# Patient Record
Sex: FEMALE | Race: WHITE | NOT HISPANIC OR LATINO | Employment: OTHER | URBAN - NONMETROPOLITAN AREA
[De-identification: names, ages, dates, MRNs, and addresses within clinical notes are randomized per-mention and may not be internally consistent; named-entity substitution may affect disease eponyms.]

---

## 2017-10-04 RX ORDER — DICYCLOMINE HYDROCHLORIDE 10 MG/1
10 CAPSULE ORAL 3 TIMES DAILY
COMMUNITY

## 2017-10-04 RX ORDER — CELECOXIB 200 MG/1
200 CAPSULE ORAL DAILY
COMMUNITY

## 2017-10-04 RX ORDER — LISINOPRIL 20 MG/1
20 TABLET ORAL DAILY
COMMUNITY

## 2017-10-04 RX ORDER — LORATADINE 10 MG/1
10 TABLET ORAL DAILY
COMMUNITY
End: 2017-10-05

## 2017-10-04 RX ORDER — ERGOCALCIFEROL 1.25 MG/1
50000 CAPSULE ORAL
COMMUNITY
End: 2017-10-05 | Stop reason: RX

## 2017-10-05 ENCOUNTER — OFFICE VISIT (OUTPATIENT)
Dept: NEUROLOGY | Facility: CLINIC | Age: 72
End: 2017-10-05

## 2017-10-05 VITALS
DIASTOLIC BLOOD PRESSURE: 52 MMHG | RESPIRATION RATE: 18 BRPM | HEART RATE: 72 BPM | WEIGHT: 128 LBS | SYSTOLIC BLOOD PRESSURE: 80 MMHG | HEIGHT: 64 IN | BODY MASS INDEX: 21.85 KG/M2

## 2017-10-05 DIAGNOSIS — R42 DIZZINESS: Primary | ICD-10-CM

## 2017-10-05 DIAGNOSIS — I10 ESSENTIAL (PRIMARY) HYPERTENSION: ICD-10-CM

## 2017-10-05 DIAGNOSIS — I95.1 ORTHOSTASIS: ICD-10-CM

## 2017-10-05 DIAGNOSIS — R26.89 IMBALANCE: ICD-10-CM

## 2017-10-05 DIAGNOSIS — H53.2 DIPLOPIA: ICD-10-CM

## 2017-10-05 PROCEDURE — 99204 OFFICE O/P NEW MOD 45 MIN: CPT | Performed by: PSYCHIATRY & NEUROLOGY

## 2017-10-05 RX ORDER — FUROSEMIDE 20 MG/1
20 TABLET ORAL AS NEEDED
COMMUNITY

## 2017-10-05 NOTE — PROGRESS NOTES
Subjective   Merline Burr, 1945, is a female who is being seen today for   Chief Complaint   Patient presents with   • Dizziness       HISTORY OF PRESENT ILLNESS: Patient seen for dizziness.  Patient says that when she gets up quickly she gets lightheaded or dizzy.  This does not a spinning sensation and she notes no acute hearing loss.  Patient has having some eye problems followed by Dr. Cyr see optometrist I do not have any of those reports for review.  Patient has never had her blood pressure checks standing.  Patient's blood pressure today is 120/80 sitting left arm left arm standing 80/52 with pulse 72.  Patient is on lisinopril and is to get back with PCP about hypotension and orthostasis.  Patient does drive and so far has not had any significant problems driving but is warned about potential risks.  Patient had a head cold early September and got a steroid shot and some antibiotics as well as starting steroids by mouth.  Patient had significant increase diplopia with this which was vertical diplopia.  Patient has had some blood work, do not have any of that for review.  Patient is had an MRI of the brain the CD of which is not available to review.  Patient's MRI shows scattered foci of signal abnormality within the subcortical and deep white matter most likely representing chronic small vessel ischemic changes.  Patient is been under a lot of stress recently and his son apparently recently passed away and she has lost weight.  Patient has inflammatory arthritis and takes tramadol.  Patient has a sister with dementia.    REVIEW OF SYSTEMS:   GENERAL: As above  PULMONARY: As above  CVS:  No acute chest pain or palpitation  GASTROINTESTINAL: Patient is had some recent nausea  GENITOURINARY: No acute  distress  GYN: No acute GYN distress  MUSCULOSKELETAL: As above  HEENT:  As above  ENDOCRINE:  No acute endocrine symptoms  PSYCHIATRIC: As above  HEMATOLOGY: No blood work for review  SKIN: No acute  skin changes  Family history reviewed and otherwise noncontributory  Social history: Patient does smoke.  Patient denies alcohol or drug use.    PHYSICAL EXAMINATION:    GENERAL: Patient very anxious during the exam  CRANIUM: No specific tenderness over the cranium with good strength/  Flexion and extension of neck  HEENT: No acute fundic abnormalities.  Pupils equal round reactive to light.       EYES: Fields full to confrontation and EOMs intact without nystagmus.  There is a slight possible right ptosis       EARS:  Tympanic membranes normal hears tuning fork bilaterally       THROAT: No oropharynx abnormalities and patient having no swallowing difficulties or shortness of breath       NECK:  No bruits/no lymphadenopathy  CHEST: No acute cardiopulmonary abnormalities by auscultation  ABDOMEN: Nondistended  EXTREMITIES: Pulses symmetrical  NEURO: Patient alert and follows commands without difficulty  SPEECH:  Normal    CRANIAL NERVES:  Motor sensory about the face otherwise normal and symmetric    MOTOR STRENGTH:  Motor strength upper and lower extremities normal  STATION AND GAIT:  Gait is normal/Romberg negative  CEREBELLAR:  Finger-nose and heel shin normal  SENSORY:  Pin and vibration shows decreased pin and vibration distal to proximal in lower extremities to the ankles bilaterally otherwise normal pin and vibration throughout  REFLEXES:  Decreased absent reflexes throughout upper and lower extremities without Babinski's      ASSESSMENT AND PLAN:  Patient with orthostasis and dizziness and imbalance.  Patient is to get MRA brain and EEG and noninvasive carotids and further blood work.  With the diplopia were checking myasthenia panel.  Patient is to have follow-up with PCP about her blood pressure dropping and watch for driving problems and safety precautions.      Merline was seen today for dizziness.    Diagnoses and all orders for this visit:    Dizziness  -     EEG; Future  -     MRI Angiogram Head Without  Contrast; Future  -     CBC & Differential; Future  -     Comprehensive Metabolic Panel; Future  -     Lipid Panel; Future  -     Magnesium; Future  -     Sedimentation Rate; Future  -     T4, Free; Future  -     Vitamin B12; Future  -     Folate; Future    Orthostasis    Imbalance  -     US Carotid Bilateral; Future    Diplopia  -     Myasthenia Gravis Full Panel; Future    Essential (primary) hypertension   -     Lipid Panel; Future  -     T4, Free; Future

## 2017-10-05 NOTE — PATIENT INSTRUCTIONS
Patient to get back with PCP about orthostasis.  Patient to take her blood pressures regularly sitting and standing or lying sitting and standing twice daily and keep diary for her PCP.  Patient to have driving and safety precautions.  Patient not to be climbing or working over hot fires or water or working with sharp cutting tools

## 2017-10-31 ENCOUNTER — HOSPITAL ENCOUNTER (OUTPATIENT)
Dept: NEUROLOGY | Facility: HOSPITAL | Age: 72
Discharge: HOME | End: 2017-10-31
Attending: PSYCHIATRY & NEUROLOGY | Admitting: PSYCHIATRY & NEUROLOGY

## 2017-10-31 ENCOUNTER — HOSPITAL ENCOUNTER (OUTPATIENT)
Dept: MRI IMAGING | Facility: HOSPITAL | Age: 72
Discharge: HOME | End: 2017-10-31
Attending: PSYCHIATRY & NEUROLOGY

## 2017-10-31 ENCOUNTER — HOSPITAL ENCOUNTER (OUTPATIENT)
Dept: ULTRASOUND IMAGING | Facility: HOSPITAL | Age: 72
Discharge: HOME | End: 2017-10-31
Attending: PSYCHIATRY & NEUROLOGY

## 2017-10-31 ENCOUNTER — LAB (OUTPATIENT)
Dept: LAB | Facility: HOSPITAL | Age: 72
End: 2017-10-31
Attending: PSYCHIATRY & NEUROLOGY

## 2017-10-31 DIAGNOSIS — I10 ESSENTIAL (PRIMARY) HYPERTENSION: ICD-10-CM

## 2017-10-31 DIAGNOSIS — R42 DIZZINESS: ICD-10-CM

## 2017-10-31 DIAGNOSIS — H53.2 DIPLOPIA: ICD-10-CM

## 2017-10-31 DIAGNOSIS — R26.89 IMBALANCE: ICD-10-CM

## 2017-10-31 LAB
ALBUMIN SERPL-MCNC: 4.2 G/DL (ref 3.5–5)
ALBUMIN/GLOB SERPL: 1.4 G/DL (ref 1.1–2.5)
ALP SERPL-CCNC: 94 U/L (ref 24–120)
ALT SERPL W P-5'-P-CCNC: 40 U/L (ref 0–54)
ANION GAP SERPL CALCULATED.3IONS-SCNC: 13 MMOL/L (ref 4–13)
ARTICHOKE IGE QN: 104 MG/DL (ref 0–99)
AST SERPL-CCNC: 28 U/L (ref 7–45)
BASOPHILS # BLD AUTO: 0.02 10*3/MM3 (ref 0–0.2)
BASOPHILS NFR BLD AUTO: 0.2 % (ref 0–2)
BILIRUB SERPL-MCNC: 0.3 MG/DL (ref 0.1–1)
BUN BLD-MCNC: 16 MG/DL (ref 5–21)
BUN/CREAT SERPL: 16.2 (ref 7–25)
CALCIUM SPEC-SCNC: 9.3 MG/DL (ref 8.4–10.4)
CHLORIDE SERPL-SCNC: 103 MMOL/L (ref 98–110)
CHOLEST SERPL-MCNC: 193 MG/DL (ref 130–200)
CO2 SERPL-SCNC: 27 MMOL/L (ref 24–31)
CREAT BLD-MCNC: 0.99 MG/DL (ref 0.5–1.4)
DEPRECATED RDW RBC AUTO: 46.9 FL (ref 40–54)
EOSINOPHIL # BLD AUTO: 0.03 10*3/MM3 (ref 0–0.7)
EOSINOPHIL NFR BLD AUTO: 0.3 % (ref 0–4)
ERYTHROCYTE [DISTWIDTH] IN BLOOD BY AUTOMATED COUNT: 13.7 % (ref 12–15)
ERYTHROCYTE [SEDIMENTATION RATE] IN BLOOD: 7 MM/HR (ref 0–20)
FOLATE SERPL-MCNC: 12.1 NG/ML
GFR SERPL CREATININE-BSD FRML MDRD: 55 ML/MIN/1.73
GLOBULIN UR ELPH-MCNC: 3 GM/DL
GLUCOSE BLD-MCNC: 83 MG/DL (ref 70–100)
HCT VFR BLD AUTO: 43.3 % (ref 37–47)
HDLC SERPL-MCNC: 63 MG/DL
HGB BLD-MCNC: 14.3 G/DL (ref 12–16)
IMM GRANULOCYTES # BLD: 0.01 10*3/MM3 (ref 0–0.03)
IMM GRANULOCYTES NFR BLD: 0.1 % (ref 0–5)
LDLC/HDLC SERPL: 1.87 {RATIO}
LYMPHOCYTES # BLD AUTO: 1.02 10*3/MM3 (ref 0.72–4.86)
LYMPHOCYTES NFR BLD AUTO: 11.2 % (ref 15–45)
MAGNESIUM SERPL-MCNC: 2.1 MG/DL (ref 1.4–2.2)
MCH RBC QN AUTO: 31.2 PG (ref 28–32)
MCHC RBC AUTO-ENTMCNC: 33 G/DL (ref 33–36)
MCV RBC AUTO: 94.3 FL (ref 82–98)
MONOCYTES # BLD AUTO: 0.57 10*3/MM3 (ref 0.19–1.3)
MONOCYTES NFR BLD AUTO: 6.3 % (ref 4–12)
NEUTROPHILS # BLD AUTO: 7.42 10*3/MM3 (ref 1.87–8.4)
NEUTROPHILS NFR BLD AUTO: 81.9 % (ref 39–78)
PLATELET # BLD AUTO: 326 10*3/MM3 (ref 130–400)
PMV BLD AUTO: 10.7 FL (ref 6–12)
POTASSIUM BLD-SCNC: 4.2 MMOL/L (ref 3.5–5.3)
PROT SERPL-MCNC: 7.2 G/DL (ref 6.3–8.7)
RBC # BLD AUTO: 4.59 10*6/MM3 (ref 4.2–5.4)
SODIUM BLD-SCNC: 143 MMOL/L (ref 135–145)
T4 FREE SERPL-MCNC: 1.22 NG/DL (ref 0.78–2.19)
TRIGL SERPL-MCNC: 61 MG/DL (ref 0–149)
VIT B12 BLD-MCNC: 389 PG/ML (ref 239–931)
WBC NRBC COR # BLD: 9.07 10*3/MM3 (ref 4.8–10.8)

## 2017-10-31 PROCEDURE — 93880 EXTRACRANIAL BILAT STUDY: CPT | Performed by: SURGERY

## 2017-10-31 PROCEDURE — 83735 ASSAY OF MAGNESIUM: CPT | Performed by: PSYCHIATRY & NEUROLOGY

## 2017-10-31 PROCEDURE — 95816 EEG AWAKE AND DROWSY: CPT

## 2017-10-31 PROCEDURE — 83519 RIA NONANTIBODY: CPT | Performed by: PSYCHIATRY & NEUROLOGY

## 2017-10-31 PROCEDURE — 82746 ASSAY OF FOLIC ACID SERUM: CPT | Performed by: PSYCHIATRY & NEUROLOGY

## 2017-10-31 PROCEDURE — 93880 EXTRACRANIAL BILAT STUDY: CPT

## 2017-10-31 PROCEDURE — 80053 COMPREHEN METABOLIC PANEL: CPT | Performed by: PSYCHIATRY & NEUROLOGY

## 2017-10-31 PROCEDURE — 80061 LIPID PANEL: CPT | Performed by: PSYCHIATRY & NEUROLOGY

## 2017-10-31 PROCEDURE — 82607 VITAMIN B-12: CPT | Performed by: PSYCHIATRY & NEUROLOGY

## 2017-10-31 PROCEDURE — 95816 EEG AWAKE AND DROWSY: CPT | Performed by: PSYCHIATRY & NEUROLOGY

## 2017-10-31 PROCEDURE — 85025 COMPLETE CBC W/AUTO DIFF WBC: CPT | Performed by: PSYCHIATRY & NEUROLOGY

## 2017-10-31 PROCEDURE — 86255 FLUORESCENT ANTIBODY SCREEN: CPT | Performed by: PSYCHIATRY & NEUROLOGY

## 2017-10-31 PROCEDURE — 76376 3D RENDER W/INTRP POSTPROCES: CPT

## 2017-10-31 PROCEDURE — 36415 COLL VENOUS BLD VENIPUNCTURE: CPT

## 2017-10-31 PROCEDURE — 85651 RBC SED RATE NONAUTOMATED: CPT | Performed by: PSYCHIATRY & NEUROLOGY

## 2017-10-31 PROCEDURE — 70544 MR ANGIOGRAPHY HEAD W/O DYE: CPT

## 2017-10-31 PROCEDURE — 84439 ASSAY OF FREE THYROXINE: CPT | Performed by: PSYCHIATRY & NEUROLOGY

## 2017-11-03 LAB
ACHR AB SER-SCNC: <0.03 NMOL/L (ref 0–0.24)
ACHR BLOCK AB/ACHR TOTAL SFR SER: 17 % (ref 0–25)
ACHR MOD AB/ACHR TOTAL SFR SER: <12 % (ref 0–20)
STRIA MUS AB TITR SER IF: NEGATIVE {TITER}

## 2017-11-10 ENCOUNTER — OFFICE VISIT (OUTPATIENT)
Dept: NEUROLOGY | Facility: CLINIC | Age: 72
End: 2017-11-10

## 2017-11-10 VITALS
OXYGEN SATURATION: 98 % | HEART RATE: 100 BPM | HEIGHT: 65 IN | BODY MASS INDEX: 21.33 KG/M2 | DIASTOLIC BLOOD PRESSURE: 100 MMHG | WEIGHT: 128 LBS | SYSTOLIC BLOOD PRESSURE: 160 MMHG

## 2017-11-10 DIAGNOSIS — R26.89 IMBALANCE: ICD-10-CM

## 2017-11-10 DIAGNOSIS — I10 HYPERTENSION, UNSPECIFIED TYPE: ICD-10-CM

## 2017-11-10 DIAGNOSIS — R42 DIZZINESS: Primary | ICD-10-CM

## 2017-11-10 PROCEDURE — 99406 BEHAV CHNG SMOKING 3-10 MIN: CPT | Performed by: CLINICAL NURSE SPECIALIST

## 2017-11-10 PROCEDURE — 99213 OFFICE O/P EST LOW 20 MIN: CPT | Performed by: CLINICAL NURSE SPECIALIST

## 2017-11-10 NOTE — PATIENT INSTRUCTIONS
"You Can Quit Smoking  If you are ready to quit smoking or are thinking about it, congratulations! You have chosen to help yourself be healthier and live longer! There are lots of different ways to quit smoking. Nicotine gum, nicotine patches, a nicotine inhaler, or nicotine nasal spray can help with physical craving. Hypnosis, support groups, and medicines help break the habit of smoking.  TIPS TO GET OFF AND STAY OFF CIGARETTES  · Learn to predict your moods. Do not let a bad situation be your excuse to have a cigarette. Some situations in your life might tempt you to have a cigarette.  · Ask friends and co-workers not to smoke around you.  · Make your home smoke-free.  · Never have \"just one\" cigarette. It leads to wanting another and another. Remind yourself of your decision to quit.  · On a card, make a list of your reasons for not smoking. Read it at least the same number of times a day as you have a cigarette. Tell yourself everyday, \"I do not want to smoke. I choose not to smoke.\"  · Ask someone at home or work to help you with your plan to quit smoking.  · Have something planned after you eat or have a cup of coffee. Take a walk or get other exercise to perk you up. This will help to keep you from overeating.  · Try a relaxation exercise to calm you down and decrease your stress. Remember, you may be tense and nervous the first two weeks after you quit. This will pass.  · Find new activities to keep your hands busy. Play with a pen, coin, or rubber band. Doodle or draw things on paper.  · Brush your teeth right after eating. This will help cut down the craving for the taste of tobacco after meals. You can try mouthwash too.  · Try gum, breath mints, or diet candy to keep something in your mouth.  IF YOU SMOKE AND WANT TO QUIT:  · Do not stock up on cigarettes. Never buy a carton. Wait until one pack is finished before you buy another.  · Never carry cigarettes with you at work or at home.  · Keep cigarettes " "as far away from you as possible. Leave them with someone else.  · Never carry matches or a lighter with you.  · Ask yourself, \"Do I need this cigarette or is this just a reflex?\"  · Bet with someone that you can quit. Put cigarette money in a [employer] bank every morning. If you smoke, you give up the money. If you do not smoke, by the end of the week, you keep the money.  · Keep trying. It takes 21 days to change a habit!  · Talk to your doctor about using medicines to help you quit. These include nicotine replacement gum, lozenges, or skin patches.     This information is not intended to replace advice given to you by your health care provider. Make sure you discuss any questions you have with your health care provider.     Document Released: 10/14/2010 Document Revised: 03/11/2013 Document Reviewed: 05/03/2016  Elsevier Interactive Patient Education ©2017 [employer]    "

## 2017-11-10 NOTE — PROGRESS NOTES
Subjective     Chief Complaint   Patient presents with   • Follow-up     Patient here to follow up from her Carotid Artery testing done on 10/31/2017.  She states the only time she has felt dizzy was while she had sinus pressure and since it has cleared up she hasn't had any dizzy spells.       Merline Burr is a 72 y.o. female right handed retiree. She is here today for follow up for dizziness and imabalance. She was last seen 10/2017 by Dr. Chaparro. Since then she tells me she has had resolution of the dizziness. At that visit she did have orthostatic BP and was to f/u with PCP. She did and per patient no adjustments to medications were done. She does have elevated BP today and no ortho stasis with sitting /standing BP. She would also have vertical diplopia with the dizziness and this has resovled as well.   She did have prior MRI brain that showed scattered foci of signal abnormality within the subcortical and deep white matter most likely representing chronic small vessel ischemic changes.  She also had 2decho, EEG, and carotid duplex and I have reviewed results with patient.     Dizziness   This is a new problem. The current episode started more than 1 month ago (9/2017). The problem occurs daily. The problem has been resolved. Pertinent negatives include no arthralgias, chest pain, fatigue, headaches, nausea, vomiting or weakness. Associated symptoms comments: Dizziness when standing, would have vertical  diplopia . Exacerbated by: sinus infection 9/2017, HTN, orthostatic hypotenson. She has tried nothing for the symptoms. The treatment provided significant relief.        Current Outpatient Prescriptions   Medication Sig Dispense Refill   • b complex-C-folic acid 1 MG capsule Take 1 capsule by mouth. Takes 2 caps twice a day     • celecoxib (CeleBREX) 200 MG capsule Take 200 mg by mouth Daily.     • dicyclomine (BENTYL) 10 MG capsule Take 10 mg by mouth 3 (Three) Times a Day.     • furosemide (LASIX) 20 MG  "tablet Take 20 mg by mouth As Needed.     • lisinopril (PRINIVIL,ZESTRIL) 20 MG tablet Take 20 mg by mouth Daily.       No current facility-administered medications for this visit.        Past Medical History:   Diagnosis Date   • Dizziness        Past Surgical History:   Procedure Laterality Date   • EYE SURGERY     • HAND SURGERY Left    • HIP SURGERY     • NECK SURGERY         family history is not on file.    Social History   Substance Use Topics   • Smoking status: Current Every Day Smoker     Types: Cigarettes   • Smokeless tobacco: Never Used   • Alcohol use No       Review of Systems   Constitutional: Negative.  Negative for fatigue.   HENT: Negative.  Negative for nosebleeds, rhinorrhea and sinus pressure.    Eyes: Negative.    Respiratory: Negative.  Negative for apnea and shortness of breath.    Cardiovascular: Negative.  Negative for chest pain.   Gastrointestinal: Negative.  Negative for constipation, diarrhea, nausea and vomiting.   Endocrine: Negative.    Genitourinary: Negative.  Negative for dysuria and frequency.   Musculoskeletal: Negative for arthralgias and gait problem.   Skin: Negative.    Allergic/Immunologic: Negative.    Neurological: Positive for dizziness. Negative for weakness and headaches.   Hematological: Negative.  Negative for adenopathy.   Psychiatric/Behavioral: Negative.  Negative for agitation, confusion and hallucinations.   All other systems reviewed and are negative.      Objective     /100 (BP Location: Left arm, Patient Position: Standing)  Pulse 100  Ht 65\" (165.1 cm)  Wt 128 lb (58.1 kg)  SpO2 98%  BMI 21.3 kg/m2, Body mass index is 21.3 kg/(m^2).    Physical Exam   Constitutional: She is oriented to person, place, and time. She appears well-developed and well-nourished.   HENT:   Head: Normocephalic and atraumatic.   Right Ear: External ear normal.   Left Ear: External ear normal.   Nose: Nose normal.   Mouth/Throat: Oropharynx is clear and moist.   Eyes: " Conjunctivae, EOM and lids are normal. Pupils are equal, round, and reactive to light. Right eye exhibits normal extraocular motion and no nystagmus. Left eye exhibits normal extraocular motion.   Neck: Normal range of motion. Neck supple. Carotid bruit is not present.   Cardiovascular: Normal rate, regular rhythm and normal heart sounds.    No murmur heard.  Pulmonary/Chest: Effort normal and breath sounds normal.   Abdominal: Soft. Bowel sounds are normal.   Musculoskeletal: Normal range of motion.   Neurological: She is alert and oriented to person, place, and time. She has normal strength and normal reflexes. She displays no tremor. No cranial nerve deficit or sensory deficit. She exhibits normal muscle tone. She displays a negative Romberg sign. Coordination and gait normal. GCS eye subscore is 4. GCS verbal subscore is 5. GCS motor subscore is 6.   Reflex Scores:       Tricep reflexes are 2+ on the right side and 2+ on the left side.       Bicep reflexes are 2+ on the right side and 2+ on the left side.       Brachioradialis reflexes are 2+ on the right side and 2+ on the left side.       Patellar reflexes are 2+ on the right side and 2+ on the left side.       Achilles reflexes are 2+ on the right side and 2+ on the left side.  CN II:  Visual fields full.  Pupils equally reactive to light  CN III, IV, VI:  Extraocular Muscles full with no signs of nystagmus  CN V:  Facial sensory is symmetric with no asymetries.  CN VII:  Facial motor symmetric  CN VIII:  Gross hearing intact bilaterally  CN IX:  Palate elevates symmetrically  CN X:  Palate elevates symmetrically  CN XI:  Shoulder shrug symmetric  CN XII:  Tongue is midline on protrusion   Skin: Skin is warm and dry.   Psychiatric: She has a normal mood and affect. Her speech is normal and behavior is normal. Cognition and memory are normal.   Nursing note and vitals reviewed.      Results for orders placed or performed in visit on 10/31/17   Comprehensive  Metabolic Panel   Result Value Ref Range    Glucose 83 70 - 100 mg/dL    BUN 16 5 - 21 mg/dL    Creatinine 0.99 0.50 - 1.40 mg/dL    Sodium 143 135 - 145 mmol/L    Potassium 4.2 3.5 - 5.3 mmol/L    Chloride 103 98 - 110 mmol/L    CO2 27.0 24.0 - 31.0 mmol/L    Calcium 9.3 8.4 - 10.4 mg/dL    Total Protein 7.2 6.3 - 8.7 g/dL    Albumin 4.20 3.50 - 5.00 g/dL    ALT (SGPT) 40 0 - 54 U/L    AST (SGOT) 28 7 - 45 U/L    Alkaline Phosphatase 94 24 - 120 U/L    Total Bilirubin 0.3 0.1 - 1.0 mg/dL    eGFR Non African Amer 55 (L) >60 mL/min/1.73    Globulin 3.0 gm/dL    A/G Ratio 1.4 1.1 - 2.5 g/dL    BUN/Creatinine Ratio 16.2 7.0 - 25.0    Anion Gap 13.0 4.0 - 13.0 mmol/L   Lipid Panel   Result Value Ref Range    Total Cholesterol 193 130 - 200 mg/dL    Triglycerides 61 0 - 149 mg/dL    HDL Cholesterol 63 >=50 mg/dL    LDL Cholesterol  104 (H) 0 - 99 mg/dL    LDL/HDL Ratio 1.87    Magnesium   Result Value Ref Range    Magnesium 2.1 1.4 - 2.2 mg/dL   Sedimentation Rate   Result Value Ref Range    Sed Rate 7 0 - 20 mm/hr   T4, Free   Result Value Ref Range    Free T4 1.22 0.78 - 2.19 ng/dL   Vitamin B12   Result Value Ref Range    Vitamin B-12 389 239 - 931 pg/mL   Folate   Result Value Ref Range    Folate 12.10 >2.75 ng/mL   Myasthenia Gravis Full Panel   Result Value Ref Range    AChR Binding Ab <0.03 0.00 - 0.24 nmol/L    AChR Blocking Abs 17 0 - 25 %    Acetylcholine Modulating Ab <12 0 - 20 %    Striated Muscle Antibody Negative Neg:<1:40   CBC Auto Differential   Result Value Ref Range    WBC 9.07 4.80 - 10.80 10*3/mm3    RBC 4.59 4.20 - 5.40 10*6/mm3    Hemoglobin 14.3 12.0 - 16.0 g/dL    Hematocrit 43.3 37.0 - 47.0 %    MCV 94.3 82.0 - 98.0 fL    MCH 31.2 28.0 - 32.0 pg    MCHC 33.0 33.0 - 36.0 g/dL    RDW 13.7 12.0 - 15.0 %    RDW-SD 46.9 40.0 - 54.0 fl    MPV 10.7 6.0 - 12.0 fL    Platelets 326 130 - 400 10*3/mm3    Neutrophil % 81.9 (H) 39.0 - 78.0 %    Lymphocyte % 11.2 (L) 15.0 - 45.0 %    Monocyte % 6.3 4.0 -  12.0 %    Eosinophil % 0.3 0.0 - 4.0 %    Basophil % 0.2 0.0 - 2.0 %    Immature Grans % 0.1 0.0 - 5.0 %    Neutrophils, Absolute 7.42 1.87 - 8.40 10*3/mm3    Lymphocytes, Absolute 1.02 0.72 - 4.86 10*3/mm3    Monocytes, Absolute 0.57 0.19 - 1.30 10*3/mm3    Eosinophils, Absolute 0.03 0.00 - 0.70 10*3/mm3    Basophils, Absolute 0.02 0.00 - 0.20 10*3/mm3    Immature Grans, Absolute 0.01 0.00 - 0.03 10*3/mm3      MRA HEAD: IMPRESSION:  No evidence of high-grade stenosis or occlusion seen in the  arteries of the [place].    CAROTID DUPLEX: IMPRESSION:  Impression:  1. There is less than 50% stenosis of the right internal carotid artery.  2. There is less than 50% stenosis of the left internal carotid artery.  3. Antegrade flow is demonstrated in bilateral vertebral arteries.    EEG: RESULTS:  EEG background activity the record is low amplitude and somewhat obscured by movement artifact at times while awake.  Background is probably  8-9 Hz in the posterior head region bilaterally there is possible beta activity over both hemispheres/possibly secondary to medication effect.  Hyperventilation produces no significant changes.  Photic stimulation no change.  Patient gets drowsy with further generalized slowing the background activity noted     IMPRESSION:  Normal EEG awake/drowsy.  There is some artifact as noted above.         ASSESSMENT/PLAN    Diagnoses and all orders for this visit:    Dizziness    Imbalance    Hypertension, unspecified type      MEDICAL DECISION MAKIN. Resolution of dizziness and diplopia  2. BP elevated today. Patient is to monitor and if not improved she is to contact PCP  3. Will see one more time in f/u and if no further episodes will likely seen PRN.  4. I advised the patient of the risks in continuing to use tobacco, and I provided this patient with smoking cessation educational materials.  I also discussed how to quit smoking and the patient has expressed the willingness to quit.       During this visit, I spent 3-10 mintues counseling the patient regarding smoking cessation.   5. Healthy BMI     allergies and all known medications/prescriptions have been reviewed using resources available on this encounter.    Return in about 3 months (around 2/10/2018).        DUSTIN Cordova

## 2018-05-15 ENCOUNTER — OFFICE VISIT (OUTPATIENT)
Dept: FAMILY MEDICINE CLINIC | Age: 73
End: 2018-05-15
Payer: MEDICARE

## 2018-05-15 VITALS
DIASTOLIC BLOOD PRESSURE: 60 MMHG | OXYGEN SATURATION: 98 % | SYSTOLIC BLOOD PRESSURE: 120 MMHG | BODY MASS INDEX: 21.78 KG/M2 | WEIGHT: 127.6 LBS | HEART RATE: 82 BPM | TEMPERATURE: 98.8 F | RESPIRATION RATE: 16 BRPM | HEIGHT: 64 IN

## 2018-05-15 DIAGNOSIS — G89.29 CHRONIC PAIN OF MULTIPLE JOINTS: ICD-10-CM

## 2018-05-15 DIAGNOSIS — R60.9 SWELLING: ICD-10-CM

## 2018-05-15 DIAGNOSIS — I10 ESSENTIAL HYPERTENSION: ICD-10-CM

## 2018-05-15 DIAGNOSIS — M19.90 ARTHRITIS: Primary | ICD-10-CM

## 2018-05-15 DIAGNOSIS — K58.9 IRRITABLE BOWEL SYNDROME, UNSPECIFIED TYPE: ICD-10-CM

## 2018-05-15 DIAGNOSIS — M25.50 CHRONIC PAIN OF MULTIPLE JOINTS: ICD-10-CM

## 2018-05-15 PROCEDURE — 99204 OFFICE O/P NEW MOD 45 MIN: CPT | Performed by: FAMILY MEDICINE

## 2018-05-15 RX ORDER — CELECOXIB 200 MG/1
200 CAPSULE ORAL DAILY
Qty: 30 CAPSULE | Refills: 0 | Status: SHIPPED | OUTPATIENT
Start: 2018-05-15 | End: 2018-06-19 | Stop reason: SDUPTHER

## 2018-05-15 RX ORDER — CELECOXIB 200 MG/1
200 CAPSULE ORAL
COMMUNITY
End: 2018-05-15 | Stop reason: SDUPTHER

## 2018-05-15 RX ORDER — TRAMADOL HYDROCHLORIDE 50 MG/1
50 TABLET ORAL 2 TIMES DAILY
COMMUNITY
End: 2018-05-15 | Stop reason: SDUPTHER

## 2018-05-15 RX ORDER — FUROSEMIDE 20 MG/1
20 TABLET ORAL
COMMUNITY
End: 2018-05-15 | Stop reason: SDUPTHER

## 2018-05-15 RX ORDER — LISINOPRIL 20 MG/1
TABLET ORAL
COMMUNITY
Start: 2018-05-04 | End: 2019-05-16 | Stop reason: SDUPTHER

## 2018-05-15 RX ORDER — TRAMADOL HYDROCHLORIDE 50 MG/1
50 TABLET ORAL 2 TIMES DAILY PRN
Qty: 60 TABLET | Refills: 0 | Status: SHIPPED | OUTPATIENT
Start: 2018-05-15 | End: 2018-06-19 | Stop reason: SDUPTHER

## 2018-05-15 RX ORDER — DICYCLOMINE HYDROCHLORIDE 10 MG/1
10 CAPSULE ORAL
COMMUNITY
End: 2019-02-19 | Stop reason: SDUPTHER

## 2018-05-15 RX ORDER — FUROSEMIDE 20 MG/1
20 TABLET ORAL DAILY PRN
Qty: 30 TABLET | Refills: 0 | Status: SHIPPED | OUTPATIENT
Start: 2018-05-15 | End: 2019-09-17 | Stop reason: SDUPTHER

## 2018-05-15 ASSESSMENT — PATIENT HEALTH QUESTIONNAIRE - PHQ9
2. FEELING DOWN, DEPRESSED OR HOPELESS: 1
1. LITTLE INTEREST OR PLEASURE IN DOING THINGS: 1
SUM OF ALL RESPONSES TO PHQ9 QUESTIONS 1 & 2: 2
SUM OF ALL RESPONSES TO PHQ QUESTIONS 1-9: 2

## 2018-05-15 ASSESSMENT — ENCOUNTER SYMPTOMS
EYE PAIN: 0
ABDOMINAL PAIN: 0
WHEEZING: 0
BACK PAIN: 0
COUGH: 0
TROUBLE SWALLOWING: 0
CONSTIPATION: 0
EYE DISCHARGE: 0
SHORTNESS OF BREATH: 0
DIARRHEA: 0
NAUSEA: 0
VOMITING: 0

## 2018-05-18 ENCOUNTER — TELEPHONE (OUTPATIENT)
Dept: FAMILY MEDICINE CLINIC | Age: 73
End: 2018-05-18

## 2018-06-19 ENCOUNTER — OFFICE VISIT (OUTPATIENT)
Dept: FAMILY MEDICINE CLINIC | Age: 73
End: 2018-06-19
Payer: MEDICARE

## 2018-06-19 VITALS
RESPIRATION RATE: 16 BRPM | HEIGHT: 64 IN | HEART RATE: 70 BPM | OXYGEN SATURATION: 97 % | WEIGHT: 128.2 LBS | BODY MASS INDEX: 21.89 KG/M2 | SYSTOLIC BLOOD PRESSURE: 138 MMHG | TEMPERATURE: 98.8 F | DIASTOLIC BLOOD PRESSURE: 84 MMHG

## 2018-06-19 DIAGNOSIS — M19.90 ARTHRITIS: ICD-10-CM

## 2018-06-19 DIAGNOSIS — M25.50 CHRONIC PAIN OF MULTIPLE JOINTS: ICD-10-CM

## 2018-06-19 DIAGNOSIS — G89.29 CHRONIC PAIN OF MULTIPLE JOINTS: ICD-10-CM

## 2018-06-19 DIAGNOSIS — J34.89 POSTERIOR RHINORRHEA: ICD-10-CM

## 2018-06-19 DIAGNOSIS — J01.00 ACUTE MAXILLARY SINUSITIS, RECURRENCE NOT SPECIFIED: Primary | ICD-10-CM

## 2018-06-19 DIAGNOSIS — Z23 NEED FOR PROPHYLACTIC VACCINATION AGAINST STREPTOCOCCUS PNEUMONIAE (PNEUMOCOCCUS): ICD-10-CM

## 2018-06-19 PROCEDURE — 90471 IMMUNIZATION ADMIN: CPT | Performed by: FAMILY MEDICINE

## 2018-06-19 PROCEDURE — 99214 OFFICE O/P EST MOD 30 MIN: CPT | Performed by: FAMILY MEDICINE

## 2018-06-19 PROCEDURE — 90670 PCV13 VACCINE IM: CPT | Performed by: FAMILY MEDICINE

## 2018-06-19 RX ORDER — TRIAMCINOLONE ACETONIDE 40 MG/ML
40 INJECTION, SUSPENSION INTRA-ARTICULAR; INTRAMUSCULAR ONCE
Status: COMPLETED | OUTPATIENT
Start: 2018-06-19 | End: 2018-06-19

## 2018-06-19 RX ORDER — FLUTICASONE PROPIONATE 50 MCG
1 SPRAY, SUSPENSION (ML) NASAL DAILY
Qty: 1 BOTTLE | Refills: 3 | Status: SHIPPED | OUTPATIENT
Start: 2018-06-19 | End: 2018-11-20 | Stop reason: SDUPTHER

## 2018-06-19 RX ORDER — TRAMADOL HYDROCHLORIDE 50 MG/1
50 TABLET ORAL 2 TIMES DAILY PRN
Qty: 60 TABLET | Refills: 2 | Status: SHIPPED | OUTPATIENT
Start: 2018-06-19 | End: 2018-08-16 | Stop reason: SDUPTHER

## 2018-06-19 RX ORDER — CELECOXIB 200 MG/1
200 CAPSULE ORAL DAILY
Qty: 30 CAPSULE | Refills: 2 | Status: SHIPPED | OUTPATIENT
Start: 2018-06-19 | End: 2022-08-03

## 2018-06-19 RX ORDER — AMOXICILLIN 500 MG/1
500 CAPSULE ORAL 2 TIMES DAILY
Qty: 20 CAPSULE | Refills: 0 | Status: SHIPPED | OUTPATIENT
Start: 2018-06-19 | End: 2018-06-29

## 2018-06-19 RX ADMIN — TRIAMCINOLONE ACETONIDE 40 MG: 40 INJECTION, SUSPENSION INTRA-ARTICULAR; INTRAMUSCULAR at 11:59

## 2018-06-19 NOTE — PROGRESS NOTES
Social History   Substance Use Topics    Smoking status: Current Every Day Smoker     Packs/day: 0.50     Years: 10.00     Types: Cigarettes    Smokeless tobacco: Never Used    Alcohol use No       Family History   Problem Relation Age of Onset    Other Other         Son DVT       /84 (Site: Right Arm, Position: Sitting, Cuff Size: Large Adult)   Pulse 70   Temp 98.8 °F (37.1 °C) (Temporal)   Resp 16   Ht 5' 3.5\" (1.613 m)   Wt 128 lb 3.2 oz (58.2 kg)   SpO2 97%   BMI 22.35 kg/m²     Physical Exam   Constitutional: She is oriented to person, place, and time. She appears well-developed and well-nourished. No distress. HENT:   Head: Normocephalic and atraumatic. Right Ear: External ear normal.   Left Ear: External ear normal.   Nose: Nose normal.   Mouth/Throat: No oropharyngeal exudate. Posterior rhinorrhea with maxillary sinus tenderness   Eyes: Conjunctivae and EOM are normal. Right eye exhibits no discharge. Left eye exhibits no discharge. No scleral icterus. Neck: Neck supple. No tracheal deviation present. No thyromegaly present. Cardiovascular: Normal rate, regular rhythm, normal heart sounds and intact distal pulses. Exam reveals no gallop and no friction rub. No murmur heard. Pulmonary/Chest: Effort normal and breath sounds normal. No respiratory distress. She has no wheezes. She has no rales. Abdominal: Soft. Bowel sounds are normal. She exhibits no distension. There is no tenderness. Musculoskeletal: She exhibits edema (mild edema bilateral feet) and deformity (Deformitites noted of fingers and toes. Ulnar deviation of fingers. ). Lymphadenopathy:     She has no cervical adenopathy. Neurological: She is alert and oriented to person, place, and time. No cranial nerve deficit. Skin: Skin is warm and dry. No rash noted. She is not diaphoretic. No erythema. Psychiatric: She has a normal mood and affect.  Her behavior is normal. Thought content normal.   Nursing note and vitals reviewed. Assessment:    ICD-10-CM ICD-9-CM    1. Acute maxillary sinusitis, recurrence not specified J01.00 461.0 amoxicillin (AMOXIL) 500 MG capsule   2. Posterior rhinorrhea J34.89 478.19 triamcinolone acetonide (KENALOG-40) injection 40 mg      fluticasone (FLONASE) 50 MCG/ACT nasal spray   3. Arthritis M19.90 716.90 traMADol (ULTRAM) 50 MG tablet      celecoxib (CELEBREX) 200 MG capsule    Jean Casas reviewed and scanned in medical record. Stressed proper use of her medication. We'll continue to monitor and adjust treatment as course dictates. 4. Chronic pain of multiple joints M25.50 719.49 traMADol (ULTRAM) 50 MG tablet    G89.29 338.29    5. Need for prophylactic vaccination against Streptococcus pneumoniae (pneumococcus) Z23 V03.82 Pneumococcal conjugate vaccine 13-valent PCV13       Plan:  Discussed diagnosis, expected course, and proper use of medication, including OTC medications if prescription is too expensive or insurance does not cover. Discussed signs and symptoms requiring medical attention. All questions were answered and patient voiced understanding and agreement with plan as discussed. Orders Placed This Encounter   Procedures    Pneumococcal conjugate vaccine 13-valent PCV13     Orders Placed This Encounter   Medications    traMADol (ULTRAM) 50 MG tablet     Sig: Take 1 tablet by mouth 2 times daily as needed for Pain for up to 90 days. .     Dispense:  60 tablet     Refill:  2    celecoxib (CELEBREX) 200 MG capsule     Sig: Take 1 capsule by mouth daily     Dispense:  30 capsule     Refill:  2    amoxicillin (AMOXIL) 500 MG capsule     Sig: Take 1 capsule by mouth 2 times daily for 10 days     Dispense:  20 capsule     Refill:  0    triamcinolone acetonide (KENALOG-40) injection 40 mg    fluticasone (FLONASE) 50 MCG/ACT nasal spray     Si spray by Nasal route daily     Dispense:  1 Bottle     Refill:  3     Medications Discontinued During This Encounter

## 2018-07-05 ASSESSMENT — ENCOUNTER SYMPTOMS
EYE PAIN: 0
CONSTIPATION: 0
SHORTNESS OF BREATH: 0
EYE DISCHARGE: 0
ABDOMINAL PAIN: 0
NAUSEA: 0
SINUS PRESSURE: 1
COUGH: 0
DIARRHEA: 0
VOMITING: 0
RHINORRHEA: 1
WHEEZING: 0

## 2018-07-06 NOTE — PATIENT INSTRUCTIONS
Patient Education        Sinusitis: Care Instructions  Your Care Instructions    Sinusitis is an infection of the lining of the sinus cavities in your head. Sinusitis often follows a cold. It causes pain and pressure in your head and face. In most cases, sinusitis gets better on its own in 1 to 2 weeks. But some mild symptoms may last for several weeks. Sometimes antibiotics are needed. Follow-up care is a key part of your treatment and safety. Be sure to make and go to all appointments, and call your doctor if you are having problems. It's also a good idea to know your test results and keep a list of the medicines you take. How can you care for yourself at home? · Take an over-the-counter pain medicine, such as acetaminophen (Tylenol), ibuprofen (Advil, Motrin), or naproxen (Aleve). Read and follow all instructions on the label. · If the doctor prescribed antibiotics, take them as directed. Do not stop taking them just because you feel better. You need to take the full course of antibiotics. · Be careful when taking over-the-counter cold or flu medicines and Tylenol at the same time. Many of these medicines have acetaminophen, which is Tylenol. Read the labels to make sure that you are not taking more than the recommended dose. Too much acetaminophen (Tylenol) can be harmful. · Breathe warm, moist air from a steamy shower, a hot bath, or a sink filled with hot water. Avoid cold, dry air. Using a humidifier in your home may help. Follow the directions for cleaning the machine. · Use saline (saltwater) nasal washes to help keep your nasal passages open and wash out mucus and bacteria. You can buy saline nose drops at a grocery store or drugstore. Or you can make your own at home by adding 1 teaspoon of salt and 1 teaspoon of baking soda to 2 cups of distilled water. If you make your own, fill a bulb syringe with the solution, insert the tip into your nostril, and squeeze gently. Geraldine Estefania your nose.   · Put a hot, wet towel or a warm gel pack on your face 3 or 4 times a day for 5 to 10 minutes each time. · Try a decongestant nasal spray like oxymetazoline (Afrin). Do not use it for more than 3 days in a row. Using it for more than 3 days can make your congestion worse. When should you call for help? Call your doctor now or seek immediate medical care if:    · You have new or worse swelling or redness in your face or around your eyes.     · You have a new or higher fever.    Watch closely for changes in your health, and be sure to contact your doctor if:    · You have new or worse facial pain.     · The mucus from your nose becomes thicker (like pus) or has new blood in it.     · You are not getting better as expected. Where can you learn more? Go to [email or web address] and sign in to your [employer] account. Enter V287 in the Alter-G box to learn more about \"Sinusitis: Care Instructions. \"     If you do not have an account, please click on the \"Sign Up Now\" link. Current as of: May 12, 2017  Content Version: 11.6  © 3500-6080 [employer]. Care instructions adapted under license by [facility] ([facility]). If you have questions about a medical condition or this instruction, always ask your healthcare professional. [address] any warranty or liability for your use of this information.

## 2018-08-15 ENCOUNTER — TELEPHONE (OUTPATIENT)
Dept: FAMILY MEDICINE CLINIC | Age: 73
End: 2018-08-15

## 2018-08-15 DIAGNOSIS — K04.7 TOOTH ABSCESS: Primary | ICD-10-CM

## 2018-08-15 RX ORDER — AMOXICILLIN 500 MG/1
500 CAPSULE ORAL 3 TIMES DAILY
Qty: 30 CAPSULE | Refills: 0 | Status: SHIPPED | OUTPATIENT
Start: 2018-08-15 | End: 2018-08-25

## 2018-08-16 ENCOUNTER — OFFICE VISIT (OUTPATIENT)
Dept: FAMILY MEDICINE CLINIC | Age: 73
End: 2018-08-16
Payer: MEDICARE

## 2018-08-16 VITALS
HEART RATE: 74 BPM | WEIGHT: 132.8 LBS | SYSTOLIC BLOOD PRESSURE: 106 MMHG | RESPIRATION RATE: 16 BRPM | BODY MASS INDEX: 23.53 KG/M2 | DIASTOLIC BLOOD PRESSURE: 60 MMHG | OXYGEN SATURATION: 98 % | HEIGHT: 63 IN | TEMPERATURE: 98.8 F

## 2018-08-16 DIAGNOSIS — I10 ESSENTIAL HYPERTENSION: ICD-10-CM

## 2018-08-16 DIAGNOSIS — M25.50 CHRONIC PAIN OF MULTIPLE JOINTS: ICD-10-CM

## 2018-08-16 DIAGNOSIS — G89.29 CHRONIC PAIN OF MULTIPLE JOINTS: ICD-10-CM

## 2018-08-16 DIAGNOSIS — K04.7 TOOTH ABSCESS: ICD-10-CM

## 2018-08-16 DIAGNOSIS — M19.90 ARTHRITIS: Primary | ICD-10-CM

## 2018-08-16 PROCEDURE — 99214 OFFICE O/P EST MOD 30 MIN: CPT | Performed by: FAMILY MEDICINE

## 2018-08-16 RX ORDER — TRAMADOL HYDROCHLORIDE 50 MG/1
50 TABLET ORAL 2 TIMES DAILY PRN
Qty: 60 TABLET | Refills: 1 | Status: SHIPPED | OUTPATIENT
Start: 2018-08-16 | End: 2018-11-20 | Stop reason: SDUPTHER

## 2018-08-16 ASSESSMENT — ENCOUNTER SYMPTOMS
SHORTNESS OF BREATH: 0
DIARRHEA: 0
BACK PAIN: 0
NAUSEA: 0
TROUBLE SWALLOWING: 0
VOMITING: 0
CONSTIPATION: 0
ABDOMINAL PAIN: 0
COUGH: 0
EYE PAIN: 0
WHEEZING: 0
EYE DISCHARGE: 0

## 2018-08-16 NOTE — PROGRESS NOTES
of fingers. ). She exhibits no edema (no appreciable lower extermity edema). Lymphadenopathy:     She has no cervical adenopathy. Neurological: She is alert and oriented to person, place, and time. No cranial nerve deficit. Skin: Skin is warm and dry. No rash noted. She is not diaphoretic. No erythema. Psychiatric: She has a normal mood and affect. Her behavior is normal. Thought content normal.   Nursing note and vitals reviewed. Assessment:    ICD-10-CM ICD-9-CM    1. Arthritis M19.90 716.90 traMADol (ULTRAM) 50 MG tablet    [place] reviewed and scanned into medical record. Stressed proper use of medication and using in as needed fashion only. Patient coming in early and having a refill left of the pharmacy discussed the option of providing a prescription with one refill into her pharmacy with plans for follow-up in 3 months. She was agreeable with this plan. 2. Chronic pain of multiple joints M25.50 719.49 traMADol (ULTRAM) 50 MG tablet    G89.29 338.29    3. Tooth abscess K04.7 522.5 Discussed proper care of involved tooth. Antibiotics of previously been sent to the pharmacy for her to get started on. Stress importance of keeping follow-up with her dentist.    4. Essential hypertension I10 401.9 Controlled with current medication we'll continue to monitor and adjust as course dictates. Plan:  Discussed proper use of medication. Discussed signs and symptoms requiring medical attention. All questions were answered and patient voiced understanding and agreement with plan as discussed. No orders of the defined types were placed in this encounter. Orders Placed This Encounter   Medications    traMADol (ULTRAM) 50 MG tablet     Sig: Take 1 tablet by mouth 2 times daily as needed for Pain for up to 60 days. .     Dispense:  60 tablet     Refill:  1     Medications Discontinued During This Encounter   Medication Reason    traMADol (ULTRAM) 50 MG tablet REORDER     Patient Instructions Patient given educational handouts and has had all questions answered. Patient voices understanding and agrees to plans along with risks and benefits of plan. Patient is instructed to continue prior meds, diet, and exercise plans as instructed. Patient agrees to follow up as instructed and sooner if needed. Patient agrees to go to ER if condition becomes emergent. Return in about 3 months (around 11/16/2018), or if symptoms worsen or fail to improve.

## 2018-11-20 ENCOUNTER — OFFICE VISIT (OUTPATIENT)
Dept: FAMILY MEDICINE CLINIC | Age: 73
End: 2018-11-20
Payer: MEDICARE

## 2018-11-20 VITALS
BODY MASS INDEX: 23.22 KG/M2 | HEART RATE: 86 BPM | DIASTOLIC BLOOD PRESSURE: 77 MMHG | OXYGEN SATURATION: 98 % | TEMPERATURE: 97.6 F | SYSTOLIC BLOOD PRESSURE: 138 MMHG | WEIGHT: 136 LBS | HEIGHT: 64 IN

## 2018-11-20 DIAGNOSIS — M25.50 CHRONIC PAIN OF MULTIPLE JOINTS: ICD-10-CM

## 2018-11-20 DIAGNOSIS — K06.2 DENTURE IRRITATION: ICD-10-CM

## 2018-11-20 DIAGNOSIS — J06.9 VIRAL URI: Primary | ICD-10-CM

## 2018-11-20 DIAGNOSIS — M19.90 ARTHRITIS: ICD-10-CM

## 2018-11-20 DIAGNOSIS — J34.89 POSTERIOR RHINORRHEA: ICD-10-CM

## 2018-11-20 DIAGNOSIS — G89.29 CHRONIC PAIN OF MULTIPLE JOINTS: ICD-10-CM

## 2018-11-20 PROCEDURE — 99214 OFFICE O/P EST MOD 30 MIN: CPT | Performed by: FAMILY MEDICINE

## 2018-11-20 RX ORDER — TRAMADOL HYDROCHLORIDE 50 MG/1
50 TABLET ORAL 2 TIMES DAILY PRN
Qty: 60 TABLET | Refills: 2 | Status: SHIPPED | OUTPATIENT
Start: 2018-11-20 | End: 2019-02-19 | Stop reason: SDUPTHER

## 2018-11-20 RX ORDER — FLUTICASONE PROPIONATE 50 MCG
1 SPRAY, SUSPENSION (ML) NASAL DAILY
Qty: 1 BOTTLE | Refills: 3 | Status: SHIPPED | OUTPATIENT
Start: 2018-11-20 | End: 2019-06-03 | Stop reason: SDUPTHER

## 2018-11-20 ASSESSMENT — ENCOUNTER SYMPTOMS
SINUS PRESSURE: 1
EYE PAIN: 0
COUGH: 0
TROUBLE SWALLOWING: 0
BACK PAIN: 0
NAUSEA: 0
VOMITING: 0
RHINORRHEA: 1
DIARRHEA: 0
CONSTIPATION: 0
ABDOMINAL PAIN: 0
EYE DISCHARGE: 0
SHORTNESS OF BREATH: 0
SINUS PAIN: 1

## 2019-02-19 ENCOUNTER — OFFICE VISIT (OUTPATIENT)
Dept: FAMILY MEDICINE CLINIC | Age: 74
End: 2019-02-19
Payer: MEDICARE

## 2019-02-19 VITALS
BODY MASS INDEX: 24.1 KG/M2 | SYSTOLIC BLOOD PRESSURE: 128 MMHG | TEMPERATURE: 98.8 F | HEART RATE: 82 BPM | WEIGHT: 136 LBS | DIASTOLIC BLOOD PRESSURE: 82 MMHG | HEIGHT: 63 IN | OXYGEN SATURATION: 97 % | RESPIRATION RATE: 16 BRPM

## 2019-02-19 DIAGNOSIS — M19.90 ARTHRITIS: Primary | ICD-10-CM

## 2019-02-19 DIAGNOSIS — Z79.899 MEDICATION MANAGEMENT: ICD-10-CM

## 2019-02-19 DIAGNOSIS — M25.50 CHRONIC PAIN OF MULTIPLE JOINTS: ICD-10-CM

## 2019-02-19 DIAGNOSIS — G89.29 CHRONIC PAIN OF MULTIPLE JOINTS: ICD-10-CM

## 2019-02-19 PROCEDURE — 99213 OFFICE O/P EST LOW 20 MIN: CPT | Performed by: FAMILY MEDICINE

## 2019-02-19 RX ORDER — DICYCLOMINE HYDROCHLORIDE 10 MG/1
10 CAPSULE ORAL
Qty: 120 CAPSULE | Refills: 2 | Status: SHIPPED | OUTPATIENT
Start: 2019-02-19 | End: 2020-04-23 | Stop reason: SDUPTHER

## 2019-02-19 RX ORDER — TRAMADOL HYDROCHLORIDE 50 MG/1
TABLET ORAL
COMMUNITY
End: 2019-03-23 | Stop reason: SDUPTHER

## 2019-02-19 RX ORDER — TRAMADOL HYDROCHLORIDE 50 MG/1
50 TABLET ORAL 2 TIMES DAILY PRN
Qty: 60 TABLET | Refills: 2 | Status: SHIPPED | OUTPATIENT
Start: 2019-02-19 | End: 2019-05-16 | Stop reason: SDUPTHER

## 2019-02-19 ASSESSMENT — PATIENT HEALTH QUESTIONNAIRE - PHQ9
SUM OF ALL RESPONSES TO PHQ9 QUESTIONS 1 & 2: 0
SUM OF ALL RESPONSES TO PHQ QUESTIONS 1-9: 0
1. LITTLE INTEREST OR PLEASURE IN DOING THINGS: 0
SUM OF ALL RESPONSES TO PHQ QUESTIONS 1-9: 0
2. FEELING DOWN, DEPRESSED OR HOPELESS: 0

## 2019-02-20 ASSESSMENT — ENCOUNTER SYMPTOMS
CONSTIPATION: 0
TROUBLE SWALLOWING: 0
RHINORRHEA: 0
VOMITING: 0
COUGH: 0
NAUSEA: 0
DIARRHEA: 0
EYE PAIN: 0
EYE DISCHARGE: 0
SHORTNESS OF BREATH: 0

## 2019-03-19 ENCOUNTER — TELEPHONE (OUTPATIENT)
Dept: FAMILY MEDICINE CLINIC | Age: 74
End: 2019-03-19

## 2019-03-23 ENCOUNTER — OFFICE VISIT (OUTPATIENT)
Dept: FAMILY MEDICINE CLINIC | Age: 74
End: 2019-03-23
Payer: MEDICARE

## 2019-03-23 VITALS
DIASTOLIC BLOOD PRESSURE: 74 MMHG | SYSTOLIC BLOOD PRESSURE: 122 MMHG | BODY MASS INDEX: 24.09 KG/M2 | TEMPERATURE: 97.4 F | HEART RATE: 94 BPM | OXYGEN SATURATION: 98 % | WEIGHT: 136 LBS

## 2019-03-23 DIAGNOSIS — B96.89 ACUTE BACTERIAL SINUSITIS: Primary | ICD-10-CM

## 2019-03-23 DIAGNOSIS — J01.90 ACUTE BACTERIAL SINUSITIS: Primary | ICD-10-CM

## 2019-03-23 PROCEDURE — 96372 THER/PROPH/DIAG INJ SC/IM: CPT | Performed by: FAMILY MEDICINE

## 2019-03-23 PROCEDURE — 99213 OFFICE O/P EST LOW 20 MIN: CPT | Performed by: FAMILY MEDICINE

## 2019-03-23 RX ORDER — TRIAMCINOLONE ACETONIDE 40 MG/ML
40 INJECTION, SUSPENSION INTRA-ARTICULAR; INTRAMUSCULAR ONCE
Status: COMPLETED | OUTPATIENT
Start: 2019-03-23 | End: 2019-03-23

## 2019-03-23 RX ORDER — IPRATROPIUM BROMIDE 42 UG/1
2 SPRAY, METERED NASAL 3 TIMES DAILY
Qty: 1 BOTTLE | Refills: 3 | Status: SHIPPED | OUTPATIENT
Start: 2019-03-23 | End: 2020-04-23 | Stop reason: ALTCHOICE

## 2019-03-23 RX ORDER — DEXAMETHASONE SODIUM PHOSPHATE 4 MG/ML
4 INJECTION, SOLUTION INTRA-ARTICULAR; INTRALESIONAL; INTRAMUSCULAR; INTRAVENOUS; SOFT TISSUE ONCE
Status: COMPLETED | OUTPATIENT
Start: 2019-03-23 | End: 2019-03-23

## 2019-03-23 RX ORDER — CEFDINIR 300 MG/1
300 CAPSULE ORAL 2 TIMES DAILY
Qty: 20 CAPSULE | Refills: 0 | Status: SHIPPED | OUTPATIENT
Start: 2019-03-23 | End: 2019-04-02

## 2019-03-23 RX ADMIN — DEXAMETHASONE SODIUM PHOSPHATE 4 MG: 4 INJECTION, SOLUTION INTRA-ARTICULAR; INTRALESIONAL; INTRAMUSCULAR; INTRAVENOUS; SOFT TISSUE at 09:49

## 2019-03-23 RX ADMIN — TRIAMCINOLONE ACETONIDE 40 MG: 40 INJECTION, SUSPENSION INTRA-ARTICULAR; INTRAMUSCULAR at 09:49

## 2019-03-23 ASSESSMENT — ENCOUNTER SYMPTOMS
GASTROINTESTINAL NEGATIVE: 1
COUGH: 1
ALLERGIC/IMMUNOLOGIC NEGATIVE: 1
EYES NEGATIVE: 1

## 2019-03-26 ENCOUNTER — TELEPHONE (OUTPATIENT)
Dept: FAMILY MEDICINE CLINIC | Age: 74
End: 2019-03-26

## 2019-03-26 RX ORDER — BENZONATATE 200 MG/1
200 CAPSULE ORAL 3 TIMES DAILY PRN
Qty: 30 CAPSULE | Refills: 0 | Status: SHIPPED | OUTPATIENT
Start: 2019-03-26 | End: 2019-04-05

## 2019-04-03 ENCOUNTER — HOSPITAL ENCOUNTER (OUTPATIENT)
Dept: GENERAL RADIOLOGY | Age: 74
Discharge: HOME | End: 2019-04-03
Payer: MEDICARE

## 2019-04-03 ENCOUNTER — OFFICE VISIT (OUTPATIENT)
Dept: FAMILY MEDICINE CLINIC | Age: 74
End: 2019-04-03
Payer: MEDICARE

## 2019-04-03 VITALS
SYSTOLIC BLOOD PRESSURE: 104 MMHG | WEIGHT: 131 LBS | HEART RATE: 91 BPM | BODY MASS INDEX: 23.21 KG/M2 | TEMPERATURE: 98.1 F | OXYGEN SATURATION: 97 % | RESPIRATION RATE: 16 BRPM | DIASTOLIC BLOOD PRESSURE: 70 MMHG

## 2019-04-03 DIAGNOSIS — J01.90 ACUTE NON-RECURRENT SINUSITIS, UNSPECIFIED LOCATION: ICD-10-CM

## 2019-04-03 DIAGNOSIS — R05.9 COUGH: ICD-10-CM

## 2019-04-03 DIAGNOSIS — M19.90 ARTHRITIS: ICD-10-CM

## 2019-04-03 DIAGNOSIS — I10 ESSENTIAL HYPERTENSION: ICD-10-CM

## 2019-04-03 PROCEDURE — 96372 THER/PROPH/DIAG INJ SC/IM: CPT | Performed by: FAMILY MEDICINE

## 2019-04-03 PROCEDURE — 71046 X-RAY EXAM CHEST 2 VIEWS: CPT

## 2019-04-03 PROCEDURE — 99214 OFFICE O/P EST MOD 30 MIN: CPT | Performed by: FAMILY MEDICINE

## 2019-04-03 RX ORDER — ALBUTEROL SULFATE 90 UG/1
2 AEROSOL, METERED RESPIRATORY (INHALATION) EVERY 6 HOURS PRN
Qty: 1 INHALER | Refills: 3 | Status: SHIPPED | OUTPATIENT
Start: 2019-04-03 | End: 2022-03-23

## 2019-04-03 RX ORDER — AMOXICILLIN AND CLAVULANATE POTASSIUM 875; 125 MG/1; MG/1
1 TABLET, FILM COATED ORAL 2 TIMES DAILY
Qty: 20 TABLET | Refills: 0 | Status: SHIPPED | OUTPATIENT
Start: 2019-04-03 | End: 2019-04-13

## 2019-04-03 RX ORDER — TRIAMCINOLONE ACETONIDE 40 MG/ML
40 INJECTION, SUSPENSION INTRA-ARTICULAR; INTRAMUSCULAR ONCE
Status: COMPLETED | OUTPATIENT
Start: 2019-04-03 | End: 2019-04-03

## 2019-04-03 RX ADMIN — TRIAMCINOLONE ACETONIDE 40 MG: 40 INJECTION, SUSPENSION INTRA-ARTICULAR; INTRAMUSCULAR at 12:43

## 2019-04-03 ASSESSMENT — ENCOUNTER SYMPTOMS
SHORTNESS OF BREATH: 0
BLOOD IN STOOL: 0
CONSTIPATION: 0
EYES NEGATIVE: 1
COUGH: 0
NAUSEA: 0
VOMITING: 0
DIARRHEA: 0
CHEST TIGHTNESS: 0

## 2019-04-03 NOTE — PROGRESS NOTES
Subjective:      Patient ID: Eder Grewal is a 75 y.o. female. HPI  This patient is a patient of this clinic typically seen by Dr. Sonia smith. She was however seen regarding a sick visit on 3/to 3/19 which was on a Saturday by Dr. Arsen Joshi. She was evaluated then for upper respiratory infection. She had had a one-week history of pressure and congestion. Apparently there was a lot of postnasal drainage wiith significant cough at night. She also did report some chills and facial pain there was report of history of allergies in this patient. Dr. Arsen Joshi felt as though she had acute bacterial sinusitis. He did administer an injection of Kenalog 40 mg along with Decadron 4 mg. She will also was placed on Atrovent nasal spray and Omnicef 300 mg capsule. The patient has finished the [address] now and states that she was feeling better. She actually had the enough improvement that she was able to get outside and get out and about walking her [place] that she enjoys doing in the past. She states that the night after which she had gone out during the day she noted she had increased temperature night and the coughing restarted. She has had white sputum. This patient is currently a smoker and presently usually smokes about 1 pack every 2 or 3 days. She was prescribed Tessalon Perles and she still has about a half a bottle of that left. I did tell her that she should continue to use that as needed. Atrovent or Flonase could certainly be utilized as well. The patient was felt to have evidence of sinusitis and allergies. She did undergo chest x-ray to be sure that she did not have pneumonia. There was no evidence of any acute cardiopulmonary process. The patient was prescribed a Proventil inhaler to use it 2 puffs every 6 hours when necessary. She also was given an injection of Rocephin at 1 g IM and Kenalog 80 mg IM. She then was placed on Augmentin at 875 mg by mouth twice a day for a total of 10 days.  She was admonished to increase her water intake as well. She does have a history of essential hypertension. She is on Cipro 20 mg by mouth daily and Lasix 20 mg by mouth daily on an as needed basis for swelling. She does reportedly tolerate these medicines well and her blood pressure today was good at 104/70. For history of arthritis, she does take Celebrex 200 mg by mouth daily and she uses a tramadol for pain as well at 50 mg up to twice daily on an as-needed basis. Review of Systems   Constitutional: Negative. HENT: Negative. Eyes: Negative. Respiratory: Negative for cough, chest tightness and shortness of breath. Cardiovascular: Negative for chest pain, palpitations and leg swelling. Gastrointestinal: Negative for blood in stool, constipation, diarrhea, nausea and vomiting. Genitourinary: Negative for hematuria. Musculoskeletal: Negative. Skin: Negative. Neurological: Negative. Psychiatric/Behavioral: Negative. Objective:   Physical Exam   Constitutional: She is oriented to person, place, and time. Vital signs are normal. She appears well-developed and well-nourished. She is active. HENT:   Head: Normocephalic and atraumatic. Right Ear: External ear normal.   Left Ear: External ear normal.   Nose: Nose normal.   Mouth/Throat: Oropharynx is clear and moist.   Eyes: Pupils are equal, round, and reactive to light. Conjunctivae and EOM are normal.   Neck: Normal range of motion and full passive range of motion without pain. Neck supple. No thyromegaly present. Cardiovascular: Normal rate, regular rhythm, S1 normal, S2 normal, normal heart sounds and normal pulses. Exam reveals no gallop and no friction rub. No murmur heard. Pulmonary/Chest: Effort normal and breath sounds normal. No stridor. No respiratory distress. She has no wheezes. She has no rales. She exhibits no tenderness. Abdominal: Soft. Normal appearance and bowel sounds are normal. She exhibits no distension and no mass. There is no tenderness. There is no rebound and no guarding. Musculoskeletal: Normal range of motion. She exhibits no edema, tenderness or deformity. Neurological: She is alert and oriented to person, place, and time. She has normal strength and normal reflexes. Skin: Skin is warm, dry and intact. Psychiatric: She has a normal mood and affect. Her speech is normal and behavior is normal. Judgment and thought content normal. Cognition and memory are normal.   Nursing note and vitals reviewed. Assessment:             Diagnosis Orders   1. Cough  XR CHEST STANDARD (2 VW)   2. Acute non-recurrent sinusitis, unspecified location     3. Essential hypertension     4. Arthritis          Plan:       I am having Ms. Alex Zuluaga maintain her :   Outpatient Medications Marked as Taking for the 4/3/19 encounter (Office Visit) with Hari Currie MD   Medication Sig Dispense Refill    albuterol sulfate HFA (PROVENTIL HFA) 108 (90 Base) MCG/ACT inhaler Inhale 2 puffs into the lungs every 6 hours as needed for Wheezing 1 Inhaler 3    amoxicillin-clavulanate (AUGMENTIN) 875-125 MG per tablet Take 1 tablet by mouth 2 times daily for 10 days 20 tablet 0    benzonatate (TESSALON) 200 MG capsule Take 1 capsule by mouth 3 times daily as needed for Cough 30 capsule 0    ipratropium (ATROVENT) 0.06 % nasal spray 2 sprays by Nasal route 3 times daily 1 Bottle 3    traMADol (ULTRAM) 50 MG tablet Take 1 tablet by mouth 2 times daily as needed for Pain for up to 60 days. . 60 tablet 2    dicyclomine (BENTYL) 10 MG capsule Take 1 capsule by mouth 4 times daily (before meals and nightly) 120 capsule 2    fluticasone (FLONASE) 50 MCG/ACT nasal spray 1 spray by Nasal route daily 1 Bottle 3    celecoxib (CELEBREX) 200 MG capsule Take 1 capsule by mouth daily 30 capsule 2    lisinopril (PRINIVIL;ZESTRIL) 20 MG tablet       furosemide (LASIX) 20 MG tablet Take 1 tablet by mouth daily as needed (swelling) 30 tablet 0 ,Patient states they are no longer utilizing these medication: There are no discontinued medications. I have refilled the following medication today:   Requested Prescriptions     Signed Prescriptions Disp Refills    albuterol sulfate HFA (PROVENTIL HFA) 108 (90 Base) MCG/ACT inhaler 1 Inhaler 3     Sig: Inhale 2 puffs into the lungs every 6 hours as needed for Wheezing    amoxicillin-clavulanate (AUGMENTIN) 875-125 MG per tablet 20 tablet 0     Sig: Take 1 tablet by mouth 2 times daily for 10 days   .              Angela Zaragoza MD

## 2019-05-16 ENCOUNTER — OFFICE VISIT (OUTPATIENT)
Dept: FAMILY MEDICINE CLINIC | Age: 74
End: 2019-05-16
Payer: MEDICARE

## 2019-05-16 VITALS
BODY MASS INDEX: 24.09 KG/M2 | HEART RATE: 72 BPM | OXYGEN SATURATION: 98 % | WEIGHT: 136 LBS | DIASTOLIC BLOOD PRESSURE: 60 MMHG | TEMPERATURE: 98.1 F | SYSTOLIC BLOOD PRESSURE: 116 MMHG

## 2019-05-16 DIAGNOSIS — F43.21 GRIEF REACTION: ICD-10-CM

## 2019-05-16 DIAGNOSIS — M25.50 CHRONIC PAIN OF MULTIPLE JOINTS: ICD-10-CM

## 2019-05-16 DIAGNOSIS — G89.29 CHRONIC PAIN OF MULTIPLE JOINTS: ICD-10-CM

## 2019-05-16 DIAGNOSIS — M19.90 ARTHRITIS: Primary | ICD-10-CM

## 2019-05-16 DIAGNOSIS — I10 ESSENTIAL HYPERTENSION: ICD-10-CM

## 2019-05-16 PROCEDURE — 99214 OFFICE O/P EST MOD 30 MIN: CPT | Performed by: FAMILY MEDICINE

## 2019-05-16 RX ORDER — TRAMADOL HYDROCHLORIDE 50 MG/1
50 TABLET ORAL 2 TIMES DAILY PRN
Qty: 60 TABLET | Refills: 2 | Status: SHIPPED | OUTPATIENT
Start: 2019-05-16 | End: 2019-08-30 | Stop reason: SDUPTHER

## 2019-05-16 RX ORDER — LISINOPRIL 20 MG/1
20 TABLET ORAL DAILY
Qty: 30 TABLET | Refills: 5 | Status: SHIPPED | OUTPATIENT
Start: 2019-05-16 | End: 2019-08-30 | Stop reason: SDUPTHER

## 2019-05-16 RX ORDER — MUPIROCIN CALCIUM 20 MG/G
CREAM TOPICAL
Qty: 1 TUBE | Refills: 0 | Status: SHIPPED | OUTPATIENT
Start: 2019-05-16 | End: 2019-06-15

## 2019-05-16 ASSESSMENT — ENCOUNTER SYMPTOMS
RHINORRHEA: 0
EYE DISCHARGE: 0
CONSTIPATION: 0
EYE PAIN: 0
NAUSEA: 0
COUGH: 0
DIARRHEA: 0
VOMITING: 0
SHORTNESS OF BREATH: 0

## 2019-05-16 NOTE — PROGRESS NOTES
Eder Grewal is a 75 y.o. female who presents today for   Chief Complaint   Patient presents with    Finger Injury     thumbnail got smashed    Arthritis     med refill    Hypertension     med refill       Chief Complaint: Arthritis    HPI  Patient has a history of arthritis that interferes with her daily life and has been taking tramadol with improvement of her symptoms. She denies any issues with the use of medicine and reports that it does give her benefit to allow her to do the things that she needs to do. She is also taking Celebrex which is also beneficial for her symptoms. She denies any issues with the use of Celebrex. She does have history of arterial hypertension that is doing well with her current medications. She denies any issues with use of her lisinopril. She denies any symptoms from abnormal blood pressures. She does attempt avoid excess salt intake. Patient did recently lose her sister. She reports that she is taking the loss hard but is managing to cope well. She states that she is staying active and has been spitting a lot of time outside which is really been helpful. She does report that she's had more difficulty sleeping but again is managing. She is not interested in medicine at this time but did bring up that she was easily emotional in office. Review of Systems   Constitutional: Negative for activity change, appetite change, fatigue and fever. HENT: Negative for congestion and rhinorrhea. Eyes: Negative for pain and discharge. Respiratory: Negative for cough and shortness of breath. Cardiovascular: Negative for chest pain and palpitations. Gastrointestinal: Negative for constipation, diarrhea, nausea and vomiting. Endocrine: Negative for cold intolerance and heat intolerance. Genitourinary: Negative for dysuria and hematuria. Musculoskeletal: Positive for arthralgias and gait problem. Skin: Negative for rash and wound.    Neurological: Negative for syncope and weakness. Hematological: Negative for adenopathy. Does not bruise/bleed easily. Psychiatric/Behavioral: Positive for sleep disturbance. The patient is not nervous/anxious. Past Medical History:   Diagnosis Date    Chronic back pain     Hypertension     Inflammatory arthritis        Current Outpatient Medications   Medication Sig Dispense Refill    traMADol (ULTRAM) 50 MG tablet Take 1 tablet by mouth 2 times daily as needed for Pain for up to 60 days. 60 tablet 2    lisinopril (PRINIVIL;ZESTRIL) 20 MG tablet Take 1 tablet by mouth daily 30 tablet 5    mupirocin (BACTROBAN) 2 % cream Apply 3 times daily. 1 Tube 0    albuterol sulfate HFA (PROVENTIL HFA) 108 (90 Base) MCG/ACT inhaler Inhale 2 puffs into the lungs every 6 hours as needed for Wheezing 1 Inhaler 3    ipratropium (ATROVENT) 0.06 % nasal spray 2 sprays by Nasal route 3 times daily 1 Bottle 3    dicyclomine (BENTYL) 10 MG capsule Take 1 capsule by mouth 4 times daily (before meals and nightly) 120 capsule 2    fluticasone (FLONASE) 50 MCG/ACT nasal spray 1 spray by Nasal route daily 1 Bottle 3    celecoxib (CELEBREX) 200 MG capsule Take 1 capsule by mouth daily 30 capsule 2    furosemide (LASIX) 20 MG tablet Take 1 tablet by mouth daily as needed (swelling) 30 tablet 0     No current facility-administered medications for this visit. Allergies   Allergen Reactions    Codeine     Hydrocodone-Acetaminophen     Hydromorphone Hcl     Levofloxacin In D5w     Sulfamethoxazole-Trimethoprim        Past Surgical History:   Procedure Laterality Date    ABDOMEN SURGERY      APPENDECTOMY      BACK SURGERY      HAND SURGERY Left     MOUTH SURGERY      TUBAL LIGATION         Social History     Tobacco Use    Smoking status: Current Every Day Smoker     Packs/day: 0.50     Years: 10.00     Pack years: 5.00     Types: Cigarettes    Smokeless tobacco: Never Used   Substance Use Topics    Alcohol use: No    Drug use:  No Family History   Problem Relation Age of Onset    Other Other         Son DVT    Cancer Mother         colon     Heart Attack Mother     Heart Attack Father        /60 (Site: Right Upper Arm, Position: Sitting, Cuff Size: Medium Adult)   Pulse 72   Temp 98.1 °F (36.7 °C) (Oral)   Wt 136 lb (61.7 kg)   SpO2 98%   BMI 24.09 kg/m²     Physical Exam   Constitutional: She is oriented to person, place, and time. She appears well-developed and well-nourished. No distress. HENT:   Head: Normocephalic and atraumatic. Right Ear: External ear normal.   Left Ear: External ear normal.   Nose: Nose normal.   Eyes: Conjunctivae and EOM are normal. Right eye exhibits no discharge. Left eye exhibits no discharge. No scleral icterus. Neck: Neck supple. No tracheal deviation present. No thyromegaly present. Cardiovascular: Normal rate, regular rhythm, normal heart sounds and intact distal pulses. Exam reveals no gallop and no friction rub. No murmur heard. Pulmonary/Chest: Effort normal and breath sounds normal. No respiratory distress. She has no wheezes. She has no rales. Abdominal: Soft. Bowel sounds are normal. She exhibits no distension. There is no tenderness. Musculoskeletal: She exhibits deformity (Ulnar deviation of fingers. ). She exhibits no edema (no appreciable lower extermity edema). Lymphadenopathy:     She has no cervical adenopathy. Neurological: She is alert and oriented to person, place, and time. No cranial nerve deficit. Skin: Skin is warm and dry. No rash noted. She is not diaphoretic. No erythema. Psychiatric: Her behavior is normal. Thought content normal. She exhibits a depressed mood. Emotional and easily tearful during office visit. Nursing note and vitals reviewed. Assessment:       ICD-10-CM    1. Arthritis M19.90 traMADol (ULTRAM) 50 MG tablet   2. Chronic pain of multiple joints M25.50 traMADol (ULTRAM) 50 MG tablet    G89.29    3.  Essential hypertension I10 lisinopril (PRINIVIL;ZESTRIL) 20 MG tablet   4. Grief reaction F43.21        Plan:  Discussed proper use of medication. Doing well with current usage of tramadol. We'll continue this time and continue to monitor. Stress importance of as needed use only. Blood pressure is controlled with current medication. We'll continue and continue to monitor. Discussed patient's mood and management options with recommendation for therapy with a licensed psychologist and offers a referral to individual for choosing including but not limited to our in-house psychologist. Patient declines any treatment/management at this time but does state that if her symptoms don't start to show some improvement over the next few days she may call for referral/appointment. Discussed signs and symptoms requiring medical attention. All questions were answered and patient voiced understanding and agreement with plan as discussed. No orders of the defined types were placed in this encounter. Orders Placed This Encounter   Medications    traMADol (ULTRAM) 50 MG tablet     Sig: Take 1 tablet by mouth 2 times daily as needed for Pain for up to 60 days. Dispense:  60 tablet     Refill:  2     Reduce doses taken as pain becomes manageable    lisinopril (PRINIVIL;ZESTRIL) 20 MG tablet     Sig: Take 1 tablet by mouth daily     Dispense:  30 tablet     Refill:  5    mupirocin (BACTROBAN) 2 % cream     Sig: Apply 3 times daily. Dispense:  1 Tube     Refill:  0     May substitute for ointment if needed for insurance purposes. Medications Discontinued During This Encounter   Medication Reason    traMADol (ULTRAM) 50 MG tablet REORDER    lisinopril (PRINIVIL;ZESTRIL) 20 MG tablet REORDER     Patient Instructions   Patient given educational handouts and has had all questions answered. Patient voices understanding and agrees to plans along with risks and benefits of plan.  Patient is instructed to continue prior meds,diet, and exercise plans as instructed. Patient agrees to follow up as instructed and sooner if needed. Patient agrees to go to ER if condition becomes emergent. Return in about 3 months (around 8/16/2019), or if symptoms worsen or fail to improve.

## 2019-05-17 NOTE — PATIENT INSTRUCTIONS
Patient Education        Adjustment Disorder: Care Instructions  Your Care Instructions    Adjustment disorder means that you have emotional or behavioral problems because of stress. But your response to the stress is far more severe than a normal response. It is severe enough to affect your work or social life and may cause depression and physical pains and problems. Events that may cause this response can include a divorce, money problems, or starting school or a new job. It might be anything that causes some stress. This disorder is most often a short-term problem. It happens within 3 months of the stressful event or change. If the response lasts longer than 6 months after the event ends, you may have a more serious disorder. Follow-up care is a key part of your treatment and safety. Be sure to make and go to all appointments, and call your doctor if you are having problems. It's also a good idea to know your test results and keep a list of the medicines you take. How can you care for yourself at home? · Go to all counseling sessions. Do not skip any because you are feeling better. · If your doctor prescribed medicines, take them exactly as prescribed. Call your doctor if you think you are having a problem with your medicine. You will get more details on the specific medicines your doctor prescribes. · Discuss the causes of your stress with a good friend or family member. Or you can join a support group for people with similar problems. Talking to others sometimes relieves stress. · Get at least 30 minutes of exercise on most days of the week. Walking is a good choice. You also may want to do other activities, such as running, swimming, cycling, or playing tennis or team sports. Relaxation techniques  Do relaxation exercises 10 to 20 minutes a day. You can play soothing, relaxing music while you do them, if you wish. · Tell others in your house that you are going to do your relaxation exercises.  Ask them not to disturb you. · Find a comfortable, quiet place. · Lie down on your back, or sit with your back straight. · Focus on your breathing. Make it slow and steady. · Breathe in through your nose. Breathe out through either your nose or mouth. · Breathe deeply, filling up the area between your navel and your rib cage. Breathe so that your belly goes up and down. · Do not hold your breath. · Breathe like this for 5 to 10 minutes. Notice the feeling of calmness throughout your whole body. As you continue to breathe slowly and deeply, relax by doing these next steps for another 5 to 10 minutes:  · Tighten and relax each muscle group in your body. Start at your toes, and work your way up to your head. · Imagine your muscle groups relaxing and getting heavy. · Empty your mind of all thoughts. · Let yourself relax more and more deeply. · Be aware of the state of calmness that surrounds you. · When your relaxation time is over, you can bring yourself back to alertness by moving your fingers and toes. Then move your hands and feet. And then move your entire body. Sometimes people fall asleep during relaxation. But they most often wake up soon. · Always give yourself time to return to full alertness before you drive a car. Wait to do anything that might cause an accident if you are not fully alert. Never play a relaxation tape while you drive a car. When should you call for help? Call 911 anytime you think you may need emergency care. For example, call if:    · You feel you cannot stop from hurting yourself or someone else. Keep the numbers for these national suicide hotlines: 5-313-021-TALK (7-439-266-475.309.9460) and 4-993-XAIEKHV (8-645.137.7201).  If you or someone you know talks about suicide or feeling hopeless, get help right away.    Watch closely for changes in your health, and be sure to contact your doctor if:    · You have new anxiety, or your anxiety gets worse.     · You have been feeling sad,

## 2019-06-03 DIAGNOSIS — J34.89 POSTERIOR RHINORRHEA: ICD-10-CM

## 2019-06-03 RX ORDER — FLUTICASONE PROPIONATE 50 MCG
SPRAY, SUSPENSION (ML) NASAL
Qty: 16 G | Refills: 0 | Status: SHIPPED | OUTPATIENT
Start: 2019-06-03 | End: 2019-09-17 | Stop reason: SDUPTHER

## 2019-08-29 DIAGNOSIS — I10 ESSENTIAL HYPERTENSION: ICD-10-CM

## 2019-08-29 DIAGNOSIS — M19.90 ARTHRITIS: ICD-10-CM

## 2019-08-29 DIAGNOSIS — M25.50 CHRONIC PAIN OF MULTIPLE JOINTS: ICD-10-CM

## 2019-08-29 DIAGNOSIS — G89.29 CHRONIC PAIN OF MULTIPLE JOINTS: ICD-10-CM

## 2019-08-30 DIAGNOSIS — M19.90 ARTHRITIS: ICD-10-CM

## 2019-08-30 DIAGNOSIS — M25.50 CHRONIC PAIN OF MULTIPLE JOINTS: ICD-10-CM

## 2019-08-30 DIAGNOSIS — G89.29 CHRONIC PAIN OF MULTIPLE JOINTS: ICD-10-CM

## 2019-08-30 RX ORDER — TRAMADOL HYDROCHLORIDE 50 MG/1
50 TABLET ORAL 2 TIMES DAILY PRN
Qty: 60 TABLET | Refills: 2 | Status: SHIPPED | OUTPATIENT
Start: 2019-08-30 | End: 2019-10-29

## 2019-08-30 RX ORDER — LISINOPRIL 20 MG/1
20 TABLET ORAL DAILY
Qty: 30 TABLET | Refills: 5 | Status: SHIPPED | OUTPATIENT
Start: 2019-08-30 | End: 2020-03-02

## 2019-09-03 RX ORDER — TRAMADOL HYDROCHLORIDE 50 MG/1
TABLET ORAL
Qty: 60 TABLET | Refills: 0 | Status: SHIPPED | OUTPATIENT
Start: 2019-09-03 | End: 2019-10-03

## 2019-09-17 ENCOUNTER — OFFICE VISIT (OUTPATIENT)
Dept: FAMILY MEDICINE CLINIC | Age: 74
End: 2019-09-17
Payer: MEDICARE

## 2019-09-17 VITALS
DIASTOLIC BLOOD PRESSURE: 84 MMHG | HEIGHT: 63 IN | OXYGEN SATURATION: 96 % | HEART RATE: 87 BPM | WEIGHT: 138 LBS | RESPIRATION RATE: 16 BRPM | SYSTOLIC BLOOD PRESSURE: 126 MMHG | TEMPERATURE: 98.9 F | BODY MASS INDEX: 24.45 KG/M2

## 2019-09-17 DIAGNOSIS — J32.0 MAXILLARY SINUSITIS, UNSPECIFIED CHRONICITY: Primary | ICD-10-CM

## 2019-09-17 DIAGNOSIS — Z13.220 SCREENING CHOLESTEROL LEVEL: ICD-10-CM

## 2019-09-17 DIAGNOSIS — N18.30 CKD (CHRONIC KIDNEY DISEASE) STAGE 3, GFR 30-59 ML/MIN (HCC): ICD-10-CM

## 2019-09-17 DIAGNOSIS — J34.89 POSTERIOR RHINORRHEA: ICD-10-CM

## 2019-09-17 DIAGNOSIS — I10 ESSENTIAL HYPERTENSION: ICD-10-CM

## 2019-09-17 DIAGNOSIS — R60.9 SWELLING: ICD-10-CM

## 2019-09-17 PROCEDURE — 99214 OFFICE O/P EST MOD 30 MIN: CPT | Performed by: FAMILY MEDICINE

## 2019-09-17 RX ORDER — FUROSEMIDE 20 MG/1
20 TABLET ORAL DAILY PRN
Qty: 30 TABLET | Refills: 2 | Status: SHIPPED | OUTPATIENT
Start: 2019-09-17 | End: 2020-05-06 | Stop reason: SDUPTHER

## 2019-09-17 RX ORDER — CEFDINIR 300 MG/1
300 CAPSULE ORAL 2 TIMES DAILY
Qty: 20 CAPSULE | Refills: 0 | Status: SHIPPED | OUTPATIENT
Start: 2019-09-17 | End: 2019-09-27

## 2019-09-17 RX ORDER — METHYLPREDNISOLONE 4 MG/1
TABLET ORAL
Qty: 1 KIT | Refills: 0 | Status: SHIPPED | OUTPATIENT
Start: 2019-09-17 | End: 2019-09-23

## 2019-09-17 RX ORDER — FLUTICASONE PROPIONATE 50 MCG
2 SPRAY, SUSPENSION (ML) NASAL DAILY
Qty: 16 G | Refills: 2 | Status: SHIPPED | OUTPATIENT
Start: 2019-09-17 | End: 2020-04-23 | Stop reason: SDUPTHER

## 2019-09-17 NOTE — PROGRESS NOTES
Negative for syncope, weakness and numbness. Hematological: Negative for adenopathy. Does not bruise/bleed easily. Psychiatric/Behavioral: Positive for sleep disturbance. Negative for dysphoric mood. The patient is not nervous/anxious. Past Medical History:   Diagnosis Date    Chronic back pain     Hypertension     Inflammatory arthritis        Current Outpatient Medications   Medication Sig Dispense Refill    furosemide (LASIX) 20 MG tablet Take 1 tablet by mouth daily as needed (swelling) 30 tablet 2    fluticasone (FLONASE) 50 MCG/ACT nasal spray 2 sprays by Nasal route daily 16 g 2    traMADol (ULTRAM) 50 MG tablet Take 1 tablet by mouth 2 times daily as needed for Pain. Reduce doses taken as pain becomes manageable 60 tablet 0    lisinopril (PRINIVIL;ZESTRIL) 20 MG tablet Take 1 tablet by mouth daily 30 tablet 5    traMADol (ULTRAM) 50 MG tablet Take 1 tablet by mouth 2 times daily as needed for Pain for up to 60 days. 60 tablet 2    albuterol sulfate HFA (PROVENTIL HFA) 108 (90 Base) MCG/ACT inhaler Inhale 2 puffs into the lungs every 6 hours as needed for Wheezing 1 Inhaler 3    ipratropium (ATROVENT) 0.06 % nasal spray 2 sprays by Nasal route 3 times daily 1 Bottle 3    dicyclomine (BENTYL) 10 MG capsule Take 1 capsule by mouth 4 times daily (before meals and nightly) 120 capsule 2    celecoxib (CELEBREX) 200 MG capsule Take 1 capsule by mouth daily 30 capsule 2     No current facility-administered medications for this visit.            Allergies   Allergen Reactions    Codeine     Hydrocodone-Acetaminophen     Hydromorphone Hcl     Levofloxacin In D5w     Sulfamethoxazole-Trimethoprim        Past Surgical History:   Procedure Laterality Date    ABDOMEN SURGERY      APPENDECTOMY      BACK SURGERY      HAND SURGERY Left     MOUTH SURGERY      TUBAL LIGATION         Social History     Tobacco Use    Smoking status: Current Every Day Smoker     Packs/day: 0.50     Years: 10.00 Pack years: 5.00     Types: Cigarettes    Smokeless tobacco: Never Used   Substance Use Topics    Alcohol use: No    Drug use: No       Family History   Problem Relation Age of Onset    Other Other         Son DVT    Cancer Mother         colon     Heart Attack Mother     Heart Attack Father        /84 (Site: Right Upper Arm, Position: Sitting, Cuff Size: Medium Adult)   Pulse 87   Temp 98.9 °F (37.2 °C) (Oral)   Resp 16   Ht 5' 3\" (1.6 m)   Wt 138 lb (62.6 kg)   SpO2 96%   BMI 24.45 kg/m²     Physical Exam   Constitutional: She is oriented to person, place, and time. She appears well-developed and well-nourished. HENT:   Head: Normocephalic and atraumatic. Right Ear: Hearing, tympanic membrane, external ear and ear canal normal.   Left Ear: Hearing, tympanic membrane, external ear and ear canal normal.   Mouth/Throat: No oropharyngeal exudate. Maxillary sinus tenderness appreciated. She does have posterior drainage appreciated on exam as well. Eyes: Pupils are equal, round, and reactive to light. Conjunctivae are normal. Right eye exhibits no discharge. Left eye exhibits no discharge. No scleral icterus. Neck: Neck supple. No tracheal deviation present. No thyromegaly present. Cardiovascular: Normal rate, regular rhythm, normal heart sounds and intact distal pulses. Exam reveals no gallop and no friction rub. No murmur heard. Pulmonary/Chest: Effort normal and breath sounds normal. No respiratory distress. She has no wheezes. She has no rales. Abdominal: Soft. Bowel sounds are normal. She exhibits no distension. There is no tenderness. Musculoskeletal: She exhibits no edema (bilateral lower extremities) or deformity ( no gross deformities of upper or lower extremities bilaterally). Lymphadenopathy:     She has no cervical adenopathy. Neurological: She is alert and oriented to person, place, and time. No cranial nerve deficit. She exhibits normal muscle tone.    Skin: Standing Expiration Date:   2020     Orders Placed This Encounter   Medications    furosemide (LASIX) 20 MG tablet     Sig: Take 1 tablet by mouth daily as needed (swelling)     Dispense:  30 tablet     Refill:  2    fluticasone (FLONASE) 50 MCG/ACT nasal spray     Si sprays by Nasal route daily     Dispense:  16 g     Refill:  2    methylPREDNISolone (MEDROL DOSEPACK) 4 MG tablet     Sig: Take by mouth. Dispense:  1 kit     Refill:  0    cefdinir (OMNICEF) 300 MG capsule     Sig: Take 1 capsule by mouth 2 times daily for 10 days     Dispense:  20 capsule     Refill:  0     Medications Discontinued During This Encounter   Medication Reason    furosemide (LASIX) 20 MG tablet REORDER    fluticasone (FLONASE) 50 MCG/ACT nasal spray REORDER     Patient Instructions   Patient given educational handouts and has had all questions answered. Patient voices understanding and agrees to plans along with risks and benefits of plan. Patient is instructed to continue prior meds,diet, and exercise plans as instructed. Patient agrees to follow up as instructed and sooner if needed. Patient agrees to go to ER if condition becomes emergent. Return in about 2 weeks (around 10/1/2019), or if symptoms worsen or fail to improve.

## 2019-09-28 ASSESSMENT — ENCOUNTER SYMPTOMS
WHEEZING: 0
RHINORRHEA: 1
CONSTIPATION: 0
DIARRHEA: 0
EYE DISCHARGE: 0
SINUS PRESSURE: 1
VOMITING: 0
SORE THROAT: 0
EYE PAIN: 0
SHORTNESS OF BREATH: 0
SINUS PAIN: 1
NAUSEA: 0
ABDOMINAL PAIN: 0

## 2019-10-01 ENCOUNTER — OFFICE VISIT (OUTPATIENT)
Dept: FAMILY MEDICINE CLINIC | Age: 74
End: 2019-10-01
Payer: MEDICARE

## 2019-10-01 VITALS
BODY MASS INDEX: 24.45 KG/M2 | RESPIRATION RATE: 16 BRPM | TEMPERATURE: 98.9 F | SYSTOLIC BLOOD PRESSURE: 140 MMHG | HEIGHT: 63 IN | DIASTOLIC BLOOD PRESSURE: 88 MMHG | OXYGEN SATURATION: 96 % | WEIGHT: 138 LBS | HEART RATE: 60 BPM

## 2019-10-01 DIAGNOSIS — H66.002 ACUTE SUPPURATIVE OTITIS MEDIA OF LEFT EAR WITHOUT SPONTANEOUS RUPTURE OF TYMPANIC MEMBRANE, RECURRENCE NOT SPECIFIED: Primary | ICD-10-CM

## 2019-10-01 DIAGNOSIS — Z23 NEED FOR PROPHYLACTIC VACCINATION AGAINST STREPTOCOCCUS PNEUMONIAE (PNEUMOCOCCUS): ICD-10-CM

## 2019-10-01 DIAGNOSIS — R09.81 SINUS CONGESTION: ICD-10-CM

## 2019-10-01 PROCEDURE — 99213 OFFICE O/P EST LOW 20 MIN: CPT | Performed by: FAMILY MEDICINE

## 2019-10-01 PROCEDURE — G0009 ADMIN PNEUMOCOCCAL VACCINE: HCPCS | Performed by: FAMILY MEDICINE

## 2019-10-01 PROCEDURE — 90732 PPSV23 VACC 2 YRS+ SUBQ/IM: CPT | Performed by: FAMILY MEDICINE

## 2019-10-01 RX ORDER — MONTELUKAST SODIUM 10 MG/1
10 TABLET ORAL DAILY
Qty: 30 TABLET | Refills: 3 | Status: SHIPPED | OUTPATIENT
Start: 2019-10-01 | End: 2020-07-30

## 2019-10-01 RX ORDER — AMOXICILLIN 875 MG/1
875 TABLET, COATED ORAL 2 TIMES DAILY
Qty: 20 TABLET | Refills: 0 | Status: SHIPPED | OUTPATIENT
Start: 2019-10-01 | End: 2019-10-11

## 2019-10-18 ASSESSMENT — ENCOUNTER SYMPTOMS
SINUS PRESSURE: 1
ABDOMINAL PAIN: 0
RHINORRHEA: 0
VOMITING: 0
EYE DISCHARGE: 0
DIARRHEA: 0
SHORTNESS OF BREATH: 0
EYE PAIN: 0
COUGH: 0
SORE THROAT: 0
CONSTIPATION: 0
NAUSEA: 0

## 2019-11-25 ENCOUNTER — OFFICE VISIT (OUTPATIENT)
Dept: FAMILY MEDICINE CLINIC | Age: 74
End: 2019-11-25
Payer: MEDICARE

## 2019-11-25 VITALS
OXYGEN SATURATION: 95 % | WEIGHT: 138 LBS | HEIGHT: 63 IN | TEMPERATURE: 98.5 F | RESPIRATION RATE: 16 BRPM | SYSTOLIC BLOOD PRESSURE: 140 MMHG | BODY MASS INDEX: 24.45 KG/M2 | HEART RATE: 80 BPM | DIASTOLIC BLOOD PRESSURE: 82 MMHG

## 2019-11-25 DIAGNOSIS — I10 ESSENTIAL HYPERTENSION: ICD-10-CM

## 2019-11-25 DIAGNOSIS — K58.0 IRRITABLE BOWEL SYNDROME WITH DIARRHEA: ICD-10-CM

## 2019-11-25 DIAGNOSIS — Z12.31 ENCOUNTER FOR SCREENING MAMMOGRAM FOR BREAST CANCER: ICD-10-CM

## 2019-11-25 DIAGNOSIS — M25.50 CHRONIC PAIN OF MULTIPLE JOINTS: ICD-10-CM

## 2019-11-25 DIAGNOSIS — M19.90 ARTHRITIS: Primary | ICD-10-CM

## 2019-11-25 DIAGNOSIS — G89.29 CHRONIC PAIN OF MULTIPLE JOINTS: ICD-10-CM

## 2019-11-25 PROCEDURE — 99214 OFFICE O/P EST MOD 30 MIN: CPT | Performed by: FAMILY MEDICINE

## 2019-11-25 RX ORDER — TRAMADOL HYDROCHLORIDE 50 MG/1
50 TABLET ORAL 2 TIMES DAILY PRN
Qty: 60 TABLET | Refills: 2 | Status: SHIPPED | OUTPATIENT
Start: 2019-11-25 | End: 2020-01-29 | Stop reason: SDUPTHER

## 2019-11-25 ASSESSMENT — ENCOUNTER SYMPTOMS
CONSTIPATION: 0
SHORTNESS OF BREATH: 0
NAUSEA: 0
COUGH: 0
VOMITING: 0
EYE PAIN: 0
EYE DISCHARGE: 0
DIARRHEA: 0
RHINORRHEA: 0

## 2020-01-29 ENCOUNTER — OFFICE VISIT (OUTPATIENT)
Dept: FAMILY MEDICINE CLINIC | Age: 75
End: 2020-01-29
Payer: MEDICARE

## 2020-01-29 VITALS
HEART RATE: 80 BPM | TEMPERATURE: 98.6 F | SYSTOLIC BLOOD PRESSURE: 152 MMHG | HEIGHT: 63 IN | WEIGHT: 140 LBS | BODY MASS INDEX: 24.8 KG/M2 | DIASTOLIC BLOOD PRESSURE: 96 MMHG | OXYGEN SATURATION: 96 % | RESPIRATION RATE: 16 BRPM

## 2020-01-29 LAB
AMPHETAMINE SCREEN, URINE: NORMAL
BARBITURATE SCREEN, URINE: NORMAL
BENZODIAZEPINE SCREEN, URINE: NORMAL
BUPRENORPHINE URINE: NORMAL
COCAINE METABOLITE SCREEN URINE: NORMAL
GABAPENTIN SCREEN, URINE: NORMAL
MDMA URINE: NORMAL
METHADONE SCREEN, URINE: NORMAL
METHAMPHETAMINE, URINE: NORMAL
OPIATE SCREEN URINE: NORMAL
OXYCODONE SCREEN URINE: NORMAL
PHENCYCLIDINE SCREEN URINE: NORMAL
PROPOXYPHENE SCREEN, URINE: NORMAL
THC SCREEN, URINE: NORMAL
TRICYCLIC ANTIDEPRESSANTS, UR: NORMAL

## 2020-01-29 PROCEDURE — 80305 DRUG TEST PRSMV DIR OPT OBS: CPT | Performed by: FAMILY MEDICINE

## 2020-01-29 PROCEDURE — 99214 OFFICE O/P EST MOD 30 MIN: CPT | Performed by: FAMILY MEDICINE

## 2020-01-29 RX ORDER — AMOXICILLIN AND CLAVULANATE POTASSIUM 875; 125 MG/1; MG/1
1 TABLET, FILM COATED ORAL 2 TIMES DAILY
Qty: 14 TABLET | Refills: 0 | Status: SHIPPED | OUTPATIENT
Start: 2020-01-29 | End: 2020-02-05

## 2020-01-29 RX ORDER — METHYLPREDNISOLONE 4 MG/1
TABLET ORAL
Qty: 1 KIT | Refills: 0 | Status: SHIPPED | OUTPATIENT
Start: 2020-01-29 | End: 2020-02-04

## 2020-01-29 RX ORDER — TRAMADOL HYDROCHLORIDE 50 MG/1
50 TABLET ORAL 2 TIMES DAILY PRN
Qty: 60 TABLET | Refills: 2 | Status: SHIPPED | OUTPATIENT
Start: 2020-01-29 | End: 2020-04-23 | Stop reason: SDUPTHER

## 2020-01-29 ASSESSMENT — PATIENT HEALTH QUESTIONNAIRE - PHQ9
2. FEELING DOWN, DEPRESSED OR HOPELESS: 1
1. LITTLE INTEREST OR PLEASURE IN DOING THINGS: 0
SUM OF ALL RESPONSES TO PHQ9 QUESTIONS 1 & 2: 1
SUM OF ALL RESPONSES TO PHQ QUESTIONS 1-9: 1
SUM OF ALL RESPONSES TO PHQ QUESTIONS 1-9: 1

## 2020-01-29 ASSESSMENT — ENCOUNTER SYMPTOMS
RHINORRHEA: 1
SHORTNESS OF BREATH: 0
VOMITING: 0
EYE PAIN: 0
SINUS PAIN: 1
SINUS PRESSURE: 1
EYE DISCHARGE: 0
COUGH: 0
NAUSEA: 0
DIARRHEA: 0
CONSTIPATION: 0

## 2020-01-29 NOTE — LETTER
MEDICATION AGREEMENT     Noah Vivar  [birth year]      For certain conditions, multiple classes of medications may be used to help better manage your symptoms, and to improve your ability to function at home, work and in social settings. However, these medications do have risks, which will be discussed with you, including addiction and dependency. The following prescribed medications need frequent monitoring and will require you to partner and assist in your healthcare. Medication  Dose, instructions and quantity as indicated on current prescription bottle Diagnosis/Reason(s) for Taking Category     Tramadol 50 mg 1 bid prn #60 2 refills  Chronic pain and arthritis                             Benefits and goals of Controlled Substance Medications: There are two potential goals for your treatment: (1) decreased pain and suffering (2) improved daily life functions. There are many possible treatments for your chronic condition(s), and, in addition to controlled substance medications, we will try alternatives such as physical therapy, yoga, massage, home daily exercise, meditation, relaxation techniques, injections, chiropractic manipulations, surgery, cognitive therapy, hypnosis and many medications that are not habit-forming. Use of controlled substance medications may be helpful, but they are unlikely to resolve all of your symptoms or restore all function. Risks of Controlled Substance Medications:    Opioid pain medications: These medications can lead to problems such as addiction/dependence, sedation, lightheadedness/dizziness, memory issues, falls, constipation, nausea, or vomiting. They may also impair the ability to drive or operate machinery. Additionally, these medications may lower testosterone levels, leading to loss of bone strength, stamina and sex drive.   They may cause problems with breathing, sleep apnea and reduced coughing, which are especially dangerous for patients with lung supplements and oral decongestants. Dependence withdrawal symptoms may include depressed mood, loss of interest, suicidal thoughts, anxiety, fatigue, appetite changes and agitation. Testosterone replacement therapy:  Potential side effects include increased risk of stroke and heart attack, blood clots, increased blood pressure, increased cholesterol, enlarged prostate, sleep apnea, irritability/aggression and other mood disorders, and decreased fertility. Other:     1. I understand that I have the following responsibilities:  · I will take medications at the dose and frequency prescribed. · I will not increase or change how I take my medications without the approval of the health care provider who signs this Medication Agreement. · I will arrange for refills at the prescribed interval ONLY during regular office hours. I will not ask for refills earlier than agreed, after-hours, on holidays or on weekends. · I will obtain all refills for these medications at  ·  ____________________________________  pharmacy (phone number  ·  ________________________), with full consent for my provider and pharmacist to exchange information in writing or verbally. · I will not request any pain medications or controlled substances from other providers and will inform this provider of all other medications I am taking. · I will inform my other health care providers that I am taking these medications and of the existence of this [facility]. In the event of an emergency, I will provide the same information to the emergency department providers. · I will protect my prescriptions and medications. I understand that lost or misplaced prescriptions will not be replaced. · I will keep medications only for my own use and will not share them with others. I will keep all medications away from children. · I agree to participate in any medical, psychological or psychiatric assessments recommended by my provider. which may also result in my being prevented from receiving further care from this office. · Other:____________________________________________________________________    AGREEMENT:    I have read the above and have had all of my questions answered. For chronic disease management, I know that my symptoms can be managed with many types of treatments. A chronic medication trial may be part of my treatment, but I must be an active participant in my care. Medication therapy is only one part of my symptom management plan. In some cases, there may be limited scientific evidence to support the chronic use of certain medications to improve symptoms and daily function. Furthermore, in certain circumstances, there may be scientific information that suggests that use of chronic controlled substances may actually worsen my symptoms and increase my risk of unintentional death directly related to this medication therapy. I know that if my provider feels my risk from controlled medications is greater than my benefit, I will have my controlled substance medication(s) compassionately lowered or removed altogether. I agree to a controlled substance medication trial.      I further agree to allow this office to contact my HIPAA contact on file if there are concerns about my safety and use of controlled medications. I have agreed to use the following medications above as instructed by my physician and as stated in this [facility] [address].      Patient Signature:  ______________________  Date:1/29/2020 or _____________    Provider Signature:______________________  Date:1/29/2020 or _____________

## 2020-01-29 NOTE — PROGRESS NOTES
problem. Skin: Negative for rash and wound. Neurological: Negative for syncope and weakness. Hematological: Negative for adenopathy. Does not bruise/bleed easily. Psychiatric/Behavioral: Positive for sleep disturbance. The patient is not nervous/anxious. Past Medical History:   Diagnosis Date    Chronic back pain     Hypertension     Inflammatory arthritis        Current Outpatient Medications   Medication Sig Dispense Refill    traMADol (ULTRAM) 50 MG tablet Take 1 tablet by mouth 2 times daily as needed for Pain for up to 60 days. 60 tablet 2    methylPREDNISolone (MEDROL DOSEPACK) 4 MG tablet Take by mouth. 1 kit 0    amoxicillin-clavulanate (AUGMENTIN) 875-125 MG per tablet Take 1 tablet by mouth 2 times daily for 7 days 14 tablet 0    montelukast (SINGULAIR) 10 MG tablet Take 1 tablet by mouth daily 30 tablet 3    furosemide (LASIX) 20 MG tablet Take 1 tablet by mouth daily as needed (swelling) 30 tablet 2    fluticasone (FLONASE) 50 MCG/ACT nasal spray 2 sprays by Nasal route daily 16 g 2    lisinopril (PRINIVIL;ZESTRIL) 20 MG tablet Take 1 tablet by mouth daily 30 tablet 5    albuterol sulfate HFA (PROVENTIL HFA) 108 (90 Base) MCG/ACT inhaler Inhale 2 puffs into the lungs every 6 hours as needed for Wheezing 1 Inhaler 3    ipratropium (ATROVENT) 0.06 % nasal spray 2 sprays by Nasal route 3 times daily 1 Bottle 3    dicyclomine (BENTYL) 10 MG capsule Take 1 capsule by mouth 4 times daily (before meals and nightly) 120 capsule 2    celecoxib (CELEBREX) 200 MG capsule Take 1 capsule by mouth daily 30 capsule 2     No current facility-administered medications for this visit.            Allergies   Allergen Reactions    Codeine     Hydrocodone-Acetaminophen     Hydromorphone Hcl     Levofloxacin In D5w     Sulfamethoxazole-Trimethoprim        Past Surgical History:   Procedure Laterality Date    ABDOMEN SURGERY      APPENDECTOMY      BACK SURGERY      HAND SURGERY Left     Musculoskeletal:         General: Deformity (Ulnar deviation of fingers.) present. Right lower leg: No edema. Left lower leg: No edema. Comments: Not present on the palm of patient's left hand that does correspond to the tendon/ligaments of her middle finger. Lymphadenopathy:      Cervical: No cervical adenopathy. Skin:     General: Skin is warm and dry. Findings: No erythema or rash. Neurological:      Mental Status: She is alert and oriented to person, place, and time. Cranial Nerves: No cranial nerve deficit. Psychiatric:         Behavior: Behavior normal.         Thought Content: Thought content normal.         Assessment:       ICD-10-CM    1. Arthritis M19.90 traMADol (ULTRAM) 50 MG tablet     POCT Rapid Drug Screen    Doing well with current dose of tramadol. Will continue at this time and continue to monitor. Stressed importance of as needed use only. 2. Chronic pain of multiple joints M25.50 traMADol (ULTRAM) 50 MG tablet    G89.29 POCT Rapid Drug Screen   3. Pain of left hand M79.642 External Referral To Orthopedic Surgery    Discussed proper care of involved hand and the possibility of seeing an specialist for further evaluation recommendations and patient was agreeable. 4. Trigger middle finger of left hand M65.332 External Referral To Orthopedic Surgery    Discussed physical exam findings and management options and through shared decision-making termination was to involve hand specialist in her care for further evaluation recommendations. 5. Acute non-recurrent sinusitis, unspecified location J01.90 methylPREDNISolone (MEDROL DOSEPACK) 4 MG tablet     amoxicillin-clavulanate (AUGMENTIN) 875-125 MG per tablet       Plan:  Discussed diagnosis, expected course, and proper use of medication, including OTC medications if prescription is too expensive or insurance does not cover. Discussed signs and symptoms requiring medical attention.   All questions were answered and patient voiced understanding and agreement with plan as discussed. Orders Placed This Encounter   Procedures    External Referral To Orthopedic Surgery     Referral Priority:   Routine     Referral Type:   Eval and Treat     Referral Reason:   Specialty Services Required     Referred to Provider:   Hayden Mckee MD     Requested Specialty:   Orthopedic Surgery     Number of Visits Requested:   1    POCT Rapid Drug Screen     Orders Placed This Encounter   Medications    traMADol (ULTRAM) 50 MG tablet     Sig: Take 1 tablet by mouth 2 times daily as needed for Pain for up to 60 days. Dispense:  60 tablet     Refill:  2     Reduce doses taken as pain becomes manageable    methylPREDNISolone (MEDROL DOSEPACK) 4 MG tablet     Sig: Take by mouth. Dispense:  1 kit     Refill:  0    amoxicillin-clavulanate (AUGMENTIN) 875-125 MG per tablet     Sig: Take 1 tablet by mouth 2 times daily for 7 days     Dispense:  14 tablet     Refill:  0     Medications Discontinued During This Encounter   Medication Reason    traMADol (ULTRAM) 50 MG tablet REORDER     Patient Instructions   Patient given educational handouts and has had all questions answered. Patient voices understanding and agrees to plans along with risks and benefits of plan. Patient is instructed to continue prior meds,diet, and exercise plans as instructed. Patient agrees to follow up as instructed and sooner if needed. Patient agrees to go to ER if condition becomes emergent. Return in about 3 months (around 4/29/2020), or if symptoms worsen or fail to improve.

## 2020-01-29 NOTE — PATIENT INSTRUCTIONS
life easier, such as a higher toilet seat and padded handles on kitchen utensils. · Do not sit in low chairs, which can make it hard to get up. · Put heat or cold on your sore joints as needed. Use whichever helps you most. You also can take turns with hot and cold packs. ? Apply heat 2 or 3 times a day for 20 to 30 minutes--using a heating pad, hot shower, or hot pack--to relieve pain and stiffness. ? Put ice or a cold pack on your sore joint for 10 to 20 minutes at a time. Put a thin cloth between the ice and your skin. When should you call for help? Call your doctor now or seek immediate medical care if:    · You have sudden swelling, warmth, or pain in any joint.     · You have joint pain and a fever or rash.     · You have such bad pain that you cannot use a joint.    Watch closely for changes in your health, and be sure to contact your doctor if:    · You have mild joint symptoms that continue even with more than 6 weeks of care at home.     · You have stomach pain or other problems with your medicine. Where can you learn more? Go to [email or web address] org and sign in to your [employer] account. Enter W972 in the [employer] box to learn more about \"Arthritis: Care Instructions. \"     If you do not have an account, please click on the \"Sign Up Now\" link. Current as of: April 1, 2019  Content Version: 12.3  © 0288-4231 [employer]. Care instructions adapted under license by [facility] [place] ([facility]). If you have questions about a medical condition or this instruction, always ask your healthcare professional. [place] [address] any warranty or liability for your use of this information.

## 2020-03-02 RX ORDER — LISINOPRIL 20 MG/1
TABLET ORAL
Qty: 30 TABLET | Refills: 0 | Status: SHIPPED | OUTPATIENT
Start: 2020-03-02 | End: 2020-05-20

## 2020-03-05 ENCOUNTER — OFFICE VISIT (OUTPATIENT)
Dept: FAMILY MEDICINE CLINIC | Age: 75
End: 2020-03-05
Payer: MEDICARE

## 2020-03-05 VITALS
WEIGHT: 141 LBS | RESPIRATION RATE: 16 BRPM | HEIGHT: 63 IN | SYSTOLIC BLOOD PRESSURE: 130 MMHG | OXYGEN SATURATION: 96 % | TEMPERATURE: 98.9 F | HEART RATE: 74 BPM | BODY MASS INDEX: 24.98 KG/M2 | DIASTOLIC BLOOD PRESSURE: 92 MMHG

## 2020-03-05 PROCEDURE — 99213 OFFICE O/P EST LOW 20 MIN: CPT | Performed by: FAMILY MEDICINE

## 2020-03-05 RX ORDER — METHYLPREDNISOLONE 4 MG/1
TABLET ORAL
Qty: 1 KIT | Refills: 0 | Status: SHIPPED | OUTPATIENT
Start: 2020-03-05 | End: 2020-03-11

## 2020-03-05 RX ORDER — AMOXICILLIN AND CLAVULANATE POTASSIUM 562.5; 437.5; 62.5 MG/1; MG/1; MG/1
1 TABLET, FILM COATED, EXTENDED RELEASE ORAL 2 TIMES DAILY
Qty: 20 TABLET | Refills: 0 | Status: SHIPPED | OUTPATIENT
Start: 2020-03-05 | End: 2020-03-15

## 2020-03-05 ASSESSMENT — ENCOUNTER SYMPTOMS
NAUSEA: 0
RHINORRHEA: 1
SINUS PRESSURE: 1
VOMITING: 0
ABDOMINAL PAIN: 0
COUGH: 0
SORE THROAT: 0
EYE PAIN: 0
DIARRHEA: 0
CONSTIPATION: 0
SHORTNESS OF BREATH: 0
EYE DISCHARGE: 0

## 2020-03-05 NOTE — PATIENT INSTRUCTIONS
Patient Education        Ear Infection (Otitis Media): Care Instructions  Your Care Instructions    An ear infection may start with a cold and affect the middle ear (otitis media). It can hurt a lot. Most ear infections clear up on their own in a couple of days. Most often you will not need antibiotics. This is because many ear infections are caused by a virus. Antibiotics don't work against a virus. Regular doses of pain medicines are the best way to reduce your fever and help you feel better. Follow-up care is a key part of your treatment and safety. Be sure to make and go to all appointments, and call your doctor if you are having problems. It's also a good idea to know your test results and keep a list of the medicines you take. How can you care for yourself at home? · Take pain medicines exactly as directed. ? If the doctor gave you a prescription medicine for pain, take it as prescribed. ? If you are not taking a prescription pain medicine, take an over-the-counter medicine, such as acetaminophen (Tylenol), ibuprofen (Advil, Motrin), or naproxen (Aleve). Read and follow all instructions on the label. ? Do not take two or more pain medicines at the same time unless the doctor told you to. Many pain medicines have acetaminophen, which is Tylenol. Too much acetaminophen (Tylenol) can be harmful. · Plan to take a full dose of pain reliever before bedtime. Getting enough sleep will help you get better. · Try a warm, moist washcloth on the ear. It may help relieve pain. · If your doctor prescribed antibiotics, take them as directed. Do not stop taking them just because you feel better. You need to take the full course of antibiotics. When should you call for help?   Call your doctor now or seek immediate medical care if:    · You have new or increasing ear pain.     · You have new or increasing pus or blood draining from your ear.     · You have a fever with a stiff neck or a severe headache.    Watch closely for changes in your health, and be sure to contact your doctor if:    · You have new or worse symptoms.     · You are not getting better after taking an antibiotic for 2 days. Where can you learn more? Go to [email or web address] and sign in to your [employer] account. Enter W477 in the [employer] box to learn more about \"Ear Infection (Otitis Media): Care Instructions. \"     If you do not have an account, please click on the \"Sign Up Now\" link. Current as of: July 28, 2019  Content Version: 12.3  © 9027-3612 Healthwise, Incorporated. Care instructions adapted under license by [facility] ([facility]). If you have questions about a medical condition or this instruction, always ask your healthcare professional. [address] any warranty or liability for your use of this information.

## 2020-03-05 NOTE — PROGRESS NOTES
Jesse Boyce is a 75 y.o. female who presents today for   Chief Complaint   Patient presents with    Sinus Problem    Head Congestion    Headache       Chief Complaint: Sinus/ear problems    HPI  Patient reports that she has been having bilateral ear pain but worse on the right and sinus congestion with pressure and drainage that is been ongoing for the past few days or so. She has had an elevated temperature but denies any fever. She has taken some Vicks softgels which might of provide her some temporary benefit but did not provide any lasting relief. She denies any other aggravating or relieving factors for symptoms. She denies any specific known sick contacts. Review of Systems   Constitutional: Negative for activity change, appetite change and fever. HENT: Positive for congestion, ear pain, rhinorrhea and sinus pressure. Negative for sore throat. Eyes: Negative for pain and discharge. Respiratory: Negative for cough and shortness of breath. Cardiovascular: Negative for chest pain and palpitations. Gastrointestinal: Negative for abdominal pain, constipation, diarrhea, nausea and vomiting. Endocrine: Negative for cold intolerance and heat intolerance. Genitourinary: Negative for dysuria and hematuria. Musculoskeletal: Negative for gait problem and neck pain. Skin: Negative for rash and wound. Past Medical History:   Diagnosis Date    Chronic back pain     Hypertension     Inflammatory arthritis        Current Outpatient Medications   Medication Sig Dispense Refill    amoxicillin-clavulanate (AUGMENTIN XR) 1000-62.5 MG per extended release tablet Take 1 tablet by mouth 2 times daily for 10 days 20 tablet 0    methylPREDNISolone (MEDROL DOSEPACK) 4 MG tablet Take by mouth.  1 kit 0    lisinopril (PRINIVIL;ZESTRIL) 20 MG tablet TAKE 1 TABLET BY MOUTH ONCE DAILY 30 tablet 0    traMADol (ULTRAM) 50 MG tablet Take 1 tablet by mouth 2 times daily as needed for Pain for up amoxicillin-clavulanate (AUGMENTIN XR) 1000-62.5 MG per extended release tablet   2. Posterior rhinorrhea J34.89 methylPREDNISolone (MEDROL DOSEPACK) 4 MG tablet       Plan:  Discussed diagnosis, expected course, and proper use of medication, including OTC medications if prescription is too expensive or insurance does not cover. Discussed signs and symptoms requiring medical attention. All questions were answered and patient voiced understanding and agreement with plan as discussed. No orders of the defined types were placed in this encounter. Orders Placed This Encounter   Medications    amoxicillin-clavulanate (AUGMENTIN XR) 1000-62.5 MG per extended release tablet     Sig: Take 1 tablet by mouth 2 times daily for 10 days     Dispense:  20 tablet     Refill:  0    methylPREDNISolone (MEDROL DOSEPACK) 4 MG tablet     Sig: Take by mouth. Dispense:  1 kit     Refill:  0     There are no discontinued medications. Patient Instructions   Patient given educational handouts and has had all questions answered. Patient voices understanding and agrees to plans along with risks and benefits of plan. Patient is instructed to continue prior meds,diet, and exercise plans as instructed. Patient agrees to follow up as instructed and sooner if needed. Patient agrees to go to ER if condition becomes emergent. Return if symptoms worsen or fail to improve, for next scheduled follow up with PCP.

## 2020-03-18 ENCOUNTER — TELEPHONE (OUTPATIENT)
Dept: FAMILY MEDICINE CLINIC | Age: 75
End: 2020-03-18

## 2020-03-18 RX ORDER — CEFDINIR 300 MG/1
300 CAPSULE ORAL 2 TIMES DAILY
Qty: 20 CAPSULE | Refills: 0 | Status: SHIPPED | OUTPATIENT
Start: 2020-03-18 | End: 2020-03-28

## 2020-04-23 ENCOUNTER — OFFICE VISIT (OUTPATIENT)
Dept: FAMILY MEDICINE CLINIC | Age: 75
End: 2020-04-23
Payer: MEDICARE

## 2020-04-23 VITALS
TEMPERATURE: 97.6 F | DIASTOLIC BLOOD PRESSURE: 78 MMHG | RESPIRATION RATE: 20 BRPM | WEIGHT: 141 LBS | SYSTOLIC BLOOD PRESSURE: 136 MMHG | BODY MASS INDEX: 24.98 KG/M2 | HEART RATE: 90 BPM | OXYGEN SATURATION: 98 %

## 2020-04-23 PROCEDURE — 99214 OFFICE O/P EST MOD 30 MIN: CPT | Performed by: FAMILY MEDICINE

## 2020-04-23 RX ORDER — TRAMADOL HYDROCHLORIDE 50 MG/1
50 TABLET ORAL 2 TIMES DAILY PRN
Qty: 60 TABLET | Refills: 2 | Status: SHIPPED | OUTPATIENT
Start: 2020-04-23 | End: 2020-06-22

## 2020-04-23 RX ORDER — DICYCLOMINE HYDROCHLORIDE 10 MG/1
10 CAPSULE ORAL
Qty: 120 CAPSULE | Refills: 2 | Status: SHIPPED | OUTPATIENT
Start: 2020-04-23 | End: 2021-08-30

## 2020-04-23 RX ORDER — FLUTICASONE PROPIONATE 50 MCG
2 SPRAY, SUSPENSION (ML) NASAL DAILY
Qty: 16 G | Refills: 2 | Status: SHIPPED | OUTPATIENT
Start: 2020-04-23 | End: 2020-11-30

## 2020-04-23 ASSESSMENT — ENCOUNTER SYMPTOMS
SINUS PRESSURE: 0
VOMITING: 0
COUGH: 0
SORE THROAT: 0
SHORTNESS OF BREATH: 0
EYE PAIN: 0
NAUSEA: 0
ABDOMINAL PAIN: 0
CONSTIPATION: 0
EYE DISCHARGE: 0
RHINORRHEA: 1
DIARRHEA: 0

## 2020-04-23 NOTE — PROGRESS NOTES
APPENDECTOMY      BACK SURGERY      HAND SURGERY Left     MOUTH SURGERY      TUBAL LIGATION         Social History     Tobacco Use    Smoking status: Current Every Day Smoker     Packs/day: 0.50     Years: 10.00     Pack years: 5.00     Types: Cigarettes    Smokeless tobacco: Never Used   Substance Use Topics    Alcohol use: No    Drug use: No       Family History   Problem Relation Age of Onset    Other Other         Son DVT    Cancer Mother         colon     Heart Attack Mother     Heart Attack Father        /78 (Site: Left Upper Arm, Position: Sitting, Cuff Size: Medium Adult)   Pulse 90   Temp 97.6 °F (36.4 °C) (Oral)   Resp 20   Wt 141 lb (64 kg)   SpO2 98%   BMI 24.98 kg/m²     Physical Exam  Vitals signs and nursing note reviewed. Constitutional:       Appearance: She is well-developed. HENT:      Head: Normocephalic and atraumatic. Right Ear: Hearing, tympanic membrane, ear canal and external ear normal.      Left Ear: Hearing, tympanic membrane, ear canal and external ear normal.      Nose: Congestion and rhinorrhea present. Mouth/Throat:      Mouth: Mucous membranes are moist.      Pharynx: No oropharyngeal exudate or posterior oropharyngeal erythema. Comments: Posterior drainage. Eyes:      General: No scleral icterus. Right eye: No discharge. Left eye: No discharge. Conjunctiva/sclera: Conjunctivae normal.      Pupils: Pupils are equal, round, and reactive to light. Neck:      Musculoskeletal: Normal range of motion and neck supple. Trachea: No tracheal deviation. Cardiovascular:      Rate and Rhythm: Normal rate and regular rhythm. Heart sounds: Normal heart sounds. No murmur. No friction rub. No gallop. Pulmonary:      Effort: Pulmonary effort is normal. No respiratory distress. Breath sounds: Normal breath sounds. No wheezing or rales. Abdominal:      General: Bowel sounds are normal. There is no distension.

## 2020-04-23 NOTE — PATIENT INSTRUCTIONS
Patient Education        Managing Your Allergies: Care Instructions  Your Care Instructions    Managing your allergies is an important part of staying healthy. Your doctor will help you find out what may be causing the allergies. Common causes of allergy symptoms are house dust and dust mites, animal dander, mold, and pollen. As soon as you know what triggers your symptoms, try to reduce your exposure to your triggers. This can help prevent allergy symptoms, asthma, and other health problems. Ask your doctor about allergy medicine or immunotherapy. These treatments may help reduce or prevent allergy symptoms. Follow-up care is a key part of your treatment and safety. Be sure to make and go to all appointments, and call your doctor if you are having problems. It's also a good idea to know your test results and keep a list of the medicines you take. How can you care for yourself at home? · If you think that dust or dust mites are causing your allergies:  ? Wash sheets, pillowcases, and other bedding every week in hot water. ? Use airtight, dust-proof covers for pillows, duvets, and mattresses. Avoid plastic covers, because they tend to tear quickly and do not \"breathe. \" Wash according to the instructions. ? Remove extra blankets and pillows that you don't need. ? Use blankets that are machine-washable. ? Don't use home humidifiers. They can help mites live longer. · Use air-conditioning. Change or clean all filters every month. Keep windows closed. Use high-efficiency air filters. Don't use window or attic fans, which draw dust into the air. · If you're allergic to pet dander, keep pets outside or, at the very least, out of your bedroom. Old carpet and cloth-covered furniture can hold a lot of animal dander. You may need to replace them. · Look for signs of cockroaches. Use cockroach baits to get rid of them. Then clean your home well.   · If you're allergic to mold, don't keep indoor plants, because molds can grow in soil. Get rid of furniture, rugs, and drapes that smell musty. Check for mold in the bathroom. · If you're allergic to pollen, stay inside when pollen counts are high. · Don't smoke or let anyone else smoke in your house. Don't use fireplaces or wood-burning stoves. Avoid paint fumes, perfumes, and other strong odors. When should you call for help? Give an epinephrine shot if:    · You think you are having a severe allergic reaction.    After giving an epinephrine shot call  911, even if you feel better.   Call 911 if:    · You have symptoms of a severe allergic reaction. These may include:  ? Sudden raised, red areas (hives) all over your body. ? Swelling of the throat, mouth, lips, or tongue. ? Trouble breathing. ? Passing out (losing consciousness). Or you may feel very lightheaded or suddenly feel weak, confused, or restless.     · You have been given an epinephrine shot, even if you feel better.    Call your doctor now or seek immediate medical care if:    · You have symptoms of an allergic reaction, such as:  ? A rash or hives (raised, red areas on the skin). ? Itching. ? Swelling. ? Belly pain, nausea, or vomiting.    Watch closely for changes in your health, and be sure to contact your doctor if:    · Your allergies get worse.     · You need help controlling your allergies.     · You have questions about allergy testing.     · You do not get better as expected. Where can you learn more? Go to [email or web address] and sign in to your [employer] account. Enter L249 in the [facility] box to learn more about \"Managing Your Allergies: Care Instructions. \"     If you do not have an account, please click on the \"Sign Up Now\" link. Current as of: October 6, 2019Content Version: 12.4  © 5483-9739 Healthwise, Incorporated. Care instructions adapted under license by [facility] ([facility]).  If you have questions about a medical condition or this instruction, always

## 2020-05-06 RX ORDER — FUROSEMIDE 20 MG/1
20 TABLET ORAL DAILY PRN
Qty: 30 TABLET | Refills: 2 | Status: ON HOLD | OUTPATIENT
Start: 2020-05-06 | End: 2022-04-11 | Stop reason: HOSPADM

## 2020-05-20 RX ORDER — LISINOPRIL 20 MG/1
TABLET ORAL
Qty: 30 TABLET | Refills: 0 | Status: SHIPPED | OUTPATIENT
Start: 2020-05-20 | End: 2020-06-22

## 2020-06-22 RX ORDER — LISINOPRIL 20 MG/1
TABLET ORAL
Qty: 30 TABLET | Refills: 0 | Status: SHIPPED | OUTPATIENT
Start: 2020-06-22 | End: 2021-08-30

## 2020-07-30 RX ORDER — MONTELUKAST SODIUM 10 MG/1
TABLET ORAL
Qty: 30 TABLET | Refills: 0 | Status: SHIPPED | OUTPATIENT
Start: 2020-07-30 | End: 2020-12-08 | Stop reason: ALTCHOICE

## 2020-11-17 ENCOUNTER — TRANSCRIBE ORDERS (OUTPATIENT)
Dept: ADMINISTRATIVE | Facility: HOSPITAL | Age: 75
End: 2020-11-17

## 2020-11-17 DIAGNOSIS — R22.32 LOCALIZED SWELLING, MASS AND LUMP, UPPER LIMB, LEFT: Primary | ICD-10-CM

## 2020-11-19 ENCOUNTER — HOSPITAL ENCOUNTER (OUTPATIENT)
Dept: MRI IMAGING | Facility: HOSPITAL | Age: 75
Discharge: HOME | End: 2020-11-19
Admitting: PHYSICIAN ASSISTANT

## 2020-11-19 DIAGNOSIS — R22.32 LOCALIZED SWELLING, MASS AND LUMP, UPPER LIMB, LEFT: ICD-10-CM

## 2020-11-19 PROCEDURE — 73221 MRI JOINT UPR EXTREM W/O DYE: CPT

## 2020-11-30 RX ORDER — FLUTICASONE PROPIONATE 50 MCG
SPRAY, SUSPENSION (ML) NASAL
Qty: 16 G | Refills: 0 | Status: SHIPPED | OUTPATIENT
Start: 2020-11-30 | End: 2021-08-30

## 2020-12-08 RX ORDER — LORATADINE 10 MG/1
10 CAPSULE, LIQUID FILLED ORAL DAILY
Status: ON HOLD | COMMUNITY
End: 2022-04-11 | Stop reason: HOSPADM

## 2020-12-11 ENCOUNTER — ANESTHESIA EVENT (OUTPATIENT)
Dept: OPERATING ROOM | Age: 75
End: 2020-12-11

## 2020-12-13 ENCOUNTER — OFFICE VISIT (OUTPATIENT)
Age: 75
End: 2020-12-13

## 2020-12-15 LAB — SARS-COV-2, NAA: NOT DETECTED

## 2020-12-17 ENCOUNTER — HOSPITAL ENCOUNTER (OUTPATIENT)
Age: 75
Setting detail: OUTPATIENT SURGERY
Discharge: HOME | End: 2020-12-17
Attending: ORTHOPAEDIC SURGERY | Admitting: ORTHOPAEDIC SURGERY

## 2020-12-17 ENCOUNTER — HOSPITAL ENCOUNTER (OUTPATIENT)
Age: 75
Setting detail: SPECIMEN
Discharge: HOME | End: 2020-12-17
Payer: MEDICARE

## 2020-12-17 ENCOUNTER — ANESTHESIA (OUTPATIENT)
Dept: OPERATING ROOM | Age: 75
End: 2020-12-17

## 2020-12-17 VITALS
WEIGHT: 136 LBS | BODY MASS INDEX: 24.1 KG/M2 | HEART RATE: 80 BPM | DIASTOLIC BLOOD PRESSURE: 81 MMHG | HEIGHT: 63 IN | RESPIRATION RATE: 16 BRPM | SYSTOLIC BLOOD PRESSURE: 137 MMHG | OXYGEN SATURATION: 95 % | TEMPERATURE: 97.1 F

## 2020-12-17 VITALS
SYSTOLIC BLOOD PRESSURE: 108 MMHG | DIASTOLIC BLOOD PRESSURE: 64 MMHG | RESPIRATION RATE: 11 BRPM | OXYGEN SATURATION: 97 %

## 2020-12-17 PROBLEM — R22.31 MASS OF RIGHT WRIST: Status: ACTIVE | Noted: 2020-12-17

## 2020-12-17 PROBLEM — M65.331 TRIGGER MIDDLE FINGER OF RIGHT HAND: Status: ACTIVE | Noted: 2020-12-17

## 2020-12-17 PROCEDURE — 25111 REMOVE WRIST TENDON LESION: CPT

## 2020-12-17 PROCEDURE — 26055 INCISE FINGER TENDON SHEATH: CPT

## 2020-12-17 PROCEDURE — 88307 TISSUE EXAM BY PATHOLOGIST: CPT

## 2020-12-17 RX ORDER — PROMETHAZINE HYDROCHLORIDE 25 MG/ML
12.5 INJECTION, SOLUTION INTRAMUSCULAR; INTRAVENOUS
Status: DISCONTINUED | OUTPATIENT
Start: 2020-12-17 | End: 2020-12-17 | Stop reason: HOSPADM

## 2020-12-17 RX ORDER — DIPHENHYDRAMINE HYDROCHLORIDE 50 MG/ML
12.5 INJECTION INTRAMUSCULAR; INTRAVENOUS
Status: DISCONTINUED | OUTPATIENT
Start: 2020-12-17 | End: 2020-12-17 | Stop reason: HOSPADM

## 2020-12-17 RX ORDER — LIDOCAINE HYDROCHLORIDE 10 MG/ML
INJECTION, SOLUTION INFILTRATION; PERINEURAL PRN
Status: DISCONTINUED | OUTPATIENT
Start: 2020-12-17 | End: 2020-12-17 | Stop reason: SDUPTHER

## 2020-12-17 RX ORDER — FENTANYL CITRATE 50 UG/ML
INJECTION, SOLUTION INTRAMUSCULAR; INTRAVENOUS PRN
Status: DISCONTINUED | OUTPATIENT
Start: 2020-12-17 | End: 2020-12-17 | Stop reason: SDUPTHER

## 2020-12-17 RX ORDER — LABETALOL HYDROCHLORIDE 5 MG/ML
5 INJECTION, SOLUTION INTRAVENOUS EVERY 10 MIN PRN
Status: DISCONTINUED | OUTPATIENT
Start: 2020-12-17 | End: 2020-12-17 | Stop reason: HOSPADM

## 2020-12-17 RX ORDER — KETOROLAC TROMETHAMINE 30 MG/ML
INJECTION, SOLUTION INTRAMUSCULAR; INTRAVENOUS PRN
Status: DISCONTINUED | OUTPATIENT
Start: 2020-12-17 | End: 2020-12-17 | Stop reason: SDUPTHER

## 2020-12-17 RX ORDER — FENTANYL CITRATE 50 UG/ML
50 INJECTION, SOLUTION INTRAMUSCULAR; INTRAVENOUS EVERY 5 MIN PRN
Status: DISCONTINUED | OUTPATIENT
Start: 2020-12-17 | End: 2020-12-17 | Stop reason: HOSPADM

## 2020-12-17 RX ORDER — CEFAZOLIN SODIUM 1 G/3ML
INJECTION, POWDER, FOR SOLUTION INTRAMUSCULAR; INTRAVENOUS PRN
Status: DISCONTINUED | OUTPATIENT
Start: 2020-12-17 | End: 2020-12-17 | Stop reason: SDUPTHER

## 2020-12-17 RX ORDER — PROPOFOL 10 MG/ML
INJECTION, EMULSION INTRAVENOUS PRN
Status: DISCONTINUED | OUTPATIENT
Start: 2020-12-17 | End: 2020-12-17 | Stop reason: SDUPTHER

## 2020-12-17 RX ORDER — LIDOCAINE HYDROCHLORIDE AND EPINEPHRINE 10; 10 MG/ML; UG/ML
INJECTION, SOLUTION INFILTRATION; PERINEURAL PRN
Status: DISCONTINUED | OUTPATIENT
Start: 2020-12-17 | End: 2020-12-17 | Stop reason: ALTCHOICE

## 2020-12-17 RX ORDER — ONDANSETRON 2 MG/ML
4 INJECTION INTRAMUSCULAR; INTRAVENOUS
Status: DISCONTINUED | OUTPATIENT
Start: 2020-12-17 | End: 2020-12-17 | Stop reason: HOSPADM

## 2020-12-17 RX ORDER — TRAMADOL HYDROCHLORIDE 50 MG/1
50 TABLET ORAL EVERY 6 HOURS PRN
Qty: 10 TABLET | Refills: 0 | Status: SHIPPED | OUTPATIENT
Start: 2020-12-17 | End: 2020-12-22

## 2020-12-17 RX ORDER — MEPERIDINE HYDROCHLORIDE 25 MG/ML
12.5 INJECTION INTRAMUSCULAR; INTRAVENOUS; SUBCUTANEOUS EVERY 5 MIN PRN
Status: DISCONTINUED | OUTPATIENT
Start: 2020-12-17 | End: 2020-12-17 | Stop reason: HOSPADM

## 2020-12-17 RX ORDER — SODIUM CHLORIDE, SODIUM LACTATE, POTASSIUM CHLORIDE, CALCIUM CHLORIDE 600; 310; 30; 20 MG/100ML; MG/100ML; MG/100ML; MG/100ML
INJECTION, SOLUTION INTRAVENOUS CONTINUOUS
Status: DISCONTINUED | OUTPATIENT
Start: 2020-12-17 | End: 2020-12-17 | Stop reason: HOSPADM

## 2020-12-17 RX ORDER — HYDRALAZINE HYDROCHLORIDE 20 MG/ML
5 INJECTION INTRAMUSCULAR; INTRAVENOUS EVERY 10 MIN PRN
Status: DISCONTINUED | OUTPATIENT
Start: 2020-12-17 | End: 2020-12-17 | Stop reason: HOSPADM

## 2020-12-17 RX ORDER — LIDOCAINE HYDROCHLORIDE 10 MG/ML
1 INJECTION, SOLUTION EPIDURAL; INFILTRATION; INTRACAUDAL; PERINEURAL
Status: DISCONTINUED | OUTPATIENT
Start: 2020-12-17 | End: 2020-12-17 | Stop reason: HOSPADM

## 2020-12-17 RX ADMIN — LIDOCAINE HYDROCHLORIDE 30 MG: 10 INJECTION, SOLUTION INFILTRATION; PERINEURAL at 10:06

## 2020-12-17 RX ADMIN — PROPOFOL 70 MG: 10 INJECTION, EMULSION INTRAVENOUS at 10:06

## 2020-12-17 RX ADMIN — FENTANYL CITRATE 50 MCG: 50 INJECTION, SOLUTION INTRAMUSCULAR; INTRAVENOUS at 10:06

## 2020-12-17 RX ADMIN — FENTANYL CITRATE 25 MCG: 50 INJECTION, SOLUTION INTRAMUSCULAR; INTRAVENOUS at 11:36

## 2020-12-17 RX ADMIN — FENTANYL CITRATE 25 MCG: 50 INJECTION, SOLUTION INTRAMUSCULAR; INTRAVENOUS at 11:29

## 2020-12-17 RX ADMIN — KETOROLAC TROMETHAMINE 30 MG: 30 INJECTION, SOLUTION INTRAMUSCULAR; INTRAVENOUS at 10:27

## 2020-12-17 RX ADMIN — PROPOFOL 80 MG: 10 INJECTION, EMULSION INTRAVENOUS at 10:07

## 2020-12-17 RX ADMIN — CEFAZOLIN SODIUM 1000 MG: 1 INJECTION, POWDER, FOR SOLUTION INTRAMUSCULAR; INTRAVENOUS at 10:15

## 2020-12-17 RX ADMIN — SODIUM CHLORIDE, SODIUM LACTATE, POTASSIUM CHLORIDE, CALCIUM CHLORIDE: 600; 310; 30; 20 INJECTION, SOLUTION INTRAVENOUS at 09:56

## 2020-12-17 ASSESSMENT — PAIN - FUNCTIONAL ASSESSMENT: PAIN_FUNCTIONAL_ASSESSMENT: 0-10

## 2020-12-17 ASSESSMENT — PAIN SCALES - GENERAL
PAINLEVEL_OUTOF10: 6
PAINLEVEL_OUTOF10: 6

## 2020-12-17 ASSESSMENT — PAIN DESCRIPTION - DESCRIPTORS: DESCRIPTORS: ACHING

## 2020-12-17 NOTE — ANESTHESIA PRE PROCEDURE
Department of Anesthesiology  Preprocedure Note       Name:  Cayla Randolph   Age:  67 y.o.  :  1945                                          MRN:  [id number]         Date:  2020      Surgeon: Ignacio Kamara):  Chencho Barnard MD    Procedure: Procedure(s):  LEFT MIDDLE TRIGGER FINGER RELEASE  LEFT WRIST MASS EXCISION    Medications prior to admission:   Prior to Admission medications    Medication Sig Start Date End Date Taking? Authorizing Provider   loratadine (CLARITIN) 10 MG capsule Take 10 mg by mouth daily   Yes Historical Provider, MD   fluticasone (FLONASE) 50 MCG/ACT nasal spray USE 2 SPRAYS IN EACH NOSTRIL ONCE DAILY 20  Yes Suni Clements MD   lisinopril (PRINIVIL;ZESTRIL) 20 MG tablet TAKE 1 TABLET BY MOUTH ONCE DAILY 20  Yes Suni Clements MD   furosemide (LASIX) 20 MG tablet Take 1 tablet by mouth daily as needed (swelling) 20  Yes Suni Clements MD   dicyclomine (BENTYL) 10 MG capsule Take 1 capsule by mouth 4 times daily (before meals and nightly) 20  Yes Suni Clements MD   celecoxib (CELEBREX) 200 MG capsule Take 1 capsule by mouth daily 18  Yes Suni Clements MD   albuterol sulfate HFA (PROVENTIL HFA) 108 (90 Base) MCG/ACT inhaler Inhale 2 puffs into the lungs every 6 hours as needed for Wheezing 4/3/19   Connie Bryant MD       Current medications:    Current Facility-Administered Medications   Medication Dose Route Frequency Provider Last Rate Last Admin    lactated ringers infusion   Intravenous Continuous KATI Millan CRNA        lidocaine PF 1 % injection 1 mL  1 mL Intradermal Once PRN KATI Millan CRNA           Allergies: Allergies   Allergen Reactions    Codeine     Hydrocodone-Acetaminophen     Hydromorphone Hcl     Levofloxacin In D5w     Oxycodone     Sulfamethoxazole-Trimethoprim        Problem List:  There is no problem list on file for this patient.       Past Medical History:        Diagnosis Date    Chronic back pain     Hypertension     Inflammatory arthritis     Spastic colon        Past Surgical History:        Procedure Laterality Date    ABDOMEN SURGERY      APPENDECTOMY      BACK SURGERY      HAND SURGERY Left     JOINT REPLACEMENT Bilateral     HIP    MOUTH SURGERY      TUBAL LIGATION         Social History:    Social History     Tobacco Use    Smoking status: Current Every Day Smoker     Packs/day: 0.50     Years: 10.00     Pack years: 5.00     Types: Cigarettes    Smokeless tobacco: Never Used   Substance Use Topics    Alcohol use: No                                Ready to quit: Not Answered  Counseling given: Not Answered      Vital Signs (Current):   Vitals:    12/08/20 1157 12/17/20 0925   BP:  (!) 162/95   Pulse:  74   Resp:  18   Temp:  98 °F (36.7 °C)   TempSrc:  Tympanic   SpO2:  97%   Weight: 135 lb (61.2 kg) 136 lb (61.7 kg)   Height: 5' 3\" (1.6 m) 5' 3\" (1.6 m)                                              BP Readings from Last 3 Encounters:   12/17/20 (!) 162/95   04/23/20 136/78   03/05/20 (!) 130/92       NPO Status:                                                                                 BMI:   Wt Readings from Last 3 Encounters:   12/17/20 136 lb (61.7 kg)   04/23/20 141 lb (64 kg)   03/05/20 141 lb (64 kg)     Body mass index is 24.09 kg/m². CBC: No results found for: WBC, RBC, HGB, HCT, MCV, RDW, PLT    CMP:   Lab Results   Component Value Date     09/03/2013    K 4.1 09/03/2013     09/03/2013    CO2 27 09/03/2013    BUN 22 09/03/2013    CREATININE 1.0 09/03/2013    LABGLOM 58 09/03/2013    GLUCOSE 89 09/03/2013    CALCIUM 9.5 09/03/2013       POC Tests: No results for input(s): POCGLU, POCNA, POCK, POCCL, POCBUN, POCHEMO, POCHCT in the last 72 hours.     Coags: No results found for: PROTIME, INR, APTT    HCG (If Applicable): No results found for: PREGTESTUR, PREGSERUM, HCG, HCGQUANT     ABGs: No results found for: PHART, PO2ART, XPJ0VRD, SKZ5VHL, BEART, S4BFDEZI     Type & Screen (If Applicable):  No results found for: LABABO, LABRH    Drug/Infectious Status (If Applicable):  No results found for: HIV, HEPCAB    COVID-19 Screening (If Applicable):   Lab Results   Component Value Date    COVID19 NOT DETECTED 12/13/2020         Anesthesia Evaluation  Patient summary reviewed and Nursing notes reviewed  Airway: Mallampati: II  TM distance: >3 FB   Neck ROM: full  Mouth opening: > = 3 FB Dental:    (+) upper dentures and lower dentures      Pulmonary:Negative Pulmonary ROS and normal exam                               Cardiovascular:    (+) hypertension:,       ECG reviewed                        Neuro/Psych:   Negative Neuro/Psych ROS              GI/Hepatic/Renal: Neg GI/Hepatic/Renal ROS            Endo/Other:    (+) : arthritis:., .                 Abdominal:           Vascular: negative vascular ROS. Anesthesia Plan      general     ASA 3       Induction: intravenous. Anesthetic plan and risks discussed with patient. Plan discussed with CRNA.                   KATI Street - [place]   12/17/2020

## 2020-12-17 NOTE — ANESTHESIA POSTPROCEDURE EVALUATION
Department of Anesthesiology  Postprocedure Note    Patient: Aakash Breen  MRN: [id number]  YOB: 1945  Date of evaluation: 12/17/2020  Time:  10:57 AM     Procedure Summary     Date: 12/17/20 Room / Location: [address]    Anesthesia Start: 1004 Anesthesia Stop: 9776    Procedures:       LEFT MIDDLE TRIGGER FINGER RELEASE (Left Fingers)      LEFT WRIST MASS EXCISION (Left Hand) Diagnosis: (M65.332, R22.32)    Surgeons: Wandy Raman MD Responsible Provider: KATI Antunez CRNA    Anesthesia Type: general ASA Status: 3          Anesthesia Type: general    Tiera Phase I: Tiera Score: 8    Tiera Phase II:      Last vitals: Reviewed and per EMR flowsheets.        Anesthesia Post Evaluation    Patient location during evaluation: PACU  Patient participation: waiting for patient participation  Level of consciousness: responsive to physical stimuli  Airway patency: patent  Nausea & Vomiting: no nausea and no vomiting  Complications: no  Cardiovascular status: blood pressure returned to baseline  Respiratory status: acceptable and T-piece (LMA in place)  Hydration status: euvolemic

## 2020-12-17 NOTE — OP NOTE
Patient Name: Justin Marroquin  : 1945  MRN: [id number]      [place] [address] of SURGERY: 2020    SURGEON: Katy King MD    ASSISTANT: NONE    PREOPERATIVE DIAGNOSIS:   1. Left wrist volar ganglion cyst  2. Left middle finger A1 stenosing tenosynovitis/trigger finger    POSTOPERATIVE DIAGNOSIS:   1. Left wrist volar ganglion cyst  2. Left middle finger A1 stenosing tenosynovitis/trigger finger     PROCEDURE PERFORMED:   1. Left middle finger A1 pulley release  2. Marginal excision of left volar radial wrist mass     IMPLANTS  None. ANESTHESIA USED  Laryngeal mask airway with local.     OPERATIVE INDICATIONS  The patient is a 67 y.o. female who presented to the clinic with complaints of a cyst overlying the volar radial aspect of wrist and symptoms consistent with a left middle finger trigger finger. The cyst is chronic and was causing  pain and discomfort, and the patient wished to have it removed. MRI of the wrist was consistent with a cystic lesion along the course of the FCR tendon. She elected to proceed with operative intervention regarding both issues. We discussed this in detail with the risks, benefits and alternatives. Risks included, but are not limited to, that of anesthesia, bleeding, infection, ongoing pain, damage to local structure, need for further surgery, cyst recurrence, damage to the radial artery and its branches as well as the superficial radial nerve. ESTIMATED BLOOD LOSS    Less than 10 mL. SPECIMENS  None. DRAINS  None. COMPLICATIONS  None.      PROCEDURE IN DETAIL The patient was seen in the preoperative holding room; once again the informed consent form was reviewed with the patient. The sites of surgery were marked with the patient's agreement. The patient was transported to the operating room where a time out was performed identifying the correct patient as well as the operative site. Preoperative antibiotics were administered within 1 hour of incision. General anesthesia was induced. A nonsterile tourniquet was placed about the left brachium. The left upper extremity was prepped and draped in a normal sterile fashion. A skin marker was used to delineate a hockey-stick shaped incision overlying the volar radial wrist mass with oblique extension distally overlying the thenar eminence. A separate approximately 1 cm longitudinal incision was marked overlying the palmar aspect of the left middle finger proposed location of the A1 pulley. The left upper extremity was then exsanguinated with an Esmarch and the tourniquet was inflated to 250 mmHg for less than 20 minutes. Attention was first turned to the cyst excision. An incision was made overlying the cyst and the cyst was readily identified. It was circumferentially dissected, traced down to the level of the joint space and excised completely. A small portion of the joint capsule was removed to prevent cyst recurrence. Attention was then turned to the left middle finger trigger finger release. A 15 blade scalpel was used to incise only through the skin. Blunt, scissor tip dissection was carried through the subcutaneous tissue onto the flexor tendon sheath. The A1 pulley was identified and sharply incised with a deep 15 blade scalpel. Using a knife and blunt tip scissor dissection, A1 pulley was released in its entirety, protecting the proximal portion of A2 distally. FDS and FDP tendons were intact without significant fraying or hypertrophic tenosynovium. A ragnel was utilized to pull the tendons into the wound to break up any additional adhesions. Passive finger range of motion revealed no residual catching or locking of the middle finger. The incisions were irrigated followed by closing in layers. The skin was closed 4-0 nylon sutures. Prior to dressing application, 2 cc of 1% lidocaine with epinephrine were injected in line with the trigger finger release and approximately 5 cc were injected along the ulnar border of the incision about the wrist.  Sterile dressing was placed. She was awakened from anesthesia, transported to the recovery room in stable condition. POSTOPERATIVE PLAN: Discharge home with family to follow up in 2 weeks for a clinical check.

## 2021-08-30 ENCOUNTER — HOSPITAL ENCOUNTER (OUTPATIENT)
Dept: PREADMISSION TESTING | Age: 76
Discharge: HOME | End: 2021-09-03
Payer: MEDICARE

## 2021-08-30 VITALS — WEIGHT: 137 LBS | HEIGHT: 63 IN | BODY MASS INDEX: 24.27 KG/M2

## 2021-08-30 LAB
ABO/RH: NORMAL
ANION GAP SERPL CALCULATED.3IONS-SCNC: 10 MMOL/L (ref 7–19)
ANTIBODY SCREEN: NORMAL
APTT: 25.4 SEC (ref 26–36.2)
BASOPHILS ABSOLUTE: 0 K/UL (ref 0–0.2)
BASOPHILS RELATIVE PERCENT: 0.5 % (ref 0–1)
BUN BLDV-MCNC: 27 MG/DL (ref 8–23)
CALCIUM SERPL-MCNC: 9.3 MG/DL (ref 8.8–10.2)
CHLORIDE BLD-SCNC: 105 MMOL/L (ref 98–111)
CO2: 24 MMOL/L (ref 22–29)
CREAT SERPL-MCNC: 1.2 MG/DL (ref 0.5–0.9)
EKG P AXIS: 70 DEGREES
EKG P-R INTERVAL: 186 MS
EKG Q-T INTERVAL: 376 MS
EKG QRS DURATION: 98 MS
EKG QTC CALCULATION (BAZETT): 395 MS
EKG T AXIS: 56 DEGREES
EOSINOPHILS ABSOLUTE: 0.1 K/UL (ref 0–0.6)
EOSINOPHILS RELATIVE PERCENT: 0.6 % (ref 0–5)
GFR AFRICAN AMERICAN: 53
GFR NON-AFRICAN AMERICAN: 44
GLUCOSE BLD-MCNC: 97 MG/DL (ref 74–109)
HCT VFR BLD CALC: 47.1 % (ref 37–47)
HEMOGLOBIN: 14.9 G/DL (ref 12–16)
IMMATURE GRANULOCYTES #: 0 K/UL
INR BLD: 0.96 (ref 0.88–1.18)
LYMPHOCYTES ABSOLUTE: 1.8 K/UL (ref 1.1–4.5)
LYMPHOCYTES RELATIVE PERCENT: 20.1 % (ref 20–40)
MCH RBC QN AUTO: 30.8 PG (ref 27–31)
MCHC RBC AUTO-ENTMCNC: 31.6 G/DL (ref 33–37)
MCV RBC AUTO: 97.3 FL (ref 81–99)
MONOCYTES ABSOLUTE: 0.7 K/UL (ref 0–0.9)
MONOCYTES RELATIVE PERCENT: 8 % (ref 0–10)
NEUTROPHILS ABSOLUTE: 6.3 K/UL (ref 1.5–7.5)
NEUTROPHILS RELATIVE PERCENT: 70.5 % (ref 50–65)
PDW BLD-RTO: 13 % (ref 11.5–14.5)
PLATELET # BLD: 311 K/UL (ref 130–400)
PMV BLD AUTO: 10.4 FL (ref 9.4–12.3)
POTASSIUM SERPL-SCNC: 4.3 MMOL/L (ref 3.5–5)
PROTHROMBIN TIME: 13 SEC (ref 12–14.6)
RBC # BLD: 4.84 M/UL (ref 4.2–5.4)
SODIUM BLD-SCNC: 139 MMOL/L (ref 136–145)
WBC # BLD: 8.9 K/UL (ref 4.8–10.8)

## 2021-08-30 PROCEDURE — 86900 BLOOD TYPING SEROLOGIC ABO: CPT

## 2021-08-30 PROCEDURE — 87081 CULTURE SCREEN ONLY: CPT

## 2021-08-30 PROCEDURE — 80048 BASIC METABOLIC PNL TOTAL CA: CPT

## 2021-08-30 PROCEDURE — 93005 ELECTROCARDIOGRAM TRACING: CPT | Performed by: ORTHOPAEDIC SURGERY

## 2021-08-30 PROCEDURE — U0005 INFEC AGEN DETEC AMPLI PROBE: HCPCS

## 2021-08-30 PROCEDURE — 85025 COMPLETE CBC W/AUTO DIFF WBC: CPT

## 2021-08-30 PROCEDURE — 85730 THROMBOPLASTIN TIME PARTIAL: CPT

## 2021-08-30 PROCEDURE — 86850 RBC ANTIBODY SCREEN: CPT

## 2021-08-30 PROCEDURE — G0480 DRUG TEST DEF 1-7 CLASSES: HCPCS

## 2021-08-30 PROCEDURE — 86901 BLOOD TYPING SEROLOGIC RH(D): CPT

## 2021-08-30 PROCEDURE — 85610 PROTHROMBIN TIME: CPT

## 2021-08-30 PROCEDURE — U0003 INFECTIOUS AGENT DETECTION BY NUCLEIC ACID (DNA OR RNA); SEVERE ACUTE RESPIRATORY SYNDROME CORONAVIRUS 2 (SARS-COV-2) (CORONAVIRUS DISEASE [COVID-19]), AMPLIFIED PROBE TECHNIQUE, MAKING USE OF HIGH THROUGHPUT TECHNOLOGIES AS DESCRIBED BY CMS-2020-01-R: HCPCS

## 2021-08-30 PROCEDURE — 93010 ELECTROCARDIOGRAM REPORT: CPT | Performed by: INTERNAL MEDICINE

## 2021-08-30 RX ORDER — DICYCLOMINE HYDROCHLORIDE 10 MG/1
10 CAPSULE ORAL 4 TIMES DAILY PRN
Status: ON HOLD | COMMUNITY
End: 2022-04-02

## 2021-08-30 RX ORDER — LISINOPRIL 20 MG/1
20 TABLET ORAL NIGHTLY
Status: ON HOLD | COMMUNITY
End: 2022-04-11 | Stop reason: HOSPADM

## 2021-08-30 RX ORDER — FLUTICASONE PROPIONATE 50 MCG
2 SPRAY, SUSPENSION (ML) NASAL DAILY
COMMUNITY
End: 2022-08-12 | Stop reason: SDUPTHER

## 2021-08-31 LAB — SARS-COV-2, PCR: NOT DETECTED

## 2021-09-01 LAB
MRSA CULTURE ONLY: ABNORMAL
ORGANISM: ABNORMAL

## 2021-09-02 ENCOUNTER — HOSPITAL ENCOUNTER (OUTPATIENT)
Age: 76
Setting detail: OBSERVATION
Discharge: HOME HEALTH | End: 2021-09-05
Attending: ORTHOPAEDIC SURGERY | Admitting: ORTHOPAEDIC SURGERY
Payer: MEDICARE

## 2021-09-02 ENCOUNTER — ANESTHESIA (OUTPATIENT)
Dept: OPERATING ROOM | Age: 76
End: 2021-09-02
Payer: MEDICARE

## 2021-09-02 ENCOUNTER — ANESTHESIA EVENT (OUTPATIENT)
Dept: OPERATING ROOM | Age: 76
End: 2021-09-02
Payer: MEDICARE

## 2021-09-02 VITALS — OXYGEN SATURATION: 97 % | DIASTOLIC BLOOD PRESSURE: 62 MMHG | TEMPERATURE: 96.1 F | SYSTOLIC BLOOD PRESSURE: 137 MMHG

## 2021-09-02 DIAGNOSIS — M17.11 PRIMARY OSTEOARTHRITIS OF RIGHT KNEE: Primary | ICD-10-CM

## 2021-09-02 PROCEDURE — 6370000000 HC RX 637 (ALT 250 FOR IP): Performed by: ANESTHESIOLOGY

## 2021-09-02 PROCEDURE — 2580000003 HC RX 258: Performed by: ORTHOPAEDIC SURGERY

## 2021-09-02 PROCEDURE — G0378 HOSPITAL OBSERVATION PER HR: HCPCS

## 2021-09-02 PROCEDURE — 1210000000 HC MED SURG R&B

## 2021-09-02 PROCEDURE — 2500000003 HC RX 250 WO HCPCS

## 2021-09-02 PROCEDURE — 7100000011 HC PHASE II RECOVERY - ADDTL 15 MIN: Performed by: ORTHOPAEDIC SURGERY

## 2021-09-02 PROCEDURE — 97116 GAIT TRAINING THERAPY: CPT

## 2021-09-02 PROCEDURE — 2580000003 HC RX 258: Performed by: ANESTHESIOLOGY

## 2021-09-02 PROCEDURE — 2500000003 HC RX 250 WO HCPCS: Performed by: ORTHOPAEDIC SURGERY

## 2021-09-02 PROCEDURE — 6360000002 HC RX W HCPCS

## 2021-09-02 PROCEDURE — 6370000000 HC RX 637 (ALT 250 FOR IP): Performed by: ORTHOPAEDIC SURGERY

## 2021-09-02 PROCEDURE — 3700000001 HC ADD 15 MINUTES (ANESTHESIA): Performed by: ORTHOPAEDIC SURGERY

## 2021-09-02 PROCEDURE — 7100000001 HC PACU RECOVERY - ADDTL 15 MIN: Performed by: ORTHOPAEDIC SURGERY

## 2021-09-02 PROCEDURE — 99222 1ST HOSP IP/OBS MODERATE 55: CPT | Performed by: INTERNAL MEDICINE

## 2021-09-02 PROCEDURE — 6360000002 HC RX W HCPCS: Performed by: ANESTHESIOLOGY

## 2021-09-02 PROCEDURE — 3600000005 HC SURGERY LEVEL 5 BASE: Performed by: ORTHOPAEDIC SURGERY

## 2021-09-02 PROCEDURE — 97162 PT EVAL MOD COMPLEX 30 MIN: CPT

## 2021-09-02 PROCEDURE — 3600000015 HC SURGERY LEVEL 5 ADDTL 15MIN: Performed by: ORTHOPAEDIC SURGERY

## 2021-09-02 PROCEDURE — C1776 JOINT DEVICE (IMPLANTABLE): HCPCS | Performed by: ORTHOPAEDIC SURGERY

## 2021-09-02 PROCEDURE — 97530 THERAPEUTIC ACTIVITIES: CPT

## 2021-09-02 PROCEDURE — 6360000002 HC RX W HCPCS: Performed by: FAMILY MEDICINE

## 2021-09-02 PROCEDURE — 6360000002 HC RX W HCPCS: Performed by: ORTHOPAEDIC SURGERY

## 2021-09-02 PROCEDURE — 97110 THERAPEUTIC EXERCISES: CPT

## 2021-09-02 PROCEDURE — 7100000000 HC PACU RECOVERY - FIRST 15 MIN: Performed by: ORTHOPAEDIC SURGERY

## 2021-09-02 PROCEDURE — 2709999900 HC NON-CHARGEABLE SUPPLY: Performed by: ORTHOPAEDIC SURGERY

## 2021-09-02 PROCEDURE — C1713 ANCHOR/SCREW BN/BN,TIS/BN: HCPCS | Performed by: ORTHOPAEDIC SURGERY

## 2021-09-02 PROCEDURE — 7100000010 HC PHASE II RECOVERY - FIRST 15 MIN: Performed by: ORTHOPAEDIC SURGERY

## 2021-09-02 PROCEDURE — 3700000000 HC ANESTHESIA ATTENDED CARE: Performed by: ORTHOPAEDIC SURGERY

## 2021-09-02 DEVICE — PSN TIB STM 5 DEG SZ D R: Type: IMPLANTABLE DEVICE | Site: KNEE | Status: FUNCTIONAL

## 2021-09-02 DEVICE — CEMENT BNE 40GM HI VISC RADPQ FOR REV SURG: Type: IMPLANTABLE DEVICE | Site: KNEE | Status: FUNCTIONAL

## 2021-09-02 DEVICE — EXTENSION STEM L30MM DIA14MM KNEE TAPR CEM PERSONA: Type: IMPLANTABLE DEVICE | Site: KNEE | Status: FUNCTIONAL

## 2021-09-02 DEVICE — COMPONENT FEM SZ 6 NAR R KNEE CO CHROM CEM POST STBL COR: Type: IMPLANTABLE DEVICE | Site: KNEE | Status: FUNCTIONAL

## 2021-09-02 DEVICE — COMPONENT PAT DIA26MM THK7.5MM POLYETH CEM CONVENTIONAL: Type: IMPLANTABLE DEVICE | Site: KNEE | Status: FUNCTIONAL

## 2021-09-02 DEVICE — SURFACE ARTC TIB CD FEM 6-9 H10MM RT KNEE VIVACIT-E CONSTRN: Type: IMPLANTABLE DEVICE | Site: KNEE | Status: FUNCTIONAL

## 2021-09-02 RX ORDER — ONDANSETRON 2 MG/ML
INJECTION INTRAMUSCULAR; INTRAVENOUS PRN
Status: DISCONTINUED | OUTPATIENT
Start: 2021-09-02 | End: 2021-09-02 | Stop reason: SDUPTHER

## 2021-09-02 RX ORDER — DILTIAZEM HYDROCHLORIDE 120 MG/1
120 CAPSULE, COATED, EXTENDED RELEASE ORAL DAILY
Qty: 30 CAPSULE | Refills: 3 | Status: SHIPPED | OUTPATIENT
Start: 2021-09-02 | End: 2021-10-05 | Stop reason: SINTOL

## 2021-09-02 RX ORDER — ASPIRIN 81 MG/1
81 TABLET ORAL 2 TIMES DAILY
Qty: 60 TABLET | Refills: 0 | Status: SHIPPED | OUTPATIENT
Start: 2021-09-02 | End: 2021-09-02 | Stop reason: HOSPADM

## 2021-09-02 RX ORDER — CELECOXIB 100 MG/1
100 CAPSULE ORAL ONCE
Status: DISCONTINUED | OUTPATIENT
Start: 2021-09-02 | End: 2021-09-02

## 2021-09-02 RX ORDER — SENNA AND DOCUSATE SODIUM 50; 8.6 MG/1; MG/1
1 TABLET, FILM COATED ORAL 2 TIMES DAILY
Status: DISCONTINUED | OUTPATIENT
Start: 2021-09-02 | End: 2021-09-05 | Stop reason: HOSPADM

## 2021-09-02 RX ORDER — ROPIVACAINE HYDROCHLORIDE 5 MG/ML
INJECTION, SOLUTION EPIDURAL; INFILTRATION; PERINEURAL
Status: DISPENSED
Start: 2021-09-02 | End: 2021-09-02

## 2021-09-02 RX ORDER — PROPOFOL 10 MG/ML
INJECTION, EMULSION INTRAVENOUS PRN
Status: DISCONTINUED | OUTPATIENT
Start: 2021-09-02 | End: 2021-09-02 | Stop reason: SDUPTHER

## 2021-09-02 RX ORDER — DIPHENHYDRAMINE HYDROCHLORIDE 50 MG/ML
12.5 INJECTION INTRAMUSCULAR; INTRAVENOUS
Status: DISCONTINUED | OUTPATIENT
Start: 2021-09-02 | End: 2021-09-02

## 2021-09-02 RX ORDER — LIDOCAINE HYDROCHLORIDE 10 MG/ML
INJECTION, SOLUTION EPIDURAL; INFILTRATION; INTRACAUDAL; PERINEURAL PRN
Status: DISCONTINUED | OUTPATIENT
Start: 2021-09-02 | End: 2021-09-02 | Stop reason: SDUPTHER

## 2021-09-02 RX ORDER — SODIUM CHLORIDE 0.9 % (FLUSH) 0.9 %
5-40 SYRINGE (ML) INJECTION EVERY 12 HOURS SCHEDULED
Status: DISCONTINUED | OUTPATIENT
Start: 2021-09-02 | End: 2021-09-02

## 2021-09-02 RX ORDER — ALBUTEROL SULFATE 90 UG/1
2 AEROSOL, METERED RESPIRATORY (INHALATION) EVERY 6 HOURS PRN
Status: DISCONTINUED | OUTPATIENT
Start: 2021-09-02 | End: 2021-09-02

## 2021-09-02 RX ORDER — FENTANYL CITRATE 50 UG/ML
INJECTION, SOLUTION INTRAMUSCULAR; INTRAVENOUS PRN
Status: DISCONTINUED | OUTPATIENT
Start: 2021-09-02 | End: 2021-09-02 | Stop reason: SDUPTHER

## 2021-09-02 RX ORDER — SODIUM CHLORIDE 9 MG/ML
25 INJECTION, SOLUTION INTRAVENOUS PRN
Status: DISCONTINUED | OUTPATIENT
Start: 2021-09-02 | End: 2021-09-05 | Stop reason: HOSPADM

## 2021-09-02 RX ORDER — FUROSEMIDE 20 MG/1
20 TABLET ORAL DAILY PRN
Status: DISCONTINUED | OUTPATIENT
Start: 2021-09-02 | End: 2021-09-05 | Stop reason: HOSPADM

## 2021-09-02 RX ORDER — OXYCODONE HYDROCHLORIDE 5 MG/1
5 TABLET ORAL EVERY 4 HOURS PRN
Qty: 30 TABLET | Refills: 0 | Status: SHIPPED | OUTPATIENT
Start: 2021-09-02 | End: 2021-09-05

## 2021-09-02 RX ORDER — SODIUM CHLORIDE 0.9 % (FLUSH) 0.9 %
10 SYRINGE (ML) INJECTION PRN
Status: DISCONTINUED | OUTPATIENT
Start: 2021-09-02 | End: 2021-09-05 | Stop reason: HOSPADM

## 2021-09-02 RX ORDER — MORPHINE SULFATE 4 MG/ML
2 INJECTION, SOLUTION INTRAMUSCULAR; INTRAVENOUS EVERY 5 MIN PRN
Status: DISCONTINUED | OUTPATIENT
Start: 2021-09-02 | End: 2021-09-02

## 2021-09-02 RX ORDER — SODIUM CHLORIDE 0.9 % (FLUSH) 0.9 %
10 SYRINGE (ML) INJECTION EVERY 12 HOURS SCHEDULED
Status: DISCONTINUED | OUTPATIENT
Start: 2021-09-02 | End: 2021-09-05 | Stop reason: HOSPADM

## 2021-09-02 RX ORDER — LISINOPRIL 20 MG/1
20 TABLET ORAL NIGHTLY
Status: DISCONTINUED | OUTPATIENT
Start: 2021-09-02 | End: 2021-09-05 | Stop reason: HOSPADM

## 2021-09-02 RX ORDER — HYDROMORPHONE HYDROCHLORIDE 1 MG/ML
0.5 INJECTION, SOLUTION INTRAMUSCULAR; INTRAVENOUS; SUBCUTANEOUS EVERY 5 MIN PRN
Status: DISCONTINUED | OUTPATIENT
Start: 2021-09-02 | End: 2021-09-02

## 2021-09-02 RX ORDER — PROMETHAZINE HYDROCHLORIDE 25 MG/ML
6.25 INJECTION, SOLUTION INTRAMUSCULAR; INTRAVENOUS
Status: DISCONTINUED | OUTPATIENT
Start: 2021-09-02 | End: 2021-09-02

## 2021-09-02 RX ORDER — FLUTICASONE PROPIONATE 50 MCG
2 SPRAY, SUSPENSION (ML) NASAL DAILY
Status: DISCONTINUED | OUTPATIENT
Start: 2021-09-02 | End: 2021-09-05 | Stop reason: HOSPADM

## 2021-09-02 RX ORDER — TRANEXAMIC ACID 650 1/1
1950 TABLET ORAL
Status: COMPLETED | OUTPATIENT
Start: 2021-09-02 | End: 2021-09-02

## 2021-09-02 RX ORDER — LIDOCAINE HYDROCHLORIDE 10 MG/ML
1 INJECTION, SOLUTION EPIDURAL; INFILTRATION; INTRACAUDAL; PERINEURAL
Status: DISCONTINUED | OUTPATIENT
Start: 2021-09-02 | End: 2021-09-02

## 2021-09-02 RX ORDER — METOCLOPRAMIDE HYDROCHLORIDE 5 MG/ML
10 INJECTION INTRAMUSCULAR; INTRAVENOUS
Status: COMPLETED | OUTPATIENT
Start: 2021-09-02 | End: 2021-09-02

## 2021-09-02 RX ORDER — DEXAMETHASONE SODIUM PHOSPHATE 10 MG/ML
INJECTION, SOLUTION INTRAMUSCULAR; INTRAVENOUS PRN
Status: DISCONTINUED | OUTPATIENT
Start: 2021-09-02 | End: 2021-09-02 | Stop reason: SDUPTHER

## 2021-09-02 RX ORDER — LABETALOL HYDROCHLORIDE 5 MG/ML
5 INJECTION, SOLUTION INTRAVENOUS EVERY 10 MIN PRN
Status: DISCONTINUED | OUTPATIENT
Start: 2021-09-02 | End: 2021-09-02

## 2021-09-02 RX ORDER — IBUPROFEN 400 MG/1
400 TABLET ORAL EVERY 8 HOURS PRN
Qty: 30 TABLET | Refills: 0 | Status: SHIPPED | OUTPATIENT
Start: 2021-09-02 | End: 2021-10-05

## 2021-09-02 RX ORDER — MORPHINE SULFATE 4 MG/ML
4 INJECTION, SOLUTION INTRAMUSCULAR; INTRAVENOUS EVERY 5 MIN PRN
Status: DISCONTINUED | OUTPATIENT
Start: 2021-09-02 | End: 2021-09-02

## 2021-09-02 RX ORDER — FENTANYL CITRATE 50 UG/ML
50 INJECTION, SOLUTION INTRAMUSCULAR; INTRAVENOUS
Status: DISCONTINUED | OUTPATIENT
Start: 2021-09-02 | End: 2021-09-02

## 2021-09-02 RX ORDER — CETIRIZINE HYDROCHLORIDE 5 MG/1
5 TABLET ORAL DAILY
Status: DISCONTINUED | OUTPATIENT
Start: 2021-09-02 | End: 2021-09-05 | Stop reason: HOSPADM

## 2021-09-02 RX ORDER — OXYCODONE HYDROCHLORIDE 5 MG/1
5 TABLET ORAL ONCE
Status: COMPLETED | OUTPATIENT
Start: 2021-09-02 | End: 2021-09-02

## 2021-09-02 RX ORDER — ACETAMINOPHEN 325 MG/1
650 TABLET ORAL EVERY 6 HOURS
Status: DISCONTINUED | OUTPATIENT
Start: 2021-09-02 | End: 2021-09-05 | Stop reason: HOSPADM

## 2021-09-02 RX ORDER — MORPHINE SULFATE 4 MG/ML
4 INJECTION, SOLUTION INTRAMUSCULAR; INTRAVENOUS
Status: DISCONTINUED | OUTPATIENT
Start: 2021-09-02 | End: 2021-09-05 | Stop reason: HOSPADM

## 2021-09-02 RX ORDER — SODIUM CHLORIDE 9 MG/ML
25 INJECTION, SOLUTION INTRAVENOUS PRN
Status: DISCONTINUED | OUTPATIENT
Start: 2021-09-02 | End: 2021-09-02

## 2021-09-02 RX ORDER — EPHEDRINE SULFATE 50 MG/ML
INJECTION, SOLUTION INTRAVENOUS PRN
Status: DISCONTINUED | OUTPATIENT
Start: 2021-09-02 | End: 2021-09-02 | Stop reason: SDUPTHER

## 2021-09-02 RX ORDER — ONDANSETRON 2 MG/ML
4 INJECTION INTRAMUSCULAR; INTRAVENOUS EVERY 6 HOURS PRN
Status: DISCONTINUED | OUTPATIENT
Start: 2021-09-02 | End: 2021-09-05 | Stop reason: HOSPADM

## 2021-09-02 RX ORDER — DICYCLOMINE HYDROCHLORIDE 10 MG/1
10 CAPSULE ORAL
Status: DISCONTINUED | OUTPATIENT
Start: 2021-09-02 | End: 2021-09-05 | Stop reason: HOSPADM

## 2021-09-02 RX ORDER — OXYCODONE HCL 10 MG/1
10 TABLET, FILM COATED, EXTENDED RELEASE ORAL
Status: COMPLETED | OUTPATIENT
Start: 2021-09-02 | End: 2021-09-02

## 2021-09-02 RX ORDER — SODIUM CHLORIDE 0.9 % (FLUSH) 0.9 %
5-40 SYRINGE (ML) INJECTION PRN
Status: DISCONTINUED | OUTPATIENT
Start: 2021-09-02 | End: 2021-09-02

## 2021-09-02 RX ORDER — SODIUM CHLORIDE, SODIUM LACTATE, POTASSIUM CHLORIDE, CALCIUM CHLORIDE 600; 310; 30; 20 MG/100ML; MG/100ML; MG/100ML; MG/100ML
INJECTION, SOLUTION INTRAVENOUS CONTINUOUS
Status: DISCONTINUED | OUTPATIENT
Start: 2021-09-02 | End: 2021-09-02

## 2021-09-02 RX ORDER — DILTIAZEM HYDROCHLORIDE 5 MG/ML
INJECTION INTRAVENOUS PRN
Status: DISCONTINUED | OUTPATIENT
Start: 2021-09-02 | End: 2021-09-02 | Stop reason: SDUPTHER

## 2021-09-02 RX ORDER — MORPHINE SULFATE 4 MG/ML
2 INJECTION, SOLUTION INTRAMUSCULAR; INTRAVENOUS
Status: DISCONTINUED | OUTPATIENT
Start: 2021-09-02 | End: 2021-09-05 | Stop reason: HOSPADM

## 2021-09-02 RX ORDER — ROCURONIUM BROMIDE 10 MG/ML
INJECTION, SOLUTION INTRAVENOUS PRN
Status: DISCONTINUED | OUTPATIENT
Start: 2021-09-02 | End: 2021-09-02 | Stop reason: SDUPTHER

## 2021-09-02 RX ORDER — MEPERIDINE HYDROCHLORIDE 25 MG/ML
12.5 INJECTION INTRAMUSCULAR; INTRAVENOUS; SUBCUTANEOUS EVERY 5 MIN PRN
Status: DISCONTINUED | OUTPATIENT
Start: 2021-09-02 | End: 2021-09-02

## 2021-09-02 RX ORDER — ALBUTEROL SULFATE 2.5 MG/3ML
2.5 SOLUTION RESPIRATORY (INHALATION) EVERY 6 HOURS PRN
Status: DISCONTINUED | OUTPATIENT
Start: 2021-09-02 | End: 2021-09-05 | Stop reason: HOSPADM

## 2021-09-02 RX ORDER — OXYCODONE HYDROCHLORIDE 5 MG/1
10 TABLET ORAL EVERY 4 HOURS PRN
Status: DISCONTINUED | OUTPATIENT
Start: 2021-09-02 | End: 2021-09-05 | Stop reason: HOSPADM

## 2021-09-02 RX ORDER — OXYCODONE HYDROCHLORIDE 5 MG/1
5 TABLET ORAL EVERY 4 HOURS PRN
Status: DISCONTINUED | OUTPATIENT
Start: 2021-09-02 | End: 2021-09-05 | Stop reason: HOSPADM

## 2021-09-02 RX ORDER — ENALAPRILAT 2.5 MG/2ML
1.25 INJECTION INTRAVENOUS
Status: DISCONTINUED | OUTPATIENT
Start: 2021-09-02 | End: 2021-09-02

## 2021-09-02 RX ORDER — HYDROMORPHONE HYDROCHLORIDE 1 MG/ML
0.25 INJECTION, SOLUTION INTRAMUSCULAR; INTRAVENOUS; SUBCUTANEOUS EVERY 5 MIN PRN
Status: COMPLETED | OUTPATIENT
Start: 2021-09-02 | End: 2021-09-02

## 2021-09-02 RX ORDER — MIDAZOLAM HYDROCHLORIDE 1 MG/ML
2 INJECTION INTRAMUSCULAR; INTRAVENOUS
Status: COMPLETED | OUTPATIENT
Start: 2021-09-02 | End: 2021-09-02

## 2021-09-02 RX ORDER — DILTIAZEM HYDROCHLORIDE 120 MG/1
120 CAPSULE, COATED, EXTENDED RELEASE ORAL DAILY
Status: DISCONTINUED | OUTPATIENT
Start: 2021-09-02 | End: 2021-09-05 | Stop reason: HOSPADM

## 2021-09-02 RX ORDER — ONDANSETRON 4 MG/1
4 TABLET, FILM COATED ORAL DAILY PRN
Qty: 30 TABLET | Refills: 0 | Status: SHIPPED | OUTPATIENT
Start: 2021-09-02 | End: 2021-09-02 | Stop reason: SDUPTHER

## 2021-09-02 RX ORDER — ACETAMINOPHEN 500 MG
1000 TABLET ORAL ONCE
Status: COMPLETED | OUTPATIENT
Start: 2021-09-02 | End: 2021-09-02

## 2021-09-02 RX ORDER — ROPIVACAINE HYDROCHLORIDE 2 MG/ML
INJECTION, SOLUTION EPIDURAL; INFILTRATION; PERINEURAL PRN
Status: DISCONTINUED | OUTPATIENT
Start: 2021-09-02 | End: 2021-09-02 | Stop reason: ALTCHOICE

## 2021-09-02 RX ORDER — FENTANYL CITRATE 50 UG/ML
25 INJECTION, SOLUTION INTRAMUSCULAR; INTRAVENOUS
Status: DISCONTINUED | OUTPATIENT
Start: 2021-09-02 | End: 2021-09-02

## 2021-09-02 RX ORDER — OXYCODONE HYDROCHLORIDE 5 MG/1
5 TABLET ORAL
Status: DISPENSED | OUTPATIENT
Start: 2021-09-02 | End: 2021-09-02

## 2021-09-02 RX ORDER — ONDANSETRON 4 MG/1
4 TABLET, FILM COATED ORAL EVERY 8 HOURS PRN
Qty: 30 TABLET | Refills: 0
Start: 2021-09-02 | End: 2022-08-03

## 2021-09-02 RX ORDER — HYDRALAZINE HYDROCHLORIDE 20 MG/ML
5 INJECTION INTRAMUSCULAR; INTRAVENOUS EVERY 10 MIN PRN
Status: DISCONTINUED | OUTPATIENT
Start: 2021-09-02 | End: 2021-09-02

## 2021-09-02 RX ORDER — SODIUM CHLORIDE 9 MG/ML
INJECTION, SOLUTION INTRAVENOUS CONTINUOUS
Status: DISCONTINUED | OUTPATIENT
Start: 2021-09-02 | End: 2021-09-02

## 2021-09-02 RX ADMIN — FENTANYL CITRATE 50 MCG: 50 INJECTION, SOLUTION INTRAMUSCULAR; INTRAVENOUS at 09:39

## 2021-09-02 RX ADMIN — Medication 2 MG: at 23:37

## 2021-09-02 RX ADMIN — LIDOCAINE HYDROCHLORIDE 50 MG: 10 INJECTION, SOLUTION EPIDURAL; INFILTRATION; INTRACAUDAL; PERINEURAL at 09:39

## 2021-09-02 RX ADMIN — Medication 2000 MG: at 21:33

## 2021-09-02 RX ADMIN — HYDROMORPHONE HYDROCHLORIDE 0.25 MG: 1 INJECTION, SOLUTION INTRAMUSCULAR; INTRAVENOUS; SUBCUTANEOUS at 11:12

## 2021-09-02 RX ADMIN — TRANEXAMIC ACID 1950 MG: 650 TABLET ORAL at 08:33

## 2021-09-02 RX ADMIN — DILTIAZEM HYDROCHLORIDE 10 MG: 5 INJECTION INTRAVENOUS at 10:10

## 2021-09-02 RX ADMIN — OXYCODONE 5 MG: 5 TABLET ORAL at 12:12

## 2021-09-02 RX ADMIN — ACETAMINOPHEN 1000 MG: 500 TABLET ORAL at 08:33

## 2021-09-02 RX ADMIN — PHENYLEPHRINE HYDROCHLORIDE 80 MCG: 10 INJECTION INTRAVENOUS at 10:30

## 2021-09-02 RX ADMIN — METOCLOPRAMIDE 10 MG: 5 INJECTION, SOLUTION INTRAMUSCULAR; INTRAVENOUS at 13:17

## 2021-09-02 RX ADMIN — DEXAMETHASONE SODIUM PHOSPHATE 10 MG: 10 INJECTION, SOLUTION INTRAMUSCULAR; INTRAVENOUS at 09:45

## 2021-09-02 RX ADMIN — ASPIRIN 325 MG: 325 TABLET, COATED ORAL at 21:31

## 2021-09-02 RX ADMIN — DILTIAZEM HYDROCHLORIDE 5 MG: 5 INJECTION INTRAVENOUS at 09:52

## 2021-09-02 RX ADMIN — MIDAZOLAM 1 MG: 1 INJECTION INTRAMUSCULAR; INTRAVENOUS at 09:27

## 2021-09-02 RX ADMIN — OXYCODONE HYDROCHLORIDE 10 MG: 10 TABLET, FILM COATED, EXTENDED RELEASE ORAL at 08:33

## 2021-09-02 RX ADMIN — ONDANSETRON HYDROCHLORIDE 4 MG: 2 SOLUTION INTRAMUSCULAR; INTRAVENOUS at 21:33

## 2021-09-02 RX ADMIN — ACETAMINOPHEN 650 MG: 325 TABLET ORAL at 21:31

## 2021-09-02 RX ADMIN — DOCUSATE SODIUM 50 MG AND SENNOSIDES 8.6 MG 1 TABLET: 8.6; 5 TABLET, FILM COATED ORAL at 21:31

## 2021-09-02 RX ADMIN — Medication 2000 MG: at 09:43

## 2021-09-02 RX ADMIN — OXYCODONE 5 MG: 5 TABLET ORAL at 21:32

## 2021-09-02 RX ADMIN — DILTIAZEM HYDROCHLORIDE 120 MG: 120 CAPSULE, COATED, EXTENDED RELEASE ORAL at 19:32

## 2021-09-02 RX ADMIN — PHENYLEPHRINE HYDROCHLORIDE 160 MCG: 10 INJECTION INTRAVENOUS at 10:42

## 2021-09-02 RX ADMIN — HYDROMORPHONE HYDROCHLORIDE 0.25 MG: 1 INJECTION, SOLUTION INTRAMUSCULAR; INTRAVENOUS; SUBCUTANEOUS at 11:56

## 2021-09-02 RX ADMIN — DILTIAZEM HYDROCHLORIDE 5 MG: 5 INJECTION INTRAVENOUS at 10:43

## 2021-09-02 RX ADMIN — SUGAMMADEX 200 MG: 100 INJECTION, SOLUTION INTRAVENOUS at 11:00

## 2021-09-02 RX ADMIN — PROPOFOL 100 MG: 10 INJECTION, EMULSION INTRAVENOUS at 09:39

## 2021-09-02 RX ADMIN — EPHEDRINE SULFATE 10 MG: 50 INJECTION INTRAMUSCULAR; INTRAVENOUS; SUBCUTANEOUS at 09:49

## 2021-09-02 RX ADMIN — EPHEDRINE SULFATE 10 MG: 50 INJECTION INTRAMUSCULAR; INTRAVENOUS; SUBCUTANEOUS at 10:34

## 2021-09-02 RX ADMIN — DILTIAZEM HYDROCHLORIDE 5 MG: 5 INJECTION INTRAVENOUS at 09:53

## 2021-09-02 RX ADMIN — PHENYLEPHRINE HYDROCHLORIDE 160 MCG: 10 INJECTION INTRAVENOUS at 09:47

## 2021-09-02 RX ADMIN — SODIUM CHLORIDE, SODIUM LACTATE, POTASSIUM CHLORIDE, AND CALCIUM CHLORIDE: 600; 310; 30; 20 INJECTION, SOLUTION INTRAVENOUS at 08:15

## 2021-09-02 RX ADMIN — PHENYLEPHRINE HYDROCHLORIDE 160 MCG: 10 INJECTION INTRAVENOUS at 10:34

## 2021-09-02 RX ADMIN — ROCURONIUM BROMIDE 50 MG: 10 INJECTION, SOLUTION INTRAVENOUS at 09:39

## 2021-09-02 RX ADMIN — HYDROMORPHONE HYDROCHLORIDE 0.25 MG: 1 INJECTION, SOLUTION INTRAMUSCULAR; INTRAVENOUS; SUBCUTANEOUS at 11:18

## 2021-09-02 RX ADMIN — SODIUM CHLORIDE, SODIUM LACTATE, POTASSIUM CHLORIDE, AND CALCIUM CHLORIDE: 600; 310; 30; 20 INJECTION, SOLUTION INTRAVENOUS at 10:40

## 2021-09-02 RX ADMIN — HYDROMORPHONE HYDROCHLORIDE 0.25 MG: 1 INJECTION, SOLUTION INTRAMUSCULAR; INTRAVENOUS; SUBCUTANEOUS at 11:43

## 2021-09-02 RX ADMIN — SODIUM CHLORIDE, PRESERVATIVE FREE 10 ML: 5 INJECTION INTRAVENOUS at 21:31

## 2021-09-02 RX ADMIN — ONDANSETRON HYDROCHLORIDE 4 MG: 2 INJECTION, SOLUTION INTRAMUSCULAR; INTRAVENOUS at 10:38

## 2021-09-02 ASSESSMENT — PAIN DESCRIPTION - PAIN TYPE
TYPE: SURGICAL PAIN

## 2021-09-02 ASSESSMENT — PAIN SCALES - GENERAL
PAINLEVEL_OUTOF10: 8
PAINLEVEL_OUTOF10: 6
PAINLEVEL_OUTOF10: 5
PAINLEVEL_OUTOF10: 6
PAINLEVEL_OUTOF10: 9
PAINLEVEL_OUTOF10: 6

## 2021-09-02 ASSESSMENT — PAIN - FUNCTIONAL ASSESSMENT: PAIN_FUNCTIONAL_ASSESSMENT: PREVENTS OR INTERFERES SOME ACTIVE ACTIVITIES AND ADLS

## 2021-09-02 ASSESSMENT — ENCOUNTER SYMPTOMS
WHEEZING: 0
VOMITING: 0
BACK PAIN: 0
CONSTIPATION: 0
BLOOD IN STOOL: 0
SHORTNESS OF BREATH: 0
COUGH: 0
ABDOMINAL DISTENTION: 0
SHORTNESS OF BREATH: 0
EYE DISCHARGE: 0
DIARRHEA: 0

## 2021-09-02 ASSESSMENT — PAIN DESCRIPTION - ORIENTATION
ORIENTATION: RIGHT

## 2021-09-02 ASSESSMENT — PAIN DESCRIPTION - LOCATION
LOCATION: LEG;KNEE
LOCATION: KNEE
LOCATION: KNEE

## 2021-09-02 ASSESSMENT — PAIN DESCRIPTION - DESCRIPTORS
DESCRIPTORS: SORE;DISCOMFORT
DESCRIPTORS: ACHING
DESCRIPTORS: ACHING
DESCRIPTORS: THROBBING;PRESSURE

## 2021-09-02 ASSESSMENT — LIFESTYLE VARIABLES: SMOKING_STATUS: 1

## 2021-09-02 ASSESSMENT — PAIN DESCRIPTION - FREQUENCY: FREQUENCY: CONTINUOUS

## 2021-09-02 NOTE — CONSULTS
[facility]  Cardiology Consult      Requesting MD:  Benson Hayes [facility],*   Admit Status:         History obtained from:   ? Patient  ? Other (specify):     PROBLEM LIST:    Patient Active Problem List    Diagnosis Date Noted    Trigger middle finger of right hand 12/17/2020     Priority: Low    Mass of right wrist 12/17/2020     Priority: Low     1. Reported new onset atrial fibrillation preoperatively. 2.  Possible aortic stenosis by clinical exam.  3.  Active ongoing tobacco use. PRESENTATION: María Lozada is a 67y.o. year old female who presents for right TKR. Preoperatively she was noted apparently to be in atrial fibrillation. No EKG done with single strip with concern for A. fib. Repeat strips showing sinus rhythm. Patient underwent an uneventful right TKR. Currently in sinus rhythm. Active ongoing tobacco use. History of hypertension with no other cardiac history reported. No concern of chest pain or shortness of breath at current effort tolerance. REVIEW OF SYSTEMS:  Review of Systems   Constitutional: Negative for activity change, fatigue and fever. HENT: Negative for ear pain, hearing loss and tinnitus. Eyes: Negative for discharge and visual disturbance. Respiratory: Negative for cough, shortness of breath and wheezing. Cardiovascular: Negative for chest pain, palpitations and leg swelling. Gastrointestinal: Negative for abdominal distention, blood in stool, constipation, diarrhea and vomiting. Endocrine: Negative for cold intolerance, heat intolerance, polydipsia and polyuria. Genitourinary: Negative for dysuria and hematuria. Musculoskeletal: Positive for arthralgias. Negative for back pain and myalgias. Skin: Negative for pallor and rash. Neurological: Negative for seizures, syncope, weakness and headaches. Psychiatric/Behavioral: Negative for behavioral problems and dysphoric mood.        Past Medical History: Diagnosis Date    Chronic back pain     Hypertension     Inflammatory arthritis     Knee pain     Spastic colon        Past Surgical History:      Procedure Laterality Date    ABDOMEN SURGERY      APPENDECTOMY      BACK SURGERY      neck; bone out of hip to fuse neck    FINGER TRIGGER RELEASE Left 12/17/2020    LEFT MIDDLE TRIGGER FINGER RELEASE performed by Chas Schmidt MD at [address] [place] [address] HAND SURGERY Left 2012    fx repair (3 total surgeries)    HAND SURGERY Left 12/17/2020    LEFT WRIST MASS EXCISION performed by Chas Schmidt MD at [facility] [address] Bilateral     HIP    MOUTH SURGERY      TUBAL LIGATION         Allergies:  Levofloxacin in d5w, Pyridium [phenazopyridine], Sulfamethoxazole-trimethoprim, Codeine, Hydrocodone-acetaminophen, Hydromorphone hcl, and Oxycodone    Past Social History:  Social History     Socioeconomic History    Marital status:      Spouse name: Not on file    Number of children: Not on file    Years of education: Not on file    Highest education level: Not on file   Occupational History    Not on file   Tobacco Use    Smoking status: Current Every Day Smoker     Packs/day: 0.50     Types: Cigarettes    Smokeless tobacco: Never Used   Vaping Use    Vaping Use: Never used   Substance and Sexual Activity    Alcohol use: No    Drug use: No    Sexual activity: Never   Other Topics Concern    Not on file   Social History Narrative    Not on file     Social Determinants of Health     Financial Resource Strain:     Difficulty of Paying Living Expenses:    Food Insecurity:     Worried About Running Out of Food in the Last Year:     [address] in the Last Year:    Transportation Needs:     Lack of Transportation (Medical):      Lack of Transportation (Non-Medical):    Physical Activity:     Days of Exercise per Week:     Minutes of Exercise per Session:    Stress:     Feeling of Stress :    Social Connections:     Frequency of Communication with Friends and Family:     Frequency of Social Gatherings with Friends and Family:     Attends Yarsanism Services:     Active Member of Clubs or Organizations:     Attends Club or Organization Meetings:     Marital Status:    Intimate Partner Violence:     Fear of Current or Ex-Partner:     Emotionally Abused:     Physically Abused:     Sexually Abused:        Family History:       Problem Relation Age of Onset    Other Other         Son DVT    Cancer Mother         colon     Heart Attack Mother     Heart Attack Father        Home Meds:  Prior to Admission medications    Medication Sig Start Date End Date Taking? Authorizing Provider   oxyCODONE (ROXICODONE) 5 MG immediate release tablet Take 1 tablet by mouth every 4 hours as needed for Pain for up to 3 days.  9/2/21 9/5/21 Yes Saumya Alejo MD   ibuprofen (ADVIL;MOTRIN) 400 MG tablet Take 1 tablet by mouth every 8 hours as needed for Pain 9/2/21  Yes Saumya Alejo MD   ondansetron [facility]) 4 MG tablet Take 1 tablet by mouth every 8 hours as needed for Nausea or Vomiting 9/2/21  Yes Saumya Alejo MD   apixaban Priscille Cones) 5 MG TABS tablet Take 1 tablet by mouth 2 times daily 9/2/21 10/2/21 Yes Saumya Alejo MD   dilTIAZem (CARDIZEM CD) 120 MG extended release capsule Take 1 capsule by mouth daily 9/2/21  Yes Jadiel Florence MD   lisinopril (PRINIVIL;ZESTRIL) 20 MG tablet Take 20 mg by mouth nightly Indications: High Blood Pressure Disorder   Yes Historical Provider, MD   fluticasone (FLONASE) 50 MCG/ACT nasal spray 2 sprays by Nasal route daily    Yes Historical Provider, MD   dicyclomine (BENTYL) 10 MG capsule Take 10 mg by mouth 4 times daily as needed   Yes Historical Provider, MD   loratadine (CLARITIN) 10 MG capsule Take 10 mg by mouth daily   Yes Historical Provider, MD   celecoxib (CELEBREX) 200 MG capsule Take 1 capsule by mouth daily 6/19/18  Yes Tobias Bowels, MD   furosemide (LASIX) 20 MG tablet Take 1 tablet by mouth daily as needed (swelling) 5/6/20   Divina Pepper MD   albuterol sulfate HFA (PROVENTIL HFA) 108 (90 Base) MCG/ACT inhaler Inhale 2 puffs into the lungs every 6 hours as needed for Wheezing 4/3/19   Deepti Orozco MD       Current Meds:   ropivacaine        sodium chloride flush  5-40 mL IntraVENous 2 times per day    celecoxib  100 mg Oral Once    dilTIAZem  120 mg Oral Daily       Current Infused Meds:   lactated ringers 100 mL/hr at 09/02/21 0815    sodium chloride      lactated ringers      sodium chloride      lactated ringers         Physical Exam:  Vitals:    09/02/21 1314   BP: (!) 98/57   Pulse: 56   Resp: 16   Temp:    SpO2: 97%       Intake/Output Summary (Last 24 hours) at 9/2/2021 1534  Last data filed at 9/2/2021 1245  Gross per 24 hour   Intake 1440 ml   Output 525 ml   Net 915 ml     Estimated body mass index is 24.27 kg/m² as calculated from the following:    Height as of this encounter: 5' 3\" (1.6 m). Weight as of this encounter: 137 lb (62.1 kg). Physical Exam  Constitutional:       General: She is not in acute distress. Appearance: She is not diaphoretic. HENT:      Mouth/Throat:      Pharynx: No oropharyngeal exudate. Eyes:      General: No scleral icterus. Right eye: No discharge. Left eye: No discharge. Neck:      Thyroid: No thyromegaly. Vascular: No JVD. Cardiovascular:      Rate and Rhythm: Normal rate and regular rhythm. No extrasystoles are present. Heart sounds: Normal heart sounds, S1 normal and S2 normal. No murmur heard. No systolic murmur is present. No diastolic murmur is present. No friction rub. No gallop. No S3 or S4 sounds. Comments: No JVD  No edema  Systolic murmur with mid to late peak with A2 diminished intensity but still audible consistent with possible moderate to severe aortic stenosis  Pulmonary:      Effort: Pulmonary effort is normal. No respiratory distress.       Breath sounds: Normal breath sounds. No wheezing or rales. Chest:      Chest wall: No tenderness. Abdominal:      General: Bowel sounds are normal. There is no distension. Palpations: Abdomen is soft. There is no mass. Tenderness: There is no abdominal tenderness. There is no guarding or rebound. Hernia: No hernia is present. Comments: No palpable organomegaly   Musculoskeletal:         General: Normal range of motion. Comments: Right knee in brace   Skin:     General: Skin is warm. Coloration: Skin is not pale. Findings: No rash. Neurological:      Mental Status: She is alert and oriented to person, place, and time. Cranial Nerves: No cranial nerve deficit. Deep Tendon Reflexes: Reflexes normal.           Labs:  No results for input(s): WBC, HGB, PLT in the last 72 hours. No results for input(s): NA, K, CL, CO2, BUN, CREATININE, LABGLOM, MG, CALCIUM, PHOS in the last 72 hours. CK, CKMB, Troponin: @LABRCNT (CKTOTAL:3, CKMB:3, TROPONINI:3)@    Last 3 BNP:          IMAGING:  No results found. EKGs from 8/30/2021 shows sinus rhythm with PACs. Telemetry strips from 9/2/2021 show patient in sinus rhythm. Assessment and Plan: This is a 67y.o. year old female with past medical history of hypertension, active ongoing tobacco use noted to have possible brief atrial fibrillation, not clearly documented preoperatively for TKR, clinical evidence for possible aortic stenosis here for evaluation. 1.  Patient initiated on Eliquis 5 mg twice daily. This will be helpful post TKR as well in DVT prophylaxis. We will continue for now. Currently she is in sinus rhythm. No EKG done at time of possible atrial fibrillation. Single short strip noted with 1-lead which is not definitive. Will place a ZIO monitor at discharge. Add Cardizem  mg once daily.   Follow-up within 4 weeks with cardiology with ZIO monitor results and echocardiogram.  2.  Have spoken to patient's daughter who is deaf but lip reads and she understands fully. If any bleeding issues with Eliquis, to hold and be in touch with us. She has been on chronic Celebrex. Patient advised to stop at this time and use Tylenol for pain relief.       Electronically signed by Jd Casper MD on 9/2/2021 at 3:34 PM

## 2021-09-02 NOTE — ANESTHESIA PRE PROCEDURE
Department of Anesthesiology  Preprocedure Note       Name:  Yannick Pearson   Age:  67 y.o.  :  1945                                          MRN:  [id number]         Date:  2021      Surgeon: Kami Patel):  Rocío Guerrero MD    Procedure: Procedure(s):  RIGHT TOTAL KNEE ARTHROPLASTY    Medications prior to admission:   Prior to Admission medications    Medication Sig Start Date End Date Taking? Authorizing Provider   lisinopril (PRINIVIL;ZESTRIL) 20 MG tablet Take 20 mg by mouth nightly Indications: High Blood Pressure Disorder    Historical Provider, MD   fluticasone (FLONASE) 50 MCG/ACT nasal spray 2 sprays by Nasal route daily     Historical Provider, MD   dicyclomine (BENTYL) 10 MG capsule Take 10 mg by mouth 4 times daily as needed    Historical Provider, MD   loratadine (CLARITIN) 10 MG capsule Take 10 mg by mouth daily    Historical Provider, MD   furosemide (LASIX) 20 MG tablet Take 1 tablet by mouth daily as needed (swelling) 20   Kurt Nation MD   albuterol sulfate HFA (PROVENTIL HFA) 108 (90 Base) MCG/ACT inhaler Inhale 2 puffs into the lungs every 6 hours as needed for Wheezing 4/3/19   Michael Fajardo MD   celecoxib (CELEBREX) 200 MG capsule Take 1 capsule by mouth daily 18   Kurt Nation MD       Current medications:    No current facility-administered medications for this visit. No current outpatient medications on file. Facility-Administered Medications Ordered in Other Visits   Medication Dose Route Frequency Provider Last Rate Last Admin    lactated ringers infusion   IntraVENous Continuous Marcos Cordon MD        ropivacaine (NAROPIN) 0.5% injection                Allergies:     Allergies   Allergen Reactions    Levofloxacin In D5w Rash     Itchy rash    Pyridium [Phenazopyridine] Nausea And Vomiting    Sulfamethoxazole-Trimethoprim Nausea And Vomiting    Codeine Nausea And Vomiting    Hydrocodone-Acetaminophen Nausea And Vomiting    Hydromorphone Hcl Nausea And Vomiting    Oxycodone Nausea And Vomiting       Problem List:    Patient Active Problem List   Diagnosis Code    Trigger middle finger of right hand M65.331    Mass of right wrist R22.31       Past Medical History:        Diagnosis Date    Chronic back pain     Hypertension     Inflammatory arthritis     Knee pain     Spastic colon        Past Surgical History:        Procedure Laterality Date    ABDOMEN SURGERY      APPENDECTOMY      BACK SURGERY      neck; bone out of hip to fuse neck    FINGER TRIGGER RELEASE Left 12/17/2020    LEFT MIDDLE TRIGGER FINGER RELEASE performed by Lasha Medina MD at [address] [place] [address] HAND SURGERY Left 2012    fx repair (3 total surgeries)    HAND SURGERY Left 12/17/2020    LEFT WRIST MASS EXCISION performed by Lasha Medina MD at [place] [address] Bilateral     HIP    MOUTH SURGERY      TUBAL LIGATION         Social History:    Social History     Tobacco Use    Smoking status: Current Every Day Smoker     Packs/day: 0.50     Types: Cigarettes    Smokeless tobacco: Never Used   Substance Use Topics    Alcohol use: No                                Ready to quit: Not Answered  Counseling given: Not Answered      Vital Signs (Current): There were no vitals filed for this visit.                                            BP Readings from Last 3 Encounters:   09/02/21 109/86   12/17/20 108/64   12/17/20 137/81       NPO Status:                                                                                 BMI:   Wt Readings from Last 3 Encounters:   09/02/21 137 lb (62.1 kg)   08/30/21 137 lb (62.1 kg)   12/17/20 136 lb (61.7 kg)     There is no height or weight on file to calculate BMI.    CBC:   Lab Results   Component Value Date    WBC 8.9 08/30/2021    RBC 4.84 08/30/2021    HGB 14.9 08/30/2021    HCT 47.1 08/30/2021    MCV 97.3 08/30/2021    RDW 13.0 08/30/2021     08/30/2021       CMP:   Lab Results   Component Value Date    NA 139 08/30/2021    K 4.3 08/30/2021     08/30/2021    CO2 24 08/30/2021    BUN 27 08/30/2021    CREATININE 1.2 08/30/2021    GFRAA 53 08/30/2021    LABGLOM 44 08/30/2021    GLUCOSE 97 08/30/2021    CALCIUM 9.3 08/30/2021       POC Tests: No results for input(s): POCGLU, POCNA, POCK, POCCL, POCBUN, POCHEMO, POCHCT in the last 72 hours. Coags:   Lab Results   Component Value Date    PROTIME 13.0 08/30/2021    INR 0.96 08/30/2021    APTT 25.4 08/30/2021       HCG (If Applicable): No results found for: PREGTESTUR, PREGSERUM, HCG, HCGQUANT     ABGs: No results found for: PHART, PO2ART, QVD9AWD, UBL3BIG, BEART, T6ATIRYC     Type & Screen (If Applicable):  No results found for: LABABO, LABRH    Drug/Infectious Status (If Applicable):  No results found for: HIV, HEPCAB    COVID-19 Screening (If Applicable):   Lab Results   Component Value Date    COVID19 Not Detected 08/30/2021         Anesthesia Evaluation  Patient summary reviewed and Nursing notes reviewed no history of anesthetic complications:   Airway: Mallampati: II  TM distance: >3 FB   Neck ROM: full  Mouth opening: > = 3 FB Dental:    (+) upper dentures and lower dentures      Pulmonary:normal exam    (+) current smoker    (-) shortness of breath and sleep apnea          Patient smoked on day of surgery. Cardiovascular:  Exercise tolerance: good (>4 METS),   (+) hypertension:,     (-) pacemaker, past MI, CAD, CABG/stent, dysrhythmias and  angina    ECG reviewed               Beta Blocker:  Not on Beta Blocker      ROS comment: EKG;  71 BPM  Sinus rhythm with PACs  Comparison Summary: No serial comparison made  Summary: Borderline ECG     Neuro/Psych:      (-) seizures and CVA           GI/Hepatic/Renal:   (+) GERD: well controlled, renal disease: CRI,      (-) liver disease       Endo/Other:    (+) : arthritis:., no malignancy/cancer.     (-) diabetes mellitus, blood dyscrasia, no malignancy/cancer               Abdominal:

## 2021-09-02 NOTE — PROGRESS NOTES
Anesthesia Treatment Plan:    Pt noted to be in Afib in preop area. Case discussed with Dr. Brandy Rich and Dr. Shiv Holman of Cardiology service. Plan to rate control with diltiazem and proceed with case. Dr. Shiv Holman to follow up with patient prior to discharge. Assessment:  New onset Afib. Plan:  1. Immediate treatment with cardizem IV. 2.  Cardiology consult prior to discharge home. 3.  OK to proceed to OR with adequate HR control with cardizem.     Alexa VAZQUEZ

## 2021-09-02 NOTE — DISCHARGE INSTR - DIET

## 2021-09-02 NOTE — CARE COORDINATION
HH referral received. Per Sona Brian, ortho navigator,  Patient agreeable and has chosen [facility]. Referral Faxed. [address] [place] [address] [phone]. [phone]. Please notify [facility] when patient discharges and fax DC Summary,  DC med list and any new [facility] orders. The Patient and/or patient representative was provided with a choice of provider and agrees   with the discharge plan. [x] Yes [] No    Freedom of choice list was provided with basic dialogue that supports the patient's individualized plan of care/goals, treatment preferences and shares the quality data associated with the providers.  [x] Yes [] No  Electronically signed by Mayi Carr on 9/2/2021 at 9:09 AM

## 2021-09-02 NOTE — PROGRESS NOTES
Philipp Jimenezis doesn't feel like patient is safe to go home. Patient could only ambulate to door of room. Notified Dr. Lata Ruiz of above. Awaiting response.   Electronically signed by Kathe Knowles RN on 9/2/2021 at 4:56 PM

## 2021-09-02 NOTE — PROGRESS NOTES
New orders received from Dr. Santa Nesbitt to admit patient to 5th floor. Notified bed board for room.   Electronically signed by Tanja Garay RN on 9/2/2021 at 5:22 PM

## 2021-09-02 NOTE — ANESTHESIA POSTPROCEDURE EVALUATION
Department of Anesthesiology  Postprocedure Note    Patient: Love Aquino  MRN: [id number]  [place]: 1945  Date of evaluation: 9/2/2021  Time:  11:08 AM     Procedure Summary     Date: 09/02/21 Room / Location: [address]    Anesthesia Start: 9975 Anesthesia Stop: 1108    Procedure: RIGHT TOTAL KNEE ARTHROPLASTY (Right Knee) Diagnosis: (M17.11)    Surgeons: Usama Griggs MD Responsible Provider: KATI Mae CRNA    Anesthesia Type: general, regional ASA Status: 3          Anesthesia Type: general, regional    Tiera Phase I: Tiera Score: 10    Tiera Phase II:      Last vitals: Reviewed and per EMR flowsheets.        Anesthesia Post Evaluation    Patient location during evaluation: PACU  Patient participation: complete - patient participated  Level of consciousness: awake and alert  Pain score: 0  Airway patency: patent  Nausea & Vomiting: no nausea and no vomiting  Complications: no  Cardiovascular status: hemodynamically stable and blood pressure returned to baseline  Respiratory status: acceptable and room air  Hydration status: stable

## 2021-09-02 NOTE — PROGRESS NOTES
CLINICAL PHARMACY NOTE: MEDS TO BEDS    Total # of Prescriptions Filled:4   The following medications were delivered to the patient:  · Eliquis 5mg  · Ondansetron 4mg  · Oxycodone 5mg  · Diltiazem ER 120mg    Additional Documentation:    Delivered to patients family member in [address] [place] [address] room 15, paid cash.

## 2021-09-02 NOTE — CARE COORDINATION
Lane Mckeon, RN Ortho Navigator  [phone]    Patient would like dme item to be delivered to [address]. Patient is scheduled to discharge home today at 10:30 am.  Please call if you have any questions.   Patient Information    Patient Name   Nora Tobar ([id number]) Legal Sex   Female    1945   Room Bed   [facility] OR Pool NONE   Patient Ethnicity & Race    Ethnic Group Patient Race   Non- / [address] (non-)   [place] Status   No [002]   Patient Demographics    Address   [place] [address] Phone   [phone] (Mobile) *Preferred*   PCP and Center    Primary Care Provider [address], [address] [phone] [facility]   Emergency Contact(s)    Name Relation Home Work [place] Other [phone]   Documents on File     Status Date Received Description   Documents for the Patient   (OLD) [facility]) Physician Consent and NPP Not Received     Photo ID Received () 19 [id number]   Insurance Card Received () 05/15/18 [facility]   [id number]   Physician Office Consent for Treatment Not Received     ACP-Advance Directive Not Received     ACP-Power of  Not Received     Financial Responsibility Form Not Received     Financial Assistance Program Applications Not Received     MyChart Adult Proxy Access Not Received     MyChart Child Proxy Access Not Received     ACO Consent for the Release of  Confidential Alcohol or Drug Treatment Information Not Received     ACO Declining to [address] Not Received     [address] Physician Communication Release NPP Signed 05/15/18    [facility]) Physician Consent and NPP Signed () 05/15/18    Medication Contract Received 18 gaurav  05/15/2018   Medication Contract Received 18 Medication Agreement 05/15/2018   Medication Contract Received 18 gaurav  2018   Medication Contract Received 18 gaurav  2018   Release of Information Received 18 Dasha Rodrigues  2018   Insurance Card Received () 18 MEDICARE   Insurance Card Unable to Obtain () 19 medicaid   Miscellaneous Received 19 contract and gaurav 2019   Insurance Card Received () 19 medicaid   Text Reminder Consent Patient Refused () 19    Insurance Card Received () 19    [address] Physician Communication Release NPP Signed 19    [facility]) Physician Consent and NPP Signed () 19    Physician Orders Received 19 [id number]/Mammo Order   Outside Record Received 19 gaurav 2019   Outside Record Received 20 gaurav 2020   Medication Contract Received 20 contract 2020   Insurance Card Received 20 new Medicare   Insurance Card Received [address] Card Received 20 KPA   Progress Notes Received 20 [facility]   Physician Orders Received 20 Dr. Solis Mendoza, [facility] Covid Test Order   EKG Received 20 [facility] ekg [id number]   Form Received 20 [facility] Intake Form   Photo ID Received 20 EXP 24   Insurance Card Received 21 BC MEDICARE   Photo ID Received 21 EXP 24   Documents for the [facility] Consent Treat/HIPAA Received 21    IMM Medicare Not Received     IMM - Medicare Second Copy Given to Pt      Observation Notification Not Received     Coverage COB Information Received 21    Hospital Consent Treat/HIPAA Received 21 ANES   Administrative Received 21    Cost Receipt     Jump to Cost Receipt   Admission Information    Current Information    Attending Provider Admitting Provider Admission Type Admission Status   MD Janelle Ashley MD Elective Confirmed Admission          Admission Date/Time Discharge Date Hospital Service Auth/Cert Status   19/58/19  07:33 AM  Surgery Incomplete          Hospital Area Unit Room/Bed    [address]            Admission    Complaint   M17.11   Hospital Account    Name Acct ID Class Status Primary Coverage   Yovani Guevara [id number] Outpatient Surgery Open [address] ESSENTIAL/PLUS          Guarantor Account (for Hospital Account [de-identified])    Name Relation to Pt Service Area Active?  Acct Type   Yovani Guevara Self [address] Yes Personal/Family   Address Phone     [address] [phone](V)            Coverage Information (for Hospital Account [de-identified])    F/O Payor/Plan Precert #   Levye Lipsmary kateutz MEDICARE/ANTHEM MEDIBLUE ESSENTIAL/PLUS    Subscriber Subscriber #   Yovani Guevara [id number]   Address Phone   [address]   [place], [facility] [address]           Medical Problems  Comment     Last edited by  on  at    [facility] Problem List  Date Reviewed: 9/2/2021  None   Non-Hospital Problem List  Date Reviewed: 9/2/2021     ICD-10-CM Priority Class Noted   Trigger middle finger of right hand M65.331   12/17/2020   Mass of right wrist R22.31   12/17/2020     Electronically signed by Liset Montalvo RN on 9/2/2021 at 8:42 AM

## 2021-09-02 NOTE — PROGRESS NOTES
Patient discharged home today with [facility]. Went over all discharge instructions and new medications with patient's friend and provided a copy of all new medications to take home. Patient's friend verbalized understanding. Patient showed signs of new onset afib before surgery. Patient has been given a prescription for 1 month supply of Eliquis 5 mg BID and patient is to be seen by cardiology prior to discharge home. Patient's stability will be assessed by PT and Kody Griffin before discharging home today.   Electronically signed by Madeleine Palacios RN on 9/2/2021 at 12:43 PM

## 2021-09-02 NOTE — OP NOTE
TOTAL KNEE ARTHROPLASTY OPERATIVE NOTE    NAME OF SURGEON / : Nelli Silva MD  PATIENT:   Aldo Ace  Date: 9/2/2021        Time: 10:52 AM   Referring Physician: ________________________    PREOP DIAGNOSIS:  right Knee  Primary osteoarthritis   POSTOP DIAGNOSIS:  Same     PROCEDURE:    Right  Cemented Posterior Stabilized Knee arthroplasty    IMPLANTS:   Implant Name Type Inv. Item Serial No.  Lot No. LRB No. Used Action   CEMENT BNE 40GM HI VISC RADPQ FOR REV SURG  CEMENT BNE 40GM HI VISC RADPQ FOR REV SURG  [employer] [id number] Right 1 Implanted   CEMENT BNE 40GM HI VISC RADPQ FOR REV SURG  CEMENT BNE 40GM HI VISC RADPQ FOR REV SURG  [employer] [id number] Right 1 Implanted   COMPONENT FEM SZ 6 PILLO R KNEE CO CHROM TAIWO POST STBL COR  COMPONENT FEM SZ 6 PILLO R KNEE CO CHROM TAIWO POST STBL COR  [employer] [id number] Right 1 Implanted   PSN TIB STM 5 DEG SZ D R  PSN TIB STM 5 DEG SZ D R  [employer] [id number] Right 1 Implanted   EXTENSION STEM L30MM [id number] KNEE TAPR TAIWO PERSONA  EXTENSION STEM L30MM [id number] KNEE TAPR TAIWO PERSONA  [employer] [id number] Right 1 Implanted   COMPONENT PAT RKH18RP THK7.5MM POLYETH TAIWO CONVENTIONAL  COMPONENT PAT FGI11PN THK7.5MM POLYETH TAIWO CONVENTIONAL  [employer] [id number] Right 1 Implanted   SURFACE ARTC TIB CD FEM 6-9 H10MM RT KNEE VIVACIT-E CONSTRN  SURFACE ARTC TIB CD FEM 6-9 H10MM RT KNEE VIVACIT-E CONSTRN  [employer] [id number] Right 1 Implanted       FINDINGS:  Preop ROM:  Full except for   -  full  extension  Alignment:    valgus  Bone quality:  soft  Cartilage wear:  Medial - none  Lateral - severe  Pat-fem -mild  ACL -Intact    ASSISTANT: Autumn Vu, certified first assistant. Helped with draping, exposure, retraction, essential steps of the procedure, and with wound closure.    ANESTHESIA:  General  EBL:  500 mL  TOURNIQUET:  none  FLUIDS: See anesthesia record  BLOOD PRODUCTS: None  COMPLICATIONS:  None  SPECIMEN:  None            INDICATIONS:  Patient presents for the above procedure having failed conservative treatment. Patient consents to the procedure above understanding the risks of bleeding, infection, anesthesia, nerve injury, stiffness, and blood clots. PROCEDURE:  The patient was brought to the operating room and placed in a supine position on the operating table. Anesthesia as specified above was placed. An antiobiotic was given IV. The unoperated extremities were well padded. A tourniquet was placed around the proximal thigh. The lower extremity was prepped with chlorhexidene/alcohol and draped sterilely using ioband barriers. A time out was performed. An anterior knee incision was made and fasciocutaneous flaps were developed. A mini midvastus approach was made and the patella subluxed. The prepatellar fat pad, ACL, and the anterior horns of the menisci were excised. A starter drill was placed down the femoral shaft 1 cm anterior to the PCL origin. An alignment momo was placed down the femoral shaft. The distal femoral cutting guide, set at 5 degrees of valgus was then placed over the alignment momo, and pinned perpendicular to the AP axis. The cutting block was then set to remove an extra 1 mm of distal femur and the distal cut made. The medial bone cut thickness was 11 mm. Hohmans and a PCL retractor were placed around the tibia. The external alignment guide set to cut 10 mm off the intact side at 7° degrees posterior slope was pinned in place. I stepped back and verified that the guide followed the anterior cortex of the tibia to the ankle. The tibial cut was made. Its thickness was 11 mm. The tibial fragment and osteophytes were removed. Posterior meniscal horns were resected. The extension gap was satisfactory. The epicondylar and AP femoral axes were marked with electrocautery.   Femoral trials were laid on the distal femur to estimate the appropriate size. The femoral sizer, with posterior paddles set at 3 degrees of external rotation, and an anterior stylus was placed. The sizer reading matched the size predicted by the trial.   The pinholes were drilled for the 4 in 1 femoral cutting block. This block was impacted on the distal femur and sat flush with the proper rotation relative to the AP and epicondylar axes. A drill was advanced through the anterior slot to check for notching. The femoral block was fixed with medial and lateral screws and the remaining femoral cuts were made. Femoral osteophytes were removed with a rongeur. The flexion gap was satisfactory. Posterior femoral osteophytes were removed with a 3/4 inch curved osteotome and rongeur. The appropriate tibial post punch guide covered the tibia without overhang and sat flush. It was lined up with the medial third of the tibial tubercle. The tibia was reamed, then the keel punched. The femoral trial was impacted onto the femur. The box cut was made first with a recip saw and finished with regular saw. The insert for the box was placed. A 10 mm thick, posterior stabilized tibial liner was snapped in place and ROM and stability were checked. The knee had full ROM and symmetric varus/valgus stability in flexion and extension after    *release of  NO RELEASES  * no extra resection was needed   *the appropriate size tibial liner was placed (see above)    Note: Stability to varus/valgus stress(mm):  Extension ( 1  ,  2 ) Mid Flexion (  2  ,  4  ) Flexion (  1  ,  2  )      The maximum patella thickness was 19 mm. The patella as resected with an oscillating saw and consistent thickness verified with caliper. The trial patella was placed and the overall thickness was restored to 20 mm. A femoral  bone plug placed, the femoral lug holes drilled and the trials were removed. The surfaces were rinsed with pulse lavage and dried. Two batches of cement  were mixed. Appropriate sized implants were cemented in place, a trial tibial liner placed, and the knee held in full extension until cement hardened. The capsule was injected with Ropivacaine. Excess cement was removed, and the actual tibial liner was snapped in place. No thumbs patella tracking was checked. No release was needed. Hemostasis was achieved with electrocautery. The wound was copiously irrigated with antibiotic irrigation. The capsule was closed with interrupted #2 vicryl. Subcutaneous tissue was closed with running 2-0 vicryl. Skin was closed with 3-0 vicryl and Prineo. A sterile dressing was applied. The patient was awakened, extubated and transferred to the recovery room in stable condition.             PLAN:  Full weight bearing, ROM, dvt prophylaxis      Electronically signed by Tito Ferrara MD on 9/2/2021 at 10:52 AM

## 2021-09-02 NOTE — PROGRESS NOTES
Physical Therapy    Facility/Department: [facility] OR  Initial Assessment    NAME: Raeann Courser  : 1945  MRN: [id number]    Date of Service: 2021    Discharge Recommendations:  Continue to assess pending progress, 24 hour supervision or assist, Patient would benefit from continued therapy after discharge        Assessment   Body structures, Functions, Activity limitations: Decreased functional mobility ; Decreased ROM; Decreased strength;Decreased endurance;Decreased safe awareness;Decreased balance; Increased pain;Decreased posture  Assessment: Pt. will benefit from additional PT for gait and transfer training prior to d/c home. Pt. not safe to d/c home at this point. Pt's neighbor would be helping her, but agreed with PT that pt is not ready to go home. Pt. with multiple complaints from lightheadedness to nausea and unable to keep eyes open during PT gait training. Pt. complained of feeling like she was going to pass out on the toilet, and she was unable to amb. more than 6' due to nausea, fatigue and lightheadedness. Pt. needs to use RW, gait belt and have staff A for mobility. Treatment Diagnosis: impaired gait and mobility  Prognosis: Good  Decision Making: Medium Complexity  PT Education: PT Role;Goals;Plan of Care;Gait Training; Adaptive Device Training;Weight-bearing Education;General Safety;Transfer Training;Functional Mobility Training  Patient Education: FWBAT RLE  Barriers to Learning: lightheadedness, nausea, drowsiness  REQUIRES PT FOLLOW UP: Yes  Activity Tolerance  Activity Tolerance: Patient limited by fatigue;Patient limited by endurance; Other  Activity Tolerance: nausea, lightheadedness       Patient Diagnosis(es): The encounter diagnosis was Primary osteoarthritis of right knee. has a past medical history of Chronic back pain, Hypertension, Inflammatory arthritis, Knee pain, and Spastic colon. has a past surgical history that includes Hand surgery (Left, 2012); Appendectomy;  Abdomen surgery; Tubal ligation; Mouth surgery; Finger trigger release (Left, 12/17/2020); Hand surgery (Left, 12/17/2020); joint replacement (Bilateral); and back surgery. Restrictions  Restrictions/Precautions  Restrictions/Precautions: Fall Risk, Weight Bearing  Required Braces or Orthoses?: No  Lower Extremity Weight Bearing Restrictions  Right Lower Extremity Weight Bearing: Weight Bearing As Tolerated  Vision/Hearing  Vision: Impaired (keeps closing eyes during PT eval and needs v. cues to keep them open)  Hearing: Within functional limits     Subjective  General  Chart Reviewed: Yes  Patient assessed for rehabilitation services?: Yes  Response To Previous Treatment: Not applicable  Family / Caregiver Present: Yes (pt's neighbor)  Referring Practitioner: Jesu Mckay MD  Referral Date : 09/02/21  Diagnosis: right Knee Primary osteoarthritis  Follows Commands: Impaired  Other (Comment): needs repetition of v. cues to keep eyes open and for technique during amb. and transfers  General Comment  Comments: Kenyatta FOLRES PT. 9/2/21 R TKA  Subjective  Subjective: Pt. willing to try walking. States she feels like she is going to throw up and pass out. States she isn't used to taking this much medication. Pain Screening  Patient Currently in Pain: Yes  Pain Assessment  Pain Assessment: 0-10  Pain Level: 6  Pain Type: Surgical pain  Pain Location: Leg;Knee  Pain Orientation: Right  Pain Descriptors: Sore;Discomfort  Functional Pain Assessment: Prevents or interferes some active activities and ADLs  Non-Pharmaceutical Pain Intervention(s): Repositioned; Ambulation/Increased Activity  Response to Pain Intervention: Drowsy  Vital Signs  Patient Currently in Pain: Yes  Pre Treatment Pain Screening  Intervention List: Patient able to continue with treatment    Orientation  Orientation  Overall Orientation Status: Within Functional Limits  Social/Functional History  Social/Functional History  Lives With: Alone  Type of Home: House  Home Layout: One level  Home Access: Stairs to enter without rails  Entrance Stairs - Number of Steps: 3  Home Equipment: Rolling walker (delivered to OP[facility])  Receives Help From: [employer]  ADL Assistance: Independent  Ambulation Assistance: Independent  Transfer Assistance: Independent  Occupation: Retired  Cognition   Cognition  Overall Cognitive Status: Exceptions  Arousal/Alertness: Appropriate responses to stimuli  Following Commands: Follows one step commands with repetition; Follows one step commands with increased time  Attention Span: Difficulty attending to directions  Memory: Decreased recall of precautions  Safety Judgement: Decreased awareness of need for safety  Problem Solving: Assistance required to generate solutions;Assistance required to implement solutions  Insights: Decreased awareness of deficits  Initiation: Requires cues for all  Sequencing: Requires cues for all    Objective     Observation/Palpation  Posture: Fair  Body Mechanics: forward flexed spine, forward head    AROM RLE (degrees)  RLE AROM: Exceptions  RLE General AROM: knee and hip flex to 90 deg, ankle WFLs  AROM LLE (degrees)  LLE AROM : WFL  Strength RLE  Strength RLE: Exception  Comment: ankle 3/5, knee and hip 3-/5  Strength LLE  Strength LLE: Exception  Comment: grossly 4/5        Bed mobility  Supine to Sit: Minimal assistance  Sit to Supine: Moderate assistance;2 Person assistance  Comment: pt sat on EOB x 5 mins to get her bearings, feeling light headed and like she will throw up. Pt. assisted in getting dressed part way in supine and the rest of the way in sitting and standing for pulling up pants. Transfers  Sit to Stand: Moderate Assistance  Stand to sit: Moderate Assistance  Bed to Chair: Moderate assistance  Comment: v. cues for hand placement during transfers. Pt. wanting to go to the bathroom.  RW used during transfers, except for toilet transfer when pt used grab bar to stand from w/c in bathroom. Ambulation  Ambulation?: Yes  WB Status: FWBAT RLE  Ambulation 1  Device: Rolling Walker  Other Apparatus: Wheelchair follow  Assistance: Minimal assistance  Quality of Gait: v. cues needed for sequencing BLEs and RW during gait. Pt. needed v. cues to keep eyes open as well. Overall unsteady and narrow MAYRA. Gait Deviations: Slow Ashley;Decreased step length;Decreased step height  Distance: 6'  Comments: pt. sat in chair from room due to fatigue. W/c brought up and pt transfered into w/c. Pt. wheeled to bathroom and then transfered onto and then off of toilet. Pt. commented she felt like she was going to pass out on the toilet after urinating. Pt. returned to room in w/c and then to bed. Balance  Posture: Fair  Sitting - Static: Fair;+  Sitting - Dynamic: Fair;-  Standing - Static: Poor;+  Standing - Dynamic: Poor;+        Plan   Plan  Times per week: 3-7  Times per day: Daily  Plan weeks: 2  Current Treatment Recommendations: Strengthening, ROM, Balance Training, Functional Mobility Training, Transfer Training, Gait Training, Stair training, Safety Education & Training, Positioning, Equipment Evaluation, Education, & procurement, Patient/Caregiver Education & Training  Plan Comment: cont. PT per POC.   Safety Devices  Type of devices: Gait belt, Patient at risk for falls, Nurse notified, Left in bed, Call light within reach (RN, Gaurav Eaton, present.)    G-Code       OutComes Score                                                  AM-PAC Score             Goals  Short term goals  Time Frame for Short term goals: 2 wks  Short term goal 1: supine to sit indep  Short term goal 2: sit to stand indep  Short term goal 3: amb. 100' with RW SBA  Short term goal 4: up/down 3 steps CGA  Patient Goals   Patient goals : go home soon       Therapy Time   Individual Concurrent Group Co-treatment   Time In           Time Out           Minutes                   Carol Burt PT     Electronically signed by Chel Patel Jade Guajardo, PT on 9/2/2021 at 5:15 PM

## 2021-09-02 NOTE — PROGRESS NOTES
Brought to surgery holding area. A fib noted on monitor. HR irreg between 110-152. Procedure for nerve block stopped and charge nurse, Gio Carroll notified of pt condition and anesthesiologists concurred to consult cardiologist. Dr Le Ma notifying cardiology.

## 2021-09-03 LAB
3-OH-COTININE: 53 NG/ML
ANION GAP SERPL CALCULATED.3IONS-SCNC: 10 MMOL/L (ref 7–19)
BUN BLDV-MCNC: 25 MG/DL (ref 8–23)
CALCIUM SERPL-MCNC: 8.9 MG/DL (ref 8.8–10.2)
CHLORIDE BLD-SCNC: 103 MMOL/L (ref 98–111)
CO2: 25 MMOL/L (ref 22–29)
COTININE: 170 NG/ML
CREAT SERPL-MCNC: 1 MG/DL (ref 0.5–0.9)
GFR AFRICAN AMERICAN: >59
GFR NON-AFRICAN AMERICAN: 54
GLUCOSE BLD-MCNC: 108 MG/DL (ref 74–109)
HCT VFR BLD CALC: 33.8 % (ref 37–47)
HEMOGLOBIN: 11 G/DL (ref 12–16)
LV EF: 74 %
LVEF MODALITY: NORMAL
NICOTINE: 7 NG/ML
POTASSIUM SERPL-SCNC: 4.6 MMOL/L (ref 3.5–5)
SODIUM BLD-SCNC: 138 MMOL/L (ref 136–145)

## 2021-09-03 PROCEDURE — 6360000002 HC RX W HCPCS: Performed by: ORTHOPAEDIC SURGERY

## 2021-09-03 PROCEDURE — 85014 HEMATOCRIT: CPT

## 2021-09-03 PROCEDURE — 6370000000 HC RX 637 (ALT 250 FOR IP): Performed by: ORTHOPAEDIC SURGERY

## 2021-09-03 PROCEDURE — 85018 HEMOGLOBIN: CPT

## 2021-09-03 PROCEDURE — 80048 BASIC METABOLIC PNL TOTAL CA: CPT

## 2021-09-03 PROCEDURE — G0378 HOSPITAL OBSERVATION PER HR: HCPCS

## 2021-09-03 PROCEDURE — 93306 TTE W/DOPPLER COMPLETE: CPT

## 2021-09-03 PROCEDURE — 97116 GAIT TRAINING THERAPY: CPT

## 2021-09-03 PROCEDURE — 93005 ELECTROCARDIOGRAM TRACING: CPT

## 2021-09-03 PROCEDURE — 2580000003 HC RX 258: Performed by: ORTHOPAEDIC SURGERY

## 2021-09-03 PROCEDURE — 36415 COLL VENOUS BLD VENIPUNCTURE: CPT

## 2021-09-03 PROCEDURE — 97530 THERAPEUTIC ACTIVITIES: CPT

## 2021-09-03 RX ORDER — CYCLOBENZAPRINE HCL 10 MG
5 TABLET ORAL 3 TIMES DAILY PRN
Status: DISCONTINUED | OUTPATIENT
Start: 2021-09-03 | End: 2021-09-05 | Stop reason: HOSPADM

## 2021-09-03 RX ORDER — CYCLOBENZAPRINE HCL 5 MG
5 TABLET ORAL 3 TIMES DAILY PRN
Qty: 30 TABLET | Refills: 0
Start: 2021-09-03 | End: 2021-09-13

## 2021-09-03 RX ADMIN — CYCLOBENZAPRINE 5 MG: 10 TABLET, FILM COATED ORAL at 20:51

## 2021-09-03 RX ADMIN — Medication 2 MG: at 06:36

## 2021-09-03 RX ADMIN — ACETAMINOPHEN 650 MG: 325 TABLET ORAL at 06:06

## 2021-09-03 RX ADMIN — Medication 2 MG: at 02:18

## 2021-09-03 RX ADMIN — ACETAMINOPHEN 650 MG: 325 TABLET ORAL at 11:46

## 2021-09-03 RX ADMIN — DOCUSATE SODIUM 50 MG AND SENNOSIDES 8.6 MG 1 TABLET: 8.6; 5 TABLET, FILM COATED ORAL at 07:44

## 2021-09-03 RX ADMIN — FLUTICASONE PROPIONATE 2 SPRAY: 50 SPRAY, METERED NASAL at 07:46

## 2021-09-03 RX ADMIN — OXYCODONE 5 MG: 5 TABLET ORAL at 11:46

## 2021-09-03 RX ADMIN — SODIUM CHLORIDE, PRESERVATIVE FREE 10 ML: 5 INJECTION INTRAVENOUS at 07:44

## 2021-09-03 RX ADMIN — DOCUSATE SODIUM 50 MG AND SENNOSIDES 8.6 MG 1 TABLET: 8.6; 5 TABLET, FILM COATED ORAL at 20:51

## 2021-09-03 RX ADMIN — SODIUM CHLORIDE, PRESERVATIVE FREE 10 ML: 5 INJECTION INTRAVENOUS at 21:10

## 2021-09-03 RX ADMIN — ACETAMINOPHEN 650 MG: 325 TABLET ORAL at 02:19

## 2021-09-03 RX ADMIN — DILTIAZEM HYDROCHLORIDE 120 MG: 120 CAPSULE, COATED, EXTENDED RELEASE ORAL at 07:44

## 2021-09-03 RX ADMIN — Medication 2 MG: at 04:55

## 2021-09-03 RX ADMIN — CETIRIZINE HYDROCHLORIDE 5 MG: 5 TABLET, FILM COATED ORAL at 07:44

## 2021-09-03 RX ADMIN — OXYCODONE 10 MG: 5 TABLET ORAL at 07:44

## 2021-09-03 RX ADMIN — DICYCLOMINE HYDROCHLORIDE 10 MG: 10 CAPSULE ORAL at 06:06

## 2021-09-03 RX ADMIN — ASPIRIN 325 MG: 325 TABLET, COATED ORAL at 20:51

## 2021-09-03 RX ADMIN — CYCLOBENZAPRINE 5 MG: 10 TABLET, FILM COATED ORAL at 14:07

## 2021-09-03 RX ADMIN — Medication 2000 MG: at 02:19

## 2021-09-03 RX ADMIN — ACETAMINOPHEN 650 MG: 325 TABLET ORAL at 20:50

## 2021-09-03 RX ADMIN — LISINOPRIL 20 MG: 20 TABLET ORAL at 20:51

## 2021-09-03 RX ADMIN — ASPIRIN 325 MG: 325 TABLET, COATED ORAL at 07:44

## 2021-09-03 RX ADMIN — DICYCLOMINE HYDROCHLORIDE 10 MG: 10 CAPSULE ORAL at 11:46

## 2021-09-03 RX ADMIN — OXYCODONE 10 MG: 5 TABLET ORAL at 20:50

## 2021-09-03 ASSESSMENT — PAIN DESCRIPTION - ONSET: ONSET: AWAKENED FROM SLEEP

## 2021-09-03 ASSESSMENT — PAIN SCALES - GENERAL
PAINLEVEL_OUTOF10: 8
PAINLEVEL_OUTOF10: 8
PAINLEVEL_OUTOF10: 9
PAINLEVEL_OUTOF10: 8
PAINLEVEL_OUTOF10: 9
PAINLEVEL_OUTOF10: 9
PAINLEVEL_OUTOF10: 8

## 2021-09-03 ASSESSMENT — PAIN - FUNCTIONAL ASSESSMENT: PAIN_FUNCTIONAL_ASSESSMENT: PREVENTS OR INTERFERES SOME ACTIVE ACTIVITIES AND ADLS

## 2021-09-03 ASSESSMENT — PAIN DESCRIPTION - PAIN TYPE: TYPE: SURGICAL PAIN

## 2021-09-03 ASSESSMENT — PAIN DESCRIPTION - PROGRESSION: CLINICAL_PROGRESSION: GRADUALLY IMPROVING

## 2021-09-03 ASSESSMENT — PAIN DESCRIPTION - ORIENTATION: ORIENTATION: RIGHT

## 2021-09-03 ASSESSMENT — PAIN DESCRIPTION - DESCRIPTORS: DESCRIPTORS: BURNING;DISCOMFORT

## 2021-09-03 ASSESSMENT — PAIN DESCRIPTION - FREQUENCY: FREQUENCY: CONTINUOUS

## 2021-09-03 ASSESSMENT — PAIN DESCRIPTION - LOCATION: LOCATION: KNEE

## 2021-09-03 NOTE — PROGRESS NOTES
Physical Therapy  Miko Ray  [id number]     09/03/21 1003   Subjective   Subjective Agrees to work with therapy. Bed Mobility   Supine to Sit Contact guard assistance   Transfers   Sit to Stand Contact guard assistance   Stand to sit Contact guard assistance   Ambulation   Ambulation? Yes   WB Status FWBAT RLE   Ambulation 1   Surface level tile   Device Rolling Walker   Assistance Contact guard assistance   Quality of Gait slow, steady, no LOB noted   Distance 15' x2   Other Activities   Comment Assisted patient to/from the BR. Patient able to perform self care and tolerated standing at the sink to wash her hands and brush her hair. Patient agreed to sit up in recliner after treatment. Patient positioned for comfort with all needs in reach and breakfast set up. Short term goals   Time Frame for Short term goals 2 wks   Short term goal 1 supine to sit indep   Short term goal 2 sit to stand indep   Short term goal 3 amb. 100' with RW SBA   Short term goal 4 up/down 3 steps CGA   Activity Tolerance   Activity Tolerance Patient limited by pain; Patient Tolerated treatment well   Safety Devices   Type of devices Call light within reach; Left in chair   Electronically signed by Roe Peres PTA on 9/3/2021 at 10:06 AM

## 2021-09-03 NOTE — PROGRESS NOTES
Physical Therapy  Florian May  [id number]     09/03/21 1315   Subjective   Subjective Attempt, patient out for test.    Electronically signed by Caron Block PTA on 9/3/2021 at 1:15 PM

## 2021-09-03 NOTE — PROGRESS NOTES
CLINICAL PHARMACY NOTE: MEDS TO BEDS    Total # of Prescriptions Filled: 1   The following medications were delivered to the patient:  · Cyclobenzaprine 5mg    Additional Documentation:  The patients daughter paid with cash at the patients bedside. The daughter was handed the medications.

## 2021-09-03 NOTE — CONSULTS
Referring Provider: Dr. Brandy Rich  Reason for Consultation: Medical management    Patient Care Team:  Dee Romberg, APRN - CNP as PCP - General    Chief complaint knee pain    Subjective . History of present illness: The patient presents to the orthopedic service for TKA. They have failed outpatient conservative treatment of NSAIDS, muscle relaxer, physical therapy and opioid pain meds. The pain is affecting their activities of daily living and they have chosen to undergo surgical correction. We have been asked to provide medical consultation by the attending physician since their primary care provider does not attend here at [address] [place] [address] in their healthcare during the perioperative phase was discussed with the patient. All questions were encouraged and answered to the best of our ability. The postoperative pain is as expected. There are no other participating or relieving factors noted. Pleasant 70-year-old female followed by Dee Romberg nurse practitioner for general medical care needs. She resides in [place], [place]. Past medical history of essential hypertension, chronic back pain, IBS-D, reactive airway disease and osteoarthritis. Surgical history includes appendectomy, back surgery, bilateral hip surgery and right knee arthroplasty yesterday. Postoperative pain is controlled. Ready and willing to work with therapy in anticipation for potential discharge home later today.       REVIEW OF SYSTEMS:    CONSTITUTIONAL:  Negative for anorexia, chills, fevers, night sweats and weight loss  EYES:  negative for eye dryness, icterus and redness  HEENT:   negative for dental problems, epistaxis, facial trauma and thrush  RESPIRATORY:  negative for chest tightness, cough, dyspnea on exertion, pneumonia and sputum  CARDIOVASCULAR: negative for chest pain, dyspnea, exertional chest pressure/discomfort, irregular heart beat, palpitations, paroxysmal nocturnal dyspnea and syncope  GASTROINTESTINAL:  negative for abdominal pain, hematemesis, jaundice, melena and rectal bleeding  MUSCULOSKELETAL:  negative for muscle weakness, myalgias and neck pain, chronic joint pain noted  NEUROLOGICAL:   negative for dizziness, headaches, seizures, speech problems, tremors and vertigo  INTEGUMENT: negative for pruritus, rash, skin color change and skin lesion(s)   A Full 14 point review of systems is negative outside those listed above and in the HPI      History    Past Medical History:   Diagnosis Date    Chronic back pain     Hypertension     Inflammatory arthritis     Knee pain     Spastic colon      Past Surgical History:   Procedure Laterality Date    ABDOMEN SURGERY      APPENDECTOMY      BACK SURGERY      neck; bone out of hip to fuse neck    FINGER TRIGGER RELEASE Left 12/17/2020    LEFT MIDDLE TRIGGER FINGER RELEASE performed by Josiane Martel MD at [facility] HAND SURGERY Left 2012    fx repair (3 total surgeries)    HAND SURGERY Left 12/17/2020    LEFT WRIST MASS EXCISION performed by Josiane Martel MD at [address] [place] [address] ASC OR    JOINT REPLACEMENT Bilateral     HIP    MOUTH SURGERY      TUBAL LIGATION       Family History   Problem Relation Age of Onset    Other Other         Son DVT    Cancer Mother         colon     Heart Attack Mother     Heart Attack Father      Social History     Tobacco Use    Smoking status: Current Every Day Smoker     Packs/day: 0.50     Types: Cigarettes    Smokeless tobacco: Never Used   Vaping Use    Vaping Use: Never used   Substance Use Topics    Alcohol use: No    Drug use: No     Medications Prior to Admission: lisinopril (PRINIVIL;ZESTRIL) 20 MG tablet, Take 20 mg by mouth nightly Indications: High Blood Pressure Disorder  fluticasone (FLONASE) 50 MCG/ACT nasal spray, 2 sprays by Nasal route daily   dicyclomine (BENTYL) 10 MG capsule, Take 10 mg by mouth 4 times daily as needed  loratadine (CLARITIN) 10 MG capsule, Take 10 mg by mouth recently. Follow creatinine while in the hospital.    Anemia post-op expected-check iron, B12,and folate if indicated. Replenish substrates as needed. Transfuse at acceptable levels depending on clinical judgement and comorbidities. Recent hospital policy recommends 1 unit at a time. Recheck hemoglobin in AM if patient remains in house, if D/C ed advise close follow up with PCP to ensure levels return to baseline. Hypertension-review pre and post BP's,will restart home BP meds when BP allows. Add Clonidine 0.1 mg q 4 hours prn if bp> 140/90,monitor BP and adjust meds as necessary. Hold Zestril and Lasix with low BP    Postoperative low BP--expected, stable. Antihypertensives on hold. suspect related to anesthesia and pain medication. . Give fluid bolus as needed. Add pressure support with Florinef, ProAmatine or IV albumin if indicated. Discussed treatment plan with nursing staff today. IBS-D with postoperative constipation--hold anticholinergic until bowel function returns    Medically stable postop day #1  Encourage incentive spirometry every hour while awake  Early mobilization, maximize efforts with therapy  Follow along with daily labs    I discussed the patients findings and my recommendations with patient/family, staff and the Orthopaedic team.  Sami Jiménez PA-C  09/03/21  7:09 AM    Looks good postop day #1. Medically stable for discharge home. Close follow-up with primary next week. Questionable issues of atrial fibrillation but no documented EKG, cardiology added Cardizem for rate control and Eliquis for anticoagulation. ZIO patch ordered upon discharge. I have discussed the care of Roxana Peterson, including pertinent history and exam findings with the ARNP/PA. I have seen and examined the patient and the key elements of all parts of the encounter have been performed by me. I agree with the assessment and plan as outlined by the ARNP/PA.  Please refer to my separate note for complete documentation.      Electronically signed by Kacie Mazariegos MD on 9/3/2021 at 8:45 AM

## 2021-09-03 NOTE — PROGRESS NOTES
Subjective:     Post-Operative Day: 1 No complaints    Objective:     Patient Vitals for the past 24 hrs:   BP Temp Temp src Pulse Resp SpO2 Height Weight   09/03/21 0323 (!) 113/51 97.1 °F (36.2 °C) Temporal 71 18 94 % -- --   09/02/21 2132 117/62 97.2 °F (36.2 °C) Temporal 61 20 92 % -- --   09/02/21 1918 (!) 90/48 96.5 °F (35.8 °C) Temporal 57 18 93 % -- --   09/02/21 1650 111/61 -- -- 65 16 -- -- --   09/02/21 1314 (!) 98/57 -- -- 56 16 97 % -- --   09/02/21 1245 (!) 84/58 97.8 °F (36.6 °C) Temporal 52 14 99 % -- --   09/02/21 1235 (!) 98/55 97.4 °F (36.3 °C) Temporal 71 15 95 % -- --   09/02/21 1230 (!) 112/52 -- -- 69 16 92 % -- --   09/02/21 1220 103/67 -- -- 85 13 94 % -- --   09/02/21 1210 132/63 -- -- 98 22 93 % -- --   09/02/21 1200 (!) 104/47 -- -- 67 18 98 % -- --   09/02/21 1150 (!) 107/57 -- -- 68 23 95 % -- --   09/02/21 1145 95/75 -- -- 73 28 95 % -- --   09/02/21 1140 (!) 94/51 -- -- 69 16 97 % -- --   09/02/21 1135 113/78 -- -- 70 13 96 % -- --   09/02/21 1125 87/65 -- -- 73 17 95 % -- --   09/02/21 1120 (!) 110/91 -- -- 85 17 93 % -- --   09/02/21 1115 132/63 -- -- 92 25 92 % -- --   09/02/21 1108 (!) 140/88 96.9 °F (36.1 °C) Temporal 83 (!) 32 93 % -- --   09/02/21 0803 109/86 98.5 °F (36.9 °C) Temporal 53 18 96 % 5' 3\" (1.6 m) 137 lb (62.1 kg)       General: Alert cooperative   Wound: Clean dry intact. Moderate swelling   Neurovascular: Exam normal   DVT Exam: No evidence of DVT         Data Review:  No results for input(s): HGB in the last 72 hours. No results for input(s): NA, K, CREATININE, GLUCOSE, INR in the last 72 hours. No results for input(s): POCGLU in the last 72 hours. No orders to display       Assessment:     Status Post right Total Knee Arthroplasty. Doing well postop without complication.    Plan:     Pain control  Tight blood glucose control  PT/OT  DVT prophylaxis  Ice and elevate  Discharge Home today

## 2021-09-03 NOTE — DISCHARGE SUMMARY
[facility]  Dr. Geo Montes  Discharge Summary    The Sara Garcia is a 67 y.o. female underwent total knee replacement procedure without complication. Sara Garcia was admitted to the floor following her   recovery in the PACU. Discharge Diagnosis   Right    Knee Replacement    Current Inpatient Medications    Current Facility-Administered Medications: dilTIAZem (CARDIZEM CD) extended release capsule 120 mg, 120 mg, Oral, Daily  dicyclomine (BENTYL) capsule 10 mg, 10 mg, Oral, TID AC  fluticasone (FLONASE) 50 MCG/ACT nasal spray 2 spray, 2 spray, Nasal, Daily  furosemide (LASIX) tablet 20 mg, 20 mg, Oral, Daily PRN  lisinopril (PRINIVIL;ZESTRIL) tablet 20 mg, 20 mg, Oral, Nightly  cetirizine (ZYRTEC) tablet 5 mg, 5 mg, Oral, Daily  sodium chloride flush 0.9 % injection 10 mL, 10 mL, IntraVENous, 2 times per day  sodium chloride flush 0.9 % injection 10 mL, 10 mL, IntraVENous, PRN  0.9 % sodium chloride infusion, 25 mL, IntraVENous, PRN  acetaminophen (TYLENOL) tablet 650 mg, 650 mg, Oral, Q6H  oxyCODONE (ROXICODONE) immediate release tablet 5 mg, 5 mg, Oral, Q4H PRN **OR** oxyCODONE (ROXICODONE) immediate release tablet 10 mg, 10 mg, Oral, Q4H PRN  morphine injection 2 mg, 2 mg, IntraVENous, Q1H PRN **OR** morphine injection 4 mg, 4 mg, IntraVENous, Q1H PRN  sennosides-docusate sodium (SENOKOT-S) 8.6-50 MG tablet 1 tablet, 1 tablet, Oral, BID  magnesium hydroxide (MILK OF MAGNESIA) 400 MG/5ML suspension 30 mL, 30 mL, Oral, Daily PRN  aspirin EC tablet 325 mg, 325 mg, Oral, BID  albuterol (PROVENTIL) nebulizer solution 2.5 mg, 2.5 mg, Nebulization, Q6H PRN  ondansetron (ZOFRAN) injection 4 mg, 4 mg, IntraVENous, Q6H PRN    Post-operatively the patients diet was advanced as tolerated and their incision was checked on POD #1. The incision was clean, dry and intact with no signs of infection.   The patient remained neurovascularly intact in the lower extremity and had intact pulses distally. Patients calf remained soft and showed no evidence of DVT. The patient was able to move their leg and ankle/foot without any problems post-operatively. Physical therapy and occupational therapy were consulted and began working with the patient post-operatively. The patient progressed with PT/OT as would be expected and continued to improve through their stay. The patients pain was initially controlled with IV medications but we were able to transition to oral pain medications soon after arrival to the floor and their pain remained under good control through their hospital stay. From a medical standpoint the patient remained stable and continued to have the medicine team follow throughout their stay. Acute postoperative blood loss anemia after joint replacement being monitored with daily hemoglobin/hematocrit. The patients dressing was changed/incison was checked on day of d/c. The patient will be discharged at this time to  Home   with their current diet restrictions and will continue to follow the total knee precautions outlined to them by us and PT/OT. Condition on Discharge: Stable    Plan  Followup at scheduled appointment time (1 month post-op). Patient was instructed on the use of pain medications, the signs and symptoms of infection, and was given our number to call should they have any questions or concerns following discharge.

## 2021-09-04 LAB
ANION GAP SERPL CALCULATED.3IONS-SCNC: 11 MMOL/L (ref 7–19)
BUN BLDV-MCNC: 22 MG/DL (ref 8–23)
CALCIUM SERPL-MCNC: 9.3 MG/DL (ref 8.8–10.2)
CHLORIDE BLD-SCNC: 103 MMOL/L (ref 98–111)
CO2: 24 MMOL/L (ref 22–29)
CREAT SERPL-MCNC: 1.1 MG/DL (ref 0.5–0.9)
GFR AFRICAN AMERICAN: 58
GFR NON-AFRICAN AMERICAN: 48
GLUCOSE BLD-MCNC: 114 MG/DL (ref 74–109)
HCT VFR BLD CALC: 40.1 % (ref 37–47)
HEMOGLOBIN: 12.9 G/DL (ref 12–16)
POTASSIUM SERPL-SCNC: 4.4 MMOL/L (ref 3.5–5)
SODIUM BLD-SCNC: 138 MMOL/L (ref 136–145)

## 2021-09-04 PROCEDURE — 97116 GAIT TRAINING THERAPY: CPT

## 2021-09-04 PROCEDURE — 2580000003 HC RX 258: Performed by: ORTHOPAEDIC SURGERY

## 2021-09-04 PROCEDURE — G0378 HOSPITAL OBSERVATION PER HR: HCPCS

## 2021-09-04 PROCEDURE — 97530 THERAPEUTIC ACTIVITIES: CPT

## 2021-09-04 PROCEDURE — 93246 EXT ECG>7D<15D RECORDING: CPT

## 2021-09-04 PROCEDURE — 85014 HEMATOCRIT: CPT

## 2021-09-04 PROCEDURE — 80048 BASIC METABOLIC PNL TOTAL CA: CPT

## 2021-09-04 PROCEDURE — 85018 HEMOGLOBIN: CPT

## 2021-09-04 PROCEDURE — 36415 COLL VENOUS BLD VENIPUNCTURE: CPT

## 2021-09-04 PROCEDURE — 6370000000 HC RX 637 (ALT 250 FOR IP): Performed by: ORTHOPAEDIC SURGERY

## 2021-09-04 RX ADMIN — ASPIRIN 325 MG: 325 TABLET, COATED ORAL at 21:19

## 2021-09-04 RX ADMIN — ASPIRIN 325 MG: 325 TABLET, COATED ORAL at 08:51

## 2021-09-04 RX ADMIN — DOCUSATE SODIUM 50 MG AND SENNOSIDES 8.6 MG 1 TABLET: 8.6; 5 TABLET, FILM COATED ORAL at 21:19

## 2021-09-04 RX ADMIN — DICYCLOMINE HYDROCHLORIDE 10 MG: 10 CAPSULE ORAL at 06:34

## 2021-09-04 RX ADMIN — CETIRIZINE HYDROCHLORIDE 5 MG: 5 TABLET, FILM COATED ORAL at 08:51

## 2021-09-04 RX ADMIN — ACETAMINOPHEN 650 MG: 325 TABLET ORAL at 13:21

## 2021-09-04 RX ADMIN — DOCUSATE SODIUM 50 MG AND SENNOSIDES 8.6 MG 1 TABLET: 8.6; 5 TABLET, FILM COATED ORAL at 08:51

## 2021-09-04 RX ADMIN — OXYCODONE 10 MG: 5 TABLET ORAL at 03:07

## 2021-09-04 RX ADMIN — SODIUM CHLORIDE, PRESERVATIVE FREE 10 ML: 5 INJECTION INTRAVENOUS at 21:30

## 2021-09-04 RX ADMIN — OXYCODONE 10 MG: 5 TABLET ORAL at 21:19

## 2021-09-04 RX ADMIN — ACETAMINOPHEN 650 MG: 325 TABLET ORAL at 21:20

## 2021-09-04 RX ADMIN — CYCLOBENZAPRINE 5 MG: 10 TABLET, FILM COATED ORAL at 21:19

## 2021-09-04 RX ADMIN — OXYCODONE 5 MG: 5 TABLET ORAL at 17:32

## 2021-09-04 RX ADMIN — OXYCODONE 10 MG: 5 TABLET ORAL at 13:21

## 2021-09-04 RX ADMIN — ACETAMINOPHEN 650 MG: 325 TABLET ORAL at 06:34

## 2021-09-04 RX ADMIN — DILTIAZEM HYDROCHLORIDE 120 MG: 120 CAPSULE, COATED, EXTENDED RELEASE ORAL at 08:51

## 2021-09-04 RX ADMIN — DICYCLOMINE HYDROCHLORIDE 10 MG: 10 CAPSULE ORAL at 17:38

## 2021-09-04 RX ADMIN — LISINOPRIL 20 MG: 20 TABLET ORAL at 21:20

## 2021-09-04 ASSESSMENT — PAIN SCALES - GENERAL
PAINLEVEL_OUTOF10: 5
PAINLEVEL_OUTOF10: 10
PAINLEVEL_OUTOF10: 7
PAINLEVEL_OUTOF10: 9
PAINLEVEL_OUTOF10: 10

## 2021-09-04 ASSESSMENT — PAIN DESCRIPTION - DESCRIPTORS: DESCRIPTORS: ACHING;DISCOMFORT

## 2021-09-04 ASSESSMENT — PAIN DESCRIPTION - FREQUENCY: FREQUENCY: INTERMITTENT

## 2021-09-04 ASSESSMENT — PAIN SCALES - WONG BAKER: WONGBAKER_NUMERICALRESPONSE: 4

## 2021-09-04 ASSESSMENT — PAIN - FUNCTIONAL ASSESSMENT: PAIN_FUNCTIONAL_ASSESSMENT: PREVENTS OR INTERFERES SOME ACTIVE ACTIVITIES AND ADLS

## 2021-09-04 ASSESSMENT — PAIN DESCRIPTION - LOCATION: LOCATION: KNEE

## 2021-09-04 ASSESSMENT — PAIN DESCRIPTION - ORIENTATION: ORIENTATION: RIGHT

## 2021-09-04 ASSESSMENT — PAIN DESCRIPTION - PAIN TYPE: TYPE: SURGICAL PAIN

## 2021-09-04 NOTE — PROGRESS NOTES
Subjective:     Post-Operative Day: 2 No complaints pain a little better. Cant swing right leg out of bed    Objective:     Patient Vitals for the past 24 hrs:   BP Temp Temp src Pulse Resp SpO2   09/04/21 0742 124/80 96.9 °F (36.1 °C) Temporal 120 18 95 %   09/04/21 0338 128/69 96.7 °F (35.9 °C) Temporal 62 18 95 %   09/03/21 2045 131/60 97.9 °F (36.6 °C) Temporal 64 18 94 %       General: Alert cooperative   Wound: Clean dry intact. Moderate swelling   Neurovascular: Exam normal   DVT Exam: No evidence of DVT         Data Review:  Recent Labs     09/03/21  0702 09/04/21  0312   HGB 11.0* 12.9     Recent Labs     09/04/21  0312      K 4.4   CREATININE 1.1*   GLUCOSE 114*     No results for input(s): POCGLU in the last 72 hours. No orders to display       Assessment:     Status Post right Total Knee Arthroplasty. Doing well postop without complication.    Plan:     Pain control  Tight blood glucose control  PT/OT  DVT prophylaxis  Ice and elevate  Discharge Home tomorrow

## 2021-09-04 NOTE — PROGRESS NOTES
Physical Therapy  Name: Aldo Ace  MRN:  [id number]  Date of service:  9/4/2021 09/04/21 0835   Restrictions/Precautions   Restrictions/Precautions Fall Risk;Weight Bearing   Required Braces or Orthoses? No   Lower Extremity Weight Bearing Restrictions   Right Lower Extremity Weight Bearing Weight Bearing As Tolerated   Subjective   Subjective Pt agreed to therapy. Pain Screening   Patient Currently in Pain Yes   Pain Assessment   Pain Assessment Faces   Pain Type Surgical pain   Pain Location Knee   Pain Orientation Right   Pain Descriptors Aching;Discomfort   Pain Frequency Intermittent   Functional Pain Assessment Prevents or interferes some active activities and ADLs   Non-Pharmaceutical Pain Intervention(s) Repositioned; Rest   Response to Pain Intervention Patient Satisfied   Ruiz-Baker Pain Rating 4   Bed Mobility   Supine to Sit Minimal assistance   Sit to Supine Minimal assistance   Transfers   Sit to Stand Contact guard assistance;Minimal Assistance   Stand to sit Contact guard assistance   Ambulation   Ambulation? Yes   WB Status FWBAT RLE   Ambulation 1   Surface level tile   Device Rolling Walker   Assistance Contact guard assistance   Gait Deviations Slow Ashley;Decreased step length;Decreased step height   Distance 40 ft   Comments amb to bathroom and in room, pt able to stand at sink and wash hands    Short term goals   Time Frame for Short term goals 2 wks   Short term goal 1 supine to sit indep   Short term goal 2 sit to stand indep   Short term goal 3 amb. 100' with RW SBA   Short term goal 4 up/down 3 steps CGA   Conditions Requiring Skilled Therapeutic Intervention   Body structures, Functions, Activity limitations Decreased functional mobility ; Decreased ROM; Decreased strength;Decreased endurance;Decreased safe awareness;Decreased balance; Increased pain;Decreased posture   Assessment Pt able to increase amb distance in room and reported her pain is much better today than last night. Pt slightly unsteady at times with transfer and standing but able to amb with CGA. Pt up in chair for breakfast with call light in reach. Activity Tolerance   Activity Tolerance Patient Tolerated treatment well   Safety Devices   Type of devices Call light within reach; Left in chair         Electronically signed by Yasmany Amezquita PTA on 9/4/2021 at 8:42 AM

## 2021-09-04 NOTE — PROGRESS NOTES
[facility]  Daily Progress Note  Zackary Aden  MRN: [id number] LOS: 1    Admit Date: 9/2/2021 9/4/2021 8:14 AM          Chief Complaint:  Knee pain    Interval History:    Reviewed overnight events and nursing notes  Postoperative pain is controlled  Denies chest pain  No shortness of breath  Appetite bowel function returning. DIET:  Adult Oral Nutrition Supplement; Standard High Calorie/High Protein Oral Supplement  ADULT DIET; Regular    Medications:      sodium chloride        dilTIAZem  120 mg Oral Daily    dicyclomine  10 mg Oral TID AC    fluticasone  2 spray Nasal Daily    lisinopril  20 mg Oral Nightly    cetirizine  5 mg Oral Daily    sodium chloride flush  10 mL IntraVENous 2 times per day    acetaminophen  650 mg Oral Q6H    sennosides-docusate sodium  1 tablet Oral BID    aspirin  325 mg Oral BID       Data:     Code Status: Full Code    Family History   Problem Relation Age of Onset    Other Other         Son DVT    Cancer Mother         colon     Heart Attack Mother     Heart Attack Father      Social History     Socioeconomic History    Marital status:       Spouse name: Not on file    Number of children: Not on file    Years of education: Not on file    Highest education level: Not on file   Occupational History    Not on file   Tobacco Use    Smoking status: Current Every Day Smoker     Packs/day: 0.50     Types: Cigarettes    Smokeless tobacco: Never Used   Vaping Use    Vaping Use: Never used   Substance and Sexual Activity    Alcohol use: No    Drug use: No    Sexual activity: Never   Other Topics Concern    Not on file   Social History Narrative    Not on file     Social Determinants of Health     Financial Resource Strain:     Difficulty of Paying Living Expenses:    Food Insecurity:     Worried About Running Out of Food in the Last Year:     [address] in the Last Year:    Transportation Needs:     Lack of Transportation (Medical):  Lack of Transportation (Non-Medical):    Physical Activity:     Days of Exercise per Week:     Minutes of Exercise per Session:    Stress:     Feeling of Stress :    Social Connections:     Frequency of Communication with Friends and Family:     Frequency of Social Gatherings with Friends and Family:     Attends Mandaen Services:     Active Member of Clubs or Organizations:     Attends Club or Organization Meetings:     Marital Status:    Intimate Partner Violence:     Fear of Current or Ex-Partner:     Emotionally Abused:     Physically Abused:     Sexually Abused:        Labs:  Hematology:  Recent Labs     21  0702 21   HGB 11.0* 12.9   HCT 33.8* 40.1     Chemistry:  Recent Labs     21  0702 21    138   K 4.6 4.4    103   CO2 25 24   GLUCOSE 108 114*   BUN 25* 22   CREATININE 1.0* 1.1*   ANIONGAP 10 11   LABGLOM 54* 48*   GFRAA >59 58*   CALCIUM 8.9 9.3     No results for input(s): PROT, LABALBU, LABA1C, A7HONUF, T7YKIOU, FT4, TSH, AST, ALT, LDH, GGT, ALKPHOS, BILITOT, BILIDIR, AMMONIA, AMYLASE, LIPASE, LACTATE, CHOL, HDL, LDLCHOLESTEROL, CHOLHDLRATIO, TRIG, VLDL, PHENYTOIN, PHENYF in the last 72 hours. Objective:     Vitals: /80   Pulse 120   Temp 96.9 °F (36.1 °C) (Temporal)   Resp 18   Ht 5' 3\" (1.6 m)   Wt 137 lb (62.1 kg)   SpO2 95%   BMI 24.27 kg/m²      Intake/Output Summary (Last 24 hours) at 2021 0814  Last data filed at 9/3/2021 2045  Gross per 24 hour   Intake 480 ml   Output --   Net 480 ml    Temp (24hrs), Av.2 °F (36.2 °C), Min:96.7 °F (35.9 °C), Max:97.9 °F (36.6 °C)    Glucose:  No results for input(s): POCGLU in the last 72 hours.   Physical Examination:   General appearance - alert, well appearing, and in no distress and oriented to person, place, and time  Mental status - alert, oriented to person, place, and time, normal mood, behavior, speech, dress, motor activity, and thought processes  Eyes - pupils equal and reactive, extraocular eye movements intact  Ears - bilateral TM's and external ear canals normal, hearing grossly normal bilaterally  Mouth - mucous membranes moist, pharynx normal without lesions  Neck - supple, no significant adenopathy  Lymphatics - no palpable lymphadenopathy, no hepatosplenomegaly  Chest - clear to auscultation, no wheezes, rales or rhonchi, symmetric air entry, no tachypnea, retractions or cyanosis  Heart - normal rate, regular rhythm, normal S1, S2, no murmurs, rubs, clicks or gallops  Abdomen - soft, nontender, nondistended, no masses or organomegaly bowel sounds normal  Neurological - alert, oriented, normal speech, no focal findings or movement disorder noted  Musculoskeletal - no joint tenderness, deformity or swelling  Extremities - peripheral pulses normal, no pedal edema, no clubbing or cyanosis  Skin - normal coloration and turgor, no rashes, no suspicious skin lesions noted      Assessment and Plan:       Hospital Problem list/ Treatment recommendations: Active Problems:    Osteoarthritis of right knee--- postop care     CKD stage IIIb--eGFR 44, avoid nephrotoxic medication and maintain hydration. Reviewed creatinine trend over the past several months. Educated patient about maintaining hydration and avoiding nephrotoxic medications to include NSAIDs and proton pump inhibitors. Will check vitamin D1, 25 along with calcium, phosphorus, parathyroid hormone and serum uric acid if indicated. Obtain renal ultrasound if needed and not done recently. Follow creatinine while in the hospital.     Postop anemia--stable, H&H normal     Hypertension-review pre and post BP's,will restart home BP meds when BP allows. Add Clonidine 0.1 mg q 4 hours prn if bp> 140/90,monitor BP and adjust meds as necessary. Hold Zestril and Lasix with low BP     Postoperative low BP--expected, stable. Antihypertensives on hold. suspect related to anesthesia and pain medication.  . Give fluid bolus as needed. Add pressure support with Florinef, ProAmatine or IV albumin if indicated. Discussed treatment plan with nursing staff today.     IBS-D with postoperative constipation--hold anticholinergic until bowel function returns     Paroxysmal atrial fibrillation--questionable issues of atrial fibrillation but no documented EKG, cardiology added Cardizem for rate control and Eliquis for anticoagulation. ZIO patch ordered upon discharge. Echo completed. Patient relates significant stress and anxiety before surgery which she feels caused her heart rate to \"go fast\" she remains asymptomatic postop    Medically stable postop day #2  Encourage incentive spirometry every hour while awake  Early mobilization, maximize efforts with therapy  Follow along with daily labs    Discharge planning: Home  Reviewed treatment plans with the patient and nursing staff  20 minutes spent in face to face interaction and coordination of care. Electronically signed by Candida Gold PA-C on 9/4/2021 at 8:14 AM     Medically stable. No signs of atrial fibrillation. Discharge home when cleared by orthopedics. I have discussed the care of Raeann Courser, including pertinent history and exam findings with the ARNP/PA. I have seen and examined the patient and the key elements of all parts of the encounter have been performed by me. I agree with the assessment and plan as outlined by the ARNP/PA. Please refer to my separate note for complete documentation.      Electronically signed by Henna Carroll MD on 9/4/2021 at 9:01 AM

## 2021-09-05 VITALS
SYSTOLIC BLOOD PRESSURE: 85 MMHG | RESPIRATION RATE: 16 BRPM | DIASTOLIC BLOOD PRESSURE: 56 MMHG | BODY MASS INDEX: 24.27 KG/M2 | OXYGEN SATURATION: 97 % | WEIGHT: 137 LBS | HEIGHT: 63 IN | HEART RATE: 80 BPM | TEMPERATURE: 97.6 F

## 2021-09-05 LAB
ANION GAP SERPL CALCULATED.3IONS-SCNC: 10 MMOL/L (ref 7–19)
BUN BLDV-MCNC: 21 MG/DL (ref 8–23)
CALCIUM SERPL-MCNC: 8.5 MG/DL (ref 8.8–10.2)
CHLORIDE BLD-SCNC: 106 MMOL/L (ref 98–111)
CO2: 24 MMOL/L (ref 22–29)
CREAT SERPL-MCNC: 1.1 MG/DL (ref 0.5–0.9)
GFR AFRICAN AMERICAN: 58
GFR NON-AFRICAN AMERICAN: 48
GLUCOSE BLD-MCNC: 100 MG/DL (ref 74–109)
HCT VFR BLD CALC: 36 % (ref 37–47)
HEMOGLOBIN: 11.4 G/DL (ref 12–16)
POTASSIUM SERPL-SCNC: 4 MMOL/L (ref 3.5–5)
SODIUM BLD-SCNC: 140 MMOL/L (ref 136–145)

## 2021-09-05 PROCEDURE — 97530 THERAPEUTIC ACTIVITIES: CPT

## 2021-09-05 PROCEDURE — 6370000000 HC RX 637 (ALT 250 FOR IP): Performed by: ORTHOPAEDIC SURGERY

## 2021-09-05 PROCEDURE — 85014 HEMATOCRIT: CPT

## 2021-09-05 PROCEDURE — 36415 COLL VENOUS BLD VENIPUNCTURE: CPT

## 2021-09-05 PROCEDURE — 97116 GAIT TRAINING THERAPY: CPT

## 2021-09-05 PROCEDURE — G0378 HOSPITAL OBSERVATION PER HR: HCPCS

## 2021-09-05 PROCEDURE — 80048 BASIC METABOLIC PNL TOTAL CA: CPT

## 2021-09-05 PROCEDURE — 85018 HEMOGLOBIN: CPT

## 2021-09-05 RX ADMIN — OXYCODONE 5 MG: 5 TABLET ORAL at 05:38

## 2021-09-05 RX ADMIN — DICYCLOMINE HYDROCHLORIDE 10 MG: 10 CAPSULE ORAL at 05:33

## 2021-09-05 RX ADMIN — CYCLOBENZAPRINE 5 MG: 10 TABLET, FILM COATED ORAL at 05:33

## 2021-09-05 RX ADMIN — ACETAMINOPHEN 650 MG: 325 TABLET ORAL at 05:33

## 2021-09-05 ASSESSMENT — PAIN DESCRIPTION - DESCRIPTORS: DESCRIPTORS: ACHING

## 2021-09-05 ASSESSMENT — PAIN SCALES - GENERAL
PAINLEVEL_OUTOF10: 7
PAINLEVEL_OUTOF10: 5
PAINLEVEL_OUTOF10: 7

## 2021-09-05 ASSESSMENT — PAIN DESCRIPTION - LOCATION: LOCATION: KNEE

## 2021-09-05 ASSESSMENT — PAIN DESCRIPTION - PROGRESSION: CLINICAL_PROGRESSION: GRADUALLY IMPROVING

## 2021-09-05 ASSESSMENT — PAIN - FUNCTIONAL ASSESSMENT: PAIN_FUNCTIONAL_ASSESSMENT: PREVENTS OR INTERFERES SOME ACTIVE ACTIVITIES AND ADLS

## 2021-09-05 ASSESSMENT — PAIN DESCRIPTION - PAIN TYPE: TYPE: SURGICAL PAIN

## 2021-09-05 ASSESSMENT — PAIN DESCRIPTION - FREQUENCY: FREQUENCY: INTERMITTENT

## 2021-09-05 ASSESSMENT — PAIN DESCRIPTION - ORIENTATION: ORIENTATION: RIGHT

## 2021-09-05 NOTE — DISCHARGE SUMMARY
Or[facility]opedic [facility]  Dr. Beltran Faster  Discharge Summary    The Amari Dominique is a 67 y.o. female underwent total knee replacement procedure without complication. Amari Dominique was admitted to the floor following her   recovery in the PACU. Discharge Diagnosis   Right    Knee Replacement    Current Inpatient Medications    Current Facility-Administered Medications: cyclobenzaprine (FLEXERIL) tablet 5 mg, 5 mg, Oral, TID PRN  dilTIAZem (CARDIZEM CD) extended release capsule 120 mg, 120 mg, Oral, Daily  dicyclomine (BENTYL) capsule 10 mg, 10 mg, Oral, TID AC  fluticasone (FLONASE) 50 MCG/ACT nasal spray 2 spray, 2 spray, Nasal, Daily  furosemide (LASIX) tablet 20 mg, 20 mg, Oral, Daily PRN  lisinopril (PRINIVIL;ZESTRIL) tablet 20 mg, 20 mg, Oral, Nightly  cetirizine (ZYRTEC) tablet 5 mg, 5 mg, Oral, Daily  sodium chloride flush 0.9 % injection 10 mL, 10 mL, IntraVENous, 2 times per day  sodium chloride flush 0.9 % injection 10 mL, 10 mL, IntraVENous, PRN  0.9 % sodium chloride infusion, 25 mL, IntraVENous, PRN  acetaminophen (TYLENOL) tablet 650 mg, 650 mg, Oral, Q6H  oxyCODONE (ROXICODONE) immediate release tablet 5 mg, 5 mg, Oral, Q4H PRN **OR** oxyCODONE (ROXICODONE) immediate release tablet 10 mg, 10 mg, Oral, Q4H PRN  morphine injection 2 mg, 2 mg, IntraVENous, Q1H PRN **OR** morphine injection 4 mg, 4 mg, IntraVENous, Q1H PRN  sennosides-docusate sodium (SENOKOT-S) 8.6-50 MG tablet 1 tablet, 1 tablet, Oral, BID  magnesium hydroxide (MILK OF MAGNESIA) 400 MG/5ML suspension 30 mL, 30 mL, Oral, Daily PRN  aspirin EC tablet 325 mg, 325 mg, Oral, BID  albuterol (PROVENTIL) nebulizer solution 2.5 mg, 2.5 mg, Nebulization, Q6H PRN  ondansetron (ZOFRAN) injection 4 mg, 4 mg, IntraVENous, Q6H PRN    Post-operatively the patients diet was advanced as tolerated and their incision was checked on POD #1. The incision was clean, dry and intact with no signs of infection.   The patient remained neurovascularly intact in the lower extremity and had intact pulses distally. Patients calf remained soft and showed no evidence of DVT. The patient was able to move their leg and ankle/foot without any problems post-operatively. Physical therapy and occupational therapy were consulted and began working with the patient post-operatively. The patient progressed with PT/OT as would be expected and continued to improve through their stay. The patients pain was initially controlled with IV medications but we were able to transition to oral pain medications soon after arrival to the floor and their pain remained under good control through their hospital stay. From a medical standpoint the patient remained stable and continued to have the medicine team follow throughout their stay. Acute postoperative blood loss anemia after joint replacement being monitored with daily hemoglobin/hematocrit. The patients dressing was changed/incison was checked on day of d/c. The patient will be discharged at this time to  Home   with their current diet restrictions and will continue to follow the total knee precautions outlined to them by us and PT/OT. Condition on Discharge: Stable    Plan  Followup at scheduled appointment time (1 month post-op). Patient was instructed on the use of pain medications, the signs and symptoms of infection, and was given our number to call should they have any questions or concerns following discharge.

## 2021-09-05 NOTE — PROGRESS NOTES
[facility]  Daily Progress Note  Raeann Courser  MRN: [id number] LOS: 1    Admit Date: 9/2/2021 9/5/2021 7:41 AM          Chief Complaint:  Knee pain    Interval History:    Reviewed overnight events and nursing notes  Postoperative pain is controlled  Denies chest pain  No shortness of breath  Appetite bowel function returning. DIET:  Adult Oral Nutrition Supplement; Standard High Calorie/High Protein Oral Supplement  ADULT DIET; Regular    Medications:      sodium chloride        dilTIAZem  120 mg Oral Daily    dicyclomine  10 mg Oral TID AC    fluticasone  2 spray Nasal Daily    lisinopril  20 mg Oral Nightly    cetirizine  5 mg Oral Daily    sodium chloride flush  10 mL IntraVENous 2 times per day    acetaminophen  650 mg Oral Q6H    sennosides-docusate sodium  1 tablet Oral BID    aspirin  325 mg Oral BID       Data:     Code Status: Full Code    Family History   Problem Relation Age of Onset    Other Other         Son DVT    Cancer Mother         colon     Heart Attack Mother     Heart Attack Father      Social History     Socioeconomic History    Marital status:       Spouse name: Not on file    Number of children: Not on file    Years of education: Not on file    Highest education level: Not on file   Occupational History    Not on file   Tobacco Use    Smoking status: Current Every Day Smoker     Packs/day: 0.50     Types: Cigarettes    Smokeless tobacco: Never Used   Vaping Use    Vaping Use: Never used   Substance and Sexual Activity    Alcohol use: No    Drug use: No    Sexual activity: Never   Other Topics Concern    Not on file   Social History Narrative    Not on file     Social Determinants of Health     Financial Resource Strain:     Difficulty of Paying Living Expenses:    Food Insecurity:     Worried About Running Out of Food in the Last Year:     [address] in the Last Year:    Transportation Needs:     Lack of Transportation (Medical):  Lack of Transportation (Non-Medical):    Physical Activity:     Days of Exercise per Week:     Minutes of Exercise per Session:    Stress:     Feeling of Stress :    Social Connections:     Frequency of Communication with Friends and Family:     Frequency of Social Gatherings with Friends and Family:     Attends Taoism Services:     Active Member of Clubs or Organizations:     Attends Club or Organization Meetings:     Marital Status:    Intimate Partner Violence:     Fear of Current or Ex-Partner:     Emotionally Abused:     Physically Abused:     Sexually Abused:        Labs:  Hematology:  Recent Labs     21   HGB 11.0* 12.9 11.4*   HCT 33.8* 40.1 36.0*     Chemistry:  Recent Labs     21    138 140   K 4.6 4.4 4.0    103 106   CO2 25 24 24   GLUCOSE 108 114* 100   BUN 25* 22 21   CREATININE 1.0* 1.1* 1.1*   ANIONGAP 10 11 10   LABGLOM 54* 48* 48*   GFRAA >59 58* 58*   CALCIUM 8.9 9.3 8.5*     No results for input(s): PROT, LABALBU, LABA1C, J6VZYVS, M4NLGCB, FT4, TSH, AST, ALT, LDH, GGT, ALKPHOS, BILITOT, BILIDIR, AMMONIA, AMYLASE, LIPASE, LACTATE, CHOL, HDL, LDLCHOLESTEROL, CHOLHDLRATIO, TRIG, VLDL, PHENYTOIN, PHENYF in the last 72 hours. Objective:     Vitals: /84   Pulse 89   Temp 97.8 °F (36.6 °C) (Temporal)   Resp 16   Ht 5' 3\" (1.6 m)   Wt 137 lb (62.1 kg)   SpO2 92%   BMI 24.27 kg/m²      Intake/Output Summary (Last 24 hours) at 2021 0741  Last data filed at 2021 0656  Gross per 24 hour   Intake 440 ml   Output 0 ml   Net 440 ml    Temp (24hrs), Av.9 °F (36.1 °C), Min:96.3 °F (35.7 °C), Max:97.8 °F (36.6 °C)    Glucose:  No results for input(s): POCGLU in the last 72 hours.   Physical Examination:   General appearance - alert, well appearing, and in no distress and oriented to person, place, and time  Mental status - alert, oriented to person, place, and time, normal mood, behavior, speech, dress, motor activity, and thought processes  Eyes - pupils equal and reactive, extraocular eye movements intact  Ears - bilateral TM's and external ear canals normal, hearing grossly normal bilaterally  Mouth - mucous membranes moist, pharynx normal without lesions  Neck - supple, no significant adenopathy  Lymphatics - no palpable lymphadenopathy, no hepatosplenomegaly  Chest - clear to auscultation, no wheezes, rales or rhonchi, symmetric air entry, no tachypnea, retractions or cyanosis  Heart - normal rate, regular rhythm, normal S1, S2, no murmurs, rubs, clicks or gallops  Abdomen - soft, nontender, nondistended, no masses or organomegaly bowel sounds normal  Neurological - alert, oriented, normal speech, no focal findings or movement disorder noted  Musculoskeletal - no joint tenderness, deformity or swelling  Extremities - peripheral pulses normal, no pedal edema, no clubbing or cyanosis  Skin - normal coloration and turgor, no rashes, no suspicious skin lesions noted      Assessment and Plan:       Hospital Problem list/ Treatment recommendations: Active Problems:    Osteoarthritis of right knee--- postop care     CKD stage IIIb--eGFR 48, stable at baseline, continue to avoid nephrotoxic medications and maintain normal BP     Postop anemia--stable, H&H normal     Hypertension-review pre and post BP's,will restart home BP meds when BP allows. Add Clonidine 0.1 mg q 4 hours prn if bp> 140/90,monitor BP and adjust meds as necessary. Hold Zestril and Lasix with low BP     Postoperative low BP--expected, stable. Antihypertensives on hold. suspect related to anesthesia and pain medication. . Give fluid bolus as needed. Add pressure support with Florinef, ProAmatine or IV albumin if indicated.  Discussed treatment plan with nursing staff today.     IBS-D with postoperative constipation--hold anticholinergic until bowel function returns     Paroxysmal atrial fibrillation--questionable issues of atrial fibrillation but no documented EKG, cardiology added Cardizem for rate control and Eliquis for anticoagulation. ZIO patch ordered upon discharge. Echo completed. Patient relates significant stress and anxiety before surgery which she feels caused her heart rate to \"go fast\" she remains asymptomatic postop    Medically stable postop day #3, cleared for discharge home when okay with Ortho    20 minutes spent in face to face interaction and coordination of care. Electronically signed by Attila Prabhakar PA-C on 9/5/2021 at 7:41 AM     No signs of cardiac arrhythmia-recommend close follow-up with her primary provider Cee Stephens  Medically stable for discharge home today. I have discussed the care of Jacque Hicks, including pertinent history and exam findings with the ARNP/PA. I have seen and examined the patient and the key elements of all parts of the encounter have been performed by me. I agree with the assessment and plan as outlined by the MARYELLEN/PA. Please refer to my separate note for complete documentation.      Electronically signed by Steve Ross MD on 9/5/2021 at 9:07 AM

## 2021-09-05 NOTE — PROGRESS NOTES
Subjective:     Post-Operative Day: 3 No complaints    Objective:     Patient Vitals for the past 24 hrs:   BP Temp Temp src Pulse Resp SpO2   09/05/21 0656 100/84 97.8 °F (36.6 °C) Temporal 89 16 92 %   09/05/21 0352 107/81 96.3 °F (35.7 °C) Temporal 84 18 95 %   09/04/21 2217 109/77 97 °F (36.1 °C) Temporal 58 16 94 %   09/04/21 1800 109/77 97 °F (36.1 °C) Temporal 58 16 94 %   09/04/21 1502 (!) 119/56 96.9 °F (36.1 °C) Temporal 66 18 92 %   09/04/21 1212 114/65 96.5 °F (35.8 °C) Temporal 78 18 96 %       General: Alert cooperative   Wound: Clean dry intact. Moderate swelling   Neurovascular: Exam normal   DVT Exam: No evidence of DVT         Data Review:  Recent Labs     09/04/21  0312 09/05/21  0242   HGB 12.9 11.4*     Recent Labs     09/05/21  0242      K 4.0   CREATININE 1.1*   GLUCOSE 100     No results for input(s): POCGLU in the last 72 hours. No orders to display       Assessment:     Status Post right Total Knee Arthroplasty. Doing well postop without complication.    Plan:     Pain control  Tight blood glucose control  PT/OT  DVT prophylaxis  Ice and elevate  Discharge Home today

## 2021-09-05 NOTE — PROGRESS NOTES
09/05/21 1058   Restrictions/Precautions   Restrictions/Precautions Fall Risk;Weight Bearing   Required Braces or Orthoses? No   Lower Extremity Weight Bearing Restrictions   Right Lower Extremity Weight Bearing Weight Bearing As Tolerated   General   Chart Reviewed Yes   Subjective   Subjective Patient in chair agrees to therapy has to use BR first   Pain Screening   Patient Currently in Pain Yes   Pain Assessment   Pain Assessment 0-10   Pain Level 7   Patient's Stated Pain Goal No pain   Pain Type Surgical pain   Pain Location Knee   Pain Orientation Right   Pain Descriptors Aching   Pain Frequency Intermittent   Clinical Progression Gradually improving   Functional Pain Assessment Prevents or interferes some active activities and ADLs   Non-Pharmaceutical Pain Intervention(s) Rest   Response to Pain Intervention Patient Satisfied   Oxygen Therapy   O2 Device None (Room air)   Transfers   Sit to Stand Contact guard assistance;Minimal Assistance   Stand to sit Contact guard assistance   Bed to Chair Contact guard assistance   Comment Patient walked to   toileted and performed hygiene IND   Ambulation   Ambulation? Yes   Ambulation 1   Device Rolling Walker   Assistance Contact guard assistance   Quality of Gait slow, steady, no LOB noted   Gait Deviations Slow Ashley;Decreased step length;Decreased step height   Distance 10 ' x 2  +  90'   Stairs/Curb   Stairs?  Yes   Stairs   # Steps  5   Stairs Height   (4\" and 6 \")   Rails Bilateral   Device No Device   Assistance Contact guard assistance   Comment Patient did well with steps   Activity Tolerance   Activity Tolerance Patient Tolerated treatment well   Safety Devices   Type of devices Left in chair;Call light within reach   Physical Therapy    Electronically signed by Sigrid García PTA on 9/5/2021 at 11:18 AM

## 2021-09-06 LAB
EKG P AXIS: 60 DEGREES
EKG P-R INTERVAL: 196 MS
EKG Q-T INTERVAL: 404 MS
EKG QRS DURATION: 108 MS
EKG QTC CALCULATION (BAZETT): 407 MS
EKG T AXIS: 39 DEGREES

## 2021-09-07 ENCOUNTER — TELEPHONE (OUTPATIENT)
Dept: INPATIENT UNIT | Age: 76
End: 2021-09-07

## 2021-09-21 ENCOUNTER — TELEPHONE (OUTPATIENT)
Dept: CARDIOLOGY CLINIC | Age: 76
End: 2021-09-21

## 2021-09-21 NOTE — TELEPHONE ENCOUNTER
Can hold Cardizem for now. Echocardiogram with moderate aortic stenosis. Have her follow-up with cardiology if she does not have an appointment to be made within 2 to 3 weeks.

## 2021-09-21 NOTE — TELEPHONE ENCOUNTER
PT called stating she started the cardizem  mg yesterday morning and felt awful. Her BP was 109/49, she was dizzy, no energy, nausea and had to have someone stay with her all day to help her up and down. Pt states she didn't take it this am feels fine. Pt would like something different or states she is just going to take her lisinopril 20 mg qd. pls advise.

## 2021-09-22 PROCEDURE — 93248 EXT ECG>7D<15D REV&INTERPJ: CPT | Performed by: INTERNAL MEDICINE

## 2021-09-22 NOTE — PROCEDURES
Cardiac event monitor report    Dates of recording 9/3/2021 through 9/17/2021    Summary impressions:    1. Sinus rhythm minimal heart rate 51 average 80 maximal 106    2. Atrial fibrillation 29% burden longest duration 1 day 7 hours heart rate varying between  average 134    3. No episodes of ventricular tachycardia were recorded    4. No pauses greater than 3.0 seconds were recorded    5. No episodes of complete or Mobitz 2 atrioventricular block were recorded    6. No episodes of supraventricular tachycardia were recorded    7. 1 triggered event 0 diary events rhythm during those recordings with sinus rhythm and PACs    8.  Frequent PACs 11.5% otherwise rare ectopy

## 2021-09-22 NOTE — TELEPHONE ENCOUNTER
Called and spoke with patient, advised to hold cardizem for now and to keep appt in October with Lucinda Trevino. Verbally understood.

## 2021-09-23 ENCOUNTER — TELEPHONE (OUTPATIENT)
Dept: CARDIOLOGY CLINIC | Age: 76
End: 2021-09-23

## 2021-09-23 NOTE — TELEPHONE ENCOUNTER
Called and spoke with Judah Flores, gave results, verbally understood. ----- Message from Justo Gonsalves MD sent at 9/22/2021  4:41 PM CDT -----  ZIO monitor does show atrial fibrillation. She is currently on Eliquis. Please let them know to continue Eliquis and follow-up with cardiology. Please make appointment if they do not have one already.

## 2021-10-05 ENCOUNTER — OFFICE VISIT (OUTPATIENT)
Dept: CARDIOLOGY CLINIC | Age: 76
End: 2021-10-05
Payer: MEDICARE

## 2021-10-05 VITALS
WEIGHT: 134 LBS | DIASTOLIC BLOOD PRESSURE: 80 MMHG | SYSTOLIC BLOOD PRESSURE: 130 MMHG | HEART RATE: 88 BPM | BODY MASS INDEX: 23.74 KG/M2 | HEIGHT: 63 IN

## 2021-10-05 DIAGNOSIS — Z79.01 CHRONIC ANTICOAGULATION: ICD-10-CM

## 2021-10-05 DIAGNOSIS — I35.0 MODERATE AORTIC STENOSIS: ICD-10-CM

## 2021-10-05 DIAGNOSIS — I48.0 PAF (PAROXYSMAL ATRIAL FIBRILLATION) (HCC): Primary | ICD-10-CM

## 2021-10-05 DIAGNOSIS — I10 ESSENTIAL HYPERTENSION: ICD-10-CM

## 2021-10-05 DIAGNOSIS — I51.89 GRADE II DIASTOLIC DYSFUNCTION: ICD-10-CM

## 2021-10-05 PROCEDURE — 99214 OFFICE O/P EST MOD 30 MIN: CPT | Performed by: NURSE PRACTITIONER

## 2021-10-05 PROCEDURE — 93000 ELECTROCARDIOGRAM COMPLETE: CPT | Performed by: NURSE PRACTITIONER

## 2021-10-05 RX ORDER — HYDROCODONE BITARTRATE AND ACETAMINOPHEN 5; 325 MG/1; MG/1
TABLET ORAL
COMMUNITY
Start: 2021-09-24 | End: 2022-01-13

## 2021-10-05 RX ORDER — ERGOCALCIFEROL 1.25 MG/1
50000 CAPSULE ORAL WEEKLY
COMMUNITY

## 2021-10-05 RX ORDER — METOPROLOL SUCCINATE 25 MG/1
25 TABLET, EXTENDED RELEASE ORAL DAILY
Qty: 30 TABLET | Refills: 5 | Status: SHIPPED | OUTPATIENT
Start: 2021-10-05 | End: 2021-11-04 | Stop reason: SDUPTHER

## 2021-10-05 NOTE — PATIENT INSTRUCTIONS
New instructions for today:  Stop diltiazem you are now taking for heart rhythm due to side effects. Tomorrow, start metoprolol succinate 25 mg (1) tab daily as a substitute. Resume Eliquis 5 mg (1) tablet twice daily - this is a blood thinner. Eliquis can increase your risk of bleeding. If you notice blood in urine or stool, bleeding gums, excessive bruising or cough productive of bloody sputum, notify the office. Information on this blood thinner has been included in your after visit summary. Patient Instructions:  Continue current medications as prescribed. Always keep a current medication list. Bring your medications to every office visit. Continue to follow up with primary care provider for non cardiac medical problems. Call the office with any problems, questions or concerns at [phone]. If you have been asked to keep a blood pressure log, do so for 2 weeks. Call the office to report readings to the triage nurse at [phone]. Follow up with cardiologist as scheduled. The following educational material has been included in this after visit summary for your review: Life simple 7. Heart health. Life simple 7  1) Manage blood pressure - high blood pressure is a major risk factor for heart disease and stroke. Keeping blood pressure in health range reduces strain on your heart, arteries and kidneys. Blood pressure goal is less than 130/80. 2) Control cholesterol - contributes to plaque, which can clog arteries and lead to heart disease and stroke. When you control your cholesterol you are giving your arteries their best chance to remain clear. It is recommended that you get cholesterol lab work done once a year. 3) Reduce blood sugar - most of the food we eat is turning into glucose or blood sugar that our body uses for energy. Over time, high levels of blood sugar can damage your heart, kidneys, eyes and nerves.   4) Get active - living an active life is one of the most eat. This helps lower your blood pressure. Taste food before salting. Add only a little salt when you think you need it. With time, your taste buds will adjust to less salt. · Eat fewer snack items, fast foods, canned soups, and other high-salt, high-fat, processed foods. · Read food labels and try to avoid saturated and trans fats. They increase your risk of heart disease by raising cholesterol levels. · Limit the amount of solid fat-butter, margarine, and shortening-you eat. Use olive, peanut, or canola oil when you cook. Bake, broil, and steam foods instead of frying them. · Eat a variety of fruit and vegetables every day. Dark green, deep orange, red, or yellow fruits and vegetables are especially good for you. Examples include spinach, carrots, peaches, and berries. · Foods high in fiber can reduce your cholesterol and provide important vitamins and minerals. High-fiber foods include whole-grain cereals and breads, oatmeal, beans, brown rice, citrus fruits, and apples. · Eat lean proteins. Heart-healthy proteins include seafood, lean meats and poultry, eggs, beans, peas, nuts, seeds, and soy products. · Limit drinks and foods with added sugar. These include candy, desserts, and soda pop. Lifestyle changes  · If your doctor recommends it, get more exercise. Walking is a good choice. Bit by bit, increase the amount you walk every day. Try for at least 30 minutes on most days of the week. You also may want to swim, bike, or do other activities. · Do not smoke. If you need help quitting, talk to your doctor about stop-smoking programs and medicines. These can increase your chances of quitting for good. Quitting smoking may be the most important step you can take to protect your heart. It is never too late to quit. · Limit alcohol to 2 drinks a day for men and 1 drink a day for women. Too much alcohol can cause health problems.   · Manage other health problems such as diabetes, high blood pressure, and high cholesterol. If you think you may have a problem with alcohol or drug use, talk to your doctor. Medicines  · Take your medicines exactly as prescribed. Call your doctor if you think you are having a problem with your medicine. · If your doctor recommends aspirin, take the amount directed each day. Make sure you take aspirin and not another kind of pain reliever, such as acetaminophen (Tylenol). When should you call for help? [id number] if you have symptoms of a heart attack. These may include:  · Chest pain or pressure, or a strange feeling in the chest.  · Sweating. · Shortness of breath. · Pain, pressure, or a strange feeling in the back, neck, jaw, or upper belly or in one or both shoulders or arms. · Lightheadedness or sudden weakness. · A fast or irregular heartbeat. After you call 911, the  may tell you to chew 1 adult-strength or 2 to 4 low-dose aspirin. Wait for an ambulance. Do not try to drive yourself. Watch closely for changes in your health, and be sure to contact your doctor if you have any problems. Where can you learn more? Go to https://CallFire.UIEvolution. org and sign in to your [employer] account. Enter M393 in the [employer] box to learn more about \"A Healthy Heart: Care Instructions. \"     If you do not have an account, please click on the \"Sign Up Now\" link. Current as of: December 16, 2019               Content Version: 12.5  © 0489-1452 [employer]. Care instructions adapted under license by [facility] ([facility]). If you have questions about a medical condition or this instruction, always ask your healthcare professional. [place] [address] any warranty or liability for your use of this information. Patient Education        Learning About Atrial Fibrillation  What is atrial fibrillation? Atrial fibrillation (say \"AY-tree-leslie cbw-vrqv-WWY-shun\") is a common type of irregular heartbeat (arrhythmia).  Normally, the heart beats in a strong, steady rhythm. In atrial fibrillation, a problem with the heart's electrical system causes the two upper chambers of the heart (called the atria) to quiver, or fibrillate. Atrial fibrillation can be dangerous. This is because if the heartbeat isn't strong and steady, blood can collect, or pool, in the atria. And pooled blood is more likely to form clots. Clots can travel to the brain, block blood flow, and cause a stroke. Atrial fibrillation can also lead to heart failure. This condition also upsets the normal rhythm between the atria and the lower chambers of the heart. (These chambers are called the ventricles.) The ventricles may beat fast and without a regular rhythm. What are the symptoms? Some people feel symptoms when they have episodes of atrial fibrillation. But other people don't notice any symptoms. If you have symptoms, you may feel:  · A fluttering, racing, or pounding feeling in your chest called palpitations. · Weak or tired. · Dizzy or lightheaded. · Short of breath. · Chest pain. · Confused. You may notice signs of atrial fibrillation when you check your pulse. Your pulse may seem uneven or fast.  What can you expect when you have atrial fibrillation? At first, spells of atrial fibrillation may come on suddenly and last a short time. They may go away on their own or with treatment. Over time, the spells may last longer and occur more often. They often don't go away on their own. How is it treated? Treatments can help you feel better and prevent future problems, especially stroke and heart failure. Your treatment will depend on the cause of your atrial fibrillation, your symptoms, and your risk for stroke. Types of treatment include:  · Heart rate treatment. Medicine may be used to slow your heart rate. Your heartbeat may still be irregular. But these medicines keep your heart from beating too fast. They may also help relieve symptoms. · Heart rhythm treatment.  Different treatments may be used to try to stop atrial fibrillation and keep it from returning. They can also relieve symptoms. These treatments include medicine, electrical cardioversion to shock the heart back to a normal rhythm, a procedure called catheter ablation, and heart surgery. · Stroke prevention. You and your doctor can decide how to lower your risk. You may decide to take a blood-thinning medicine called an anticoagulant. What is a heart-healthy lifestyle for atrial fibrillation? You can live well and help manage atrial fibrillation by having a heart-healthy lifestyle. This lifestyle may help reduce how often you have episodes of atrial fibrillation. Don't smoke. Avoid secondhand smoke too. Quitting smoking is the best thing you can do for your heart. Be active. Talk to your doctor about what type and level of exercise is safe for you. Eat a heart-healthy diet. These foods include vegetables, fruits, nuts, beans, lean meat, fish, and whole grains. Limit sodium, alcohol, and sugar. Avoid alcohol if it triggers symptoms. Stay at a healthy weight. Lose weight if you need to. Losing weight can help relieve symptoms. Manage other health problems. These problems include diabetes, high blood pressure, and high cholesterol. If you think you may have a problem with alcohol or drug use, talk to your doctor. Manage stress. Options like yoga, biofeedback, and meditation may help. Where can you learn more? Go to https://GZ.com.DesignCrowd. org and sign in to your [employer] account. Enter D634 in the [facility] box to learn more about \"Learning About Atrial Fibrillation. \"     If you do not have an account, please click on the \"Sign Up Now\" link. Current as of: April 29, 2021               Content Version: 13.0  © 8955-3674 [employer]. Care instructions adapted under license by [facility] ([facility]).  If you have questions about a medical condition or this instruction, always ask your healthcare professional. [place] [address] any warranty or liability for your use of this information.

## 2021-10-05 NOTE — PROGRESS NOTES
[facility], [place]. [address], [facility], [address] [facility]  [phone] office  [phone] fax      OFFICE VISIT:  10/5/2021    Manuel Shetty - : 1945  Reason For Visit:  Micah Randolph is a 67 y.o. female who is here for Follow-Up from Hospital (Patient feels sick to stomach after taking diltiazem. Says she is very weak) and Atrial Fibrillation    History:   Diagnosis Orders   1. PAF (paroxysmal atrial fibrillation) ([facility]     2. Essential hypertension     3. Chronic anticoagulation      Eliquis   4. Moderate aortic stenosis     5. Grade II diastolic dysfunction       The patient presents today for cardiology follow up.  21 Zio showed 29% AF burden. Patient was prescribed diltiazem and Eliquis. She has not been taking the medications reporting \"one or both just make me feel terrible. \"  She has not sensed irregular heart rhythm. The patient denies symptoms to suggest myocardial ischemia, heart failure or arrhythmia. BP is well controlled on current regimen. The patient's PCP monitors cholesterol. Mya Emerson denies exertional chest pain, shortness of breath, orthopnea, paroxysmal nocturnal dyspnea, syncope, presyncope, sensed arrhythmia and edema. The patient denies numbness or weakness to suggest cerebrovascular accident or transient ischemic attack. + fatigue.     Manuel Shetty [facility] the [facility]:  Patient Active Problem List   Diagnosis Code    Trigger middle finger of right hand M65.331    Mass of right wrist R22.31    Osteoarthritis of right knee M17.11     Past Medical History:   Diagnosis Date    Chronic back pain     Hypertension     Inflammatory arthritis     Knee pain     Spastic colon      Past Surgical History:   Procedure Laterality Date    ABDOMEN SURGERY      APPENDECTOMY      BACK SURGERY      neck; bone out of hip to fuse neck    FINGER TRIGGER RELEASE Left 12/17/2020    LEFT MIDDLE TRIGGER FINGER RELEASE performed by Macrina Kunz MD at [address] [place] [address] HAND SURGERY Left 2012    fx repair (3 total surgeries)    HAND SURGERY Left 12/17/2020    LEFT WRIST MASS EXCISION performed by Macrina Kunz MD at [facility] [address] Bilateral     HIP    MOUTH SURGERY      TOTAL KNEE ARTHROPLASTY Right 9/2/2021    RIGHT TOTAL KNEE ARTHROPLASTY performed by Summer Hicks MD at [address]       Family History   Problem Relation Age of Onset    Other Other         Son DVT    Cancer Mother         colon     Heart Attack Mother     Heart Attack Father      Social History     Tobacco Use    Smoking status: Current Every Day Smoker     Packs/day: 0.50     Types: Cigarettes    Smokeless tobacco: Never Used   Substance Use Topics    Alcohol use: No      Current Outpatient Medications   Medication Sig Dispense Refill    vitamin D (ERGOCALCIFEROL) 1.25 MG (88636 UT) CAPS capsule Take 50,000 Units by mouth once a week      HYDROcodone-acetaminophen (NORCO) 5-325 MG per tablet TAKE 1 TABLET BY MOUTH EVERY 8 HOURS AS NEEDED FOR PAIN      metoprolol succinate (TOPROL XL) 25 MG extended release tablet Take 1 tablet by mouth daily 30 tablet 5    ondansetron (ZOFRAN) 4 MG tablet Take 1 tablet by mouth every 8 hours as needed for Nausea or Vomiting 30 tablet 0    lisinopril (PRINIVIL;ZESTRIL) 20 MG tablet Take 20 mg by mouth nightly Indications: High Blood Pressure Disorder      fluticasone (FLONASE) 50 MCG/ACT nasal spray 2 sprays by Nasal route daily       dicyclomine (BENTYL) 10 MG capsule Take 10 mg by mouth 4 times daily as needed      loratadine (CLARITIN) 10 MG capsule Take 10 mg by mouth daily      furosemide (LASIX) 20 MG tablet Take 1 tablet by mouth daily as needed (swelling) 30 tablet 2    albuterol sulfate HFA (PROVENTIL HFA) 108 (90 Base) MCG/ACT inhaler Inhale 2 puffs into the lungs every 6 hours as needed for Wheezing 1 Inhaler 3    celecoxib (CELEBREX) 200 MG capsule Take 1 capsule by mouth daily 30 capsule 2     No current facility-administered medications for this visit. Allergies: Levofloxacin in d5w, Pyridium [phenazopyridine], Sulfamethoxazole-trimethoprim, Codeine, Hydrocodone-acetaminophen, Hydromorphone hcl, and Oxycodone    Review of Systems  Constitutional - no appetite change, or unexpected weight change. No fever, chills or diaphoresis. + fatigue. HEENT - no significant rhinorrhea or epistaxis. No tinnitus or significant hearing loss. Eyes - no sudden vision change or amaurosis. No corneal arcus, xantholasma, subconjunctival hemorrhage or discharge. Respiratory - no significant wheezing, stridor, apnea or cough. No dyspnea on exertion or shortness of air. Cardiovascular - no exertional chest pain to suggest myocardial ischemia. No orthopnea or PND. No sensation of sustained arrythmia. No occurrence of slow heart rate. No palpitations. No claudication. Gastrointestinal - no abdominal swelling or pain. No blood in stool. No severe constipation, diarrhea, nausea, or vomiting. Genitourinary - no dysuria, frequency, or urgency. No flank pain or hematuria. Musculoskeletal - no back pain or myalgia. No problems with gait. Extremities - no clubbing, cyanosis or extremity edema. Skin - no color change or rash. No pallor. No new surgical incision. Neurologic - no speech difficulty, facial asymmetry or lateralizing weakness. No seizures, presyncope or syncope. No significant dizziness. Hematologic - no easy bruising or excessive bleeding. Psychiatric - no severe anxiety or insomnia. No confusion. All other review of systems are negative. Objective  Vital Signs - /80   Pulse 88   Ht 5' 3\" (1.6 m)   Wt 134 lb (60.8 kg)   BMI 23.74 kg/m²   General - Isabelle Montelongo is alert, cooperative, and pleasant. Well groomed. No acute distress.     Body habitus - Body mass index is 23.74 kg/m².  HEENT - Head is normocephalic. No circumoral cyanosis. Dentition is normal.  EYES -   Lids normal without ptosis. No discharge, edema or subconjunctival hemorrhage. Neck - Symmetrical without apparent mass or lymphadenopathy. Respiratory - Normal respiratory effort without use of accessory muscles. Ausculatation reveals vesicular breath sounds without crackles, wheezes, rub or rhonchi. Cardiovascular - No jugular venous distention. Auscultation reveals regular rate and rhythm. No audible clicks, gallop or rub. 2/6 systolic murmur. No lower extremity varicosities. No carotid bruits. Abdominal -  No visible distention, mass or pulsations. Extremities - No clubbing or cyanosis. No statis dermatitis or ulcers. No edema. Musculoskeletal -   No Osler's nodes. No kyphosis or scoliosis. Gait is even and regular without limp or shuffle. Ambulates without assistance. Skin -  Warm and dry; no rash or pallor. No new surgical wound. Neurological - No focal neurological deficits. Thought processes coherent. No apparent tremor. Oriented to person, place and time. Psychiatric -  Appropriate affect and mood. Data reviewed:  9/3/21 echo  Normal left ventricular size with preserved LV systolic function and a  calculated ejection fraction of 74%. No regional wall motion   abnormalities. Mild concentric left ventricular hypertrophy. Grade II diastolic dysfunction with evidence for increased LVEDP. Normal right ventricular size with preserved RV function. Moderate degenerative aortic valve stenosis is noted (peak velocity 3.74  m/s, mean gradient 26 mmHg, DI 0.31, SVI 42). Aortic root is within normal limits. The rhythm is sinus. No evidence of significant pericardial effusion is noted. ABNORMAL study. No previous studies available for comparison.     Signature   Electronically signed by Porfirio Medeiros(Interpreting physician)   on 09/03/2021 02:08 PM    9/2/21 Zio monitor  Dates of recording 9/3/2021 through 9/17/2021   1. Sinus rhythm minimal heart rate 51 average 80 maximal 106   2. Atrial fibrillation 29% burden longest duration 1 day 7 hours   heart rate varying between  average 134   3. No episodes of ventricular tachycardia were recorded   4. No pauses greater than 3.0 seconds were recorded   5. No episodes of complete or Mobitz 2 atrioventricular block   were recorded   6. No episodes of supraventricular tachycardia were recorded   7. 1 triggered event 0 diary events rhythm during those   recordings with sinus rhythm and PACs   8. Frequent PACs 11.5% otherwise rare ectopy    Lab Results   Component Value Date    WBC 8.9 08/30/2021    HGB 11.4 (L) 09/05/2021    HCT 36.0 (L) 09/05/2021    MCV 97.3 08/30/2021     08/30/2021     Lab Results   Component Value Date     09/05/2021    K 4.0 09/05/2021     09/05/2021    CO2 24 09/05/2021    BUN 21 09/05/2021    CREATININE 1.1 (H) 09/05/2021    GLUCOSE 100 09/05/2021    CALCIUM 8.5 (L) 09/05/2021    LABGLOM 48 (A) 09/05/2021    GFRAA 58 (L) 09/05/2021     EKG today reviewed: NSR 86 bpm; frequent PACs, QTc .407; no acute ischemic changes. BP Readings from Last 3 Encounters:   10/05/21 130/80   09/05/21 (!) 85/56   09/02/21 137/62    Pulse Readings from Last 3 Encounters:   10/05/21 88   09/05/21 80   12/17/20 80        Wt Readings from Last 3 Encounters:   10/05/21 134 lb (60.8 kg)   09/02/21 137 lb (62.1 kg)   08/30/21 137 lb (62.1 kg)     Assessment/Plan:   Diagnosis Orders   1. PAF (paroxysmal atrial fibrillation) ([address]     2. Essential hypertension     3. Chronic anticoagulation      Eliquis   4. Moderate aortic stenosis     5. Grade II diastolic dysfunction       [id number] Score: 4  Disclaimer: Risk Score calculation is dependent on accuracy of patient problem list and past encounter diagnosis. PAF - no recurrent AF. Patient intolerant to diltiazem. Change to Toprol XL 25 mg daily.    Educated on need for oral anticoagulation to prevent thrombotic event. Patient will resume Eliquis 5 mg BID. HTN - normotensive on current regimen. Continue same. Moderate AS - follow with echocardiogram.    Grade II DD - no symptoms of HFpEF. Patient is compliant with medication regimen. Previous cardiac history and records reviewed. Continue current medical management for cardiac related condition. Continue other current medications as directed. Continue to follow up with primary care provider for non cardiac medical problems. [place] [facility] [place] please request that your labs be faxed to this office at [phone]. BP goal 130/80 or less. Call the office with any problems, questions or concerns at [phone]. Cardiology follow up as scheduled in [address] appointments. Educational included in patient instructions. Heart health.       Genaro Vigil, APRN

## 2021-11-04 ENCOUNTER — OFFICE VISIT (OUTPATIENT)
Dept: CARDIOLOGY CLINIC | Age: 76
End: 2021-11-04
Payer: MEDICARE

## 2021-11-04 VITALS
DIASTOLIC BLOOD PRESSURE: 76 MMHG | HEIGHT: 63 IN | SYSTOLIC BLOOD PRESSURE: 132 MMHG | HEART RATE: 66 BPM | WEIGHT: 137 LBS | BODY MASS INDEX: 24.27 KG/M2

## 2021-11-04 DIAGNOSIS — I10 ESSENTIAL HYPERTENSION: ICD-10-CM

## 2021-11-04 DIAGNOSIS — Z79.01 CHRONIC ANTICOAGULATION: ICD-10-CM

## 2021-11-04 DIAGNOSIS — I48.0 PAF (PAROXYSMAL ATRIAL FIBRILLATION) (HCC): Primary | ICD-10-CM

## 2021-11-04 PROCEDURE — 93000 ELECTROCARDIOGRAM COMPLETE: CPT | Performed by: NURSE PRACTITIONER

## 2021-11-04 PROCEDURE — 99214 OFFICE O/P EST MOD 30 MIN: CPT | Performed by: NURSE PRACTITIONER

## 2021-11-04 RX ORDER — PANTOPRAZOLE SODIUM 40 MG/1
40 TABLET, DELAYED RELEASE ORAL
Qty: 30 TABLET | Refills: 1 | Status: SHIPPED | OUTPATIENT
Start: 2021-11-04 | End: 2022-01-11

## 2021-11-04 RX ORDER — METOPROLOL SUCCINATE 25 MG/1
25 TABLET, EXTENDED RELEASE ORAL DAILY
Qty: 30 TABLET | Refills: 5 | Status: ON HOLD
Start: 2021-11-04 | End: 2022-04-11 | Stop reason: HOSPADM

## 2021-11-04 NOTE — PROGRESS NOTES
[facility], [place]. [address], [address], [address]  [phone] office  [phone] fax      OFFICE VISIT:  2021    Joann Narvaez - : 1945  Reason For Visit:  Olive Zavala is a 67 y.o. female who is here for 1 Month Follow-Up (Following up on Afib. Patient also complains of hypertension.) and Atrial Fibrillation (She was wondering if she could restart Celebrex for arthritis pain.)    History:   Diagnosis Orders   1. PAF (paroxysmal atrial fibrillation) ([facility])  EKG 12 lead    metoprolol succinate (TOPROL XL) 25 MG extended release tablet   2. Essential hypertension     3. Chronic anticoagulation      Eliquis     The patient presents today for cardiology follow up. She has not sensed AF. 21 Zio showed 29% AF burden. Subsequently, patient started on diltiazem and Eliquis. [place] [facility] She reports today \"I have inflammatory arthritis and I am in agony off the Mobic. I try taking the pain medication instead. I break it in fourth's and it still makes me so groggy I am afraid I might fall. \"    The patient denies symptoms to suggest myocardial ischemia, heart failure or arrhythmia. BP is well controlled on current regimen. The patient's PCP monitors cholesterol. No bleeding issue on Eliquis. Ken Van denies exertional chest pain, shortness of breath, orthopnea, paroxysmal nocturnal dyspnea, syncope, presyncope, sensed arrhythmia, edema and fatigue. The patient denies numbness or weakness to suggest cerebrovascular accident or transient ischemic attack.       Joann Narvaez has the following [facility] as [facility] in [facility]:  Patient Active Problem List   Diagnosis Code    Trigger middle finger of right hand M65.331    Mass of right wrist R22.31    Osteoarthritis of right knee M17.11     Past Medical History:   Diagnosis Date    Chronic back pain     Hypertension     Inflammatory arthritis     Knee pain     Spastic colon      Past Surgical History:   Procedure Laterality Date    ABDOMEN SURGERY      APPENDECTOMY      BACK SURGERY      neck; bone out of hip to fuse neck    FINGER TRIGGER RELEASE Left 12/17/2020    LEFT MIDDLE TRIGGER FINGER RELEASE performed by Timbo Rea MD at [address] [place] [address] HAND SURGERY Left 2012    fx repair (3 total surgeries)    HAND SURGERY Left 12/17/2020    LEFT WRIST MASS EXCISION performed by Timbo Rea MD at [place] [facility] [address] Bilateral     HIP    [address]      TOTAL KNEE ARTHROPLASTY Right 9/2/2021    RIGHT TOTAL KNEE ARTHROPLASTY performed by Duane Sin, MD at [address] [place] [address]       Family History   Problem Relation Age of Onset    Other Other         Son DVT    Cancer Mother         colon     Heart Attack Mother     Heart Attack Father      Social History     Tobacco Use    Smoking status: Current Every Day Smoker     Packs/day: 0.50     Types: Cigarettes    Smokeless tobacco: Never Used   Substance Use Topics    Alcohol use: No      Current Outpatient Medications   Medication Sig Dispense Refill    metoprolol succinate (TOPROL XL) 25 MG extended release tablet Take 1 tablet by mouth daily 30 tablet 5    apixaban (ELIQUIS) 5 MG TABS tablet Take 1 tablet by mouth 2 times daily 60 tablet 5    vitamin D (ERGOCALCIFEROL) 1.25 MG (43348 UT) CAPS capsule Take 50,000 Units by mouth once a week      HYDROcodone-acetaminophen (NORCO) 5-325 MG per tablet TAKE 1 TABLET BY MOUTH EVERY 8 HOURS AS NEEDED FOR PAIN      ondansetron (ZOFRAN) 4 MG tablet Take 1 tablet by mouth every 8 hours as needed for Nausea or Vomiting 30 tablet 0    lisinopril (PRINIVIL;ZESTRIL) 20 MG tablet Take 20 mg by mouth nightly Indications: High Blood Pressure Disorder      fluticasone (FLONASE) 50 MCG/ACT nasal spray 2 sprays by Nasal route daily       dicyclomine (BENTYL) 10 MG capsule Take 10 mg by mouth 4 times daily as needed      loratadine (CLARITIN) 10 MG capsule Take 10 mg by mouth daily      furosemide (LASIX) 20 MG tablet Take 1 tablet by mouth daily as needed (swelling) 30 tablet 2    albuterol sulfate HFA (PROVENTIL HFA) 108 (90 Base) MCG/ACT inhaler Inhale 2 puffs into the lungs every 6 hours as needed for Wheezing 1 Inhaler 3    celecoxib (CELEBREX) 200 MG capsule Take 1 capsule by mouth daily 30 capsule 2     No current facility-administered medications for this visit. Allergies: Levofloxacin in d5w, Pyridium [phenazopyridine], Sulfamethoxazole-trimethoprim, Codeine, Hydrocodone-acetaminophen, Hydromorphone hcl, and Oxycodone    Review of Systems  Constitutional - no appetite change, or unexpected weight change. No fever, chills or diaphoresis. No significant change in activity level or new onset of fatigue. HEENT - no significant rhinorrhea or epistaxis. No tinnitus or significant hearing loss. Eyes - no sudden vision change or amaurosis. No corneal arcus, xantholasma, subconjunctival hemorrhage or discharge. Respiratory - no significant wheezing, stridor, apnea or cough. No dyspnea on exertion or shortness of air. Cardiovascular - no exertional chest pain to suggest myocardial ischemia. No orthopnea or PND. No sensation of sustained arrythmia. No occurrence of slow heart rate. No palpitations. No claudication. Gastrointestinal - no abdominal swelling or pain. No blood in stool. No severe constipation, diarrhea, nausea, or vomiting. Genitourinary - no dysuria, frequency, or urgency. No flank pain or hematuria. Musculoskeletal - no back pain or myalgia.  + joint pain secondary to inflammatory DJD. Gait cautious. Extremities - no clubbing, cyanosis or extremity edema. Skin - no color change or rash. No pallor. No new surgical incision. Neurologic - no speech difficulty, facial asymmetry or lateralizing weakness. No seizures, presyncope or syncope.   No significant dizziness. Hematologic - no easy bruising or excessive bleeding. Psychiatric - no severe anxiety or insomnia. No confusion. All other review of systems are negative. Objective  Vital Signs - /76   Pulse 66   Ht 5' 3\" (1.6 m)   Wt 137 lb (62.1 kg)   BMI 24.27 kg/m²   General - Carey Smyth is alert, cooperative, and pleasant. Well groomed. No acute distress. Body habitus - Body mass index is 24.27 kg/m². HEENT - Head is normocephalic. No circumoral cyanosis. Dentition is normal.  EYES -   Lids normal without ptosis. No discharge, edema or subconjunctival hemorrhage. Neck - Symmetrical without apparent mass or lymphadenopathy. Respiratory - Normal respiratory effort without use of accessory muscles. Ausculatation reveals vesicular breath sounds without crackles, wheezes, rub or rhonchi. Cardiovascular - No jugular venous distention. Auscultation reveals regular rate and rhythm. No audible clicks, gallop or rub. No murmur. No lower extremity varicosities. No carotid bruits. Abdominal -  No visible distention, mass or pulsations. Extremities - No clubbing or cyanosis. No statis dermatitis or ulcers. No edema. Musculoskeletal -   No Osler's nodes. No kyphosis or scoliosis. Ambulates without assistance. Gait is cautious. Skin -  Warm and dry; no rash or pallor. No new surgical wound. Neurological - No focal neurological deficits. Thought processes coherent. No apparent tremor. Oriented to person, place and time. Psychiatric -  Appropriate affect and mood. Data reviewed:  Dates of recording 9/3/2021 through 9/17/2021   1. Sinus rhythm minimal heart rate 51 average 80 maximal 106   2. Atrial fibrillation 29% burden longest duration 1 day 7 hours   heart rate varying between  average 134   3. No episodes of ventricular tachycardia were recorded   4. No pauses greater than 3.0 seconds were recorded   5.  No episodes of complete or Mobitz 2 atrioventricular block   were recorded   6. No episodes of supraventricular tachycardia were recorded   7. 1 triggered event 0 diary events rhythm during those   recordings with sinus rhythm and PACs   8. Frequent PACs 11.5% otherwise rare ectopy    Lab Results   Component Value Date    WBC 8.9 08/30/2021    HGB 11.4 (L) 09/05/2021    HCT 36.0 (L) 09/05/2021    MCV 97.3 08/30/2021     08/30/2021     Lab Results   Component Value Date     09/05/2021    K 4.0 09/05/2021     09/05/2021    CO2 24 09/05/2021    BUN 21 09/05/2021    CREATININE 1.1 (H) 09/05/2021    GLUCOSE 100 09/05/2021    CALCIUM 8.5 (L) 09/05/2021    LABGLOM 48 (A) 09/05/2021    GFRAA 58 (L) 09/05/2021     EKG today reviewed: NSR 66 bpm; frequent PACs; QTc .408; no acute ischemic changes. BP Readings from Last 3 Encounters:   11/04/21 132/76   10/05/21 130/80   09/05/21 (!) 85/56    Pulse Readings from Last 3 Encounters:   11/04/21 66   10/05/21 88   09/05/21 80        Wt Readings from Last 3 Encounters:   11/04/21 137 lb (62.1 kg)   10/05/21 134 lb (60.8 kg)   09/02/21 137 lb (62.1 kg)     Assessment/Plan:   Diagnosis Orders   1. PAF (paroxysmal atrial fibrillation) ([facility])  EKG 12 lead    metoprolol succinate (TOPROL XL) 25 MG extended release tablet   2. Essential hypertension     3. Chronic anticoagulation      Eliquis     [id number] Score: 4  Disclaimer: Risk Score calculation is dependent on accuracy of patient problem list and past encounter diagnosis. Stable CV status without overt heart failure, sensed arrhythmia or angina. PAF - currently in NSR with no symptomology to suggest recurrent AF. Continue Toprol XL and Eliquis. No bleeding issue on Eliquis. Discussed Mobic and potential of increased bleeding on Eliquis. She understands the risk. She will take Mobic with food only. Added Protonix 40 mg (1) tab daily. She is to report any blood in stool or urine. HTN - normotensive on current regimen.   BP goal less than 130/80. Continue same. Patient is compliant with medication regimen. Previous cardiac history and records reviewed. Continue current medical management for cardiac related condition. Continue other current medications as directed. Continue to follow up with primary care provider for non cardiac medical problems. If your primary care provider [place] [facility] [place] please request that your labs be faxed to this office at [phone]. BP goal 130/80 or less. Call the office with any problems, questions or concerns at [phone]. Cardiology follow up as scheduled in [address] appointments. Educational included in patient instructions. Heart health.       Santino Mcadams, APRN

## 2021-11-04 NOTE — PATIENT INSTRUCTIONS
New instructions for today:  Due to inflammatory arthritis, ok to start back on Celebrex one tab daily with food. Watch closely for blood in urine and stool. The combination of Celebrex and Elqiuis can increase your risk of bleeding. I will add a stomach protecting pill for now. Protonix 40 mg (1) tab daily. I will discuss the Watchman device procedure with Dr. Emily Mas and get back with you. This procedure plugs a part of the heart wear clots can collect if you go back into atrial fibrillation. Patient Instructions:  Continue current medications as prescribed. Always keep a current medication list. Bring your medications to every office visit. Continue to follow up with primary care provider for non cardiac medical problems. Call the office with any problems, questions or concerns at [phone]. If you have been asked to keep a blood pressure log, do so for 2 weeks. Call the office to report readings to the triage nurse at [phone]. Follow up with cardiologist as scheduled. The following educational material has been included in this after visit summary for your review: Life simple 7. Heart health. Life simple 7  1) Manage blood pressure - high blood pressure is a major risk factor for heart disease and stroke. Keeping blood pressure in health range reduces strain on your heart, arteries and kidneys. Blood pressure goal is less than 130/80. 2) Control cholesterol - contributes to plaque, which can clog arteries and lead to heart disease and stroke. When you control your cholesterol you are giving your arteries their best chance to remain clear. It is recommended that you get cholesterol lab work done once a year. 3) Reduce blood sugar - most of the food we eat is turning into glucose or blood sugar that our body uses for energy. Over time, high levels of blood sugar can damage your heart, kidneys, eyes and nerves.   4) Get active - living an active life is one of the most rewarding gifts you can give yourself and those you love. Simply put, daily physical activity increases your length and quality of life. Strive to exercise 15 minutes most days of the week. 5)  Eat better - A healthy diet is one of your best weapons for fighting cardiovascular disease. When you eat a heart healthy diet, you improve your chances for feeling good and staying healthy for life. 6)  Lose weight - when you shed extra fat an unnecessary pounds, you reduce the burden on your hear, lungs, blood vessels and skeleton. You give yourself the gift of active living, you lower your blood pressure and help yourself feel better. 7) Stop smoking - cigarette smokers have a higher risk of developing cardiovascular disease. If  You smoke, quitting is the best thing you can do for your health. Check American Heart Association on line for more information on Life's Simple 7 and tips for healthy living. A Healthy Heart: Care Instructions  Your Care Instructions     Coronary artery disease, also called heart disease, occurs when a substance called plaque builds up in the vessels that supply oxygen-rich blood to your heart muscle. This can narrow the blood vessels and reduce blood flow. A heart attack happens when blood flow is completely blocked. A high-fat diet, smoking, and other factors increase the risk of heart disease. Your doctor has found that you have a chance of having heart disease. You can do lots of things to keep your heart healthy. It may not be easy, but you can change your diet, exercise more, and quit smoking. These steps really work to lower your chance of heart disease. Follow-up care is a key part of your treatment and safety. Be sure to make and go to all appointments, and call your doctor if you are having problems. It's also a good idea to know your test results and keep a list of the medicines you take. How can you care for yourself at home? Diet  · Use less salt when you cook and eat.  This helps lower your blood pressure. Taste food before salting. Add only a little salt when you think you need it. With time, your taste buds will adjust to less salt. · Eat fewer snack items, fast foods, canned soups, and other high-salt, high-fat, processed foods. · Read food labels and try to avoid saturated and trans fats. They increase your risk of heart disease by raising cholesterol levels. · Limit the amount of solid fat-butter, margarine, and shortening-you eat. Use olive, peanut, or canola oil when you cook. Bake, broil, and steam foods instead of frying them. · Eat a variety of fruit and vegetables every day. Dark green, deep orange, red, or yellow fruits and vegetables are especially good for you. Examples include spinach, carrots, peaches, and berries. · Foods high in fiber can reduce your cholesterol and provide important vitamins and minerals. High-fiber foods include whole-grain cereals and breads, oatmeal, beans, brown rice, citrus fruits, and apples. · Eat lean proteins. Heart-healthy proteins include seafood, lean meats and poultry, eggs, beans, peas, nuts, seeds, and soy products. · Limit drinks and foods with added sugar. These include candy, desserts, and soda pop. Lifestyle changes  · If your doctor recommends it, get more exercise. Walking is a good choice. Bit by bit, increase the amount you walk every day. Try for at least 30 minutes on most days of the week. You also may want to swim, bike, or do other activities. · Do not smoke. If you need help quitting, talk to your doctor about stop-smoking programs and medicines. These can increase your chances of quitting for good. Quitting smoking may be the most important step you can take to protect your heart. It is never too late to quit. · Limit alcohol to 2 drinks a day for men and 1 drink a day for women. Too much alcohol can cause health problems.   · Manage other health problems such as diabetes, high blood pressure, and high cholesterol. If you think you may have a problem with alcohol or drug use, talk to your doctor. Medicines  · Take your medicines exactly as prescribed. Call your doctor if you think you are having a problem with your medicine. · If your doctor recommends aspirin, take the amount directed each day. Make sure you take aspirin and not another kind of pain reliever, such as acetaminophen (Tylenol). When should you call for help? [id number] if you have symptoms of a heart attack. These may include:  · Chest pain or pressure, or a strange feeling in the chest.  · Sweating. · Shortness of breath. · Pain, pressure, or a strange feeling in the back, neck, jaw, or upper belly or in one or both shoulders or arms. · Lightheadedness or sudden weakness. · A fast or irregular heartbeat. After you call 911, the  may tell you to chew 1 adult-strength or 2 to 4 low-dose aspirin. Wait for an ambulance. Do not try to drive yourself. Watch closely for changes in your health, and be sure to contact your doctor if you have any problems. Where can you learn more? Go to https://Shoeboxed.ProTip. org and sign in to your [employer] account. Enter D204 in the Arch[employer] box to learn more about \"A Healthy Heart: Care Instructions. \"     If you do not have an account, please click on the \"Sign Up Now\" link. Current as of: December 16, 2019               Content Version: 12.5  © 2982-3614 Healthwise, Incorporated. Care instructions adapted under license by [facility] [employer] [facility] ([facility]). If you have questions about a medical condition or this instruction, always ask your healthcare professional. [place] [address] any warranty or liability for your use of this information.

## 2022-01-11 RX ORDER — PANTOPRAZOLE SODIUM 40 MG/1
40 TABLET, DELAYED RELEASE ORAL
Qty: 30 TABLET | Refills: 11 | Status: SHIPPED | OUTPATIENT
Start: 2022-01-11 | End: 2022-03-23

## 2022-01-13 ENCOUNTER — OFFICE VISIT (OUTPATIENT)
Dept: CARDIOLOGY CLINIC | Age: 77
End: 2022-01-13
Payer: MEDICARE

## 2022-01-13 VITALS
OXYGEN SATURATION: 98 % | DIASTOLIC BLOOD PRESSURE: 82 MMHG | WEIGHT: 140 LBS | RESPIRATION RATE: 18 BRPM | BODY MASS INDEX: 24.8 KG/M2 | SYSTOLIC BLOOD PRESSURE: 120 MMHG | HEART RATE: 53 BPM | HEIGHT: 63 IN

## 2022-01-13 DIAGNOSIS — I35.0 MODERATE AORTIC STENOSIS BY PRIOR ECHOCARDIOGRAM: ICD-10-CM

## 2022-01-13 DIAGNOSIS — I48.0 PAF (PAROXYSMAL ATRIAL FIBRILLATION) (HCC): Primary | ICD-10-CM

## 2022-01-13 DIAGNOSIS — Z79.01 CHRONIC ANTICOAGULATION: ICD-10-CM

## 2022-01-13 DIAGNOSIS — I10 ESSENTIAL HYPERTENSION: ICD-10-CM

## 2022-01-13 PROCEDURE — 99214 OFFICE O/P EST MOD 30 MIN: CPT | Performed by: NURSE PRACTITIONER

## 2022-01-13 RX ORDER — TRAMADOL HYDROCHLORIDE 50 MG/1
50 TABLET ORAL 2 TIMES DAILY
Status: ON HOLD | COMMUNITY
End: 2022-04-11 | Stop reason: HOSPADM

## 2022-01-13 NOTE — PATIENT INSTRUCTIONS
New instructions for today:  Start taking Eliquis twice daily. Eliquis can increase your risk of bleeding. If you notice blood in urine or stool, bleeding gums, excessive bruising or cough productive of bloody sputum, notify the office. Information on this blood thinner has been included in your after visit summary. Patient Instructions:  Continue current medications as prescribed. Always keep a current medication list. Bring your medications to every office visit. Continue to follow up with primary care provider for non cardiac medical problems. Call the office with any problems, questions or concerns at [phone]. If you have been asked to keep a blood pressure log, do so for 2 weeks. Call the office to report readings to the triage nurse at [phone]. Follow up with cardiologist as scheduled. The following educational material has been included in this after visit summary for your review: Life simple 7. Heart health. Life simple 7  1) Manage blood pressure - high blood pressure is a major risk factor for heart disease and stroke. Keeping blood pressure in health range reduces strain on your heart, arteries and kidneys. Blood pressure goal is less than 130/80. 2) Control cholesterol - contributes to plaque, which can clog arteries and lead to heart disease and stroke. When you control your cholesterol you are giving your arteries their best chance to remain clear. It is recommended that you get cholesterol lab work done once a year. 3) Reduce blood sugar - most of the food we eat is turning into glucose or blood sugar that our body uses for energy. Over time, high levels of blood sugar can damage your heart, kidneys, eyes and nerves. 4) Get active - living an active life is one of the most rewarding gifts you can give yourself and those you love. Simply put, daily physical activity increases your length and quality of life. Strive to exercise 15 minutes most days of the week.   5) Eat better - A healthy diet is one of your best weapons for fighting cardiovascular disease. When you eat a heart healthy diet, you improve your chances for feeling good and staying healthy for life. 6)  Lose weight - when you shed extra fat an unnecessary pounds, you reduce the burden on your hear, lungs, blood vessels and skeleton. You give yourself the gift of active living, you lower your blood pressure and help yourself feel better. 7) Stop smoking - cigarette smokers have a higher risk of developing cardiovascular disease. If  You smoke, quitting is the best thing you can do for your health. Check American Heart Association on line for more information on Life's Simple 7 and tips for healthy living. A Healthy Heart: Care Instructions  Your Care Instructions     Coronary artery disease, also called heart disease, occurs when a substance called plaque builds up in the vessels that supply oxygen-rich blood to your heart muscle. This can narrow the blood vessels and reduce blood flow. A heart attack happens when blood flow is completely blocked. A high-fat diet, smoking, and other factors increase the risk of heart disease. Your doctor has found that you have a chance of having heart disease. You can do lots of things to keep your heart healthy. It may not be easy, but you can change your diet, exercise more, and quit smoking. These steps really work to lower your chance of heart disease. Follow-up care is a key part of your treatment and safety. Be sure to make and go to all appointments, and call your doctor if you are having problems. It's also a good idea to know your test results and keep a list of the medicines you take. How can you care for yourself at home? Diet  · Use less salt when you cook and eat. This helps lower your blood pressure. Taste food before salting. Add only a little salt when you think you need it. With time, your taste buds will adjust to less salt.   · Eat fewer snack items, fast foods, canned soups, and other high-salt, high-fat, processed foods. · Read food labels and try to avoid saturated and trans fats. They increase your risk of heart disease by raising cholesterol levels. · Limit the amount of solid fat-butter, margarine, and shortening-you eat. Use olive, peanut, or canola oil when you cook. Bake, broil, and steam foods instead of frying them. · Eat a variety of fruit and vegetables every day. Dark green, deep orange, red, or yellow fruits and vegetables are especially good for you. Examples include spinach, carrots, peaches, and berries. · Foods high in fiber can reduce your cholesterol and provide important vitamins and minerals. High-fiber foods include whole-grain cereals and breads, oatmeal, beans, brown rice, citrus fruits, and apples. · Eat lean proteins. Heart-healthy proteins include seafood, lean meats and poultry, eggs, beans, peas, nuts, seeds, and soy products. · Limit drinks and foods with added sugar. These include candy, desserts, and soda pop. Lifestyle changes  · If your doctor recommends it, get more exercise. Walking is a good choice. Bit by bit, increase the amount you walk every day. Try for at least 30 minutes on most days of the week. You also may want to swim, bike, or do other activities. · Do not smoke. If you need help quitting, talk to your doctor about stop-smoking programs and medicines. These can increase your chances of quitting for good. Quitting smoking may be the most important step you can take to protect your heart. It is never too late to quit. · Limit alcohol to 2 drinks a day for men and 1 drink a day for women. Too much alcohol can cause health problems. · Manage other health problems such as diabetes, high blood pressure, and high cholesterol. If you think you may have a problem with alcohol or drug use, talk to your doctor. Medicines  · Take your medicines exactly as prescribed.  Call your doctor if you think you are having a problem with your medicine. · If your doctor recommends aspirin, take the amount directed each day. Make sure you take aspirin and not another kind of pain reliever, such as acetaminophen (Tylenol). When should you call for help? [id number] if you have symptoms of a heart attack. These may include:  · Chest pain or pressure, or a strange feeling in the chest.  · Sweating. · Shortness of breath. · Pain, pressure, or a strange feeling in the back, neck, jaw, or upper belly or in one or both shoulders or arms. · Lightheadedness or sudden weakness. · A fast or irregular heartbeat. After you call 911, the  may tell you to chew 1 adult-strength or 2 to 4 low-dose aspirin. Wait for an ambulance. Do not try to drive yourself. Watch closely for changes in your health, and be sure to contact your doctor if you have any problems. Where can you learn more? Go to https://[employer]. org and sign in to your [employer] account. Enter A555 in the [employer] box to learn more about \"A Healthy Heart: Care Instructions. \"     If you do not have an account, please click on the \"Sign Up Now\" link. Current as of: December 16, 2019               Content Version: 12.5  © 9666-4886 Healthwise, Incorporated. Care instructions adapted under license by [facility] ([facility]). If you have questions about a medical condition or this instruction, always ask your healthcare professional. [place] [address] any warranty or liability for your use of this information.

## 2022-01-13 NOTE — PROGRESS NOTES
[facility], [place]. [address], [address], [address] [facility]  [phone] office  [phone] fax      OFFICE VISIT:  2022    Matty Cardozo - : 1945  Reason For Visit:  Kenneth Figueroa is a 67 y.o. female who is here for Follow-up (6week) and Atrial Fibrillation    History:   Diagnosis Orders   1. PAF (paroxysmal atrial fibrillation) ([address]     2. Chronic anticoagulation      Eliquis   3. Essential hypertension     4. Moderate aortic stenosis by prior echocardiogram       The patient presents today for cardiology follow up. Kenneth Figueroa reports doing well. She has not sensed AF. Olesya Reading recently showed 29% AF burden. The patient reports \"I had some ringing in my ears and I thought it might be the Eliquis. So, I am just taking that at bedtime. The patient denies symptoms to suggest myocardial ischemia, heart failure or sensed arrhythmia. BP is well controlled on current regimen. The patient's PCP monitors cholesterol. Rosa Saldaña denies exertional chest pain, shortness of breath, orthopnea, paroxysmal nocturnal dyspnea, syncope, presyncope, sensed arrhythmia, edema and fatigue. The patient denies numbness or weakness to suggest cerebrovascular accident or transient ischemic attack.       Matty Cardozo has the following history as recorded in [facility]:  Patient Active Problem List   Diagnosis Code    Trigger middle finger of right hand M65.331    Mass of right wrist R22.31    Osteoarthritis of right knee M17.11     Past Medical History:   Diagnosis Date    Chronic back pain     Hypertension     Inflammatory arthritis     Knee pain     Spastic colon      Past Surgical History:   Procedure Laterality Date    ABDOMEN SURGERY      APPENDECTOMY      BACK SURGERY      neck; bone out of hip to fuse neck    FINGER TRIGGER RELEASE Left 2020    LEFT MIDDLE TRIGGER FINGER RELEASE performed by Estephanie Hall MD at [facility] OR    HAND SURGERY Left 2012    fx repair (3 total surgeries)    HAND SURGERY Left 12/17/2020    LEFT WRIST MASS EXCISION performed by Estephanie Hall MD at [facility] [address] Bilateral     HIP    MOUTH SURGERY      TOTAL KNEE ARTHROPLASTY Right 9/2/2021    RIGHT TOTAL KNEE ARTHROPLASTY performed by Jamaica David MD at [address] [place] [address]       Family History   Problem Relation Age of Onset    Other Other         Son DVT    Cancer Mother         colon     Heart Attack Mother     Heart Attack Father      Social History     Tobacco Use    Smoking status: Current Every Day Smoker     Packs/day: 0.50     Types: Cigarettes    Smokeless tobacco: Never Used   Substance Use Topics    Alcohol use: No      Current Outpatient Medications   Medication Sig Dispense Refill    traMADol (ULTRAM) 50 MG tablet Take 50 mg by mouth 2 times daily.       pantoprazole (PROTONIX) 40 MG tablet TAKE 1 TABLET BY MOUTH EVERY MORNING (BEFORE BREAKFAST) 30 tablet 11    metoprolol succinate (TOPROL XL) 25 MG extended release tablet Take 1 tablet by mouth daily 30 tablet 5    vitamin D (ERGOCALCIFEROL) 1.25 MG (67678 UT) CAPS capsule Take 50,000 Units by mouth once a week      ondansetron (ZOFRAN) 4 MG tablet Take 1 tablet by mouth every 8 hours as needed for Nausea or Vomiting 30 tablet 0    lisinopril (PRINIVIL;ZESTRIL) 20 MG tablet Take 20 mg by mouth nightly Indications: High Blood Pressure Disorder      fluticasone (FLONASE) 50 MCG/ACT nasal spray 2 sprays by Nasal route daily       dicyclomine (BENTYL) 10 MG capsule Take 10 mg by mouth 4 times daily as needed      loratadine (CLARITIN) 10 MG capsule Take 10 mg by mouth daily      furosemide (LASIX) 20 MG tablet Take 1 tablet by mouth daily as needed (swelling) 30 tablet 2    albuterol sulfate HFA (PROVENTIL HFA) 108 (90 Base) MCG/ACT inhaler Inhale 2 puffs into the lungs every 6 hours as needed for Wheezing 1 Inhaler 3    celecoxib (CELEBREX) 200 MG capsule Take 1 capsule by mouth daily 30 capsule 2     No current facility-administered medications for this visit. Allergies: Levofloxacin in d5w, Pyridium [phenazopyridine], Sulfamethoxazole-trimethoprim, Codeine, Hydrocodone-acetaminophen, Hydromorphone hcl, and Oxycodone    Review of Systems  Constitutional - no appetite change, or unexpected weight change. No fever, chills or diaphoresis. No significant change in activity level or new onset of fatigue. HEENT - no significant rhinorrhea or epistaxis. No tinnitus or significant hearing loss. Eyes - no sudden vision change or amaurosis. No corneal arcus, xantholasma, subconjunctival hemorrhage or discharge. Respiratory - no significant wheezing, stridor, apnea or cough. No dyspnea on exertion or shortness of air. Cardiovascular - no exertional chest pain to suggest myocardial ischemia. No orthopnea or PND. No sensation of sustained arrythmia. No occurrence of slow heart rate. No palpitations. No claudication. Gastrointestinal - no abdominal swelling or pain. No blood in stool. No severe constipation, diarrhea, nausea, or vomiting. Genitourinary - no dysuria, frequency, or urgency. No flank pain or hematuria. Musculoskeletal - no back pain or myalgia. No problems with gait. DJD knees. Extremities - no clubbing, cyanosis or extremity edema. Skin - no color change or rash. No pallor. No new surgical incision. Neurologic - no speech difficulty, facial asymmetry or lateralizing weakness. No seizures, presyncope or syncope. No significant dizziness. Hematologic - no easy bruising or excessive bleeding. Psychiatric - no severe anxiety or insomnia. No confusion. All other review of systems are negative.     Objective  Vital Signs - /82 (Site: Left Upper Arm, Position: Sitting, Cuff Size: Medium Adult)   Pulse 53   Resp 18   Ht 5' 3\" (1.6 m)   Wt 140 lb (63.5 kg)   SpO2 98%   BMI 24.80 kg/m²   General - Amelia Mccallum is alert, cooperative, and pleasant. Well groomed. No acute distress. Body habitus - Body mass index is 24.8 kg/m². HEENT - Head is normocephalic. No circumoral cyanosis. Dentition is normal.  EYES -   Lids normal without ptosis. No discharge, edema or subconjunctival hemorrhage. Neck - Symmetrical without apparent mass or lymphadenopathy. Respiratory - Normal respiratory effort without use of accessory muscles. Ausculatation reveals vesicular breath sounds without crackles, wheezes, rub or rhonchi. Cardiovascular - No jugular venous distention. Auscultation reveals irregularly irreglar rate and rhythm. No audible clicks, gallop or rub. 2/6 systolic murmur. No lower extremity varicosities. No carotid bruits. Abdominal -  No visible distention, mass or pulsations. Extremities - No clubbing or cyanosis. No statis dermatitis or ulcers. No edema. Musculoskeletal -   No Osler's nodes. No kyphosis or scoliosis. Gait is even and regular without limp or shuffle. Ambulates without assistance. Skin -  Warm and dry; no rash or pallor. No new surgical wound. Neurological - No focal neurological deficits. Thought processes coherent. No apparent tremor. Oriented to person, place and time. Psychiatric -  Appropriate affect and mood. Data reviewed:  9/3/21 echo  Normal left ventricular size with preserved LV systolic function and a  calculated ejection fraction of 74%. No regional wall motion   abnormalities. Mild concentric left ventricular hypertrophy.   Grade II diastolic dysfunction with evidence for increased LVEDP.   Normal right ventricular size with preserved RV function.   Moderate degenerative aortic valve stenosis is noted (peak velocity 3.74  m/s, mean gradient 26 mmHg, DI 0.31, SVI 42).  Aortic root is within normal limits.   The rhythm is sinus.   No evidence of significant pericardial effusion is noted.   ABNORMAL study.  No previous studies available for comparison.    Signature   Electronically signed by Jessica Medeiros(Interpreting physician)  Neilshaina Harrington 09/03/2021 02:08 PM     9/2/21 Zio monitor  Dates of recording 9/3/2021 through 9/17/2021   1. Sinus rhythm minimal heart rate 51 average 80 maximal 106   2. Atrial fibrillation 29% burden longest duration 1 day 7 hours   heart rate varying between  average 134   3. No episodes of ventricular tachycardia were recorded   4. No pauses greater than 3.0 seconds were recorded   5. No episodes of complete or Mobitz 2 atrioventricular block   were recorded   6. No episodes of supraventricular tachycardia were recorded   7. 1 triggered event 0 diary events rhythm during those   recordings with sinus rhythm and PACs   8. Frequent PACs 11.5% otherwise rare ectopy           Lab Results   Component Value Date     WBC 8.9 08/30/2021     HGB 11.4 (L) 09/05/2021     HCT 36.0 (L) 09/05/2021     MCV 97.3 08/30/2021      08/30/2021            Lab Results   Component Value Date      09/05/2021     K 4.0 09/05/2021      09/05/2021     CO2 24 09/05/2021     BUN 21 09/05/2021     CREATININE 1.1 (H) 09/05/2021     GLUCOSE 100 09/05/2021     CALCIUM 8.5 (L) 09/05/2021     LABGLOM 48 (A) 09/05/2021     GFRAA 58 (L) 09/05/2021       BP Readings from Last 3 Encounters:   01/13/22 120/82   11/04/21 132/76   10/05/21 130/80    Pulse Readings from Last 3 Encounters:   01/13/22 53   11/04/21 66   10/05/21 88        Wt Readings from Last 3 Encounters:   01/13/22 140 lb (63.5 kg)   11/04/21 137 lb (62.1 kg)   10/05/21 134 lb (60.8 kg)     Assessment/Plan:   Diagnosis Orders   1. PAF (paroxysmal atrial fibrillation) ([facility]     2. Chronic anticoagulation      Eliquis   3. Essential hypertension     4.  Moderate aortic stenosis by prior echocardiogram       [id number] Score: 4  Disclaimer: Risk Score calculation is dependent on accuracy of patient problem list and past encounter diagnosis.     PAF - Recent Zio showed 29% AF burden. In AF today with rate control. Patient is asymptomatic. Continue Toprol XL 25 mg daily. Educated on need for oral anticoagulation to prevent thrombotic event again. Patient has been taking Eliquis just at bedtime. Advised to increase to BID.       HTN - normotensive on current regimen. Continue same.     Moderate AS - follow with echocardiogram.     Grade II DD - no symptoms of HFpEF.     Patient is compliant with medication regimen.       Previous cardiac history and records reviewed. Continue current medical management for cardiac related condition. Continue other current medications as directed. Continue to follow up with primary care provider for non cardiac medical problems. If your primary care provider is outside of the [facility], please request that your labs be faxed to this office at [phone]. BP goal 130/80 or less. Call the office with any problems, questions or concerns at [phone]. Cardiology follow up as scheduled in [address] appointments. Follow up in one month. Educational included in patient instructions. Heart health.       KATI Grant

## 2022-03-23 ENCOUNTER — OFFICE VISIT (OUTPATIENT)
Dept: CARDIOLOGY CLINIC | Age: 77
End: 2022-03-23
Payer: MEDICARE

## 2022-03-23 VITALS
HEIGHT: 63 IN | HEART RATE: 57 BPM | SYSTOLIC BLOOD PRESSURE: 130 MMHG | BODY MASS INDEX: 25.69 KG/M2 | DIASTOLIC BLOOD PRESSURE: 82 MMHG | WEIGHT: 145 LBS | OXYGEN SATURATION: 95 %

## 2022-03-23 DIAGNOSIS — I10 ESSENTIAL HYPERTENSION: ICD-10-CM

## 2022-03-23 DIAGNOSIS — I48.0 PAF (PAROXYSMAL ATRIAL FIBRILLATION) (HCC): Primary | ICD-10-CM

## 2022-03-23 DIAGNOSIS — Z79.01 CHRONIC ANTICOAGULATION: ICD-10-CM

## 2022-03-23 DIAGNOSIS — I51.89 GRADE II DIASTOLIC DYSFUNCTION: ICD-10-CM

## 2022-03-23 DIAGNOSIS — I35.0 MODERATE AORTIC STENOSIS: ICD-10-CM

## 2022-03-23 PROCEDURE — 93000 ELECTROCARDIOGRAM COMPLETE: CPT | Performed by: NURSE PRACTITIONER

## 2022-03-23 PROCEDURE — 99214 OFFICE O/P EST MOD 30 MIN: CPT | Performed by: NURSE PRACTITIONER

## 2022-03-23 RX ORDER — HYDROXYZINE HYDROCHLORIDE 25 MG/1
25 TABLET, FILM COATED ORAL NIGHTLY
COMMUNITY
Start: 2022-02-23 | End: 2022-08-03

## 2022-03-23 NOTE — PROGRESS NOTES
[facility] [place] [place]. [address] [facility] [address] [facility]  [phone] office  [phone] fax      OFFICE VISIT:  3/23/2022    Josie Lopez - : 1945  Reason For Visit:  Shawn Ulloa is a 67 y.o. female who is here for Follow-up and Atrial Fibrillation    History:   Diagnosis Orders   1. PAF (paroxysmal atrial fibrillation) ([facility])  EKG 12 lead   2. Chronic anticoagulation      Eliquis   3. Essential hypertension     4. Moderate aortic stenosis     5. Grade II diastolic dysfunction       The patient presents today for cardiology follow up. The patient is doing very well without cardiac related issues. The patient denies symptoms to suggest myocardial ischemia, heart failure or arrhythmia. BP is well controlled on current regimen. The patient's PCP monitors cholesterol. Trish Huerta denies exertional chest pain, shortness of breath, orthopnea, paroxysmal nocturnal dyspnea, syncope, presyncope, sensed arrhythmia, edema and fatigue. The patient denies numbness or weakness to suggest cerebrovascular accident or transient ischemic attack.       Josie Lopez has the following history as recorded in [facility]:  Patient Active Problem List   Diagnosis Code    Trigger middle finger of right hand M65.331    Mass of right wrist R22.31    Osteoarthritis of right knee M17.11     Past Medical History:   Diagnosis Date    Chronic back pain     Hypertension     Inflammatory arthritis     Knee pain     Spastic colon      Past Surgical History:   Procedure Laterality Date    ABDOMEN SURGERY      APPENDECTOMY      BACK SURGERY      neck; bone out of hip to fuse neck    FINGER TRIGGER RELEASE Left 2020    LEFT MIDDLE TRIGGER FINGER RELEASE performed by Shagufta Cota MD at [address] [place] [address] HAND SURGERY Left     fx repair (3 total surgeries)    HAND SURGERY Left 2020    LEFT WRIST MASS EXCISION [facility]     [facility]    MOUTH SURGERY      TOTAL KNEE ARTHROPLASTY Right 9/2/2021    RIGHT TOTAL KNEE ARTHROPLASTY performed by Jorge Osorio MD at [address] [place] [address]       Family History   Problem Relation Age of Onset    Other Other         Son DVT    Cancer Mother         colon     Heart Attack Mother     Heart Attack Father      Social History     Tobacco Use    Smoking status: Current Every Day Smoker     Packs/day: 0.50     Types: Cigarettes    Smokeless tobacco: Never Used   Substance Use Topics    Alcohol use: No      Current Outpatient Medications   Medication Sig Dispense Refill    hydrOXYzine (ATARAX) 25 MG tablet Take 25 mg by mouth at bedtime      traMADol (ULTRAM) 50 MG tablet Take 50 mg by mouth 2 times daily.  apixaban (ELIQUIS) 5 MG TABS tablet Take 5 mg by mouth 2 times daily      metoprolol succinate (TOPROL XL) 25 MG extended release tablet Take 1 tablet by mouth daily 30 tablet 5    vitamin D (ERGOCALCIFEROL) 1.25 MG (48218 UT) CAPS capsule Take 50,000 Units by mouth once a week      ondansetron (ZOFRAN) 4 MG tablet Take 1 tablet by mouth every 8 hours as needed for Nausea or Vomiting 30 tablet 0    lisinopril (PRINIVIL;ZESTRIL) 20 MG tablet Take 20 mg by mouth nightly Indications: High Blood Pressure Disorder      fluticasone (FLONASE) 50 MCG/ACT nasal spray 2 sprays by Nasal route daily       dicyclomine (BENTYL) 10 MG capsule Take 10 mg by mouth 4 times daily as needed      loratadine (CLARITIN) 10 MG capsule Take 10 mg by mouth daily      furosemide (LASIX) 20 MG tablet Take 1 tablet by mouth daily as needed (swelling) 30 tablet 2    celecoxib (CELEBREX) 200 MG capsule Take 1 capsule by mouth daily 30 capsule 2     No current facility-administered medications for this visit.        Allergies: Levofloxacin in d5w, Pyridium [phenazopyridine], Sulfamethoxazole-trimethoprim, Codeine, Hydrocodone-acetaminophen, Hydromorphone hcl, and Oxycodone    Review of Systems  Constitutional - no appetite change, or unexpected weight change. No fever, chills or diaphoresis. No significant change in activity level or new onset of fatigue. HEENT - no significant rhinorrhea or epistaxis. No tinnitus or significant hearing loss. Eyes - no sudden vision change or amaurosis. No corneal arcus, xantholasma, subconjunctival hemorrhage or discharge. Respiratory - no significant wheezing, stridor, apnea or cough. No dyspnea on exertion or shortness of air. Cardiovascular - no exertional chest pain to suggest myocardial ischemia. No orthopnea or PND. No sensation of sustained arrythmia. No occurrence of slow heart rate. No palpitations. No claudication. Gastrointestinal - no abdominal swelling or pain. No blood in stool. No severe constipation, diarrhea, nausea, or vomiting. Genitourinary - no dysuria, frequency, or urgency. No flank pain or hematuria. Musculoskeletal - no back pain or myalgia. No problems with gait. Extremities - no clubbing, cyanosis or extremity edema. Skin - no color change or rash. No pallor. No new surgical incision. Neurologic - no speech difficulty, facial asymmetry or lateralizing weakness. No seizures, presyncope or syncope. No significant dizziness. Hematologic - no easy bruising or excessive bleeding. Psychiatric - no severe anxiety or insomnia. No confusion. All other review of systems are negative. Objective  Vital Signs - /82 (Site: Right Upper Arm, Position: Sitting)   Pulse 57   Ht 5' 3\" (1.6 m)   Wt 145 lb (65.8 kg)   SpO2 95%   BMI 25.69 kg/m²   General - Elvin Rowe is alert, cooperative, and pleasant. Well groomed. No acute distress. Body habitus - Body mass index is 25.69 kg/m². HEENT - Head is normocephalic. No circumoral cyanosis. Dentition is normal.  EYES -   Lids normal without ptosis.   No discharge, edema or subconjunctival hemorrhage. Neck - Symmetrical without apparent mass or lymphadenopathy. Respiratory - Normal respiratory effort without use of accessory muscles. Ausculatation reveals vesicular breath sounds without crackles, wheezes, rub or rhonchi. Cardiovascular - No jugular venous distention. Auscultation reveals regular rate and rhythm. No audible clicks, gallop or rub. 2/6 systolic murmur. No lower extremity varicosities. No carotid bruits. Abdominal -  No visible distention, mass or pulsations. Extremities - No clubbing or cyanosis. No statis dermatitis or ulcers. No edema. Musculoskeletal -   No Osler's nodes. No kyphosis or scoliosis. Gait is even and regular without limp or shuffle. Ambulates without assistance. Skin -  Warm and dry; no rash or pallor. No new surgical wound. Neurological - No focal neurological deficits. Thought processes coherent. No apparent tremor. Oriented to person, place and time. Psychiatric -  Appropriate affect and mood. Data reviewed:  Sherril Soulier - Dates of recording 9/3/2021 through 9/17/2021   1. Sinus rhythm minimal heart rate 51 average 80 maximal 106   2. Atrial fibrillation 29% burden longest duration 1 day 7 hours   heart rate varying between  average 134   3. No episodes of ventricular tachycardia were recorded   4. No pauses greater than 3.0 seconds were recorded   5. No episodes of complete or Mobitz 2 atrioventricular block   were recorded   6. No episodes of supraventricular tachycardia were recorded   7. 1 triggered event 0 diary events rhythm during those   recordings with sinus rhythm and PACs   8. Frequent PACs 11.5% otherwise rare ectopy    9/2/21 echo   Normal left ventricular size with preserved LV systolic function and a  calculated ejection fraction of 74%. No regional wall motion  abnormalities. Mild concentric left ventricular hypertrophy. Grade II diastolic dysfunction with evidence for increased LVEDP.    Normal right ventricular size with preserved RV function. Moderate degenerative aortic valve stenosis is noted (peak velocity 3.74  m/s, mean gradient 26 mmHg, DI 0.31, SVI 42). Aortic root is within normal limits. The rhythm is sinus. No evidence of significant pericardial effusion is noted. ABNORMAL study. No previous studies available for comparison. Electronically signed by Flower Medeiros(Interpreting physician)  on 09/03/2021 02:08 PM    EKG today reviewed: NSR 57 bpm; QTc .431; no acute ischemic changes or ectopy. BP Readings from Last 3 Encounters:   03/23/22 130/82   01/13/22 120/82   11/04/21 132/76    Pulse Readings from Last 3 Encounters:   03/23/22 57   01/13/22 53   11/04/21 66        Wt Readings from Last 3 Encounters:   03/23/22 145 lb (65.8 kg)   01/13/22 140 lb (63.5 kg)   11/04/21 137 lb (62.1 kg)     Assessment/Plan:   Diagnosis Orders   1. PAF (paroxysmal atrial fibrillation) ([facility])  EKG 12 lead   2. Chronic anticoagulation      Eliquis   3. Essential hypertension     4. Moderate aortic stenosis     5. Grade II diastolic dysfunction       [id number] Score: 4  Disclaimer: Risk Score calculation is dependent on accuracy of patient problem list and past encounter diagnosis. PAF - no symptomology to indicate recurrent AF. Patient is compliant with medication regimen. Continue Toprol XL and Eliquis. No bleeding issues reported. HTN - normotensive on current regimen. BP goal less than 130/80. Continue same. Moderate AS - 9/21 echo showed moderate degenerative aortic valve stenosis is noted (peak velocity 3.74  m/s, mean gradient 26 mmHg, DI 0.31, SVI 42). Follow with echocardiogram.     Grade II DD - no symptoms of heart failure with preserved EF. Previous cardiac history and records reviewed. Continue current medical management for cardiac related condition. Continue other current medications as directed.   Continue to follow up with primary care provider for non cardiac medical problems. If your primary care provider is outside of the [facility], please request that your labs be faxed to this office at [phone]. BP goal 130/80 or less. Call the office with any problems, questions or concerns at [phone]. Cardiology follow up as scheduled in [address] appointments. Educational included in patient instructions. Heart health. Deedee Almazan, APRN

## 2022-03-23 NOTE — PATIENT INSTRUCTIONS
New instructions for today:  Eliquis can increase your risk of bleeding. If you notice blood in urine or stool, bleeding gums, excessive bruising or cough productive of bloody sputum, notify the office. Information on this blood thinner has been included in your after visit summary. Patient Instructions:  Continue current medications as prescribed. Always keep a current medication list. Bring your medications to every office visit. Continue to follow up with primary care provider for non cardiac medical problems. Call the office with any problems, questions or concerns at [phone]. If you have been asked to keep a blood pressure log, do so for 2 weeks. Call the office to report readings to the triage nurse at [phone]. Follow up with cardiologist as scheduled. The following educational material has been included in this after visit summary for your review: Life simple 7. Heart health. Life simple 7  1) Manage blood pressure - high blood pressure is a major risk factor for heart disease and stroke. Keeping blood pressure in health range reduces strain on your heart, arteries and kidneys. Blood pressure goal is less than 130/80. 2) Control cholesterol - contributes to plaque, which can clog arteries and lead to heart disease and stroke. When you control your cholesterol you are giving your arteries their best chance to remain clear. It is recommended that you get cholesterol lab work done once a year. 3) Reduce blood sugar - most of the food we eat is turning into glucose or blood sugar that our body uses for energy. Over time, high levels of blood sugar can damage your heart, kidneys, eyes and nerves. 4) Get active - living an active life is one of the most rewarding gifts you can give yourself and those you love. Simply put, daily physical activity increases your length and quality of life. Strive to exercise 15 minutes most days of the week.   5)  Eat better - A healthy diet is one of your best weapons for fighting cardiovascular disease. When you eat a heart healthy diet, you improve your chances for feeling good and staying healthy for life. 6)  Lose weight - when you shed extra fat an unnecessary pounds, you reduce the burden on your hear, lungs, blood vessels and skeleton. You give yourself the gift of active living, you lower your blood pressure and help yourself feel better. 7) Stop smoking - cigarette smokers have a higher risk of developing cardiovascular disease. If  You smoke, quitting is the best thing you can do for your health. Check American Heart Association on line for more information on Life's Simple 7 and tips for healthy living. A Healthy Heart: Care Instructions  Your Care Instructions     Coronary artery disease, also called heart disease, occurs when a substance called plaque builds up in the vessels that supply oxygen-rich blood to your heart muscle. This can narrow the blood vessels and reduce blood flow. A heart attack happens when blood flow is completely blocked. A high-fat diet, smoking, and other factors increase the risk of heart disease. Your doctor has found that you have a chance of having heart disease. You can do lots of things to keep your heart healthy. It may not be easy, but you can change your diet, exercise more, and quit smoking. These steps really work to lower your chance of heart disease. Follow-up care is a key part of your treatment and safety. Be sure to make and go to all appointments, and call your doctor if you are having problems. It's also a good idea to know your test results and keep a list of the medicines you take. How can you care for yourself at home? Diet  · Use less salt when you cook and eat. This helps lower your blood pressure. Taste food before salting. Add only a little salt when you think you need it. With time, your taste buds will adjust to less salt.   · Eat fewer snack items, fast foods, canned soups, and other high-salt, high-fat, processed foods. · Read food labels and try to avoid saturated and trans fats. They increase your risk of heart disease by raising cholesterol levels. · Limit the amount of solid fat-butter, margarine, and shortening-you eat. Use olive, peanut, or canola oil when you cook. Bake, broil, and steam foods instead of frying them. · Eat a variety of fruit and vegetables every day. Dark green, deep orange, red, or yellow fruits and vegetables are especially good for you. Examples include spinach, carrots, peaches, and berries. · Foods high in fiber can reduce your cholesterol and provide important vitamins and minerals. High-fiber foods include whole-grain cereals and breads, oatmeal, beans, brown rice, citrus fruits, and apples. · Eat lean proteins. Heart-healthy proteins include seafood, lean meats and poultry, eggs, beans, peas, nuts, seeds, and soy products. · Limit drinks and foods with added sugar. These include candy, desserts, and soda pop. Lifestyle changes  · If your doctor recommends it, get more exercise. Walking is a good choice. Bit by bit, increase the amount you walk every day. Try for at least 30 minutes on most days of the week. You also may want to swim, bike, or do other activities. · Do not smoke. If you need help quitting, talk to your doctor about stop-smoking programs and medicines. These can increase your chances of quitting for good. Quitting smoking may be the most important step you can take to protect your heart. It is never too late to quit. · Limit alcohol to 2 drinks a day for men and 1 drink a day for women. Too much alcohol can cause health problems. · Manage other health problems such as diabetes, high blood pressure, and high cholesterol. If you think you may have a problem with alcohol or drug use, talk to your doctor. Medicines  · Take your medicines exactly as prescribed.  Call your doctor if you think you are having a problem with your medicine. · If your doctor recommends aspirin, take the amount directed each day. Make sure you take aspirin and not another kind of pain reliever, such as acetaminophen (Tylenol). When should you call for help? [id number] if you have symptoms of a heart attack. These may include:  · Chest pain or pressure, or a strange feeling in the chest.  · Sweating. · Shortness of breath. · Pain, pressure, or a strange feeling in the back, neck, jaw, or upper belly or in one or both shoulders or arms. · Lightheadedness or sudden weakness. · A fast or irregular heartbeat. After you call 911, the  may tell you to chew 1 adult-strength or 2 to 4 low-dose aspirin. Wait for an ambulance. Do not try to drive yourself. Watch closely for changes in your health, and be sure to contact your doctor if you have any problems. Where can you learn more? Go to https://Jogg.Jamplify. org and sign in to your Seafile account. Enter R844 in the Grabbed box to learn more about \"A Healthy Heart: Care Instructions. \"     If you do not have an account, please click on the \"Sign Up Now\" link. Current as of: December 16, 2019               Content Version: 12.5  © 9762-2907 Healthwise, [employer]orporated. Care instructions adapted under license by [facility] (formerly [facility] [facility] ([facility]). If you have questions about a medical condition or this instruction, always ask your healthcare professional. [place] [address] any warranty or liability for your use of this information.

## 2022-04-01 ENCOUNTER — HOSPITAL ENCOUNTER (INPATIENT)
Age: 77
LOS: 10 days | Discharge: NURSING, REHAB OR LONG-TERM CARE | DRG: 564 | End: 2022-04-11
Attending: EMERGENCY MEDICINE | Admitting: HOSPITALIST
Payer: MEDICARE

## 2022-04-01 ENCOUNTER — APPOINTMENT (OUTPATIENT)
Dept: GENERAL RADIOLOGY | Age: 77
DRG: 564 | End: 2022-04-01
Payer: MEDICARE

## 2022-04-01 ENCOUNTER — APPOINTMENT (OUTPATIENT)
Dept: CT IMAGING | Age: 77
DRG: 564 | End: 2022-04-01
Payer: MEDICARE

## 2022-04-01 DIAGNOSIS — R93.89 ABNORMAL CHEST X-RAY: ICD-10-CM

## 2022-04-01 DIAGNOSIS — T79.6XXA TRAUMATIC RHABDOMYOLYSIS, INITIAL ENCOUNTER (HCC): Primary | ICD-10-CM

## 2022-04-01 DIAGNOSIS — Z87.81 HISTORY OF PELVIC FRACTURE: ICD-10-CM

## 2022-04-01 PROBLEM — I48.0 PAROXYSMAL ATRIAL FIBRILLATION (HCC): Status: ACTIVE | Noted: 2022-04-01

## 2022-04-01 PROBLEM — R74.01 TRANSAMINITIS: Status: ACTIVE | Noted: 2022-04-01

## 2022-04-01 PROBLEM — R21 RASH: Status: ACTIVE | Noted: 2022-04-01

## 2022-04-01 PROBLEM — L30.4 INTERTRIGO: Status: ACTIVE | Noted: 2022-04-01

## 2022-04-01 PROBLEM — Z79.01 ANTICOAGULATED: Status: ACTIVE | Noted: 2022-04-01

## 2022-04-01 PROBLEM — E86.0 DEHYDRATION: Status: ACTIVE | Noted: 2022-04-01

## 2022-04-01 PROBLEM — R53.1 GENERALIZED WEAKNESS: Status: ACTIVE | Noted: 2022-04-01

## 2022-04-01 PROBLEM — I10 HTN (HYPERTENSION): Status: ACTIVE | Noted: 2022-04-01

## 2022-04-01 PROBLEM — M62.82 RHABDOMYOLYSIS: Status: ACTIVE | Noted: 2022-04-01

## 2022-04-01 PROBLEM — Z86.79 HISTORY OF ATRIAL FIBRILLATION: Status: ACTIVE | Noted: 2022-04-01
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
## 2022-04-01 LAB
ALBUMIN SERPL-MCNC: 3.6 G/DL (ref 3.5–5.2)
ALP BLD-CCNC: 95 U/L (ref 35–104)
ALT SERPL-CCNC: 68 U/L (ref 5–33)
AMMONIA: 36 UMOL/L (ref 11–51)
ANION GAP SERPL CALCULATED.3IONS-SCNC: 20 MMOL/L (ref 7–19)
AST SERPL-CCNC: 213 U/L (ref 5–32)
BACTERIA: NEGATIVE /HPF
BASOPHILS ABSOLUTE: 0 K/UL (ref 0–0.2)
BASOPHILS RELATIVE PERCENT: 0.2 % (ref 0–1)
BILIRUB SERPL-MCNC: 0.6 MG/DL (ref 0.2–1.2)
BILIRUBIN URINE: NEGATIVE
BLOOD, URINE: ABNORMAL
BUN BLDV-MCNC: 41 MG/DL (ref 8–23)
CALCIUM SERPL-MCNC: 8.3 MG/DL (ref 8.8–10.2)
CHLORIDE BLD-SCNC: 105 MMOL/L (ref 98–111)
CLARITY: CLEAR
CO2: 16 MMOL/L (ref 22–29)
COLOR: YELLOW
CREAT SERPL-MCNC: 0.9 MG/DL (ref 0.5–0.9)
CRYSTALS, UA: ABNORMAL /HPF
EKG P AXIS: 73 DEGREES
EKG P-R INTERVAL: 162 MS
EKG Q-T INTERVAL: 366 MS
EKG QRS DURATION: 110 MS
EKG QTC CALCULATION (BAZETT): 416 MS
EKG T AXIS: 77 DEGREES
EOSINOPHILS ABSOLUTE: 0 K/UL (ref 0–0.6)
EOSINOPHILS RELATIVE PERCENT: 0 % (ref 0–5)
EPITHELIAL CELLS, UA: 1 /HPF (ref 0–5)
FOLATE: >20 NG/ML (ref 4.8–37.3)
GFR AFRICAN AMERICAN: >59
GFR NON-AFRICAN AMERICAN: >60
GLUCOSE BLD-MCNC: 107 MG/DL (ref 74–109)
GLUCOSE URINE: NEGATIVE MG/DL
HCT VFR BLD CALC: 44.5 % (ref 37–47)
HEMOGLOBIN: 14.5 G/DL (ref 12–16)
HYALINE CASTS: 5 /HPF (ref 0–8)
IMMATURE GRANULOCYTES #: 0.1 K/UL
INR BLD: 1.03 (ref 0.88–1.18)
KETONES, URINE: 15 MG/DL
LACTIC ACID: 1.3 MMOL/L (ref 0.5–1.9)
LEUKOCYTE ESTERASE, URINE: NEGATIVE
LYMPHOCYTES ABSOLUTE: 0.5 K/UL (ref 1.1–4.5)
LYMPHOCYTES RELATIVE PERCENT: 3.1 % (ref 20–40)
MCH RBC QN AUTO: 30.2 PG (ref 27–31)
MCHC RBC AUTO-ENTMCNC: 32.6 G/DL (ref 33–37)
MCV RBC AUTO: 92.7 FL (ref 81–99)
MONOCYTES ABSOLUTE: 1.4 K/UL (ref 0–0.9)
MONOCYTES RELATIVE PERCENT: 8 % (ref 0–10)
NEUTROPHILS ABSOLUTE: 15 K/UL (ref 1.5–7.5)
NEUTROPHILS RELATIVE PERCENT: 88.1 % (ref 50–65)
NITRITE, URINE: NEGATIVE
PDW BLD-RTO: 13.1 % (ref 11.5–14.5)
PH UA: 5.5 (ref 5–8)
PLATELET # BLD: 296 K/UL (ref 130–400)
PMV BLD AUTO: 10.3 FL (ref 9.4–12.3)
POTASSIUM SERPL-SCNC: 4.4 MMOL/L (ref 3.5–5)
PROTEIN UA: 100 MG/DL
PROTHROMBIN TIME: 13.7 SEC (ref 12–14.6)
RBC # BLD: 4.8 M/UL (ref 4.2–5.4)
RBC UA: 2 /HPF (ref 0–4)
SARS-COV-2, NAAT: NOT DETECTED
SODIUM BLD-SCNC: 141 MMOL/L (ref 136–145)
SPECIFIC GRAVITY UA: 1.02 (ref 1–1.03)
TOTAL CK: 7136 U/L (ref 26–192)
TOTAL PROTEIN: 5.8 G/DL (ref 6.6–8.7)
TSH SERPL DL<=0.05 MIU/L-ACNC: 0.96 UIU/ML (ref 0.27–4.2)
UROBILINOGEN, URINE: 0.2 E.U./DL
VITAMIN B-12: 940 PG/ML (ref 211–946)
VITAMIN D 25-HYDROXY: >100 NG/ML
WBC # BLD: 17 K/UL (ref 4.8–10.8)
WBC UA: 1 /HPF (ref 0–5)

## 2022-04-01 PROCEDURE — 36415 COLL VENOUS BLD VENIPUNCTURE: CPT

## 2022-04-01 PROCEDURE — 6370000000 HC RX 637 (ALT 250 FOR IP): Performed by: HOSPITALIST

## 2022-04-01 PROCEDURE — 72125 CT NECK SPINE W/O DYE: CPT

## 2022-04-01 PROCEDURE — 99285 EMERGENCY DEPT VISIT HI MDM: CPT

## 2022-04-01 PROCEDURE — 82306 VITAMIN D 25 HYDROXY: CPT

## 2022-04-01 PROCEDURE — 73502 X-RAY EXAM HIP UNI 2-3 VIEWS: CPT

## 2022-04-01 PROCEDURE — 6370000000 HC RX 637 (ALT 250 FOR IP): Performed by: NURSE PRACTITIONER

## 2022-04-01 PROCEDURE — 81001 URINALYSIS AUTO W/SCOPE: CPT

## 2022-04-01 PROCEDURE — 82140 ASSAY OF AMMONIA: CPT

## 2022-04-01 PROCEDURE — 85610 PROTHROMBIN TIME: CPT

## 2022-04-01 PROCEDURE — 82746 ASSAY OF FOLIC ACID SERUM: CPT

## 2022-04-01 PROCEDURE — 80053 COMPREHEN METABOLIC PANEL: CPT

## 2022-04-01 PROCEDURE — 70450 CT HEAD/BRAIN W/O DYE: CPT

## 2022-04-01 PROCEDURE — 84443 ASSAY THYROID STIM HORMONE: CPT

## 2022-04-01 PROCEDURE — 1210000000 HC MED SURG R&B

## 2022-04-01 PROCEDURE — P9612 CATHETERIZE FOR URINE SPEC: HCPCS

## 2022-04-01 PROCEDURE — 82550 ASSAY OF CK (CPK): CPT

## 2022-04-01 PROCEDURE — 85025 COMPLETE CBC W/AUTO DIFF WBC: CPT

## 2022-04-01 PROCEDURE — 71045 X-RAY EXAM CHEST 1 VIEW: CPT

## 2022-04-01 PROCEDURE — 93010 ELECTROCARDIOGRAM REPORT: CPT | Performed by: INTERNAL MEDICINE

## 2022-04-01 PROCEDURE — 83605 ASSAY OF LACTIC ACID: CPT

## 2022-04-01 PROCEDURE — 6360000002 HC RX W HCPCS

## 2022-04-01 PROCEDURE — 51702 INSERT TEMP BLADDER CATH: CPT

## 2022-04-01 PROCEDURE — 93005 ELECTROCARDIOGRAM TRACING: CPT | Performed by: EMERGENCY MEDICINE

## 2022-04-01 PROCEDURE — 2580000003 HC RX 258: Performed by: HOSPITALIST

## 2022-04-01 PROCEDURE — 87040 BLOOD CULTURE FOR BACTERIA: CPT

## 2022-04-01 PROCEDURE — 2580000003 HC RX 258: Performed by: EMERGENCY MEDICINE

## 2022-04-01 PROCEDURE — 96374 THER/PROPH/DIAG INJ IV PUSH: CPT

## 2022-04-01 PROCEDURE — 87635 SARS-COV-2 COVID-19 AMP PRB: CPT

## 2022-04-01 PROCEDURE — 74176 CT ABD & PELVIS W/O CONTRAST: CPT

## 2022-04-01 PROCEDURE — 82607 VITAMIN B-12: CPT

## 2022-04-01 RX ORDER — CETIRIZINE HYDROCHLORIDE 10 MG/1
5 TABLET ORAL DAILY
Status: DISCONTINUED | OUTPATIENT
Start: 2022-04-01 | End: 2022-04-02

## 2022-04-01 RX ORDER — MORPHINE SULFATE 2 MG/ML
2 INJECTION, SOLUTION INTRAMUSCULAR; INTRAVENOUS ONCE
Status: COMPLETED | OUTPATIENT
Start: 2022-04-01 | End: 2022-04-01

## 2022-04-01 RX ORDER — SODIUM CHLORIDE, SODIUM LACTATE, POTASSIUM CHLORIDE, AND CALCIUM CHLORIDE .6; .31; .03; .02 G/100ML; G/100ML; G/100ML; G/100ML
1000 INJECTION, SOLUTION INTRAVENOUS ONCE
Status: COMPLETED | OUTPATIENT
Start: 2022-04-01 | End: 2022-04-01

## 2022-04-01 RX ORDER — NYSTATIN 100000 U/G
CREAM TOPICAL 2 TIMES DAILY
Status: DISCONTINUED | OUTPATIENT
Start: 2022-04-01 | End: 2022-04-11 | Stop reason: HOSPADM

## 2022-04-01 RX ORDER — SODIUM CHLORIDE 9 MG/ML
INJECTION, SOLUTION INTRAVENOUS CONTINUOUS
Status: DISCONTINUED | OUTPATIENT
Start: 2022-04-01 | End: 2022-04-07

## 2022-04-01 RX ORDER — ERGOCALCIFEROL 1.25 MG/1
50000 CAPSULE ORAL WEEKLY
Status: DISCONTINUED | OUTPATIENT
Start: 2022-04-01 | End: 2022-04-11 | Stop reason: HOSPADM

## 2022-04-01 RX ORDER — MORPHINE SULFATE 2 MG/ML
INJECTION, SOLUTION INTRAMUSCULAR; INTRAVENOUS
Status: COMPLETED
Start: 2022-04-01 | End: 2022-04-01

## 2022-04-01 RX ORDER — HYDROXYZINE HYDROCHLORIDE 25 MG/1
25 TABLET, FILM COATED ORAL NIGHTLY
Status: DISCONTINUED | OUTPATIENT
Start: 2022-04-01 | End: 2022-04-11 | Stop reason: HOSPADM

## 2022-04-01 RX ORDER — SODIUM CHLORIDE 0.9 % (FLUSH) 0.9 %
5-40 SYRINGE (ML) INJECTION PRN
Status: DISCONTINUED | OUTPATIENT
Start: 2022-04-01 | End: 2022-04-11 | Stop reason: HOSPADM

## 2022-04-01 RX ORDER — LACTULOSE 10 G/15ML
20 SOLUTION ORAL 3 TIMES DAILY
Status: DISCONTINUED | OUTPATIENT
Start: 2022-04-01 | End: 2022-04-08

## 2022-04-01 RX ORDER — ONDANSETRON 2 MG/ML
4 INJECTION INTRAMUSCULAR; INTRAVENOUS EVERY 6 HOURS PRN
Status: DISCONTINUED | OUTPATIENT
Start: 2022-04-01 | End: 2022-04-11 | Stop reason: HOSPADM

## 2022-04-01 RX ORDER — SODIUM BICARBONATE 325 MG/1
650 TABLET ORAL 4 TIMES DAILY
Status: DISCONTINUED | OUTPATIENT
Start: 2022-04-01 | End: 2022-04-02

## 2022-04-01 RX ORDER — FLUTICASONE PROPIONATE 50 MCG
2 SPRAY, SUSPENSION (ML) NASAL DAILY
Status: DISCONTINUED | OUTPATIENT
Start: 2022-04-01 | End: 2022-04-11 | Stop reason: HOSPADM

## 2022-04-01 RX ORDER — ACETAMINOPHEN 650 MG/1
650 SUPPOSITORY RECTAL EVERY 6 HOURS PRN
Status: DISCONTINUED | OUTPATIENT
Start: 2022-04-01 | End: 2022-04-04

## 2022-04-01 RX ORDER — METOPROLOL SUCCINATE 50 MG/1
25 TABLET, EXTENDED RELEASE ORAL DAILY
Status: DISCONTINUED | OUTPATIENT
Start: 2022-04-02 | End: 2022-04-03

## 2022-04-01 RX ORDER — SODIUM CHLORIDE 9 MG/ML
INJECTION, SOLUTION INTRAVENOUS PRN
Status: DISCONTINUED | OUTPATIENT
Start: 2022-04-01 | End: 2022-04-11 | Stop reason: HOSPADM

## 2022-04-01 RX ORDER — ONDANSETRON 4 MG/1
4 TABLET, ORALLY DISINTEGRATING ORAL EVERY 8 HOURS PRN
Status: DISCONTINUED | OUTPATIENT
Start: 2022-04-01 | End: 2022-04-11 | Stop reason: HOSPADM

## 2022-04-01 RX ORDER — SODIUM CHLORIDE 0.9 % (FLUSH) 0.9 %
5-40 SYRINGE (ML) INJECTION EVERY 12 HOURS SCHEDULED
Status: DISCONTINUED | OUTPATIENT
Start: 2022-04-01 | End: 2022-04-11 | Stop reason: HOSPADM

## 2022-04-01 RX ORDER — POLYETHYLENE GLYCOL 3350 17 G/17G
17 POWDER, FOR SOLUTION ORAL DAILY PRN
Status: DISCONTINUED | OUTPATIENT
Start: 2022-04-01 | End: 2022-04-11 | Stop reason: HOSPADM

## 2022-04-01 RX ORDER — TRAMADOL HYDROCHLORIDE 50 MG/1
50 TABLET ORAL EVERY 6 HOURS PRN
Status: DISCONTINUED | OUTPATIENT
Start: 2022-04-01 | End: 2022-04-09

## 2022-04-01 RX ORDER — ACETAMINOPHEN 325 MG/1
650 TABLET ORAL EVERY 6 HOURS PRN
Status: DISCONTINUED | OUTPATIENT
Start: 2022-04-01 | End: 2022-04-04

## 2022-04-01 RX ADMIN — SODIUM CHLORIDE: 9 INJECTION, SOLUTION INTRAVENOUS at 18:20

## 2022-04-01 RX ADMIN — SODIUM CHLORIDE, POTASSIUM CHLORIDE, SODIUM LACTATE AND CALCIUM CHLORIDE 1000 ML: 600; 310; 30; 20 INJECTION, SOLUTION INTRAVENOUS at 12:06

## 2022-04-01 RX ADMIN — SODIUM CHLORIDE, POTASSIUM CHLORIDE, SODIUM LACTATE AND CALCIUM CHLORIDE 1000 ML: 600; 310; 30; 20 INJECTION, SOLUTION INTRAVENOUS at 14:29

## 2022-04-01 RX ADMIN — LACTULOSE 20 G: 20 SOLUTION ORAL at 21:46

## 2022-04-01 RX ADMIN — SODIUM CHLORIDE, PRESERVATIVE FREE 10 ML: 5 INJECTION INTRAVENOUS at 21:52

## 2022-04-01 RX ADMIN — MUPIROCIN: 20 OINTMENT TOPICAL at 16:44

## 2022-04-01 RX ADMIN — MORPHINE SULFATE 2 MG: 2 INJECTION, SOLUTION INTRAMUSCULAR; INTRAVENOUS at 14:36

## 2022-04-01 RX ADMIN — HYDROXYZINE HYDROCHLORIDE 25 MG: 25 TABLET ORAL at 21:46

## 2022-04-01 RX ADMIN — MUPIROCIN: 20 OINTMENT TOPICAL at 23:18

## 2022-04-01 RX ADMIN — SODIUM BICARBONATE 650 MG: 325 TABLET ORAL at 21:46

## 2022-04-01 RX ADMIN — NYSTATIN: 100000 CREAM TOPICAL at 21:47

## 2022-04-01 RX ADMIN — TRAMADOL HYDROCHLORIDE 50 MG: 50 TABLET, COATED ORAL at 18:17

## 2022-04-01 RX ADMIN — ACETAMINOPHEN 650 MG: 325 TABLET ORAL at 21:49

## 2022-04-01 ASSESSMENT — ENCOUNTER SYMPTOMS
ABDOMINAL PAIN: 0
ABDOMINAL PAIN: 1
SHORTNESS OF BREATH: 0
BACK PAIN: 0
VOMITING: 0
COUGH: 0

## 2022-04-01 ASSESSMENT — PAIN SCALES - GENERAL
PAINLEVEL_OUTOF10: 9
PAINLEVEL_OUTOF10: 0
PAINLEVEL_OUTOF10: 0
PAINLEVEL_OUTOF10: 6
PAINLEVEL_OUTOF10: 5
PAINLEVEL_OUTOF10: 0

## 2022-04-01 NOTE — H&P
[address] - History & Physical      PCP: KATI Coello CNP    Date of Admission: 4/1/2022    Date of Service: 4/1/2022    Chief Complaint:  Fall    History Of Present Illness: The patient is a 67 y.o. female who presented to [facility] ED complaining of fall. Pt has history of paroxysmal afib on eliquis, moderate aortic stenosis and diastolic dysfunction followed by cardiology. She has history of HTN and osteoarthritis as well. She fell 3 days ago at home reporting problems with bilateral leg weakness. She denied syncope. She lives alone and her neighbor today went to her house as she hadn't been answering her phone. Pt had crawled to unlock door however did not have strength to get up out of floor reporting generalized weakness. She denied injury from fall. She has not had anything to eat or drink over past few days. She tells me that her hips have been painful. She denied problems with cough, fevers, shortness of breath as well as chest pain. She had previously reported abdominal discomfort that has since resolved. He neighbor is hearing impaired and reads lips relating that she hadn't been able to read pt's lips today giving concern for speech changes. Pt tells me that she doesn't have her bottom dentures and that her mouth has been dry. She denied history of CVA as well as prior TIA. She does not endorse lateralizing numbness/weakness to me.     In ED, CK 7136, creatinine 0.9/BUN elevated at 41, sodium 141, potassium 4.4, CO2 low at 16, anion gap elevated at 20, alk phos 95, /ALT 68, UA with 15ketones, large blood, negative nitrite, negative leukocyte esterase, covid test negative, ek sr hr 89, wbc 17, hgb 14, platelets 817, INR 0.38    CT head-no acute intracranial abnormality, CT cspine-no acute cervical fracture or traumatic malalignment, CXR-right paratracheal upper lobe opacities with volume loss may relate to RUL collapse w/recommendation for CT chest with contrast, pelvis right hip imaging with old fractures, bilateral hip prosthesis, no acute appearing bony injury. Pt is admitted to hospitalist service for further evaluation and treatment. Past Medical History:        Diagnosis Date    Chronic back pain     Hypertension     Inflammatory arthritis     Knee pain     Spastic colon        Past Surgical History:        Procedure Laterality Date    ABDOMEN SURGERY      APPENDECTOMY      BACK SURGERY      neck; bone out of hip to fuse neck    FINGER TRIGGER RELEASE Left 12/17/2020    LEFT MIDDLE TRIGGER FINGER RELEASE performed by Melquiades Le MD at [facility] HAND SURGERY Left 2012    fx repair (3 total surgeries)    HAND SURGERY Left 12/17/2020    LEFT WRIST MASS EXCISION performed by Melquiades Le MD at [address] Bilateral     HIP    [address] ARTHROPLASTY Right 9/2/2021    RIGHT TOTAL KNEE ARTHROPLASTY performed by Yobani Coburn MD at [facility] Medications:  Prior to Admission medications    Medication Sig Start Date End Date Taking? Authorizing Provider   hydrOXYzine (ATARAX) 25 MG tablet Take 25 mg by mouth at bedtime 2/23/22   Historical Provider, MD   traMADol (ULTRAM) 50 MG tablet Take 50 mg by mouth 2 times daily.     Historical Provider, MD   apixaban (ELIQUIS) 5 MG TABS tablet Take 5 mg by mouth 2 times daily    Historical Provider, MD   metoprolol succinate (TOPROL XL) 25 MG extended release tablet Take 1 tablet by mouth daily 11/4/21   KATI Jackson   vitamin D (ERGOCALCIFEROL) 1.25 MG (51058 UT) CAPS capsule Take 50,000 Units by mouth once a week    Historical Provider, MD   ondansetron (ZOFRAN) 4 MG tablet Take 1 tablet by mouth every 8 hours as needed for Nausea or Vomiting 9/2/21   Yobani Coburn MD   lisinopril (PRINIVIL;ZESTRIL) 20 MG tablet Take 20 mg by mouth nightly Indications: High Blood Pressure Disorder    Historical Provider, MD   fluticasone (FLONASE) 50 MCG/ACT nasal spray 2 sprays by Nasal route daily     Historical Provider, MD   dicyclomine (BENTYL) 10 MG capsule Take 10 mg by mouth 4 times daily as needed    Historical Provider, MD   loratadine (CLARITIN) 10 MG capsule Take 10 mg by mouth daily    Historical Provider, MD   furosemide (LASIX) 20 MG tablet Take 1 tablet by mouth daily as needed (swelling) 5/6/20   Kandis Funez MD   celecoxib (CELEBREX) 200 MG capsule Take 1 capsule by mouth daily 6/19/18   Kandis Funez MD       Allergies:    Levofloxacin in d5w, Pyridium [phenazopyridine], Sulfamethoxazole-trimethoprim, Codeine, Hydrocodone-acetaminophen, Hydromorphone hcl, and Oxycodone    Social History:    The patient currently lives alone  Tobacco:   reports that she has been smoking cigarettes. She has been smoking about 0.50 packs per day. She has never used smokeless tobacco.  Alcohol:   reports no history of alcohol use. Illicit Drugs: none    Family History:      Problem Relation Age of Onset    Other Other         Son DVT    Cancer Mother         colon     Heart Attack Mother     Heart Attack Father        Review of Systems:   Review of Systems   Constitutional: Positive for fatigue. Negative for fever. Respiratory: Negative for shortness of breath. Cardiovascular: Negative for chest pain. Gastrointestinal: Positive for abdominal pain (today-gone). Negative for vomiting. Neurological: Positive for weakness (global). All other systems reviewed and are negative. 14 point review of systems is negative except as specifically addressed above. Physical Examination:  /63   Pulse 84   Temp 97.6 °F (36.4 °C) (Oral)   Resp 16   Ht 5' 3\" (1.6 m)   Wt 145 lb (65.8 kg)   SpO2 97%   BMI 25.69 kg/m²   Physical Exam  Vitals reviewed.    Constitutional:       Comments: 67 yr old female with family at bedside, she appears in no respiratory distress breathing room air oxygen   HENT:      Head: Normocephalic. Right Ear: External ear normal.      Left Ear: External ear normal.      Mouth/Throat:      Mouth: Mucous membranes are dry. Eyes:      Conjunctiva/sclera: Conjunctivae normal.   Cardiovascular:      Rate and Rhythm: Normal rate and regular rhythm. Heart sounds: Normal heart sounds. Pulmonary:      Effort: Pulmonary effort is normal.      Comments: Decreased breath sounds throughout   Abdominal:      General: Bowel sounds are normal.      Palpations: Abdomen is soft. Tenderness: There is no abdominal tenderness. Musculoskeletal:      Cervical back: Normal range of motion. Comments: Decreased ROM/weakness bilateral upper/lower extermities-pt reports chronic problems with right shoulder ROM   Skin:     General: Skin is warm and dry. Comments: Bilateral lower gluteal folds with erythema w/well demarcated borders appearance of candidiasis/intertrigo     Neurological:      Mental Status: She is alert and oriented to person, place, and time.           Diagnostic Data:  CBC:  Recent Labs     04/01/22  1202   WBC 17.0*   HGB 14.5   HCT 44.5        BMP:  Recent Labs     04/01/22  1202      K 4.4      CO2 16*   BUN 41*   CREATININE 0.9   CALCIUM 8.3*     Recent Labs     04/01/22  1202   *   ALT 68*   BILITOT 0.6   ALKPHOS 95     Coag Panel:   Recent Labs     04/01/22  1202   INR 1.03   PROTIME 13.7     Cardiac Enzymes:   Recent Labs     04/01/22  1202   CKTOTAL 7,136*     Urinalysis:  Lab Results   Component Value Date    NITRU Negative 04/01/2022    WBCUA 1 04/01/2022    BACTERIA NEGATIVE 04/01/2022    RBCUA 2 04/01/2022    BLOODU LARGE 04/01/2022    SPECGRAV 1.021 04/01/2022    GLUCOSEU Negative 04/01/2022       CT ABDOMEN PELVIS WO CONTRAST Additional Contrast? None    Result Date: 4/1/2022  CT ABDOMEN PELVIS WO CONTRAST 4/1/2022 12:23 PM HISTORY: Fall COMPARISON: None DLP: 972 mGy cm TECHNIQUE: Noncontrast enhanced images of the abdomen and pelvis obtained without oral contrast. Automated exposure control was also utilized to decrease patient radiation dose. FINDINGS: Lung bases are clear. Coronary atheromatous calcification. LIVER: No focal liver lesion. BILIARY SYSTEM: The gallbladder is unremarkable. No intrahepatic or extrahepatic ductal dilatation. PANCREAS: No focal pancreatic lesion. SPLEEN: Unremarkable. KIDNEYS AND ADRENALS: Bilateral kidneys and adrenal glands are unremarkable. The ureters are decompressed and normal in appearance. RETROPERITONEUM: No mass, lymphadenopathy or hemorrhage. GI TRACT: Stomach and small bowel are unremarkable. Moderate colonic stool. OTHER: There is no mesenteric mass, lymphadenopathy or fluid collection. Unremarkable bilateral hip arthroplasties. Old healed right pubic bone fracture. Advanced lumbar spondylosis. No acute bony abnormality. PELVIS: No mass lesion, fluid collection or significant lymphadenopathy is seen in the pelvis. The urinary bladder is normal in appearance. 1. No acute process in the abdomen or pelvis. 2. Moderate colonic stool. Bowel loops are nondilated. 3. Unremarkable bilateral hip arthroplasties. Lumbar spondylosis. No acute bony normality. Signed by Dr Marylee Bell    Result Date: 4/1/2022  Examination. CT HEAD WO CONTRAST 4/1/2022 12:38 PM History: The patient fell. DLP: 1229 mGycm. The automated exposure control was utilized to minimize the patient's radiation exposure. The CT scan of the head is performed without intravenous contrast enhancement. The images are acquired in axial plane with subsequent reconstruction in coronal and sagittal planes. There is no previous study for comparison There is no evidence of an intracranial hemorrhage or hematoma. There is no evidence of a mass. No midline shift. Moderately dilated ventricles, basal cistern and the cortical sulci suggestive chronic volume loss/atrophy.  The scattered areas of chronic white matter ischemia are seen in the centrum semiovale bilaterally. The gray-white matter differentiation is maintained. The images reviewed in bone window show no evidence of a skull fracture. Severe atheromatous changes of the intracranial internal carotid arteries bilaterally noted. 1. No acute intracranial abnormality. 2. No skull fracture. 3. Chronic ischemic and atrophic changes. Signed by Dr Paola Chisholm    Result Date: 4/1/2022  CT cervical spine 4/1/2022 12:22 PM HISTORY: Fall TECHNIQUE: Axial images of the cervical spine were obtained without IV contrast. Coronal and sagittal reformatted images are reconstructed and reviewed. COMPARISON: None. DLP: 425 mGy cm Automated exposure control was utilized to minimize patient radiation dose. FINDINGS: Alignment of the cervical spine is appropriate. Moderate to advanced degenerative disc change. Diffuse uncinate process hypertrophy with mild facet osteoarthropathy. No acute cervical vertebral fracture. Incomplete fusion of posterior arch of C1, developmental finding. Mild to moderate canal stenosis at C5/6 with mild narrowing about below this level. Scattered moderate to severe foraminal stenosis. Moderate carotid bulb calcification. No apical lung nodule. 1. Moderate to advanced degenerative changes cervical spine with no acute cervical vertebral fracture or traumatic malalignment. Signed by Dr Kee Walters    XR CHEST PORTABLE    Result Date: 4/1/2022  XR CHEST PORTABLE 4/1/2022 12:21 PM HISTORY: Fatigue COMPARISON: 4/3/2019 CXR: A single frontal view of the chest is performed. Findings: There is volume loss of the right upper hemithorax with ipsilateral shifting of the mediastinal structures. Partial right upper lobe collapse considered. Left lung appears clear. Probable right lower lobe granuloma. Heart is upper limits of normal in size. No pleural effusion or pneumothorax. Degenerative change regional skeleton.     1. Right paratracheal upper lobe opacities with associated volume loss may relate to right upper lobe collapse. Follow-up imaging recommended. CT chest preferably with IV contrast could be performed for further assessment if clinically indicated. Signed by Dr Abebe Dos Santos    XR HIP 2-3 VW W PELVIS RIGHT    Result Date: 4/1/2022  History: Pain after fall Right hip: Frontal view the pelvis as well as frontal and crosstable lateral views of the right hip are obtained. The right prosthesis. No periprosthetic fracture. Old fractures of the right inferior pelvis involving right superior and inferior rami. Enthesophytes project from the right iliac spine. Chronic calcification along the right acetabular roof. Degenerative change of the lower lumbar spine. Vascular calcification. 1. Old fractures of the right inferior pelvis. Bilateral hip prostheses. No acute appearing bony injury identified. Signed by Dr Abebe Dos Santos      Assessment/Plan:  Principal Problem:    Rhabdomyolysis  Active Problems:    Fall    HTN (hypertension)    Generalized weakness    Anticoagulated    Dehydration    Intertrigo    Paroxysmal atrial fibrillation (HCC)    Transaminitis    Abnormal CXR  Resolved Problems:    * No resolved hospital problems.  *     Principal Problem:    Rhabdomyolysis/Dehydration/ Generalized weakness/Fall/Transaminitis   -NS at 125cc/hr   -follow ck/lfts   -follow renal fx/lytes   -I's and O's   -daily weight   -pt/ot consult   -d/c [place] when able   -sodium bicarb 650mg qid   -ammonia   -blood culture   -lactic acid    -tsh   -vit d 25hyrdoxy   -folate/b12  Active Problems:     HTN (hypertension)   -monitor blood pressure   -continue metoprolol     Paroxysmal atrial fibrillation/Anticoagulated   -telemetry   -continue eliquis   -continue beta blocker   -monitor cbc    Intertrigo bilateral gluteal cleft   -bactroban/nystatin ointment   -monitor skin for worsening    Abnormal CXR/Right paratracheal upper lobe opacities   -monitor for supplemental oxygen requirements   -consideration for CT of chest w/contrast when able  Resolved Problems:    * No resolved hospital problems. *  Signed:  KATI Cunningham CNP, 4/1/2022 3:27 PM       Attestation Statement     I have independently seen and examined this patient and agree with the asesment and plan by mid level provider                                                    Objective:   Vitals: /72   Pulse 80   Temp 97.6 °F (36.4 °C) (Oral)   Resp 17   Ht 5' 3\" (1.6 m)   Wt 145 lb (65.8 kg)   SpO2 95%   BMI 25.69 kg/m²   General appearance: alert, appears stated age and cooperative  Skin: Skin color, texture, turgor normal.   HEENT: Head: Normocephalic, no lesions, without obvious abnormality.   Neck: no adenopathy, no carotid bruit, no JVD and supple, symmetrical, trachea midline  Lungs: clear to auscultation bilaterally  Heart: regular rate and rhythm, S1, S2 normal, no murmur, click, rub or gallop  Abdomen: soft, non-tender; bowel sounds normal; no masses,  no organomegaly  Extremities: extremities normal, atraumatic, no cyanosis or edema  Lymphatic: No significant lymph node enlargement papable  Neurologic: Mental status: Alert, oriented, thought content appropriate      Assessment & Plan:    Traumatic rhabdomyolysis-  IVF  Metabolic acidosis-  bicarb   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- follow OP  Constipation - treat  Right upper lobe collapse - ct chest    Severe deconditioning - PT/OT- SNF      Monique Snyder MD

## 2022-04-01 NOTE — CARE COORDINATION
Spoke with pt and pts neighbor at bedside. Neighbor states she had not heard from neighbor so she went to check on her, she then found pt on the ground. She states pts phone was on the counter in the kitchen charging. She states she is going to get pt a life alert necklace. Unsure if pt will be able to return home. Unable to complete initial assessment. Hospitalist at bedside. Will return to bedside to complete assessment and discuss dc plans.

## 2022-04-01 NOTE — ED PROVIDER NOTES
[facility] 4 ONCOLOGY UNIT  eMERGENCY dEPARTMENT eNCOUnter      Pt Name: Albert Loaiza  MRN: [id number]  [place] 1945  Date of evaluation: 4/1/2022  Provider: Lisa Patel MD    CHIEF COMPLAINT       Chief Complaint   Patient presents with    Fall     per [facility], pt fell on Wednesday and has been on floor since          HISTORY OF PRESENT ILLNESS   (Location/Symptom, Timing/Onset,Context/Setting, Quality, Duration, Modifying Factors, Severity)  Note limiting factors. Albert Loaiza is a 67 y.o. female who presents to the emergency department with fall and was on floor since WED, just found today. Brought by EMS. She is fatigued and weak, couldn't get up for 2 days. Mild crampy abd pain. No cp or sob    No fever    Pain in knees and elbows from crawling on floor no swelling and no open wounds, raw bottom though. Pt has no cp or sob    Got fatigued and fell no syncope. The history is provided by the patient and a relative. NursingNotes were reviewed. REVIEW OF SYSTEMS    (2-9 systems for level 4, 10 or more for level 5)     Review of Systems   Constitutional: Positive for fatigue. Negative for fever. Respiratory: Negative for cough and shortness of breath. Gastrointestinal: Negative for abdominal pain and vomiting. Genitourinary: Negative for dysuria. Musculoskeletal: Negative for back pain. Neurological: Negative for seizures, syncope and headaches. Psychiatric/Behavioral: Negative for confusion. A complete review of systems was performed and is negative except as noted above in the HPI.        PAST MEDICAL HISTORY     Past Medical History:   Diagnosis Date    Chronic back pain     Hypertension     Inflammatory arthritis     Knee pain     Spastic colon          SURGICAL HISTORY       Past Surgical History:   Procedure Laterality Date    ABDOMEN SURGERY      APPENDECTOMY      BACK SURGERY      neck; bone out of hip to fuse neck    FINGER TRIGGER RELEASE Left 12/17/2020    LEFT MIDDLE TRIGGER FINGER RELEASE performed by Kate Palafox MD at [address] [place] [address] HAND SURGERY Left 2012    fx repair (3 total surgeries)    HAND SURGERY Left 12/17/2020    LEFT WRIST MASS EXCISION performed by Kate Palafox MD at [address] Bilateral     HIP    [address] Right 9/2/2021    RIGHT TOTAL KNEE ARTHROPLASTY performed by Keny Ortega MD at [address]       Current Discharge Medication List      CONTINUE these medications which have NOT CHANGED    Details   hydrOXYzine (ATARAX) 25 MG tablet Take 25 mg by mouth at bedtime      traMADol (ULTRAM) 50 MG tablet Take 50 mg by mouth 2 times daily.       apixaban (ELIQUIS) 5 MG TABS tablet Take 5 mg by mouth 2 times daily      metoprolol succinate (TOPROL XL) 25 MG extended release tablet Take 1 tablet by mouth daily  Qty: 30 tablet, Refills: 5    Comments: Stop Cardizem  Associated Diagnoses: PAF (paroxysmal atrial fibrillation) (HCC)      vitamin D (ERGOCALCIFEROL) 1.25 MG (79249 UT) CAPS capsule Take 50,000 Units by mouth once a week      ondansetron (ZOFRAN) 4 MG tablet Take 1 tablet by mouth every 8 hours as needed for Nausea or Vomiting  Qty: 30 tablet, Refills: 0      lisinopril (PRINIVIL;ZESTRIL) 20 MG tablet Take 20 mg by mouth nightly Indications: High Blood Pressure Disorder      fluticasone (FLONASE) 50 MCG/ACT nasal spray 2 sprays by Nasal route daily       dicyclomine (BENTYL) 10 MG capsule Take 10 mg by mouth 4 times daily as needed      loratadine (CLARITIN) 10 MG capsule Take 10 mg by mouth daily      furosemide (LASIX) 20 MG tablet Take 1 tablet by mouth daily as needed (swelling)  Qty: 30 tablet, Refills: 2    Associated Diagnoses: Swelling      celecoxib (CELEBREX) 200 MG capsule Take 1 capsule by mouth daily  Qty: 30 capsule, Refills: 2    Associated Diagnoses: Arthritis             ALLERGIES Levofloxacin in d5w, Pyridium [phenazopyridine], Sulfamethoxazole-trimethoprim, Codeine, Hydrocodone-acetaminophen, Hydromorphone hcl, and Oxycodone    FAMILY HISTORY       Family History   Problem Relation Age of Onset    Other Other         Son DVT    Cancer Mother         colon     Heart Attack Mother     Heart Attack Father           SOCIAL HISTORY       Social History     Socioeconomic History    Marital status:      Spouse name: None    Number of children: None    Years of education: None    Highest education level: None   Occupational History    None   Tobacco Use    Smoking status: Current Every Day Smoker     Packs/day: 0.50     Types: Cigarettes    Smokeless tobacco: Never Used   Vaping Use    Vaping Use: Never used   Substance and Sexual Activity    Alcohol use: No    Drug use: No    Sexual activity: Never   Other Topics Concern    None   Social History Narrative    None     Social Determinants of Health     Financial Resource Strain:     Difficulty of Paying Living Expenses: Not on file   Food Insecurity:     Worried About Running Out of Food in the Last Year: Not on file    Mari of Food in the Last Year: Not on file   Transportation Needs:     Lack of Transportation (Medical): Not on file    Lack of Transportation (Non-Medical):  Not on file   Physical Activity:     Days of Exercise per Week: Not on file    Minutes of Exercise per Session: Not on file   Stress:     Feeling of Stress : Not on file   Social Connections:     Frequency of Communication with Friends and Family: Not on file    Frequency of Social Gatherings with Friends and Family: Not on file    Attends Mu-ism Services: Not on file    Active Member of Clubs or Organizations: Not on file    Attends Club or Organization Meetings: Not on file    Marital Status: Not on file   Intimate Partner Violence:     Fear of Current or Ex-Partner: Not on file    Emotionally Abused: Not on file    Physically Abused: Not on file    Sexually Abused: Not on file   Housing Stability:     Unable to Pay for Housing in the Last Year: Not on file    Number of Places Lived in the Last Year: Not on file    Unstable Housing in the Last Year: Not on file       SCREENINGS   NIH Stroke Scale  Interval: Baseline  Level of Consciousness (1a): Alert  LOC Questions (1b): Answers both correctly  LOC Commands (1c): Performs both tasks correctly  Best Gaze (2): Normal  Visual (3): No visual loss  Facial Palsy (4): Normal symmetrical movement  Motor Arm, Left (5a): No drift  Motor Arm, Right (5b): No drift  Motor Leg, Left (6a): No drift  Motor Leg, Right (6b): No drift  Limb Ataxia (7): Absent  Sensory (8): Normal  Best Language (9): No aphasia  Dysarthria (10): Normal  Extinction and Inattention (11): No abnormality  Total: 0Glasgow Coma Scale  Eye Opening: Spontaneous  Best Verbal Response: Oriented  Best Motor Response: Obeys commands  Dylan Coma Scale Score: 15        PHYSICAL EXAM    (up to 7 for level 4, 8 or more for level 5)     ED Triage Vitals   BP Temp Temp src Pulse Resp SpO2 Height Weight   -- -- -- -- -- -- -- --       Physical Exam  Vitals and nursing note reviewed. Constitutional:       General: She is not in acute distress. Comments: Fatigued weak frail    HENT:      Head: Normocephalic and atraumatic. Eyes:      Extraocular Movements: Extraocular movements intact. Pupils: Pupils are equal, round, and reactive to light. Cardiovascular:      Rate and Rhythm: Normal rate and regular rhythm. Pulmonary:      Effort: Pulmonary effort is normal. No respiratory distress. Abdominal:      General: Abdomen is flat. Palpations: Abdomen is soft. Tenderness: There is no guarding or rebound. Genitourinary:     Comments: Erythema surrounding anus non blanching  Musculoskeletal:         General: No tenderness or deformity. Cervical back: Normal range of motion and neck supple.       Comments: No joint swelling full rom   Skin:     General: Skin is warm and dry. Capillary Refill: Capillary refill takes less than 2 seconds. Neurological:      General: No focal deficit present. Mental Status: She is alert and oriented to person, place, and time. Psychiatric:         Mood and Affect: Mood normal.         Behavior: Behavior normal.         DIAGNOSTIC RESULTS     EKG: All EKG's are interpreted by the Emergency Department Physician who either signs or Co-signs this chart in the absence of a cardiologist.        RADIOLOGY:   Non-plain film images such as CT, Ultrasound and MRI are read by the radiologist. Plainradiographic images are visualized and preliminarily interpreted by the emergency physician with the below findings:        Interpretation per the Radiologist below, if available at the time of this note:    XR HIP 2-3 VW W PELVIS RIGHT   Final Result   1. Old fractures of the right inferior pelvis. Bilateral hip   prostheses. No acute appearing bony injury identified. Signed by Dr Linda Thomas   Final Result   1. Right paratracheal upper lobe opacities with associated volume loss   may relate to right upper lobe collapse. Follow-up imaging   recommended. CT chest preferably with IV contrast could be performed   for further assessment if clinically indicated. Signed by Dr Albaro Oliver   Final Result   1. No acute intracranial abnormality. 2. No skull fracture. 3. Chronic ischemic and atrophic changes. Signed by Dr Holly Canas Additional Contrast? None   Final Result   1. No acute process in the abdomen or pelvis. 2. Moderate colonic stool. Bowel loops are nondilated. 3. Unremarkable bilateral hip arthroplasties. Lumbar spondylosis. No   acute bony normality. Signed by Dr Christina Shaw   Final Result   1.  Moderate to advanced degenerative changes cervical spine with no   acute cervical vertebral fracture or traumatic malalignment. Signed by Dr Luisa Almaguer            ED BEDSIDE ULTRASOUND:   Performed by ED Physician - none    LABS:  Labs Reviewed   COMPREHENSIVE METABOLIC PANEL - Abnormal; Notable for the following components:       Result Value    CO2 16 (*)     Anion Gap 20 (*)     BUN 41 (*)     Calcium 8.3 (*)     Total Protein 5.8 (*)     ALT 68 (*)      (*)     All other components within normal limits   CBC WITH AUTO DIFFERENTIAL - Abnormal; Notable for the following components:    WBC 17.0 (*)     MCHC 32.6 (*)     Neutrophils % 88.1 (*)     Lymphocytes % 3.1 (*)     Neutrophils Absolute 15.0 (*)     Lymphocytes Absolute 0.5 (*)     Monocytes Absolute 1.40 (*)     All other components within normal limits   URINALYSIS WITH REFLEX TO CULTURE - Abnormal; Notable for the following components:    Ketones, Urine 15 (*)     Blood, Urine LARGE (*)     Protein,  (*)     All other components within normal limits   CK - Abnormal; Notable for the following components: Total CK 7,136 (*)     All other components within normal limits   MICROSCOPIC URINALYSIS - Abnormal; Notable for the following components:    Bacteria, UA NEGATIVE (*)     Crystals, UA NEG (*)     All other components within normal limits   COVID-19, RAPID   CULTURE, BLOOD 1   PROTIME-INR   AMMONIA   TSH   VITAMIN D 25 HYDROXY   VITAMIN B12   FOLATE   LACTIC ACID   CK   COMPREHENSIVE METABOLIC PANEL       All other labs were within normal range or not returned as of this dictation.     EMERGENCY DEPARTMENT COURSE and DIFFERENTIALDIAGNOSIS/MDM:   Vitals:    Vitals:    04/01/22 1330 04/01/22 1430 04/01/22 1601 04/01/22 1723   BP: 130/63 130/63 118/72 111/75   Pulse: 87 84 80 89   Resp: 18 16 17 16   Temp:    98.2 °F (36.8 °C)   TempSrc:    Temporal   SpO2: 96% 97% 95% 94%   Weight:       Height:           MDM  Number of Diagnoses or Management Options  Abnormal chest x-ray  Traumatic rhabdomyolysis, initial encounter [facility])  Diagnosis management comments: rhabdo    ivf    No fxs    cxr abn     Creat is ok but would defer iv contrast scan for now with rhabdo discussed with hospitalist  Discussed buttocks exam as well     Urine outpt good    Admit        Amount and/or Complexity of Data Reviewed  Clinical lab tests: ordered and reviewed  Tests in the radiology section of CPT®: ordered and reviewed  Obtain history from someone other than the patient: yes  Discuss the patient with other providers: yes    Patient Progress  Patient progress: stable        CONSULTS:  None    PROCEDURES:  Unless otherwise notedbelow, none     Procedures    FINAL IMPRESSION     1. Traumatic rhabdomyolysis, initial encounter ([facility] [address]    2. Abnormal chest x-ray          DISPOSITION/PLAN   DISPOSITION Admitted 04/01/2022 02:39:31 PM      PATIENT REFERRED TO:  No follow-up provider specified.     DISCHARGE MEDICATIONS:  Current Discharge Medication List             (Please note that portions of this note were completed with a voice recognition program.  Efforts were made to edit the dictations butoccasionally words are mis-transcribed.)    Ish Rivera MD (electronically signed)  Gasper Nolasco MD  04/01/22 0830

## 2022-04-01 NOTE — ED NOTES
Report called to [facility]. Pt to go to room 432.      Darren Dodd, SANDRA  04/01/22 9197

## 2022-04-02 ENCOUNTER — APPOINTMENT (OUTPATIENT)
Dept: CT IMAGING | Age: 77
DRG: 564 | End: 2022-04-02
Payer: MEDICARE

## 2022-04-02 ENCOUNTER — APPOINTMENT (OUTPATIENT)
Dept: MRI IMAGING | Age: 77
DRG: 564 | End: 2022-04-02
Payer: MEDICARE

## 2022-04-02 LAB
ALBUMIN SERPL-MCNC: 3.1 G/DL (ref 3.5–5.2)
ALP BLD-CCNC: 83 U/L (ref 35–104)
ALT SERPL-CCNC: 65 U/L (ref 5–33)
ANION GAP SERPL CALCULATED.3IONS-SCNC: 14 MMOL/L (ref 7–19)
AST SERPL-CCNC: 176 U/L (ref 5–32)
BASOPHILS ABSOLUTE: 0 K/UL (ref 0–0.2)
BASOPHILS RELATIVE PERCENT: 0.2 % (ref 0–1)
BILIRUB SERPL-MCNC: 0.4 MG/DL (ref 0.2–1.2)
BUN BLDV-MCNC: 42 MG/DL (ref 8–23)
CALCIUM SERPL-MCNC: 8.6 MG/DL (ref 8.8–10.2)
CHLORIDE BLD-SCNC: 109 MMOL/L (ref 98–111)
CO2: 19 MMOL/L (ref 22–29)
CREAT SERPL-MCNC: 0.8 MG/DL (ref 0.5–0.9)
EOSINOPHILS ABSOLUTE: 0 K/UL (ref 0–0.6)
EOSINOPHILS RELATIVE PERCENT: 0.1 % (ref 0–5)
GFR AFRICAN AMERICAN: >59
GFR NON-AFRICAN AMERICAN: >60
GLUCOSE BLD-MCNC: 81 MG/DL (ref 74–109)
HCT VFR BLD CALC: 38.4 % (ref 37–47)
HEMOGLOBIN: 12.4 G/DL (ref 12–16)
IMMATURE GRANULOCYTES #: 0.1 K/UL
LYMPHOCYTES ABSOLUTE: 2.2 K/UL (ref 1.1–4.5)
LYMPHOCYTES RELATIVE PERCENT: 14 % (ref 20–40)
MCH RBC QN AUTO: 30 PG (ref 27–31)
MCHC RBC AUTO-ENTMCNC: 32.3 G/DL (ref 33–37)
MCV RBC AUTO: 93 FL (ref 81–99)
MONOCYTES ABSOLUTE: 1.3 K/UL (ref 0–0.9)
MONOCYTES RELATIVE PERCENT: 8.5 % (ref 0–10)
NEUTROPHILS ABSOLUTE: 12 K/UL (ref 1.5–7.5)
NEUTROPHILS RELATIVE PERCENT: 76.8 % (ref 50–65)
PDW BLD-RTO: 13.4 % (ref 11.5–14.5)
PLATELET # BLD: 263 K/UL (ref 130–400)
PMV BLD AUTO: 10.3 FL (ref 9.4–12.3)
POTASSIUM SERPL-SCNC: 3.3 MMOL/L (ref 3.5–5)
RBC # BLD: 4.13 M/UL (ref 4.2–5.4)
SODIUM BLD-SCNC: 142 MMOL/L (ref 136–145)
TOTAL CK: 4480 U/L (ref 26–192)
TOTAL PROTEIN: 4.7 G/DL (ref 6.6–8.7)
TROPONIN: 0.04 NG/ML (ref 0–0.03)
WBC # BLD: 15.6 K/UL (ref 4.8–10.8)

## 2022-04-02 PROCEDURE — 1210000000 HC MED SURG R&B

## 2022-04-02 PROCEDURE — 36415 COLL VENOUS BLD VENIPUNCTURE: CPT

## 2022-04-02 PROCEDURE — 6370000000 HC RX 637 (ALT 250 FOR IP): Performed by: HOSPITALIST

## 2022-04-02 PROCEDURE — 6370000000 HC RX 637 (ALT 250 FOR IP): Performed by: NURSE PRACTITIONER

## 2022-04-02 PROCEDURE — 97162 PT EVAL MOD COMPLEX 30 MIN: CPT

## 2022-04-02 PROCEDURE — 6360000002 HC RX W HCPCS: Performed by: NURSE PRACTITIONER

## 2022-04-02 PROCEDURE — 93005 ELECTROCARDIOGRAM TRACING: CPT | Performed by: HOSPITALIST

## 2022-04-02 PROCEDURE — 2580000003 HC RX 258: Performed by: HOSPITALIST

## 2022-04-02 PROCEDURE — 85025 COMPLETE CBC W/AUTO DIFF WBC: CPT

## 2022-04-02 PROCEDURE — 70450 CT HEAD/BRAIN W/O DYE: CPT

## 2022-04-02 PROCEDURE — 84484 ASSAY OF TROPONIN QUANT: CPT

## 2022-04-02 PROCEDURE — 80053 COMPREHEN METABOLIC PANEL: CPT

## 2022-04-02 PROCEDURE — 97530 THERAPEUTIC ACTIVITIES: CPT

## 2022-04-02 PROCEDURE — 72192 CT PELVIS W/O DYE: CPT

## 2022-04-02 PROCEDURE — 82550 ASSAY OF CK (CPK): CPT

## 2022-04-02 RX ORDER — PANTOPRAZOLE SODIUM 40 MG/1
40 GRANULE, DELAYED RELEASE ORAL
COMMUNITY

## 2022-04-02 RX ORDER — BENZONATATE 200 MG/1
200 CAPSULE ORAL 3 TIMES DAILY
Status: ON HOLD | COMMUNITY
End: 2022-04-11 | Stop reason: HOSPADM

## 2022-04-02 RX ORDER — POTASSIUM CHLORIDE 20 MEQ/1
20 TABLET, EXTENDED RELEASE ORAL DAILY
Status: DISCONTINUED | OUTPATIENT
Start: 2022-04-02 | End: 2022-04-03

## 2022-04-02 RX ORDER — SODIUM BICARBONATE 650 MG/1
1300 TABLET ORAL 4 TIMES DAILY
Status: DISCONTINUED | OUTPATIENT
Start: 2022-04-02 | End: 2022-04-04

## 2022-04-02 RX ORDER — BISACODYL 10 MG
10 SUPPOSITORY, RECTAL RECTAL DAILY
Status: DISCONTINUED | OUTPATIENT
Start: 2022-04-02 | End: 2022-04-04

## 2022-04-02 RX ORDER — DICYCLOMINE HYDROCHLORIDE 10 MG/1
10 CAPSULE ORAL PRN
COMMUNITY
End: 2022-08-03

## 2022-04-02 RX ORDER — DIPHENHYDRAMINE HCL 50 MG
50 CAPSULE ORAL NIGHTLY PRN
COMMUNITY
End: 2022-08-03

## 2022-04-02 RX ORDER — POTASSIUM CHLORIDE 20 MEQ/1
40 TABLET, EXTENDED RELEASE ORAL PRN
Status: DISCONTINUED | OUTPATIENT
Start: 2022-04-02 | End: 2022-04-11 | Stop reason: HOSPADM

## 2022-04-02 RX ORDER — POTASSIUM CHLORIDE 7.45 MG/ML
10 INJECTION INTRAVENOUS PRN
Status: DISCONTINUED | OUTPATIENT
Start: 2022-04-02 | End: 2022-04-11 | Stop reason: HOSPADM

## 2022-04-02 RX ORDER — POLYETHYLENE GLYCOL 3350 17 G/17G
17 POWDER, FOR SOLUTION ORAL DAILY
Status: DISCONTINUED | OUTPATIENT
Start: 2022-04-02 | End: 2022-04-08

## 2022-04-02 RX ORDER — DEXTROMETHORPHN/ACETAMINOPH/CP 10-325-2MG
325 TABLET ORAL
Status: ON HOLD | COMMUNITY
End: 2022-04-11 | Stop reason: HOSPADM

## 2022-04-02 RX ORDER — HYDROMORPHONE HYDROCHLORIDE 1 MG/ML
0.5 INJECTION, SOLUTION INTRAMUSCULAR; INTRAVENOUS; SUBCUTANEOUS
Status: DISCONTINUED | OUTPATIENT
Start: 2022-04-02 | End: 2022-04-08

## 2022-04-02 RX ORDER — SIMETHICONE 80 MG
125 TABLET,CHEWABLE ORAL EVERY 6 HOURS PRN
COMMUNITY

## 2022-04-02 RX ADMIN — SODIUM CHLORIDE: 9 INJECTION, SOLUTION INTRAVENOUS at 01:44

## 2022-04-02 RX ADMIN — NYSTATIN: 100000 CREAM TOPICAL at 21:36

## 2022-04-02 RX ADMIN — Medication: at 21:36

## 2022-04-02 RX ADMIN — HYDROXYZINE HYDROCHLORIDE 25 MG: 25 TABLET ORAL at 21:36

## 2022-04-02 RX ADMIN — APIXABAN 5 MG: 5 TABLET, FILM COATED ORAL at 21:36

## 2022-04-02 RX ADMIN — HYDROMORPHONE HYDROCHLORIDE 0.5 MG: 1 INJECTION, SOLUTION INTRAMUSCULAR; INTRAVENOUS; SUBCUTANEOUS at 21:34

## 2022-04-02 RX ADMIN — SODIUM BICARBONATE 1300 MG: 650 TABLET ORAL at 19:42

## 2022-04-02 RX ADMIN — BISACODYL 10 MG: 10 SUPPOSITORY RECTAL at 19:43

## 2022-04-02 RX ADMIN — SODIUM CHLORIDE, PRESERVATIVE FREE 5 ML: 5 INJECTION INTRAVENOUS at 15:48

## 2022-04-02 RX ADMIN — HYDROMORPHONE HYDROCHLORIDE 0.5 MG: 1 INJECTION, SOLUTION INTRAMUSCULAR; INTRAVENOUS; SUBCUTANEOUS at 17:00

## 2022-04-02 RX ADMIN — SODIUM BICARBONATE 1300 MG: 650 TABLET ORAL at 21:36

## 2022-04-02 RX ADMIN — HYDROMORPHONE HYDROCHLORIDE 0.5 MG: 1 INJECTION, SOLUTION INTRAMUSCULAR; INTRAVENOUS; SUBCUTANEOUS at 13:32

## 2022-04-02 RX ADMIN — Medication: at 19:58

## 2022-04-02 RX ADMIN — POLYETHYLENE GLYCOL 3350 17 G: 17 POWDER, FOR SOLUTION ORAL at 19:42

## 2022-04-02 ASSESSMENT — ENCOUNTER SYMPTOMS
GASTROINTESTINAL NEGATIVE: 1
EYES NEGATIVE: 1
CHOKING: 1
ABDOMINAL PAIN: 1
VOMITING: 0
ALLERGIC/IMMUNOLOGIC NEGATIVE: 1
SHORTNESS OF BREATH: 0

## 2022-04-02 ASSESSMENT — PAIN DESCRIPTION - PAIN TYPE: TYPE: ACUTE PAIN

## 2022-04-02 ASSESSMENT — PAIN SCALES - GENERAL
PAINLEVEL_OUTOF10: 10
PAINLEVEL_OUTOF10: 10
PAINLEVEL_OUTOF10: 8

## 2022-04-02 ASSESSMENT — PAIN DESCRIPTION - ONSET: ONSET: ON-GOING

## 2022-04-02 ASSESSMENT — PAIN DESCRIPTION - ORIENTATION: ORIENTATION: LEFT

## 2022-04-02 ASSESSMENT — PAIN DESCRIPTION - DESCRIPTORS
DESCRIPTORS: DISCOMFORT;ACHING
DESCRIPTORS: DISCOMFORT

## 2022-04-02 ASSESSMENT — PAIN DESCRIPTION - LOCATION
LOCATION: GENERALIZED
LOCATION: GROIN

## 2022-04-02 ASSESSMENT — PAIN - FUNCTIONAL ASSESSMENT: PAIN_FUNCTIONAL_ASSESSMENT: PREVENTS OR INTERFERES SOME ACTIVE ACTIVITIES AND ADLS

## 2022-04-02 ASSESSMENT — PAIN SCALES - WONG BAKER: WONGBAKER_NUMERICALRESPONSE: 4

## 2022-04-02 ASSESSMENT — PAIN DESCRIPTION - FREQUENCY: FREQUENCY: INTERMITTENT

## 2022-04-02 NOTE — CARE COORDINATION
Met with pt and neighbor at bedside re: SNF placement. Informed pt about SNF placement and the benefits of it. Pt states \"I would rather die than go to a nursing home. It makes me sick to think about it\" pt went on to state that her sister had a bad experience at a SNF. SW tried to explain that there are other SNF options and many pts benefit from their care. Pt continues to state that she will not go. Neighbor at bedside states that prior to fall pt did everything independently - cook, bathe, laundry, medications, etc. She states pts family is not local, she is all pt has. Neighbor states she is curious to see how pt will do with PT and OT. Neighbor states if it is close to her baseline she feels comfortable with pt returning home, however, if she needs a lot of assistance she will need to go to a SNF as she cannot stay with pt 24/7. Informed pt and neighbor we will await PT/OT evals.

## 2022-04-02 NOTE — PLAN OF CARE
Problem: Skin Integrity:  Goal: Will show no infection signs and symptoms  Description: Will show no infection signs and symptoms  4/1/2022 2346 by Janell Galeas RN  Outcome: Ongoing  4/1/2022 1833 by Gurjit Bashir RN  Outcome: Ongoing  Goal: Absence of new skin breakdown  Description: Absence of new skin breakdown  4/1/2022 2346 by Janell Galeas RN  Outcome: Ongoing  4/1/2022 1833 by Gurjit Bashir RN  Outcome: Ongoing     Problem: Falls - Risk of:  Goal: Will remain free from falls  Description: Will remain free from falls  4/1/2022 2346 by Janell Galeas RN  Outcome: Ongoing  4/1/2022 1833 by Gurjit Bashir RN  Outcome: Ongoing  Goal: Absence of physical injury  Description: Absence of physical injury  4/1/2022 2346 by Janell Galeas RN  Outcome: Ongoing  4/1/2022 1833 by Gurjit Bashir RN  Outcome: Ongoing

## 2022-04-02 NOTE — PROGRESS NOTES
Facility/Department: [facility] ONCOLOGY UNIT  Initial Assessment    NAME: Jaleesa Spence  : 1945  MRN: [id number]    Date of Service: 2022    Discharge Recommendations:  Continue to assess pending progress,Patient would benefit from continued therapy after discharge        Assessment   Body structures, Functions, Activity limitations: Decreased functional mobility ; Decreased ADL status; Decreased ROM; Decreased strength;Decreased safe awareness;Decreased balance; Increased pain;Decreased posture  Assessment: Pt REQUIRES ASSIST FOR ALL MOBILITY AT THIS TIME. ABLE TO SIT EOB WITH HELP BUT LEANS TO RIGHT. DID NOT ATTEMPT STANDING DUE TO POOR SITTING BALANCE AND Pt COMPLAINTS OF LEFT GROIN PAIN WITH HIP INTERNAL ROTATION. APRN PRESENT TO ASSESS AND IS ORDERING FURTHER TESTING. Pt WILL NOT BE ABLE TO RETURN HOME AT THIS LEVEL. WILL NEED FURTHER THERAPY TO ADDRESS LIMITATIONS. PT Education: PT Role;Plan of Care  REQUIRES PT FOLLOW UP: Yes  Activity Tolerance  Activity Tolerance: Patient limited by fatigue       Patient Diagnosis(es): The primary encounter diagnosis was Traumatic rhabdomyolysis, initial encounter ([facility] [address] A diagnosis of Abnormal chest x-ray was also pertinent to this visit. has a past medical history of Chronic back pain, Hypertension, Inflammatory arthritis, Knee pain, and Spastic colon. has a past surgical history that includes Hand surgery (Left, ); Appendectomy; Abdomen surgery; Tubal ligation; Mouth surgery; Finger trigger release (Left, 2020); Hand surgery (Left, 2020); joint replacement (Bilateral); back surgery; and Total knee arthroplasty (Right, 2021).     Restrictions  Restrictions/Precautions  Restrictions/Precautions: Fall Risk  Vision/Hearing  Vision: Within Functional Limits  Hearing: Within functional limits     Subjective  General  Diagnosis: FALL, RHABDO  General Comment  Comments: QUESTIONABLE STROKE PER APRN TODAY  Subjective  Subjective: Pt WITH SLURRED SPEECH, WILLING TO PARTICIPATE  Pain Screening  Patient Currently in Pain: Yes  Pain Assessment  Pain Assessment: Faces  Ruiz-Baker Pain Rating: Hurts little more  Pain Type: Acute pain  Pain Location: Groin  Pain Orientation: Left  Pain Descriptors: Discomfort  Pain Frequency: Intermittent  Pain Onset: On-going  Functional Pain Assessment: Prevents or interferes some active activities and ADLs  Non-Pharmaceutical Pain Intervention(s): Repositioned; Rest  Response to Pain Intervention: Patient Satisfied  Vital Signs  Patient Currently in Pain: Yes  Pre Treatment Pain Screening  Intervention List: Nurse/physician notified    Orientation  Orientation  Overall Orientation Status: Within Functional Limits  Social/Functional History  Social/Functional History  Lives With: Alone (NEIGHBOR WHO CHECKS ON Pt)  ADL Assistance: Independent  Ambulation Assistance: Independent  Transfer Assistance: Independent  Cognition   Cognition  Cognition Comment: GROGGY OVERALL, ABLE TO ANSWER QUESTIONS    Objective     Observation/Palpation  Observation: FACIAL DROOP    PROM RLE (degrees)  RLE PROM: WNL  AROM RLE (degrees)  RLE General AROM:   PROM LLE (degrees)  LLE PROM: WNL  LLE General PROM: DID NOT FULLY ASSIST HIP  AROM LLE (degrees)  LLE General AROM:   Strength RLE  Comment: KNEE/ANKLE ~ 3/5, HIP <3/5  Strength LLE  Comment: KNEE/ANKLE ~ 3/5, DID NOT ASSESS HIP FURTHER, WILL AWAIT IMAGING        Bed mobility  Rolling to Left: Minimal assistance  Rolling to Right: Minimal assistance  Supine to Sit: Maximum assistance  Sit to Supine: Maximum assistance  Scooting: Maximal assistance (IN SITTING)  Comment: Pt SAT EOB ~ 5-6 MIN WITH MIN-MAX ASSIST DEPENDING ON POSITIONING. LEANS RIGHT.            Balance  Sitting - Static: Poor  Sitting - Dynamic: Poor        Plan   Plan  Times per week: AT LEAST 4-6  Current Treatment Recommendations: Jenelle Segal Mobility Training,Transfer Training,Patient/Caregiver Education & Training,Safety Education & Training  Safety Devices  Type of devices: Bed alarm in place,Call light within reach      Goals  Short term goals  Time Frame for Short term goals: 14 DAYS  Short term goal 1: BED MOB MIN ASSIST  Short term goal 2: SIT TO STAND MIN ASSIST  Short term goal 3: BED TO CHAIR MIN ASSIST                 Chayo Padilla, PT

## 2022-04-02 NOTE — PROGRESS NOTES
[facility]      Progress Note    Patient:  Smiley Molina  YOB: 1945  Date of Service: 4/2/2022  MRN: [id number]   Acct: [de-identified]   Primary Care Physician: KATI Torres CNP  Advance Directive: Full Code  Admit Date: 4/1/2022       Hospital Day: 1    Portions of this note have been copied forward, however, updated to reflect the most current clinical status of this patient. CHIEF COMPLAINT Fall    SUBJECTIVE:  Hurting all over today. Friend noticed slurred speech. She is also having difficulty with swallowing . Her bottom is raw from lying in urine      [address]:   The patient is a 67 y.o. female who presented to [facility] ED complaining of fall. Pt has history of paroxysmal afib on eliquis, moderate aortic stenosis and diastolic dysfunction followed by cardiology. She has history of HTN and osteoarthritis as well. She fell 3 days ago at home reporting problems with bilateral leg weakness. She denied syncope. She lives alone and her neighbor today went to her house as she hadn't been answering her phone. Pt had crawled to unlock door however did not have strength to get up out of floor reporting generalized weakness. She denied injury from fall. She has not had anything to eat or drink over past few days. She tells me that her hips have been painful. She denied problems with cough, fevers, shortness of breath as well as chest pain. She had previously reported abdominal discomfort that has since resolved. He neighbor is hearing impaired and reads lips relating that she hadn't been able to read pt's lips today giving concern for speech changes. Pt tells me that she doesn't have her bottom dentures and that her mouth has been dry. She denied history of CVA as well as prior TIA.    In ED, CK 7136, creatinine 0.9/BUN elevated at 41, sodium 141, potassium 4.4, CO2 low at 16, anion gap elevated at 20, alk phos 95, /ALT 68, UA with 15ketones, large blood, negative nitrite, negative leukocyte esterase, covid test negative, ek sr hr 89, wbc 17, hgb 14, platelets 164, INR 0.36  CT head-no acute intracranial abnormality, CT cspine-no acute cervical fracture or traumatic malalignment, CXR-right paratracheal upper lobe opacities with volume loss may relate to RUL collapse w/recommendation for CT chest with contrast, pelvis right hip imaging with old fractures, bilateral hip prosthesis, no acute appearing bony injury. Pt is admitted to hospitalist service for further evaluation and treatment. Today she is hurting allover. Her groin where she had previous pelvic fx is main complaint. Her left leg appears rotated inward. SHe is having diff swallowing and chokes. She has left side facial drooping of the mouth     Review of Systems   Constitutional: Negative. HENT: Negative. Eyes: Negative. Respiratory: Positive for choking. Cardiovascular: Negative. Gastrointestinal: Negative. Endocrine: Negative. Musculoskeletal: Positive for arthralgias and myalgias. Allergic/Immunologic: Negative. Neurological: Positive for weakness. Negative for dizziness. Hematological: Negative. Psychiatric/Behavioral: Negative. All other systems reviewed and are negative. Objective:   VITALS:  /61   Pulse 78   Temp 97.2 °F (36.2 °C)   Resp 18   Ht 5' 3\" (1.6 m)   Wt 145 lb (65.8 kg)   SpO2 91%   BMI 25.69 kg/m²   24HR INTAKE/OUTPUT:    Intake/Output Summary (Last 24 hours) at 4/2/2022 1432  Last data filed at 4/2/2022 1409  Gross per 24 hour   Intake 2151.93 ml   Output 1000 ml   Net 1151.93 ml         Physical Exam  Vitals and nursing note reviewed. Constitutional:       Appearance: She is ill-appearing. HENT:      Head: Normocephalic and atraumatic. Nose: Nose normal.      Mouth/Throat:      Mouth: Mucous membranes are moist.   Eyes:      Extraocular Movements: Extraocular movements intact.       Comments: Cataract lenses Cardiovascular:      Rate and Rhythm: Normal rate. Rhythm irregular. Heart sounds: Murmur heard. Pulmonary:      Breath sounds: Normal breath sounds. Abdominal:      General: Bowel sounds are normal.      Palpations: Abdomen is soft. Musculoskeletal:      Cervical back: Neck supple. Skin:     General: Skin is warm and dry. Coloration: Skin is pale. Neurological:      Comments: Left mouth droop            Medications:      sodium chloride 100 mL/hr at 04/02/22 1409    sodium chloride        potassium chloride  20 mEq Oral Daily    sodium bicarbonate  1,300 mg Oral 4x Daily    sodium chloride flush  5-40 mL IntraVENous 2 times per day    lactulose  20 g Oral TID    mupirocin   Topical 4x Daily    apixaban  5 mg Oral BID    fluticasone  2 spray Nasal Daily    hydrOXYzine  25 mg Oral Nightly    cetirizine  5 mg Oral Daily    metoprolol succinate  25 mg Oral Daily    vitamin D  50,000 Units Oral Weekly    nystatin   Topical BID     potassium chloride **OR** potassium alternative oral replacement **OR** potassium chloride, magic butt cream, HYDROmorphone, sodium chloride flush, sodium chloride, ondansetron **OR** ondansetron, polyethylene glycol, acetaminophen **OR** acetaminophen, traMADol  Diet NPO     Lab and other Data:     Recent Labs     04/01/22  1202 04/02/22  0923   WBC 17.0* 15.6*   HGB 14.5 12.4    263     Recent Labs     04/01/22  1202 04/02/22  0553    142   K 4.4 3.3*    109   CO2 16* 19*   BUN 41* 42*   CREATININE 0.9 0.8   GLUCOSE 107 81     Recent Labs     04/01/22  1202 04/02/22  0553   * 176*   ALT 68* 65*   BILITOT 0.6 0.4   ALKPHOS 95 83     Troponin T:   Recent Labs     04/02/22  1205   TROPONINI 0.04*     Pro-BNP: No results for input(s): BNP in the last 72 hours.   INR:   Recent Labs     04/01/22  1202   INR 1.03     UA:  Recent Labs     04/01/22  1320   COLORU YELLOW   PHUR 5.5   WBCUA 1   RBCUA 2   BACTERIA NEGATIVE*   CLARITYU Clear   SPECGRAV 1.021   LEUKOCYTESUR Negative   UROBILINOGEN 0.2   BILIRUBINUR Negative   BLOODU LARGE*   GLUCOSEU Negative     A1C: No results for input(s): LABA1C in the last 72 hours. ABG:No results for input(s): PHART, PHK6KSX, PO2ART, WAD5RAA, BEART, HGBAE, O4GGAOCK, CARBOXHGBART in the last 72 hours. RAD:   CT ABDOMEN PELVIS WO CONTRAST Additional Contrast? None    Result Date: 4/1/2022  CT ABDOMEN PELVIS WO CONTRAST 4/1/2022 12:23 PM HISTORY: Fall COMPARISON: None DLP: 972 mGy cm TECHNIQUE: Noncontrast enhanced images of the abdomen and pelvis obtained without oral contrast. Automated exposure control was also utilized to decrease patient radiation dose. FINDINGS: Lung bases are clear. Coronary atheromatous calcification. LIVER: No focal liver lesion. BILIARY SYSTEM: The gallbladder is unremarkable. No intrahepatic or extrahepatic ductal dilatation. PANCREAS: No focal pancreatic lesion. SPLEEN: Unremarkable. KIDNEYS AND ADRENALS: Bilateral kidneys and adrenal glands are unremarkable. The ureters are decompressed and normal in appearance. RETROPERITONEUM: No mass, lymphadenopathy or hemorrhage. GI TRACT: Stomach and small bowel are unremarkable. Moderate colonic stool. OTHER: There is no mesenteric mass, lymphadenopathy or fluid collection. Unremarkable bilateral hip arthroplasties. Old healed right pubic bone fracture. Advanced lumbar spondylosis. No acute bony abnormality. PELVIS: No mass lesion, fluid collection or significant lymphadenopathy is seen in the pelvis. The urinary bladder is normal in appearance. 1. No acute process in the abdomen or pelvis. 2. Moderate colonic stool. Bowel loops are nondilated. 3. Unremarkable bilateral hip arthroplasties. Lumbar spondylosis. No acute bony normality. Signed by Dr Saúl Stein    CT Head WO Contrast    Result Date: 4/2/2022  CT BRAIN without contrast 4/2/2022 12:38 PM HISTORY: New onset weakness yesterday COMPARISON: 4/1/2022 DLP: 632 mGy cm.  All CT scans are performed using dose optimization techniques as appropriate to the performed exam and include at least one of the following: Automated exposure control, adjustment of the mA and/or kV according to size, and the use of the iterative reconstruction technique. TECHNIQUE: Serial axial tomographic images of the brain were obtained without the use of intravenous contrast. FINDINGS: The midline structures are nondisplaced. There is mild cerebral and cerebellar volume loss, with an associated increase in the prominence of the ventricles and sulci. The basilar cisterns are normal in size and configuration. There is no evidence of intracranial hemorrhage or mass-effect. There is low attenuation in the periventricular white matter, consistent with chronic ischemic change. There are no abnormal extra-axial fluid collections. There is no evidence of tonsillar herniation. The included orbits and their contents are unremarkable. The visualized paranasal sinuses, mastoid air cells and middle ear cavities are clear. The visualized osseous structures and overlying soft tissues of the skull and face are intact. 1. Mild cerebral and cerebellar volume loss with chronic microvascular disease but no evidence of acute intracranial process. Signed by Dr Monica Dillon    Result Date: 4/1/2022  Examination. CT HEAD WO CONTRAST 4/1/2022 12:38 PM History: The patient fell. DLP: 1229 mGycm. The automated exposure control was utilized to minimize the patient's radiation exposure. The CT scan of the head is performed without intravenous contrast enhancement. The images are acquired in axial plane with subsequent reconstruction in coronal and sagittal planes. There is no previous study for comparison There is no evidence of an intracranial hemorrhage or hematoma. There is no evidence of a mass. No midline shift.  Moderately dilated ventricles, basal cistern and the cortical sulci suggestive chronic volume loss/atrophy. The scattered areas of chronic white matter ischemia are seen in the centrum semiovale bilaterally. The gray-white matter differentiation is maintained. The images reviewed in bone window show no evidence of a skull fracture. Severe atheromatous changes of the intracranial internal carotid arteries bilaterally noted. 1. No acute intracranial abnormality. 2. No skull fracture. 3. Chronic ischemic and atrophic changes. Signed by Dr Etienne Cochran    Result Date: 4/1/2022  CT cervical spine 4/1/2022 12:22 PM HISTORY: Fall TECHNIQUE: Axial images of the cervical spine were obtained without IV contrast. Coronal and sagittal reformatted images are reconstructed and reviewed. COMPARISON: None. DLP: 425 mGy cm Automated exposure control was utilized to minimize patient radiation dose. FINDINGS: Alignment of the cervical spine is appropriate. Moderate to advanced degenerative disc change. Diffuse uncinate process hypertrophy with mild facet osteoarthropathy. No acute cervical vertebral fracture. Incomplete fusion of posterior arch of C1, developmental finding. Mild to moderate canal stenosis at C5/6 with mild narrowing about below this level. Scattered moderate to severe foraminal stenosis. Moderate carotid bulb calcification. No apical lung nodule. 1. Moderate to advanced degenerative changes cervical spine with no acute cervical vertebral fracture or traumatic malalignment. Signed by Dr Geraldine Rodriguez Additional Contrast? None    Result Date: 4/2/2022  EXAMINATION: CT pelvis without contrast 4/2/2022 HISTORY: Pubic pain post fall Dose: 807 mGycm. All CT scans are performed using dose optimization techniques as appropriate to the performed exam and include at least one of the following: Automated exposure control, adjustment of the mA and/or kV according to size, and the use of the iterative reconstruction technique.  FINDINGS: Multiple contiguous axial images are obtained through the bony pelvis per protocol without venous contrast enhancement with reformatted images obtained in the sagittal coronal projections from the original data set. At the L4-L5 level there is broad-based bulging of disc as well as moderate facet and ligamentous hypertrophy. There is moderate central spinal stenosis as well as moderate bilateral neural foraminal narrowing. There is mild grade 1 anterolisthesis of L4 on L5 likely representing subluxation at the level of the facets. The SI joints are intact. No evidence of sacral fracture. The patient is status post bilateral total hip arthroplasty. This does obscure some of the anatomic detail secondary to streaking artifact. There are old fractures of the right superior and inferior pubic ramus. No acute fractures are present. The pubic symphysis is intact. A Saeed catheter is in place within the bladder. There is mild fecal impaction within the rectal vault. 1.. Old fractures of the right superior and inferior pubic rami. No additional fractures are present. The SI joints and pubic symphysis are intact with no evidence of diastases. 2. Facet and ligamentous hypertrophy as well as bulging of the disc contributes to central and foraminal stenosis at L4-L5. There is grade 1 anterolisthesis of L4 on L5 felt to represent subluxation at the level of the facets. 3. Constipation with increased stool throughout the colon including fecal impaction within the rectal vault. Signed by Dr Tiffany Eastman    Result Date: 4/1/2022  XR CHEST PORTABLE 4/1/2022 12:21 PM HISTORY: Fatigue COMPARISON: 4/3/2019 CXR: A single frontal view of the chest is performed. Findings: There is volume loss of the right upper hemithorax with ipsilateral shifting of the mediastinal structures. Partial right upper lobe collapse considered. Left lung appears clear. Probable right lower lobe granuloma. Heart is upper limits of normal in size.  No pleural effusion or pneumothorax. Degenerative change regional skeleton. 1. Right paratracheal upper lobe opacities with associated volume loss may relate to right upper lobe collapse. Follow-up imaging recommended. CT chest preferably with IV contrast could be performed for further assessment if clinically indicated. Signed by Dr Rogelio Kwon    XR HIP 2-3 VW W PELVIS RIGHT    Result Date: 4/1/2022  History: Pain after fall Right hip: Frontal view the pelvis as well as frontal and crosstable lateral views of the right hip are obtained. The right prosthesis. No periprosthetic fracture. Old fractures of the right inferior pelvis involving right superior and inferior rami. Enthesophytes project from the right iliac spine. Chronic calcification along the right acetabular roof. Degenerative change of the lower lumbar spine. Vascular calcification. 1. Old fractures of the right inferior pelvis. Bilateral hip prostheses. No acute appearing bony injury identified. Signed by Dr Rogelio Kwon          Assessment/Plan   Principal Problem:    Rhabdomyolysis   Ck improved   Continue daily lab   Pain control   Strict I & O     Active Problems:    Fall   Ct pelvis left hip    HTN (hypertension)   Home meds=-metoprolol   Continue to moniter    Generalized weakness   Pt/ot    Dehydration   Moniter renal function       Paroxysmal atrial fibrillation (HCC)   Telemetry    eliquis       Transaminitis   Daily lab  Neuro status change   Ct head    Neurology consult      Resolved Problems:    * No resolved hospital problems. Discharge planning: snf most likely      Further Orders per Clinical course/attending. Electronically signed by KATI Ferrer CNP on 4/2/2022 at 2:32 PM       EMR Dragon/Transcription disclaimer:   Much of this encounter note is an electronic transcription/translation of spoken language to printed text.  The electronic translation of spoken language may permit erroneous, or at times, nonsensical words or phrases to be inadvertently transcribed; although attempts have made to review the note for such errors, some may still exist.      Attestation Statement     I have independently seen and examined this patient and agree with the asesment and plan by mid level [facility] MEDICINE  - PROGRESS NOTE         Objective:   Vitals: /61   Pulse 78   Temp 97.2 °F (36.2 °C)   Resp 18   Ht 5' 3\" (1.6 m)   Wt 145 lb (65.8 kg)   SpO2 91%   BMI 25.69 kg/m²   General appearance: alert, appears stated age and cooperative  Skin: Skin color, texture, turgor normal.   HEENT: Head: Normocephalic, no lesions, without obvious abnormality.   Neck: no adenopathy, no carotid bruit, no JVD and supple, symmetrical, trachea midline  Lungs: clear to auscultation bilaterally  Heart: regular rate and rhythm, S1, S2 normal, no murmur, click, rub or gallop  Abdomen: soft, non-tender; bowel sounds normal; no masses,  no organomegaly  Extremities: extremities normal, atraumatic, no cyanosis or edema  Lymphatic: No significant lymph node enlargement papable  Neurologic: Mental status: Alert, oriented, thought content appropriate, facial droop      Assessment & Plan:    Traumatic rhabdomyolysis- cont IVF  Metabolic acidosis- cont bicarb   Hypokalemia -replace  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- follow OP  Constipation - treat  Right upper lobe collapse - ct chest    Severe deconditioning - PT/OT- SNF  Facial droop- ct head neg- mri brain pending      Marcie Ervin MD

## 2022-04-03 ENCOUNTER — APPOINTMENT (OUTPATIENT)
Dept: MRI IMAGING | Age: 77
DRG: 564 | End: 2022-04-03
Payer: MEDICARE

## 2022-04-03 PROBLEM — R47.1 DYSARTHRIA: Status: ACTIVE | Noted: 2022-04-03

## 2022-04-03 PROBLEM — E87.6 HYPOKALEMIA: Status: ACTIVE | Noted: 2022-04-03

## 2022-04-03 PROBLEM — E55.9 VITAMIN D DEFICIENCY: Status: ACTIVE | Noted: 2022-04-03

## 2022-04-03 PROBLEM — R26.9 GAIT ABNORMALITY: Status: ACTIVE | Noted: 2022-04-03

## 2022-04-03 PROBLEM — R29.810 FACIAL DROOP: Status: ACTIVE | Noted: 2022-04-03

## 2022-04-03 PROBLEM — Z87.81 HISTORY OF PELVIC FRACTURE: Status: ACTIVE | Noted: 2022-04-03

## 2022-04-03 PROBLEM — M25.559 HIP PAIN: Status: ACTIVE | Noted: 2022-04-03

## 2022-04-03 LAB
ALBUMIN SERPL-MCNC: 2.7 G/DL (ref 3.5–5.2)
ALBUMIN SERPL-MCNC: 2.9 G/DL (ref 3.5–5.2)
ALP BLD-CCNC: 76 U/L (ref 35–104)
ALP BLD-CCNC: 79 U/L (ref 35–104)
ALT SERPL-CCNC: 58 U/L (ref 5–33)
ALT SERPL-CCNC: 63 U/L (ref 5–33)
ANION GAP SERPL CALCULATED.3IONS-SCNC: 13 MMOL/L (ref 7–19)
ANION GAP SERPL CALCULATED.3IONS-SCNC: 13 MMOL/L (ref 7–19)
AST SERPL-CCNC: 106 U/L (ref 5–32)
AST SERPL-CCNC: 112 U/L (ref 5–32)
BILIRUB SERPL-MCNC: 0.4 MG/DL (ref 0.2–1.2)
BILIRUB SERPL-MCNC: 0.5 MG/DL (ref 0.2–1.2)
BUN BLDV-MCNC: 24 MG/DL (ref 8–23)
BUN BLDV-MCNC: 25 MG/DL (ref 8–23)
C-REACTIVE PROTEIN: 12.66 MG/DL (ref 0–0.5)
CALCIUM SERPL-MCNC: 8.2 MG/DL (ref 8.8–10.2)
CALCIUM SERPL-MCNC: 8.3 MG/DL (ref 8.8–10.2)
CHLORIDE BLD-SCNC: 106 MMOL/L (ref 98–111)
CHLORIDE BLD-SCNC: 106 MMOL/L (ref 98–111)
CO2: 21 MMOL/L (ref 22–29)
CO2: 21 MMOL/L (ref 22–29)
CREAT SERPL-MCNC: 0.6 MG/DL (ref 0.5–0.9)
CREAT SERPL-MCNC: 0.6 MG/DL (ref 0.5–0.9)
EKG P AXIS: -13 DEGREES
EKG P AXIS: 66 DEGREES
EKG P-R INTERVAL: 144 MS
EKG P-R INTERVAL: 156 MS
EKG Q-T INTERVAL: 324 MS
EKG Q-T INTERVAL: 398 MS
EKG QRS DURATION: 104 MS
EKG QRS DURATION: 86 MS
EKG QTC CALCULATION (BAZETT): 388 MS
EKG QTC CALCULATION (BAZETT): 417 MS
EKG T AXIS: 11 DEGREES
EKG T AXIS: 129 DEGREES
GFR AFRICAN AMERICAN: >59
GFR AFRICAN AMERICAN: >59
GFR NON-AFRICAN AMERICAN: >60
GFR NON-AFRICAN AMERICAN: >60
GLUCOSE BLD-MCNC: 75 MG/DL (ref 74–109)
GLUCOSE BLD-MCNC: 82 MG/DL (ref 74–109)
HCT VFR BLD CALC: 34.9 % (ref 37–47)
HCT VFR BLD CALC: 35.9 % (ref 37–47)
HEMOGLOBIN: 11.4 G/DL (ref 12–16)
HEMOGLOBIN: 11.8 G/DL (ref 12–16)
LV EF: 58 %
LVEF MODALITY: NORMAL
MAGNESIUM: 1.7 MG/DL (ref 1.6–2.4)
MAGNESIUM: 1.8 MG/DL (ref 1.6–2.4)
MCH RBC QN AUTO: 30.7 PG (ref 27–31)
MCH RBC QN AUTO: 30.9 PG (ref 27–31)
MCHC RBC AUTO-ENTMCNC: 32.7 G/DL (ref 33–37)
MCHC RBC AUTO-ENTMCNC: 32.9 G/DL (ref 33–37)
MCV RBC AUTO: 93.5 FL (ref 81–99)
MCV RBC AUTO: 94.6 FL (ref 81–99)
PDW BLD-RTO: 13.6 % (ref 11.5–14.5)
PDW BLD-RTO: 13.7 % (ref 11.5–14.5)
PLATELET # BLD: 237 K/UL (ref 130–400)
PLATELET # BLD: 244 K/UL (ref 130–400)
PMV BLD AUTO: 10.5 FL (ref 9.4–12.3)
PMV BLD AUTO: 10.6 FL (ref 9.4–12.3)
POTASSIUM SERPL-SCNC: 3 MMOL/L (ref 3.5–5)
POTASSIUM SERPL-SCNC: 3.3 MMOL/L (ref 3.5–5)
RBC # BLD: 3.69 M/UL (ref 4.2–5.4)
RBC # BLD: 3.84 M/UL (ref 4.2–5.4)
RHEUMATOID FACTOR: 10 IU/ML
SEDIMENTATION RATE, ERYTHROCYTE: 19 MM/HR (ref 0–25)
SODIUM BLD-SCNC: 140 MMOL/L (ref 136–145)
SODIUM BLD-SCNC: 140 MMOL/L (ref 136–145)
TOTAL CK: 1080 U/L (ref 26–192)
TOTAL CK: 1117 U/L (ref 26–192)
TOTAL PROTEIN: 4.7 G/DL (ref 6.6–8.7)
TOTAL PROTEIN: 5.1 G/DL (ref 6.6–8.7)
TROPONIN: 0.03 NG/ML (ref 0–0.03)
TROPONIN: 0.03 NG/ML (ref 0–0.03)
WBC # BLD: 10.7 K/UL (ref 4.8–10.8)
WBC # BLD: 10.7 K/UL (ref 4.8–10.8)

## 2022-04-03 PROCEDURE — 36415 COLL VENOUS BLD VENIPUNCTURE: CPT

## 2022-04-03 PROCEDURE — 99223 1ST HOSP IP/OBS HIGH 75: CPT | Performed by: PSYCHIATRY & NEUROLOGY

## 2022-04-03 PROCEDURE — 70553 MRI BRAIN STEM W/O & W/DYE: CPT

## 2022-04-03 PROCEDURE — 83735 ASSAY OF MAGNESIUM: CPT

## 2022-04-03 PROCEDURE — 86038 ANTINUCLEAR ANTIBODIES: CPT

## 2022-04-03 PROCEDURE — 93306 TTE W/DOPPLER COMPLETE: CPT

## 2022-04-03 PROCEDURE — 2580000003 HC RX 258: Performed by: HOSPITALIST

## 2022-04-03 PROCEDURE — 6370000000 HC RX 637 (ALT 250 FOR IP): Performed by: INTERNAL MEDICINE

## 2022-04-03 PROCEDURE — 6370000000 HC RX 637 (ALT 250 FOR IP): Performed by: HOSPITALIST

## 2022-04-03 PROCEDURE — 86140 C-REACTIVE PROTEIN: CPT

## 2022-04-03 PROCEDURE — 93005 ELECTROCARDIOGRAM TRACING: CPT | Performed by: HOSPITALIST

## 2022-04-03 PROCEDURE — 6360000002 HC RX W HCPCS: Performed by: HOSPITALIST

## 2022-04-03 PROCEDURE — 93010 ELECTROCARDIOGRAM REPORT: CPT | Performed by: INTERNAL MEDICINE

## 2022-04-03 PROCEDURE — 85027 COMPLETE CBC AUTOMATED: CPT

## 2022-04-03 PROCEDURE — 6370000000 HC RX 637 (ALT 250 FOR IP): Performed by: PSYCHIATRY & NEUROLOGY

## 2022-04-03 PROCEDURE — 6360000004 HC RX CONTRAST MEDICATION: Performed by: PSYCHIATRY & NEUROLOGY

## 2022-04-03 PROCEDURE — A9577 INJ MULTIHANCE: HCPCS | Performed by: PSYCHIATRY & NEUROLOGY

## 2022-04-03 PROCEDURE — 84484 ASSAY OF TROPONIN QUANT: CPT

## 2022-04-03 PROCEDURE — 93880 EXTRACRANIAL BILAT STUDY: CPT

## 2022-04-03 PROCEDURE — 1210000000 HC MED SURG R&B

## 2022-04-03 PROCEDURE — 82550 ASSAY OF CK (CPK): CPT

## 2022-04-03 PROCEDURE — 85652 RBC SED RATE AUTOMATED: CPT

## 2022-04-03 PROCEDURE — 86431 RHEUMATOID FACTOR QUANT: CPT

## 2022-04-03 PROCEDURE — 80053 COMPREHEN METABOLIC PANEL: CPT

## 2022-04-03 RX ORDER — POTASSIUM CHLORIDE 20 MEQ/1
20 TABLET, EXTENDED RELEASE ORAL
Status: DISCONTINUED | OUTPATIENT
Start: 2022-04-03 | End: 2022-04-04

## 2022-04-03 RX ORDER — HYDROCODONE BITARTRATE AND ACETAMINOPHEN 5; 325 MG/1; MG/1
1 TABLET ORAL EVERY 6 HOURS PRN
Status: DISCONTINUED | OUTPATIENT
Start: 2022-04-03 | End: 2022-04-03

## 2022-04-03 RX ORDER — AMIODARONE HYDROCHLORIDE 200 MG/1
200 TABLET ORAL 2 TIMES DAILY
Status: DISCONTINUED | OUTPATIENT
Start: 2022-04-03 | End: 2022-04-06

## 2022-04-03 RX ORDER — METHYLPREDNISOLONE SODIUM SUCCINATE 40 MG/ML
40 INJECTION, POWDER, LYOPHILIZED, FOR SOLUTION INTRAMUSCULAR; INTRAVENOUS DAILY
Status: DISCONTINUED | OUTPATIENT
Start: 2022-04-03 | End: 2022-04-03

## 2022-04-03 RX ORDER — METOPROLOL SUCCINATE 50 MG/1
50 TABLET, EXTENDED RELEASE ORAL DAILY
Status: DISCONTINUED | OUTPATIENT
Start: 2022-04-04 | End: 2022-04-06

## 2022-04-03 RX ORDER — OXYCODONE HYDROCHLORIDE AND ACETAMINOPHEN 5; 325 MG/1; MG/1
1 TABLET ORAL EVERY 6 HOURS PRN
Status: DISCONTINUED | OUTPATIENT
Start: 2022-04-03 | End: 2022-04-05

## 2022-04-03 RX ADMIN — OXYCODONE HYDROCHLORIDE AND ACETAMINOPHEN 1 TABLET: 5; 325 TABLET ORAL at 12:17

## 2022-04-03 RX ADMIN — APIXABAN 5 MG: 5 TABLET, FILM COATED ORAL at 09:35

## 2022-04-03 RX ADMIN — HYDROXYZINE HYDROCHLORIDE 25 MG: 25 TABLET ORAL at 20:52

## 2022-04-03 RX ADMIN — SODIUM BICARBONATE 1300 MG: 650 TABLET ORAL at 16:55

## 2022-04-03 RX ADMIN — SODIUM BICARBONATE 1300 MG: 650 TABLET ORAL at 12:19

## 2022-04-03 RX ADMIN — NYSTATIN: 100000 CREAM TOPICAL at 20:53

## 2022-04-03 RX ADMIN — METOPROLOL SUCCINATE 25 MG: 50 TABLET, EXTENDED RELEASE ORAL at 02:18

## 2022-04-03 RX ADMIN — OXYCODONE HYDROCHLORIDE AND ACETAMINOPHEN 1 TABLET: 5; 325 TABLET ORAL at 20:56

## 2022-04-03 RX ADMIN — NYSTATIN 500000 UNITS: 100000 SUSPENSION ORAL at 20:52

## 2022-04-03 RX ADMIN — NYSTATIN 500000 UNITS: 100000 SUSPENSION ORAL at 16:55

## 2022-04-03 RX ADMIN — AMIODARONE HYDROCHLORIDE 200 MG: 200 TABLET ORAL at 09:36

## 2022-04-03 RX ADMIN — TRAMADOL HYDROCHLORIDE 50 MG: 50 TABLET, COATED ORAL at 17:15

## 2022-04-03 RX ADMIN — POTASSIUM CHLORIDE 20 MEQ: 20 TABLET, EXTENDED RELEASE ORAL at 09:37

## 2022-04-03 RX ADMIN — AMIODARONE HYDROCHLORIDE 200 MG: 200 TABLET ORAL at 01:57

## 2022-04-03 RX ADMIN — AMIODARONE HYDROCHLORIDE 200 MG: 200 TABLET ORAL at 20:52

## 2022-04-03 RX ADMIN — APIXABAN 5 MG: 5 TABLET, FILM COATED ORAL at 20:52

## 2022-04-03 RX ADMIN — POTASSIUM CHLORIDE 20 MEQ: 20 TABLET, EXTENDED RELEASE ORAL at 16:55

## 2022-04-03 RX ADMIN — GADOBENATE DIMEGLUMINE 13 ML: 529 INJECTION, SOLUTION INTRAVENOUS at 14:43

## 2022-04-03 RX ADMIN — POLYETHYLENE GLYCOL 3350 17 G: 17 POWDER, FOR SOLUTION ORAL at 09:36

## 2022-04-03 RX ADMIN — SODIUM BICARBONATE 1300 MG: 650 TABLET ORAL at 20:52

## 2022-04-03 RX ADMIN — SODIUM BICARBONATE 1300 MG: 650 TABLET ORAL at 12:18

## 2022-04-03 RX ADMIN — METHYLPREDNISOLONE SODIUM SUCCINATE 40 MG: 40 INJECTION, POWDER, FOR SOLUTION INTRAMUSCULAR; INTRAVENOUS at 16:56

## 2022-04-03 RX ADMIN — LACTULOSE 20 G: 20 SOLUTION ORAL at 16:55

## 2022-04-03 RX ADMIN — SODIUM CHLORIDE, PRESERVATIVE FREE 10 ML: 5 INJECTION INTRAVENOUS at 16:57

## 2022-04-03 RX ADMIN — TRAMADOL HYDROCHLORIDE 50 MG: 50 TABLET, COATED ORAL at 06:45

## 2022-04-03 RX ADMIN — LACTULOSE 20 G: 20 SOLUTION ORAL at 20:52

## 2022-04-03 RX ADMIN — METOPROLOL SUCCINATE 25 MG: 50 TABLET, EXTENDED RELEASE ORAL at 09:35

## 2022-04-03 ASSESSMENT — PAIN SCALES - GENERAL
PAINLEVEL_OUTOF10: 10
PAINLEVEL_OUTOF10: 10
PAINLEVEL_OUTOF10: 0
PAINLEVEL_OUTOF10: 5
PAINLEVEL_OUTOF10: 8
PAINLEVEL_OUTOF10: 8
PAINLEVEL_OUTOF10: 0

## 2022-04-03 ASSESSMENT — ENCOUNTER SYMPTOMS
ALLERGIC/IMMUNOLOGIC NEGATIVE: 1
CHOKING: 1
GASTROINTESTINAL NEGATIVE: 1
EYES NEGATIVE: 1

## 2022-04-03 NOTE — PROGRESS NOTES
[facility] Hospitalists      Progress Note    Patient:  Jaleesa Spence  YOB: 1945  Date of Service: 4/3/2022  MRN: [id number]   Acct: [de-identified]   Primary Care Physician: KATI Myrick CNP  Advance Directive: Full Code  Admit Date: 4/1/2022       Hospital Day: 2    Portions of this note have been copied forward, however, updated to reflect the most current clinical status of this patient. CHIEF COMPLAINT Fall    SUBJECTIVE: Speech still slurred. .  Very quick to tell me she will do reha here but she WILL go home. Family here, a niece who would like a  Meeting with social work. CUMULATIVE HOSPITAL COURSE:   The patient is a 67 y.o. female who presented to [facility] ED complaining of fall. Pt has history of paroxysmal afib on eliquis, moderate aortic stenosis and diastolic dysfunction followed by cardiology. She has history of HTN and osteoarthritis as well. She fell 3 days ago at home reporting problems with bilateral leg weakness. She denied syncope. She lives alone and her neighbor today went to her house as she hadn't been answering her phone. Pt had crawled to unlock door however did not have strength to get up out of floor reporting generalized weakness. She denied injury from fall. She has not had anything to eat or drink over past few days. She tells me that her hips have been painful. She denied problems with cough, fevers, shortness of breath as well as chest pain. She had previously reported abdominal discomfort that has since resolved. He neighbor is hearing impaired and reads lips relating that she hadn't been able to read pt's lips today giving concern for speech changes. Pt tells me that she doesn't have her bottom dentures and that her mouth has been dry. She denied history of CVA as well as prior TIA.    In ED, CK 7136, creatinine 0.9/BUN elevated at 41, sodium 141, potassium 4.4, CO2 low at 16, anion gap elevated at 20, alk phos 95, /ALT 68, UA with 15ketones, large blood, negative nitrite, negative leukocyte esterase, covid test negative, ek sr hr 89, wbc 17, hgb 14, platelets 623, INR 7.11  CT head-no acute intracranial abnormality, CT cspine-no acute cervical fracture or traumatic malalignment, CXR-right paratracheal upper lobe opacities with volume loss may relate to RUL collapse w/recommendation for CT chest with contrast, pelvis right hip imaging with old fractures, bilateral hip prosthesis, no acute appearing bony injury. Pt is admitted to hospitalist service for further evaluation and treatment. Today she is hurting allover. Her groin where she had previous pelvic fx is main complaint. Her left leg appears rotated inward. Xray negative for acute problem. She has left side facial drooping of the mouth that is still present.     Review of Systems   Constitutional: Negative. HENT: Negative. Eyes: Negative. Respiratory: Positive for choking. Cardiovascular: Negative. Gastrointestinal: Negative. Endocrine: Negative. Musculoskeletal: Positive for arthralgias and myalgias. Allergic/Immunologic: Negative. Neurological: Positive for weakness. Negative for dizziness. Hematological: Negative. Psychiatric/Behavioral: Negative. All other systems reviewed and are negative. Objective:   VITALS:  BP (!) 121/97   Pulse 77   Temp 97.9 °F (36.6 °C) (Temporal)   Resp 20   Ht 5' 3\" (1.6 m)   Wt 145 lb (65.8 kg)   SpO2 95%   BMI 25.69 kg/m²   24HR INTAKE/OUTPUT:      Intake/Output Summary (Last 24 hours) at 4/3/2022 1348  Last data filed at 4/3/2022 1307  Gross per 24 hour   Intake 4149.89 ml   Output 700 ml   Net 3449.89 ml         Physical Exam  Vitals and nursing note reviewed. Constitutional:       Appearance: She is ill-appearing. HENT:      Head: Normocephalic and atraumatic. Nose: Nose normal.      Mouth/Throat:      Mouth: Mucous membranes are moist.   Eyes:      Extraocular Movements: Extraocular movements intact. Comments: Cataract lenses   Cardiovascular:      Rate and Rhythm: Normal rate. Rhythm irregular. Heart sounds: Murmur heard. Pulmonary:      Breath sounds: Normal breath sounds. Abdominal:      General: Bowel sounds are normal.      Palpations: Abdomen is soft. Musculoskeletal:      Cervical back: Neck supple. Skin:     General: Skin is warm and dry. Coloration: Skin is pale. Neurological:      Comments: Left mouth droop            Medications:      sodium chloride 100 mL/hr at 04/03/22 1307    sodium chloride        amiodarone  200 mg Oral BID    potassium chloride  20 mEq Oral TID WC    [START ON 4/4/2022] metoprolol succinate  50 mg Oral Daily    nystatin  5 mL Oral 4x Daily    sodium bicarbonate  1,300 mg Oral 4x Daily    polyethylene glycol  17 g Oral Daily    bisacodyl  10 mg Rectal Daily    sodium chloride flush  5-40 mL IntraVENous 2 times per day    lactulose  20 g Oral TID    apixaban  5 mg Oral BID    fluticasone  2 spray Nasal Daily    hydrOXYzine  25 mg Oral Nightly    vitamin D  50,000 Units Oral Weekly    nystatin   Topical BID     oxyCODONE-acetaminophen, potassium chloride **OR** potassium alternative oral replacement **OR** potassium chloride, magic butt cream, HYDROmorphone, sodium chloride flush, sodium chloride, ondansetron **OR** ondansetron, polyethylene glycol, acetaminophen **OR** acetaminophen, traMADol  ADULT DIET;  Regular  ADULT ORAL NUTRITION SUPPLEMENT; Breakfast, Lunch, Dinner; Standard High Calorie/High Protein Oral Supplement     Lab and other Data:     Recent Labs     04/02/22  0923 04/03/22  0613 04/03/22  0800   WBC 15.6* 10.7 10.7   HGB 12.4 11.4* 11.8*    237 244     Recent Labs     04/02/22  0553 04/03/22  0613 04/03/22  0800    140 140   K 3.3* 3.0* 3.3*    106 106   CO2 19* 21* 21*   BUN 42* 25* 24*   CREATININE 0.8 0.6 0.6   GLUCOSE 81 75 82     Recent Labs     04/02/22  0553 04/03/22  0613 04/03/22  0800   AST 176* 106* 112*   ALT 65* 58* 63*   BILITOT 0.4 0.4 0.5   ALKPHOS 83 76 79     Troponin T:   Recent Labs     04/02/22  1205 04/03/22  0613 04/03/22  0800   TROPONINI 0.04* 0.03 0.03     Pro-BNP: No results for input(s): BNP in the last 72 hours. INR:   Recent Labs     04/01/22  1202   INR 1.03     UA:  Recent Labs     04/01/22  1320   COLORU YELLOW   PHUR 5.5   WBCUA 1   RBCUA 2   BACTERIA NEGATIVE*   CLARITYU Clear   SPECGRAV 1.021   LEUKOCYTESUR Negative   UROBILINOGEN 0.2   BILIRUBINUR Negative   BLOODU LARGE*   GLUCOSEU Negative     A1C: No results for input(s): LABA1C in the last 72 hours. ABG:No results for input(s): PHART, KMN1BND, PO2ART, WFO5VWP, BEART, HGBAE, A2TUKPYD, CARBOXHGBART in the last 72 hours. RAD:   CT ABDOMEN PELVIS WO CONTRAST Additional Contrast? None    Result Date: 4/1/2022  CT ABDOMEN PELVIS WO CONTRAST 4/1/2022 12:23 PM HISTORY: Fall COMPARISON: None DLP: 972 mGy cm TECHNIQUE: Noncontrast enhanced images of the abdomen and pelvis obtained without oral contrast. Automated exposure control was also utilized to decrease patient radiation dose. FINDINGS: Lung bases are clear. Coronary atheromatous calcification. LIVER: No focal liver lesion. BILIARY SYSTEM: The gallbladder is unremarkable. No intrahepatic or extrahepatic ductal dilatation. PANCREAS: No focal pancreatic lesion. SPLEEN: Unremarkable. KIDNEYS AND ADRENALS: Bilateral kidneys and adrenal glands are unremarkable. The ureters are decompressed and normal in appearance. RETROPERITONEUM: No mass, lymphadenopathy or hemorrhage. GI TRACT: Stomach and small bowel are unremarkable. Moderate colonic stool. OTHER: There is no mesenteric mass, lymphadenopathy or fluid collection. Unremarkable bilateral hip arthroplasties. Old healed right pubic bone fracture. Advanced lumbar spondylosis. No acute bony abnormality. PELVIS: No mass lesion, fluid collection or significant lymphadenopathy is seen in the pelvis.  The urinary bladder is normal in appearance. 1. No acute process in the abdomen or pelvis. 2. Moderate colonic stool. Bowel loops are nondilated. 3. Unremarkable bilateral hip arthroplasties. Lumbar spondylosis. No acute bony normality. Signed by Dr Andi Lamar    CT Head WO Contrast    Result Date: 4/2/2022  CT BRAIN without contrast 4/2/2022 12:38 PM HISTORY: New onset weakness yesterday COMPARISON: 4/1/2022 DLP: 632 mGy cm. All CT scans are performed using dose optimization techniques as appropriate to the performed exam and include at least one of the following: Automated exposure control, adjustment of the mA and/or kV according to size, and the use of the iterative reconstruction technique. TECHNIQUE: Serial axial tomographic images of the brain were obtained without the use of intravenous contrast. FINDINGS: The midline structures are nondisplaced. There is mild cerebral and cerebellar volume loss, with an associated increase in the prominence of the ventricles and sulci. The basilar cisterns are normal in size and configuration. There is no evidence of intracranial hemorrhage or mass-effect. There is low attenuation in the periventricular white matter, consistent with chronic ischemic change. There are no abnormal extra-axial fluid collections. There is no evidence of tonsillar herniation. The included orbits and their contents are unremarkable. The visualized paranasal sinuses, mastoid air cells and middle ear cavities are clear. The visualized osseous structures and overlying soft tissues of the skull and face are intact. 1. Mild cerebral and cerebellar volume loss with chronic microvascular disease but no evidence of acute intracranial process. Signed by Dr Guillermo Ford    Result Date: 4/1/2022  Examination. CT HEAD WO CONTRAST 4/1/2022 12:38 PM History: The patient fell. DLP: 1229 mGycm. The automated exposure control was utilized to minimize the patient's radiation exposure.  The CT scan of the head is performed without intravenous contrast enhancement. The images are acquired in axial plane with subsequent reconstruction in coronal and sagittal planes. There is no previous study for comparison There is no evidence of an intracranial hemorrhage or hematoma. There is no evidence of a mass. No midline shift. Moderately dilated ventricles, basal cistern and the cortical sulci suggestive chronic volume loss/atrophy. The scattered areas of chronic white matter ischemia are seen in the centrum semiovale bilaterally. The gray-white matter differentiation is maintained. The images reviewed in bone window show no evidence of a skull fracture. Severe atheromatous changes of the intracranial internal carotid arteries bilaterally noted. 1. No acute intracranial abnormality. 2. No skull fracture. 3. Chronic ischemic and atrophic changes. Signed by Dr Rosana Adkins    Result Date: 4/1/2022  CT cervical spine 4/1/2022 12:22 PM HISTORY: Fall TECHNIQUE: Axial images of the cervical spine were obtained without IV contrast. Coronal and sagittal reformatted images are reconstructed and reviewed. COMPARISON: None. DLP: 425 mGy cm Automated exposure control was utilized to minimize patient radiation dose. FINDINGS: Alignment of the cervical spine is appropriate. Moderate to advanced degenerative disc change. Diffuse uncinate process hypertrophy with mild facet osteoarthropathy. No acute cervical vertebral fracture. Incomplete fusion of posterior arch of C1, developmental finding. Mild to moderate canal stenosis at C5/6 with mild narrowing about below this level. Scattered moderate to severe foraminal stenosis. Moderate carotid bulb calcification. No apical lung nodule. 1. Moderate to advanced degenerative changes cervical spine with no acute cervical vertebral fracture or traumatic malalignment.  Signed by Dr Michael Gore Additional Contrast? None    Result Date: 4/2/2022  EXAMINATION: CT pelvis without contrast 4/2/2022 HISTORY: Pubic pain post fall Dose: 807 mGycm. All CT scans are performed using dose optimization techniques as appropriate to the performed exam and include at least one of the following: Automated exposure control, adjustment of the mA and/or kV according to size, and the use of the iterative reconstruction technique. FINDINGS: Multiple contiguous axial images are obtained through the bony pelvis per protocol without venous contrast enhancement with reformatted images obtained in the sagittal coronal projections from the original data set. At the L4-L5 level there is broad-based bulging of disc as well as moderate facet and ligamentous hypertrophy. There is moderate central spinal stenosis as well as moderate bilateral neural foraminal narrowing. There is mild grade 1 anterolisthesis of L4 on L5 likely representing subluxation at the level of the facets. The SI joints are intact. No evidence of sacral fracture. The patient is status post bilateral total hip arthroplasty. This does obscure some of the anatomic detail secondary to streaking artifact. There are old fractures of the right superior and inferior pubic ramus. No acute fractures are present. The pubic symphysis is intact. A Saeed catheter is in place within the bladder. There is mild fecal impaction within the rectal vault. 1.. Old fractures of the right superior and inferior pubic rami. No additional fractures are present. The SI joints and pubic symphysis are intact with no evidence of diastases. 2. Facet and ligamentous hypertrophy as well as bulging of the disc contributes to central and foraminal stenosis at L4-L5. There is grade 1 anterolisthesis of L4 on L5 felt to represent subluxation at the level of the facets. 3. Constipation with increased stool throughout the colon including fecal impaction within the rectal vault.  Signed by Dr La Dc    Result Date: 4/1/2022  XR CHEST PORTABLE 4/1/2022 12:21 PM HISTORY: Fatigue COMPARISON: 4/3/2019 CXR: A single frontal view of the chest is performed. Findings: There is volume loss of the right upper hemithorax with ipsilateral shifting of the mediastinal structures. Partial right upper lobe collapse considered. Left lung appears clear. Probable right lower lobe granuloma. Heart is upper limits of normal in size. No pleural effusion or pneumothorax. Degenerative change regional skeleton. 1. Right paratracheal upper lobe opacities with associated volume loss may relate to right upper lobe collapse. Follow-up imaging recommended. CT chest preferably with IV contrast could be performed for further assessment if clinically indicated. Signed by Dr Tia Valdes    XR HIP 2-3 VW W PELVIS RIGHT    Result Date: 4/1/2022  History: Pain after fall Right hip: Frontal view the pelvis as well as frontal and crosstable lateral views of the right hip are obtained. The right prosthesis. No periprosthetic fracture. Old fractures of the right inferior pelvis involving right superior and inferior rami. Enthesophytes project from the right iliac spine. Chronic calcification along the right acetabular roof. Degenerative change of the lower lumbar spine. Vascular calcification. 1. Old fractures of the right inferior pelvis. Bilateral hip prostheses. No acute appearing bony injury identified.  Signed by Dr Tia Valdes          Assessment/Plan   Principal Problem:    Rhabdomyolysis   Ck improved   Continue daily lab   Pain control   Strict I & O     Active Problems:    Fall   Ct pelvis left hip    HTN (hypertension)   Home meds=-metoprolol   Continue to moniter   Echo completed result pending    Generalized weakness   Pt/ot    Dehydration   Moniter renal function   Daily lab    Paroxysmal atrial fibrillation (HCC)   Telemetry    eliquis    Transaminitis   improved   Daily lab  Neuro status change   Ct head -nl   Neurology following    MRI left infarct  Resolved Problems:    * No resolved hospital problems. Discharge planning: snf most likely      Further Orders per Clinical course/attending. Electronically signed by KATI Stevenson CNP on 4/3/2022 at 1:48 PM       EMR Dragon/Transcription disclaimer:   Much of this encounter note is an electronic transcription/translation of spoken language to printed text. The electronic translation of spoken language may permit erroneous, or at times, nonsensical words or phrases to be inadvertently transcribed; although attempts have made to review the note for such errors, some may still exist.      Attestation Statement     I have independently seen and examined this patient and agree with the asesment and plan by [facility] MEDICINE  - PROGRESS NOTE         Objective:   Vitals: BP (!) 121/97   Pulse 77   Temp 97.9 °F (36.6 °C) (Temporal)   Resp 20   Ht 5' 3\" (1.6 m)   Wt 145 lb (65.8 kg)   SpO2 95%   BMI 25.69 kg/m²   General appearance: alert, appears stated age and cooperative  Skin: Skin color, texture, turgor normal.   HEENT: Head: Normocephalic, no lesions, without obvious abnormality.   Neck: no adenopathy, no carotid bruit, no JVD and supple, symmetrical, trachea midline  Lungs: clear to auscultation bilaterally  Heart: regular rate and rhythm, S1, S2 normal, no murmur, click, rub or gallop  Abdomen: soft, non-tender; bowel sounds normal; no masses,  no organomegaly  Extremities: extremities normal, atraumatic, no cyanosis or edema  Lymphatic: No significant lymph node enlargement papable  Neurologic: Mental status: Alert, oriented, thought content appropriate, facial droop      Assessment & Plan:    Traumatic rhabdomyolysis- cont IVF  Metabolic acidosis- cont bicarb   Hypokalemia -replace  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- follow OP  Constipation - treat  Right upper lobe collapse - ct chest    Severe deconditioning - PT/OT- acute rehab  Facial droop- mri shows acute stroke- neurology on board    Edson Raman MD

## 2022-04-03 NOTE — PROGRESS NOTES
Vascular Preliminary Report      Bilateral Carotid Artery Duplex Completed. Predicted Degree of Stenosis:     Rt ICA: 50-69%      Lt ICA:  <50%      Bilateral Vertebral Arteries appear antegrade. Final Report Pending.

## 2022-04-03 NOTE — CONSULTS
[facility] Neurology Consult        Patient:   Sara Garcia  MR#:    [id number]  Account Number:                   [id number]      Room:    [address] [place] [address]   YOB: 1945  Date of Progress Note: 4/3/2022  Time of Note                           10:11 AM  Attending Physician:  Anahi Burgess, *  Consulting Physician:   Bryant Young M.D.        [address]: Slurred speech, facial droop      HISTORY OF PRESENT ILLNESS:   This 67 y.o. female with a past medical history significant for atrial fibrillation on chronic anticoagulation with Eliquis, previous pelvic fracture and hypertension is seen for evaluation of facial droop and slurred speech. The patient has been on 4/1/2022 after being found down at home. Had fallen approximately 3 days prior to admission due to leg weakness. She was laying on the ground and was unable to get help. She does not recall why she fell. The neighbor tried to go over to the house that she had not heard from her and the patient was able to crawl to unlock the door but was unable to get up. The patient indicates she has a previous history of pelvic fracture in office take some Tylenol and tramadol at home with good pain relief. On admission her serum CK level was 7136. Her serum creatinine was 0.9 with a BUN of 41. Her CT of the head had some mild generalized volume loss with no acute changes noted. CT of the pelvis revealed old fracture of the right superior and inferior pubic rami. Patient is complaining of right pelvic pain. Patient denies any history of stroke. She indicates she had some dental work done and ever since then her face has been droop. She wears dentures and indicates her speech is slurred due to that and a dry mouth. REVIEW OF SYSTEMS:  Constitutional - No fever or chills. No diaphoresis or significant fatigue. HENT -  No tinnitus or significant hearing loss.   Eyes - no sudden vision change or eye pain  Respiratory - no significant shortness of breath or cough  Cardiovascular - no chest pain No palpitations or significant leg swelling  Gastrointestinal - no abdominal swelling or pain. Right groin pain  Genitourinary - No difficulty urinating, dysuria  Musculoskeletal - no back pain or myalgia. Skin - no color change or rash  Neurologic - No seizures. No lateralizing weakness. Hematologic - no easy bruising or excessive bleeding. Psychiatric - no severe anxiety or nervousness. All other review of systems are negative.       Past Medical History:      Diagnosis Date    Chronic back pain     Hypertension     Inflammatory arthritis     Knee pain     Spastic colon        Past Surgical History:      Procedure Laterality Date    ABDOMEN SURGERY      APPENDECTOMY      BACK SURGERY      neck; bone out of hip to fuse neck    FINGER TRIGGER RELEASE Left 12/17/2020    LEFT MIDDLE TRIGGER FINGER RELEASE performed by Melquiades Le MD at [address] [place] [address] HAND SURGERY Left 2012    fx repair (3 total surgeries)    HAND SURGERY Left 12/17/2020    LEFT WRIST MASS EXCISION performed by Melquiades Le MD at [address] Bilateral     HIP    MOUTH SURGERY      TOTAL KNEE ARTHROPLASTY Right 9/2/2021    RIGHT TOTAL KNEE ARTHROPLASTY performed by Yobani Coburn MD at [address] [place] [address]         Medications in Hospital:      Current Facility-Administered Medications:     amiodarone (CORDARONE) tablet 200 mg, 200 mg, Oral, BID, Josue Butler MD, 200 mg at 04/03/22 0936    oxyCODONE-acetaminophen (PERCOCET) 5-325 MG per tablet 1 tablet, 1 tablet, Oral, Q6H PRN, Chelsea Ndiaye MD    potassium chloride (KLOR-CON M) extended release tablet 40 mEq, 40 mEq, Oral, PRN **OR** potassium bicarb-citric acid (EFFER-K) effervescent tablet 40 mEq, 40 mEq, Oral, PRN **OR** potassium chloride 10 mEq/100 mL IVPB (Peripheral Line), 10 mEq, IntraVENous, PRN, Finesse Lane, APRN - CNP    potassium chloride (KLOR-CON M) extended release tablet 20 mEq, 20 mEq, Oral, Daily, Janell Tesfaye MD, 20 mEq at 04/03/22 6184    sodium bicarbonate tablet 1,300 mg, 1,300 mg, Oral, 4x Daily, Janell Tesfaye MD, 1,300 mg at 04/02/22 2136    magic butt cream, , Topical, Q4H PRN, Maurisio Sea, APRN - CNP, Given at 04/02/22 2136    HYDROmorphone HCl PF (DILAUDID) injection 0.5 mg, 0.5 mg, IntraVENous, Q3H PRN, Maurisio Sea, APRN - CNP, 0.5 mg at 04/02/22 2134    polyethylene glycol (GLYCOLAX) packet 17 g, 17 g, Oral, Daily, Janell Tesfaye MD, 17 g at 04/03/22 5498    bisacodyl (DULCOLAX) suppository 10 mg, 10 mg, Rectal, Daily, Janell Tesfaye MD, 10 mg at 04/02/22 1943    0.9 % sodium chloride infusion, , IntraVENous, Continuous, Janell Tesfaye MD, Last Rate: 100 mL/hr at 04/02/22 1409, Rate Verify at 04/02/22 1409    sodium chloride flush 0.9 % injection 5-40 mL, 5-40 mL, IntraVENous, 2 times per day, Janell Tesfaye MD, 5 mL at 04/02/22 1548    sodium chloride flush 0.9 % injection 5-40 mL, 5-40 mL, IntraVENous, PRN, Janell Tesfaye MD    0.9 % sodium chloride infusion, , IntraVENous, PRN, Janell Tesfaye MD    ondansetron (ZOFRAN-ODT) disintegrating tablet 4 mg, 4 mg, Oral, Q8H PRN **OR** ondansetron (ZOFRAN) injection 4 mg, 4 mg, IntraVENous, Q6H PRN, Janell Tesfaye MD    polyethylene glycol (GLYCOLAX) packet 17 g, 17 g, Oral, Daily PRN, Janell Tesfaye MD    acetaminophen (TYLENOL) tablet 650 mg, 650 mg, Oral, Q6H PRN, 650 mg at 04/01/22 2149 **OR** acetaminophen (TYLENOL) suppository 650 mg, 650 mg, Rectal, Q6H PRN, Janell Tesfaye MD    lactulose (CHRONULAC) 10 GM/15ML solution 20 g, 20 g, Oral, TID, Janell Tesfaye MD, 20 g at 04/01/22 2146    apixaban (ELIQUIS) tablet 5 mg, 5 mg, Oral, BID, Janell Tesfaye MD, 5 mg at 04/03/22 0935    fluticasone (FLONASE) 50 MCG/ACT nasal spray 2 spray, 2 spray, Nasal, Daily, Juanna Saint, MD    hydrOXYzine (ATARAX) tablet 25 mg, 25 mg, Oral, Nightly, Juanna Saint, MD, 25 mg at 04/02/22 2136    metoprolol succinate (TOPROL XL) extended release tablet 25 mg, 25 mg, Oral, Daily, Juanna Saint, MD, 25 mg at 04/03/22 0935    traMADol (ULTRAM) tablet 50 mg, 50 mg, Oral, Q6H PRN, Juanna Saint, MD, 50 mg at 04/03/22 0645    vitamin D (ERGOCALCIFEROL) capsule 50,000 Units, 50,000 Units, Oral, Weekly, Juanna Saint, MD    nystatin (MYCOSTATIN) cream, , Topical, BID, KATI Peraza CNP, Given at 04/02/22 2136    Allergies:  Levofloxacin in d5w, Pyridium [phenazopyridine], Sulfamethoxazole-trimethoprim, Codeine, Hydrocodone-acetaminophen, Hydromorphone hcl, and Oxycodone    Social History:   TOBACCO:   reports that she has been smoking cigarettes. She has been smoking about 0.50 packs per day. She has never used smokeless tobacco.  ETOH:   reports no history of alcohol use. Family History:       Problem Relation Age of Onset    Other Other         Son DVT    Cancer Mother         colon     Heart Attack Mother     Heart Attack Father            Physical Exam:    Vitals: BP (!) 121/97   Pulse 121   Temp 97.9 °F (36.6 °C) (Temporal)   Resp 20   Ht 5' 3\" (1.6 m)   Wt 145 lb (65.8 kg)   SpO2 95%   BMI 25.69 kg/m²     Constitutional - well developed, well nourished. Eyes - conjunctiva normal.  Pupils not tested  Ear, nose, throat -hearing  intact to finger rub No scars, masses, or lesions over external nose or ears, no atrophy of tongue  Neck-symmetric, no masses noted, no jugular vein distension  Respiration- chest wall appears symmetric, good expansion,   normal effort without use of accessory muscles  Musculoskeletal - no significant wasting of muscles noted, no bony deformities  Extremities-no clubbing, cyanosis or edema  Skin - warm, dry, and intact. No rash, erythema, or pallor.   Psychiatric - mood, affect, and behavior appear slightly slowed    Neurological exam  Awake, slightly slow, fluent oriented x 2   Attention and concentration appear mildly impaired  Recent and remote memory appears mildly impaired  Speech with dysarthria  No clear issues with language of fund of knowledge    Cranial Nerve Exam   CN II- Visual fields grossly unremarkable  CN III, IV,VI-EOMI, No nystagmus, conjugate eye movements, no ptosis  CN V-sensation intact to LT over face  CN VII-right lower facial droop  CN VIII-Hearing  intact to finger rub  CN IX and X- Palate not tested  CN XI-not test shoulder shrug  CN XII-Tongue midline with no fasciculations or fibrillations    Motor Exam  Antigravity throughout upper and lower extremities bilaterally, no cogwheeling, normal tone  Giveaway in the right arm and right leg    Sensory Exam  Sensation intact to light touch and temperature upper and lower extremities bilaterally    Reflexes   Not tested    Tremors- no tremors in hands or head noted    Gait  Not tested    Coordination  Finger to nose-slowed        CBC:   Recent Labs     04/02/22  0923 04/03/22  0613 04/03/22  0800   WBC 15.6* 10.7 10.7   HGB 12.4 11.4* 11.8*    237 244       BMP:    Recent Labs     04/02/22  0553 04/03/22  0613 04/03/22  0800    140 140   K 3.3* 3.0* 3.3*    106 106   CO2 19* 21* 21*   BUN 42* 25* 24*   CREATININE 0.8 0.6 0.6   GLUCOSE 81 75 82       Hepatic:   Recent Labs     04/02/22  0553 04/03/22  0613 04/03/22  0800   * 106* 112*   ALT 65* 58* 63*   BILITOT 0.4 0.4 0.5   ALKPHOS 83 76 79       Lipids: No results for input(s): CHOL, HDL in the last 72 hours.     Invalid input(s): LDLCALCU    INR:   Recent Labs     04/01/22  1202   INR 1.03           Assessment and Plan     Facial droop, dysarthria, fall, weakness    Examination  Generalized giveaway weakness worse in the right arm and right leg but patient complains of right shoulder issues and right groin pain  Right lower facial droop with dysarthric speech-patient indicates has had facial droop following a dental procedure previously and her speech is slurred due to her dentures and a dry mouth. Stroke is certainly in the differential  MRI of the brain/carotid ultrasound/2D echo  On Eliquis     GI-bowel regimen  Atrial fibrillation-Eliquis/amiodarone/metoprolol  Allergies-Flonase  Hypertension-on medications monitor  Hypokalemia-on potassium  Rhabdomyolysis-IV fluids-CK trending down  Vitamin D deficiency-on vitamin D  Pain control-try Percocet as needed  DVT prophylaxis-Eliquis  PT/OT/speech    Continue supportive care      Please feel free to call with any questions   [phone] (cell phone)    Dr Lacho Patel certified in Neurology  Board Certified in Clinical Neurophysiology  Fellowship Trained in [place] [address] Neurophysiology    EMR Dragon/transcription disclaimer:Significant part of this  encounter note is electronic transcription/translation of spoken language to printed text. The electronic translation of spoken language may be erroneous, or at times, nonsensical words or phrases may be inadvertently transcribed.  Although I have reviewed the note for such errors, some may still exist.

## 2022-04-03 NOTE — PROGRESS NOTES
LAUREN met with Pt lvece Kristopher Hernandez along with Pt neighbor Sylvain Pierre. After SW went over guardianship process, Inna Donald stated she would be willing to file to emergency guardianship. Inna Donald stated Pt has two daughters but they have had nothing to do with Pt for many years now. SW stated he would work on getting the appropriate paperwork together. Pt will also need a speech cognitive eval done and the order has been placed in the system.        [id number]  Kristopher Hernandez      [phone]  Sylvain Pierre     Electronically signed by Clint Sun on 4/3/2022 at 4:02 PM

## 2022-04-03 NOTE — CARE COORDINATION
LAUREN met with Pt & her neighbor John Fink to discuss concerns with Pt going home. Monika states she is only her neighbor and helps out when she can. Monika stated Pt is currently not at her baseline, as she was able to cook/clean and take care of herself. Monika stated Pt gave her the number to her niece, which is the only family that John Fink knows of. LAUREN explained if there is not any family available, then Pt would need emergency guardianship. Monika stated she did not want to be the guardian for Pt if there is no family available. LAUREN explained Pt would need a state guardian, if there is no one available to help make decisions. Monika has contacted Pt niece and is currently awaiting call back. LAUREN left contact card and asked to be contacted once she is able to speak with family. Either way Pt will most likely need emergency guardianship.    Electronically signed by Patricia Causey on 4/3/2022 at 1:07 PM

## 2022-04-04 ENCOUNTER — APPOINTMENT (OUTPATIENT)
Dept: CT IMAGING | Age: 77
DRG: 564 | End: 2022-04-04
Payer: MEDICARE

## 2022-04-04 PROBLEM — G89.4 CHRONIC PAIN DISORDER: Status: ACTIVE | Noted: 2022-04-04

## 2022-04-04 PROBLEM — Z51.5 PALLIATIVE CARE PATIENT: Status: ACTIVE | Noted: 2022-04-04

## 2022-04-04 PROBLEM — I51.89 DIASTOLIC DYSFUNCTION: Status: ACTIVE | Noted: 2022-04-04

## 2022-04-04 PROBLEM — K21.9 GASTROESOPHAGEAL REFLUX DISEASE: Status: ACTIVE | Noted: 2022-04-04

## 2022-04-04 LAB
ALBUMIN SERPL-MCNC: 3 G/DL (ref 3.5–5.2)
ALP BLD-CCNC: 77 U/L (ref 35–104)
ALT SERPL-CCNC: 60 U/L (ref 5–33)
ANION GAP SERPL CALCULATED.3IONS-SCNC: 11 MMOL/L (ref 7–19)
AST SERPL-CCNC: 75 U/L (ref 5–32)
BILIRUB SERPL-MCNC: 0.3 MG/DL (ref 0.2–1.2)
BUN BLDV-MCNC: 17 MG/DL (ref 8–23)
CALCIUM SERPL-MCNC: 8.4 MG/DL (ref 8.8–10.2)
CHLORIDE BLD-SCNC: 107 MMOL/L (ref 98–111)
CO2: 21 MMOL/L (ref 22–29)
CREAT SERPL-MCNC: 0.6 MG/DL (ref 0.5–0.9)
GFR AFRICAN AMERICAN: >59
GFR NON-AFRICAN AMERICAN: >60
GLUCOSE BLD-MCNC: 132 MG/DL (ref 74–109)
HCT VFR BLD CALC: 35 % (ref 37–47)
HEMOGLOBIN: 11.2 G/DL (ref 12–16)
LIPASE: 12 U/L (ref 13–60)
MAGNESIUM: 1.9 MG/DL (ref 1.6–2.4)
MCH RBC QN AUTO: 30.7 PG (ref 27–31)
MCHC RBC AUTO-ENTMCNC: 32 G/DL (ref 33–37)
MCV RBC AUTO: 95.9 FL (ref 81–99)
PDW BLD-RTO: 13.4 % (ref 11.5–14.5)
PLATELET # BLD: 228 K/UL (ref 130–400)
PMV BLD AUTO: 10.4 FL (ref 9.4–12.3)
POTASSIUM SERPL-SCNC: 3.9 MMOL/L (ref 3.5–5)
RBC # BLD: 3.65 M/UL (ref 4.2–5.4)
SODIUM BLD-SCNC: 139 MMOL/L (ref 136–145)
TOTAL CK: 553 U/L (ref 26–192)
TOTAL PROTEIN: 5.2 G/DL (ref 6.6–8.7)
WBC # BLD: 8.3 K/UL (ref 4.8–10.8)

## 2022-04-04 PROCEDURE — 2580000003 HC RX 258: Performed by: HOSPITALIST

## 2022-04-04 PROCEDURE — 99222 1ST HOSP IP/OBS MODERATE 55: CPT | Performed by: INTERNAL MEDICINE

## 2022-04-04 PROCEDURE — 80053 COMPREHEN METABOLIC PANEL: CPT

## 2022-04-04 PROCEDURE — 92523 SPEECH SOUND LANG COMPREHEN: CPT

## 2022-04-04 PROCEDURE — 97166 OT EVAL MOD COMPLEX 45 MIN: CPT

## 2022-04-04 PROCEDURE — 36415 COLL VENOUS BLD VENIPUNCTURE: CPT

## 2022-04-04 PROCEDURE — 1210000000 HC MED SURG R&B

## 2022-04-04 PROCEDURE — 71260 CT THORAX DX C+: CPT

## 2022-04-04 PROCEDURE — 82550 ASSAY OF CK (CPK): CPT

## 2022-04-04 PROCEDURE — 6360000002 HC RX W HCPCS: Performed by: HOSPITALIST

## 2022-04-04 PROCEDURE — 6360000004 HC RX CONTRAST MEDICATION: Performed by: INTERNAL MEDICINE

## 2022-04-04 PROCEDURE — 6370000000 HC RX 637 (ALT 250 FOR IP): Performed by: INTERNAL MEDICINE

## 2022-04-04 PROCEDURE — 83735 ASSAY OF MAGNESIUM: CPT

## 2022-04-04 PROCEDURE — 85027 COMPLETE CBC AUTOMATED: CPT

## 2022-04-04 PROCEDURE — 6370000000 HC RX 637 (ALT 250 FOR IP): Performed by: PSYCHIATRY & NEUROLOGY

## 2022-04-04 PROCEDURE — 97535 SELF CARE MNGMENT TRAINING: CPT

## 2022-04-04 PROCEDURE — 6370000000 HC RX 637 (ALT 250 FOR IP): Performed by: NURSE PRACTITIONER

## 2022-04-04 PROCEDURE — 83690 ASSAY OF LIPASE: CPT

## 2022-04-04 PROCEDURE — 99223 1ST HOSP IP/OBS HIGH 75: CPT | Performed by: PSYCHIATRY & NEUROLOGY

## 2022-04-04 PROCEDURE — 6370000000 HC RX 637 (ALT 250 FOR IP): Performed by: HOSPITALIST

## 2022-04-04 PROCEDURE — 6360000002 HC RX W HCPCS: Performed by: NURSE PRACTITIONER

## 2022-04-04 PROCEDURE — 97530 THERAPEUTIC ACTIVITIES: CPT

## 2022-04-04 RX ORDER — SIMETHICONE 80 MG
80 TABLET,CHEWABLE ORAL 4 TIMES DAILY PRN
Status: DISCONTINUED | OUTPATIENT
Start: 2022-04-04 | End: 2022-04-11 | Stop reason: HOSPADM

## 2022-04-04 RX ORDER — SODIUM BICARBONATE 650 MG/1
650 TABLET ORAL 4 TIMES DAILY
Status: DISCONTINUED | OUTPATIENT
Start: 2022-04-04 | End: 2022-04-05

## 2022-04-04 RX ORDER — DICYCLOMINE HYDROCHLORIDE 10 MG/1
10 CAPSULE ORAL
Status: DISCONTINUED | OUTPATIENT
Start: 2022-04-04 | End: 2022-04-04

## 2022-04-04 RX ORDER — POTASSIUM CHLORIDE 20 MEQ/1
20 TABLET, EXTENDED RELEASE ORAL 2 TIMES DAILY WITH MEALS
Status: DISCONTINUED | OUTPATIENT
Start: 2022-04-05 | End: 2022-04-08

## 2022-04-04 RX ADMIN — NYSTATIN 500000 UNITS: 100000 SUSPENSION ORAL at 21:07

## 2022-04-04 RX ADMIN — SODIUM BICARBONATE 1300 MG: 650 TABLET ORAL at 08:06

## 2022-04-04 RX ADMIN — NYSTATIN 500000 UNITS: 100000 SUSPENSION ORAL at 08:05

## 2022-04-04 RX ADMIN — DICYCLOMINE HYDROCHLORIDE 10 MG: 10 CAPSULE ORAL at 17:28

## 2022-04-04 RX ADMIN — NYSTATIN 500000 UNITS: 100000 SUSPENSION ORAL at 14:23

## 2022-04-04 RX ADMIN — NYSTATIN: 100000 CREAM TOPICAL at 09:38

## 2022-04-04 RX ADMIN — IOPAMIDOL 90 ML: 755 INJECTION, SOLUTION INTRAVENOUS at 12:27

## 2022-04-04 RX ADMIN — SODIUM BICARBONATE 1300 MG: 650 TABLET ORAL at 17:25

## 2022-04-04 RX ADMIN — ONDANSETRON 4 MG: 2 INJECTION INTRAMUSCULAR; INTRAVENOUS at 16:52

## 2022-04-04 RX ADMIN — APIXABAN 5 MG: 5 TABLET, FILM COATED ORAL at 21:07

## 2022-04-04 RX ADMIN — SODIUM BICARBONATE 1300 MG: 650 TABLET ORAL at 14:23

## 2022-04-04 RX ADMIN — SODIUM BICARBONATE 650 MG: 650 TABLET ORAL at 21:07

## 2022-04-04 RX ADMIN — HYDROMORPHONE HYDROCHLORIDE 0.5 MG: 1 INJECTION, SOLUTION INTRAMUSCULAR; INTRAVENOUS; SUBCUTANEOUS at 21:11

## 2022-04-04 RX ADMIN — LACTULOSE 20 G: 20 SOLUTION ORAL at 14:23

## 2022-04-04 RX ADMIN — HYDROXYZINE HYDROCHLORIDE 25 MG: 25 TABLET ORAL at 21:07

## 2022-04-04 RX ADMIN — SODIUM CHLORIDE, PRESERVATIVE FREE 10 ML: 5 INJECTION INTRAVENOUS at 08:06

## 2022-04-04 RX ADMIN — METOPROLOL SUCCINATE 50 MG: 50 TABLET, EXTENDED RELEASE ORAL at 08:05

## 2022-04-04 RX ADMIN — LACTULOSE 20 G: 20 SOLUTION ORAL at 08:05

## 2022-04-04 RX ADMIN — POTASSIUM CHLORIDE 20 MEQ: 20 TABLET, EXTENDED RELEASE ORAL at 08:05

## 2022-04-04 RX ADMIN — SODIUM CHLORIDE: 9 INJECTION, SOLUTION INTRAVENOUS at 08:04

## 2022-04-04 RX ADMIN — HYDROMORPHONE HYDROCHLORIDE 0.5 MG: 1 INJECTION, SOLUTION INTRAMUSCULAR; INTRAVENOUS; SUBCUTANEOUS at 12:06

## 2022-04-04 RX ADMIN — POTASSIUM CHLORIDE 20 MEQ: 20 TABLET, EXTENDED RELEASE ORAL at 17:25

## 2022-04-04 RX ADMIN — LACTULOSE 20 G: 20 SOLUTION ORAL at 21:07

## 2022-04-04 RX ADMIN — AMIODARONE HYDROCHLORIDE 200 MG: 200 TABLET ORAL at 21:07

## 2022-04-04 RX ADMIN — POTASSIUM CHLORIDE 20 MEQ: 20 TABLET, EXTENDED RELEASE ORAL at 12:49

## 2022-04-04 RX ADMIN — APIXABAN 5 MG: 5 TABLET, FILM COATED ORAL at 08:05

## 2022-04-04 RX ADMIN — TRAMADOL HYDROCHLORIDE 50 MG: 50 TABLET, COATED ORAL at 08:18

## 2022-04-04 RX ADMIN — NYSTATIN 500000 UNITS: 100000 SUSPENSION ORAL at 17:25

## 2022-04-04 RX ADMIN — AMIODARONE HYDROCHLORIDE 200 MG: 200 TABLET ORAL at 08:05

## 2022-04-04 RX ADMIN — OXYCODONE HYDROCHLORIDE AND ACETAMINOPHEN 1 TABLET: 5; 325 TABLET ORAL at 12:49

## 2022-04-04 RX ADMIN — HYDROMORPHONE HYDROCHLORIDE 0.5 MG: 1 INJECTION, SOLUTION INTRAMUSCULAR; INTRAVENOUS; SUBCUTANEOUS at 01:00

## 2022-04-04 RX ADMIN — POLYETHYLENE GLYCOL 3350 17 G: 17 POWDER, FOR SOLUTION ORAL at 08:06

## 2022-04-04 ASSESSMENT — PAIN DESCRIPTION - PAIN TYPE
TYPE: ACUTE PAIN
TYPE: CHRONIC PAIN

## 2022-04-04 ASSESSMENT — ENCOUNTER SYMPTOMS
COUGH: 0
EYES NEGATIVE: 1
DIARRHEA: 0
EYE DISCHARGE: 0
VOMITING: 0
GASTROINTESTINAL NEGATIVE: 1
BACK PAIN: 0
CHOKING: 1
CONSTIPATION: 0
ALLERGIC/IMMUNOLOGIC NEGATIVE: 1
WHEEZING: 0
SHORTNESS OF BREATH: 0
ABDOMINAL DISTENTION: 0
BLOOD IN STOOL: 0

## 2022-04-04 ASSESSMENT — PAIN - FUNCTIONAL ASSESSMENT: PAIN_FUNCTIONAL_ASSESSMENT: PREVENTS OR INTERFERES WITH MANY ACTIVE NOT PASSIVE ACTIVITIES

## 2022-04-04 ASSESSMENT — PAIN SCALES - GENERAL
PAINLEVEL_OUTOF10: 0
PAINLEVEL_OUTOF10: 9
PAINLEVEL_OUTOF10: 8
PAINLEVEL_OUTOF10: 6
PAINLEVEL_OUTOF10: 7
PAINLEVEL_OUTOF10: 5
PAINLEVEL_OUTOF10: 2

## 2022-04-04 ASSESSMENT — PAIN DESCRIPTION - DESCRIPTORS
DESCRIPTORS: ACHING;SORE
DESCRIPTORS: ACHING;DISCOMFORT

## 2022-04-04 ASSESSMENT — PAIN DESCRIPTION - ORIENTATION
ORIENTATION: RIGHT
ORIENTATION: LEFT

## 2022-04-04 ASSESSMENT — PAIN DESCRIPTION - LOCATION
LOCATION: SHOULDER
LOCATION: GENERALIZED

## 2022-04-04 ASSESSMENT — PAIN DESCRIPTION - FREQUENCY: FREQUENCY: INTERMITTENT

## 2022-04-04 ASSESSMENT — PAIN DESCRIPTION - ONSET: ONSET: GRADUAL

## 2022-04-04 NOTE — PROGRESS NOTES
Occupational Therapy Initial Assessment  Date: 2022   Patient Name: Yadiel Peterson  MRN: [id number]     : 1945    Date of Service: 2022    Discharge Recommendations:  Patient would benefit from continued therapy after discharge       Assessment   Assessment: Evaluation completed and tx initiated. The patient would benefit from further rehab to upgrade functional status  Treatment Diagnosis: Rhabdomyolosis, Left side infarct     REQUIRES OT FOLLOW UP: Yes  Activity Tolerance  Activity Tolerance: Patient Tolerated treatment well  Safety Devices  Safety Devices in place: Yes  Type of devices: Call light within reach; Chair alarm in place;Nurse notified           Patient Diagnosis(es): The primary encounter diagnosis was Traumatic rhabdomyolysis, initial encounter ([facility] [address] A diagnosis of Abnormal chest x-ray was also pertinent to this visit. has a past medical history of Chronic back pain, Hypertension, Inflammatory arthritis, Knee pain, and Spastic colon. has a past surgical history that includes Hand surgery (Left, ); Appendectomy; Abdomen surgery; Tubal ligation; Mouth surgery; Finger trigger release (Left, 2020); Hand surgery (Left, 2020); joint replacement (Bilateral); back surgery; and Total knee arthroplasty (Right, 2021). Treatment Diagnosis: Rhabdomyolosis, Left side infarct      Restrictions  Restrictions/Precautions  Restrictions/Precautions: Fall Risk,Contact Precautions    Subjective   General  Chart Reviewed: Yes  Patient assessed for rehabilitation services?: Yes  Family / Caregiver Present: Yes  Patient Currently in Pain: Yes  Pain Assessment  Pain Assessment: 0-10  Pain Level: 2  Pain Type: Chronic pain  Pain Location: Shoulder  Pain Orientation: Right (hx of rotator cuff issues, states she was planning for an injection)  Pain Descriptors: Aching; Sore (with use)  Functional Pain Assessment: Prevents or interferes with many active not passive activities  Non-Pharmaceutical Pain Intervention(s): Ambulation/Increased Activity; Elevation;Repositioned (activity modified)  Response to Pain Intervention: Patient Satisfied  Vital Signs  Patient Currently in Pain: Yes    Social/Functional History  Social/Functional History  Lives With: Alone  ADL Assistance: Independent  Ambulation Assistance: Independent  Transfer Assistance: Independent       Objective   Vision: Within Functional Limits  Hearing: Within functional limits       Observation/Palpation  Observation: FACIAL DROOP  Balance  Sitting Balance: Minimal assistance (lists to the right, requires multiple corrections)  Standing Balance: Maximum assistance  Toilet Transfers  Toilet - Technique: Stand step  Equipment Used: Standard bedside commode  Toilet Transfer: Maximum assistance  ADL  Feeding: Supervision  Grooming: Minimal assistance  UE Bathing: Minimal assistance  LE Bathing: Moderate assistance  UE Dressing: Minimal assistance  LE Dressing: Maximum assistance  Toileting: Maximum assistance  Coordination  Movements Are Fluid And Coordinated: No  Coordination and Movement description: Gross motor impairments; Fine motor impairments;Right UE;Decreased speed     Bed mobility  Supine to Sit: Maximum assistance  Transfers  Stand Step Transfers: Maximum assistance (to the left, may require assist of two to transfer to the right)  Transfer Comments: Feel the patient will be a good candidate for the [facility]     Cognition  Cognition Comment: Awake, alert, following directions with delayed responses.   Decreased awareness of deficits and implications on independent living                 LUE PROM (degrees)  LUE PROM: WFL  LUE AROM (degrees)  LUE AROM : WFL  RUE PROM (degrees)  RUE General PROM: Shoulder flexion  is 0-90 degrees, elbow WFL, wrist deferred due to IV, WFL for  finger flexion  RUE AROM (degrees)  RUE General AROM: Shoulder flexion in supine is 0-20 degrees, elbow WFL, wrist deferred due to IV, 50 % finger flexion, limited by weakness, edema, arthritis                    Plan   Plan  Times per week: 3-5    Goals  Short term goals  Time Frame for Short term goals: 1-2 weeks  Short term goal 1: Perform transfers with moderate assist  Short term goal 2: Perform shirt dressing with supervision  Short term goal 3: Perform LB dressing with moderate assist  Short term goal 4: Perform bed mobility with min A  Short term goal 5: Perform toileting with moderate assist  Long term goals  Time Frame for Long term goals : Upgrade as tolerated     Tx initiated:  Positioning, therapeutic activities, mobility and balance tasks.   (30 mins)          Porfirio Leventhal, OT/L  Electronically signed by Porfirio Leventhal OT/L on 4/4/2022 at 11:10 AM.

## 2022-04-04 NOTE — PROGRESS NOTES
Patient:   Albert Loaiza  MR#:    [id number]   Room:    [address] [place] [address]   YOB: 1945  Date of Progress Note: 4/4/2022  Time of Note                           10:14 AM  Consulting Physician:   Jodie Cooper M.D. Attending Physician:  Alva Askew, *     Chief complaint Slurred speech, facial droop    S:This 67 y.o. female  with a past medical history significant for atrial fibrillation on chronic anticoagulation with Eliquis, previous pelvic fracture and hypertension is seen for evaluation of facial droop and slurred speech. The patient has been on 4/1/2022 after being found down at home. Had fallen approximately 3 days prior to admission due to leg weakness. She was laying on the ground and was unable to get help. She does not recall why she fell. The neighbor tried to go over to the house that she had not heard from her and the patient was able to crawl to unlock the door but was unable to get up. The patient indicates she has a previous history of pelvic fracture in office take some Tylenol and tramadol at home with good pain relief. On admission her serum CK level was 7136. Her serum creatinine was 0.9 with a BUN of 41. Her CT of the head had some mild generalized volume loss with no acute changes noted. CT of the pelvis revealed old fracture of the right superior and inferior pubic rami. Patient is complaining of right pelvic pain. Patient denies any history of stroke. She indicates she had some dental work done and ever since then her face has been droop. She wears dentures and indicates her speech is slurred due to that and a dry mouth. Pelvic pain better.     REVIEW OF SYSTEMS:  Constitutional: No fevers No chills  Neck:No stiffness  Respiratory: No shortness of breath  Cardiovascular: No chest pain No palpitations  Gastrointestinal: No abdominal pain    Genitourinary: No Dysuria  Neurological: No headache, no confusion    Past Medical History:      Diagnosis Date    Chronic back pain     Hypertension     Inflammatory arthritis     Knee pain     Spastic colon        Past Surgical History:      Procedure Laterality Date    ABDOMEN SURGERY      APPENDECTOMY      BACK SURGERY      neck; bone out of hip to fuse neck    FINGER TRIGGER RELEASE Left 12/17/2020    LEFT MIDDLE TRIGGER FINGER RELEASE performed [facility]    HAND SURGERY Left 2012    fx repair (3 total surgeries)    HAND SURGERY Left 12/17/2020    LEFT WRIST MASS EXCISION performed by Christina Wadsworth MD at [facility] 113 Bilateral     HIP    MOUTH SURGERY      TOTAL KNEE ARTHROPLASTY Right 9/2/2021    RIGHT TOTAL KNEE ARTHROPLASTY performed by Chago Dong MD at [facility]         Medications in Hospital:      Current Facility-Administered Medications:     amiodarone (CORDARONE) tablet 200 mg, 200 mg, Oral, BID, Kaila Romeo MD, 200 mg at 04/04/22 0805    oxyCODONE-acetaminophen (PERCOCET) 5-325 MG per tablet 1 tablet, 1 tablet, Oral, Q6H PRN, Rita Bal MD, 1 tablet at 04/03/22 2056    potassium chloride (KLOR-CON M) extended release tablet 20 mEq, 20 mEq, Oral, TID WC, Suri Rivas MD, 20 mEq at 04/04/22 0805    metoprolol succinate (TOPROL XL) extended release tablet 50 mg, 50 mg, Oral, Daily, Suri Rivas MD, 50 mg at 04/04/22 0805    nystatin (MYCOSTATIN) 014137 UNIT/ML suspension 500,000 Units, 5 mL, Oral, 4x Daily, Suri Rivas MD, 500,000 Units at 04/04/22 0805    potassium chloride (KLOR-CON M) extended release tablet 40 mEq, 40 mEq, Oral, PRN **OR** potassium bicarb-citric acid (EFFER-K) effervescent tablet 40 mEq, 40 mEq, Oral, PRN **OR** potassium chloride 10 mEq/100 mL IVPB (Peripheral Line), 10 mEq, IntraVENous, PRN, Alis Bun, APRN - CNP    sodium bicarbonate tablet 1,300 mg, 1,300 mg, Oral, 4x Daily, Suri Rivas MD, 1,300 mg at 04/04/22 0806    magic butt cream, , Topical, Q4H PRN, KATI Cunha CNP, Given at 04/02/22 2136    HYDROmorphone HCl PF (DILAUDID) injection 0.5 mg, 0.5 mg, IntraVENous, Q3H PRN, KATI Cunha CNP, 0.5 mg at 04/04/22 0100    polyethylene glycol (GLYCOLAX) packet 17 g, 17 g, Oral, Daily, Jorge Guidry MD, 17 g at 04/04/22 5805    bisacodyl (DULCOLAX) suppository 10 mg, 10 mg, Rectal, Daily, Jorge Guidry MD, 10 mg at 04/02/22 1943    0.9 % sodium chloride infusion, , IntraVENous, Continuous, Jorge Guidry MD, Last Rate: 125 mL/hr at 04/04/22 0804, New Bag at 04/04/22 0804    sodium chloride flush 0.9 % injection 5-40 mL, 5-40 mL, IntraVENous, 2 times per day, Jorge Guidry MD, 10 mL at 04/04/22 0806    sodium chloride flush 0.9 % injection 5-40 mL, 5-40 mL, IntraVENous, PRN, Jorge Guidry MD, 10 mL at 04/03/22 1657    0.9 % sodium chloride infusion, , IntraVENous, PRN, Jorge Guidry MD    ondansetron (ZOFRAN-ODT) disintegrating tablet 4 mg, 4 mg, Oral, Q8H PRN **OR** ondansetron (ZOFRAN) injection 4 mg, 4 mg, IntraVENous, Q6H PRN, Jorge Guidry MD    polyethylene glycol (GLYCOLAX) packet 17 g, 17 g, Oral, Daily PRN, Jorge Guidry MD    acetaminophen (TYLENOL) tablet 650 mg, 650 mg, Oral, Q6H PRN, 650 mg at 04/01/22 2149 **OR** acetaminophen (TYLENOL) suppository 650 mg, 650 mg, Rectal, Q6H PRN, Jorge Guidry MD    lactulose (CHRONULAC) 10 GM/15ML solution 20 g, 20 g, Oral, TID, Jorge Guidry MD, 20 g at 04/04/22 0805    apixaban (ELIQUIS) tablet 5 mg, 5 mg, Oral, BID, Jorge Guidry MD, 5 mg at 04/04/22 0805    fluticasone (FLONASE) 50 MCG/ACT nasal spray 2 spray, 2 spray, Nasal, Daily, Jorge Guidry MD    hydrOXYzine (ATARAX) tablet 25 mg, 25 mg, Oral, Nightly, Jorge Guidry MD, 25 mg at 04/03/22 2052    traMADol (ULTRAM) tablet 50 mg, 50 mg, Oral, Q6H PRN, Jorge Guidry, MD, 50 mg at 04/04/22 0818    vitamin D (ERGOCALCIFEROL) capsule 50,000 Units, 50,000 Units, Oral, Weekly, Meet Guido MD    nystatin (MYCOSTATIN) cream, , Topical, BID, Cleaster Freeze, KATI - CNP, Given at 04/04/22 7103    Allergies:  Levofloxacin in d5w, Pyridium [phenazopyridine], Sulfamethoxazole-trimethoprim, Codeine, Hydrocodone-acetaminophen, Hydromorphone hcl, and Oxycodone    Social History:   TOBACCO:   reports that she has been smoking cigarettes. She has been smoking about 0.50 packs per day. She has never used smokeless tobacco.  ETOH:   reports no history of alcohol use. Family History:       Problem Relation Age of Onset    Other Other         Son DVT    Cancer Mother         colon     Heart Attack Mother     Heart Attack Father          PHYSICAL EXAM:  BP (!) 159/89   Pulse 72   Temp 97 °F (36.1 °C) (Temporal)   Resp 20   Ht 5' 3\" (1.6 m)   Wt 145 lb (65.8 kg)   SpO2 92%   BMI 25.69 kg/m²     Constitutional - well developed, well nourished. Eyes - conjunctiva normal.   Ear, nose, throat - No scars, masses, or lesions over external nose or ears, no atrophy of tongue  Neck-symmetric, no masses noted, no jugular vein distension  Respiration- chest wall appears symmetric, good expansion,   normal effort without use of accessory muscles  Musculoskeletal - no significant wasting of muscles noted, no bony deformities  Extremities-no clubbing, cyanosis or edema  Skin - warm, dry, and intact. No rash, erythema, or pallor.   Psychiatric - mood, affect, and behavior appear normal.      Neurological exam  Awake, alert, fluent oriented appropriate affect  Attention and concentration appear appropriate  Recent and remote memory appears unremarkable  Speech with dysarthria  No clear issues with language of fund of knowledge     Cranial Nerve Exam     CN III, IV,VI-EOMI, No nystagmus, conjugate eye movements, no ptosis    CN VII-right lower facial droop       Motor Exam  antigravity throughout upper and lower extremities bilaterally  Giveway more on the right      Tremors- no tremors in hands or head noted     Gait  Not tested     Nursing/pcp notes, imaging,labs and vitals reviewed. PT,OT and/or speech notes reviewed    Lab Results   Component Value Date    WBC 8.3 04/04/2022    HGB 11.2 (L) 04/04/2022    HCT 35.0 (L) 04/04/2022    MCV 95.9 04/04/2022     04/04/2022     Lab Results   Component Value Date     04/04/2022    K 3.9 04/04/2022     04/04/2022    CO2 21 (L) 04/04/2022    BUN 17 04/04/2022    CREATININE 0.6 04/04/2022    GLUCOSE 132 (H) 04/04/2022    CALCIUM 8.4 (L) 04/04/2022    PROT 5.2 (L) 04/04/2022    LABALBU 3.0 (L) 04/04/2022    BILITOT 0.3 04/04/2022    ALKPHOS 77 04/04/2022    AST 75 (H) 04/04/2022    ALT 60 (H) 04/04/2022    LABGLOM >60 04/04/2022    GFRAA >59 04/04/2022     Lab Results   Component Value Date    INR 1.03 04/01/2022    INR 0.96 08/30/2021    PROTIME 13.7 04/01/2022    PROTIME 13.0 08/30/2021       MRI BRAIN W WO CONTRAST [[id number]]    Resulted: 04/03/22 1553    Updated: 04/03/22 1555    Narrative:     MRI brain without and with contrast 4/3/2022 2:22 PM   History: Facial droop   Comparison: None.     Technique: Multiplanar imaging of the brain was performed in a routine   fashion before and after the intravenous injection of gadolinium   contrast.   Findings:   Midline structures are nondisplaced. Ventricular system is normal.   There is no significant mass effect or hydrocephalus. Basilar cisterns   are preserved. There are scattered foci of T2 abnormality involving   the periventricular and higher white matter tracts consistent with   moderate small vessel ischemic disease. There is mild gliosis within   the nely. There is a focus of low signal within the left thalamus near   the posterior limb of the internal capsule on gradient imaging   suggesting a site of previous hemorrhage or calcification.  On   diffusion-weighted imaging this is separable from the area of acute   ischemic infarction. No additional sites of acute or chronic   hemorrhage are demonstrated. . No abnormal extra axial fluid   collections are noted. There is diffusion restriction within the left   basal ganglia with involvement of the thalamus, posterior limb of the   left internal capsule and putamen with involvement of the external   capsule also demonstrated. These are nonhemorrhagic infarcts. There is   associated signal loss on ADC mapping imaging consistent with acute   ischemic infarction. No additional sites of diffusion restriction are   present. Blanchie Bevels Postcontrast imaging demonstrates a developmental venous anomaly which   is parafalcine in location within the right parietal lobe extending   into the region of the corpus callosum. No additional sites of   abnormal enhancement are present. Proximal cervical spinal cord, brainstem, and cerebellum are   unremarkable. Normal cerebrovascular flow voids are seen. Bilateral   globes and orbits are normal in appearance. No abnormal signal is noted in the mastoid air cells or paranasal   sinuses. Impression:     Impression:   1. Diffusion restriction within the left thalamus and basal ganglia   with associated ADC mapping abnormality consistent with an acute   left-sided infarct. This is nonhemorrhagic. There is no associated   mass effect or shift of the midline. 2. There is mild atrophy of the brain. Small vessel ischemic changes   are noted involving the periventricular and higher white matter   tracts. There is evidence of a remote small focus of hemorrhage within   the left thalamus. No additional sites of blooming are present on   gradient imaging. 3. DVA within the parafalcine right parietal lobe extending into the   region of the corpus callosum. No additional sites of abnormal   enhancement are present.    Signed by Dr Becka Sharma     ECHO Complete 2D W Doppler W Color [[id number]]    Collected: 04/03/22 1020 Updated: 04/04/22 0825    Narrative:     Transthoracic Echocardiography Report (TTE)      Demographics        Patient Name Faby Holden Date of Study          04/03/2022        MRN           [id number]            Gender                 Female        Date of Birth 1945        Room Number            [id number]        Age           76 year(s)        Height:       63 inches         Referring Physician    Ravinder Benitez        Weight:       145 pounds        Sonographer            Mary Tolentino RDCS        BSA:          1.69 m^2          Interpreting Physician Jammie Rios MD        BMI:          25.69 kg/m^2       Procedure     Type of Study        TTE procedure:ECHO NO CONTRAST WITH DOP/COLR.       Study Location: Echo Lab   Technical Quality: Adequate visualization     Patient Status: Inpatient     Rhythm: Within normal limits BP: 121/92 mmHg     Indications:TIA.      Conclusions        Summary    Mitral valve leaflets are mildly thickened with preserved leaflet    mobility.    Mitral annular calcification is present.    Mild mitral regurgitation is present.    Mild aortic stenosis.    Bicuspid valve not excluded    Mean gradient 15 mm hg    Tricuspid valve is structurally normal.    Mild tricuspid regurgitation with estimated RVSP of 39 mm Hg.    Moderately dilated left atrium.    Normal left ventricular size with preserved LV function and an estimated    ejection fraction of approximately 55-60%.  Mild concentric left ventricular hypertrophy.    Grade II diastolic dysfunction    Mildly enlarged right ventricle cavity.     IVC dilated.        Signature        ----------------------------------------------------------------    Electronically signed by Jammie Rios MD(Interpreting    [id number]) on 04/04/2022 08:25 AM    ----------------------------------------------------------------                RECORD REVIEW: Previous medical records, medications were reviewed at today's visit    IMPRESSION:   Acute ischemic stroke-2 areas of ischemia- left basal ganglia and left thalamus presumed cardi embolic with history of atrial fibrillation as patient taking her Eliquis only once a day    Facial droop, dysarthria, fall, weakness     Initial ExaminationGeneralized giveaway weakness worse in the right arm and right leg  Right lower facial droop with dysarthric speech    MRI of the brain-acute ischemia left basal ganglia and thalamus-personally reviewed  Carotid ultrasound-preliminarily <50% stenosis left internal carotid and asymptomatic 50-69% stenosis right internal carotid-medical management  2D echo-LV EF 55-60%-moderately dilated LA  On Eliquis now twice a day     GI-bowel regimen  Atrial fibrillation-Eliquis/amiodarone/metoprolol  Allergies-Flonase  Hypertension-on medications monitor  Hypokalemia-on potassium  Rhabdomyolysis-IV fluids-CK trending down  Vitamin D deficiency-on vitamin D  Pain control-try Percocet as needed  DVT prophylaxis-Eliquis  PT/OT/speech     Too low functioning for rehab    Continue supportive care    Disposition pending     Expected duration and frequency therapy: 180 minutes per day, 5 days per week    CALL WITH ANY QUESTIONS  [phone] CELL  Dr Maya Liu

## 2022-04-04 NOTE — ACP (ADVANCE CARE PLANNING)
Advance Care Planning     Advance Care Planning Activator (Inpatient)  Conversation Note      Date of ACP Conversation: 4/4/2022     Conversation Conducted with: {Discussion Participants:82004::\"Patient with Decision Making Capacity\"}    ACP Activator: Marci Rodriguez RN    {When Decision Maker makes decisions on behalf of the incapacitated patient: Decision Maker is asked to consider and make decisions based on patient values, known preferences, or best interests. [facility]):05600}    Health Care Decision Maker:     Current Designated Health Care Decision Maker:     Primary Decision Maker: Patti Wu - Niece/Nephew - [phone]    Secondary Decision Maker: Joe Rodríguez - Niece/Nephew - [phone]  Click here to complete Healthcare Decision Makers including section of the Healthcare Decision Maker Relationship (ie \"Primary\")  {Select if Healthcare Decision Makers in ACP Activity was empty/incomplete (Optional):[id number]}    Care Preferences    Ventilation: \"If you were in your present state of health and suddenly became very ill and were unable to breathe on your own, what would your preference be about the use of a ventilator (breathing machine) if it were available to you? \"      Would the patient desire the use of ventilator (breathing machine)?: {Yes/No-Ex:827800}    \"If your health worsens and it becomes clear that your chance of recovery is unlikely, what would your preference be about the use of a ventilator (breathing machine) if it were available to you? \"     Would the patient desire the use of ventilator (breathing machine)?: {YES/NO:85918}      Resuscitation  \"CPR works best to restart the heart when there is a sudden event, like a heart attack, in someone who is otherwise healthy. Unfortunately, CPR does not typically restart the heart for people who have serious health conditions or who are very sick. \"    \"In the event your heart stopped as a result of an underlying serious health condition, would you want attempts to be made to restart your heart (answer \"yes\" for attempt to resuscitate) or would you prefer a natural death (answer \"no\" for do not attempt to resuscitate)? \" {Yes/No-Ex:031389}       [] Yes   [] No   Educated Patient / Denis Maradiaga regarding differences between Advance Directives and portable DNR orders. Length of ACP Conversation in minutes:      Conversation Outcomes:  [] ACP discussion completed  [] Existing advance directive reviewed with patient; no changes to patient's previously recorded wishes  [] New Advance Directive completed  [] Portable Do Not Rescitate prepared for Provider review and signature  [] POLST/POST/MOLST/MOST prepared for Provider review and signature      Follow-up plan:    [] Schedule follow-up conversation to continue planning  [] Referred individual to Provider for additional questions/concerns   [] Advised patient/agent/surrogate to review completed ACP document and update if needed with changes in condition, patient preferences or care setting    [] This note routed to one or more involved healthcare providers    {If the relationship to the patient does NOT follow your state's Next of Kin hierarchy, the patient must complete an ACP document to allow him/her to act on the patient's behalf. (Optional):91068}    {If the patient has a valid advance directive AND verbally provides inconsistent care preference(s), advise the patient to either create a new advance directive or to orally revoke that prior directive.  (Optional):97851}

## 2022-04-04 NOTE — PROGRESS NOTES
Consult received. Will place Palliative care evaluation and Palliative RN/ will initiate discussions. We will follow along and assist as needed. Thank you for allowing us to participate in the care of this patient.     Nelson Castro PA-C

## 2022-04-04 NOTE — CONSULTS
Palliative Care:  Pt is new to palliative care team.  Presents to ED via EMS after she was found down. Pt stated she fell on Wednesday and was too weak to get herself up. Palliative care initiated for support, GOC ACP. Pt is alert and oriented. Nieces are at bedside. Pt will go for testing. Currently NPO. Past Medical History:        Past Medical History:   Diagnosis Date    Chronic back pain     Hypertension     Inflammatory arthritis     Knee pain     Spastic colon        Advance Directives:   Full Code, ACP complete. AD to be completed today with . Pain/Other Symptoms: Pt rates her abd pain equal to \"5\". Nurse aware and will administer pain. Activity:  As jennifer           Psychological/Spiritual:    Pt states she is of [employer] with good support. Limited family support. Plan:   Medical management, imaging, labs    Patient/family discussion r/t goals:  Pt tells me up until this fall she was able to manage all her ADL's. She did her own shopping, driving, cleaning, etc.  She reports she was rinsing some clothes in the sink when she felt fatigued and unable to stay standing she fell to the floor. Neighbor found her on Friday. Pt not feeling well, painful, tired. Palliative will follow up.  will follow for AD.                   Electronically signed by Liyah Calderon RN on 4/4/2022 at 12:29 PM

## 2022-04-04 NOTE — CONSULTS
[address] [facility]  Cardiology Consult      Requesting MD:  Shree Rosario, *   Admit Status:         History obtained from:   ? Patient  ? Other (specify):     PROBLEM LIST:    Patient Active Problem List    Diagnosis Date Noted    Chronic pain disorder 04/04/2022     Priority: Low    Gastroesophageal reflux disease 04/04/2022     Priority: Low    Palliative care patient 04/04/2022     Priority: Low    Diastolic dysfunction 95/82/5505     Priority: Low    Facial droop 04/03/2022     Priority: Low    Dysarthria 04/03/2022     Priority: Low    History of pelvic fracture 04/03/2022     Priority: Low    Hip pain 04/03/2022     Priority: Low    Hypokalemia 04/03/2022     Priority: Low    Vitamin D deficiency 04/03/2022     Priority: Low    Gait abnormality 04/03/2022     Priority: Low    Rhabdomyolysis 04/01/2022     Priority: Low    Fall 04/01/2022     Priority: Low    HTN (hypertension) 04/01/2022     Priority: Low    Generalized weakness 04/01/2022     Priority: Low    Anticoagulated 04/01/2022     Priority: Low    Dehydration 04/01/2022     Priority: Low    Intertrigo 04/01/2022     Priority: Low    Paroxysmal atrial fibrillation ([address] 04/01/2022     Priority: Low    Transaminitis 04/01/2022     Priority: Low    Abnormal CXR 04/01/2022     Priority: Low    Osteoarthritis of right knee 09/02/2021     Priority: Low    Trigger middle finger of right hand 12/17/2020     Priority: Low    Mass of right wrist 12/17/2020     Priority: Low     1. Paroxysmal atrial fibrillation on Eliquis (reduced compliance) and amiodarone (29% burden on Zio). 2.  Moderate aortic stenosis (echo 9/2021 with mean gradient 27 mmHg), normal LV ejection fraction. 3.  Active ongoing tobacco use. 4.  Acute left thalamic/basal ganglia infarct, intermediate right ICA stenosis. PRESENTATION: Santos Ferguson is a 67y.o. year old female who presents with apparent fall.   She slid down to the floor.  Denies losing consciousness. Was unable to get up on her own. Laid there on the floor for 2 days apparently. Neighbor apparently got her. Noted to have mild rhabdomyolysis with peak CPK of 7136. She has been taking her Eliquis only once a day apparently for the last month as she cut herself in it did not stop bleeding. Still smoking cigarettes. MRI reported as possible acute left sided thalamic/basal ganglia infarct. REVIEW OF SYSTEMS:  Review of Systems   Constitutional: Positive for fatigue. Negative for activity change and fever. HENT: Negative for ear pain, hearing loss and tinnitus. Eyes: Negative for discharge and visual disturbance. Respiratory: Negative for cough, shortness of breath and wheezing. Cardiovascular: Negative for chest pain, palpitations and leg swelling. Gastrointestinal: Negative for abdominal distention, blood in stool, constipation, diarrhea and vomiting. Endocrine: Negative for cold intolerance, heat intolerance, polydipsia and polyuria. Genitourinary: Negative for dysuria and hematuria. Musculoskeletal: Positive for gait problem. Negative for arthralgias, back pain and myalgias. Skin: Negative for pallor and rash. Neurological: Positive for weakness. Negative for seizures, syncope and headaches. Psychiatric/Behavioral: Negative for behavioral problems and dysphoric mood.        Past Medical History:      Diagnosis Date    Chronic back pain     Hypertension     Inflammatory arthritis     Knee pain     Spastic colon        Past Surgical History:      Procedure Laterality Date    ABDOMEN SURGERY      APPENDECTOMY      BACK SURGERY      neck; bone out of hip to fuse neck    FINGER TRIGGER RELEASE Left 12/17/2020    LEFT MIDDLE TRIGGER FINGER RELEASE performed by Brooks Gardner MD at [address] [place] [address] HAND SURGERY Left 2012    fx repair (3 total surgeries)    HAND SURGERY Left 12/17/2020    LEFT WRIST MASS EXCISION performed by Brooks Gardner MD at [facility] Bilateral     HIP    MOUTH SURGERY      TOTAL KNEE ARTHROPLASTY Right 9/2/2021    RIGHT TOTAL KNEE ARTHROPLASTY performed by Dani Law MD at [address] [place] [address]         Allergies:  Levofloxacin in d5w, Pyridium [phenazopyridine], Sulfamethoxazole-trimethoprim, Codeine, Hydrocodone-acetaminophen, Hydromorphone hcl, and Oxycodone    Past Social History:  Social History     Socioeconomic History    Marital status:      Spouse name: Not on file    Number of children: Not on file    Years of education: Not on file    Highest education level: Not on file   Occupational History    Not on file   Tobacco Use    Smoking status: Current Every Day Smoker     Packs/day: 0.50     Types: Cigarettes    Smokeless tobacco: Never Used   Vaping Use    Vaping Use: Never used   Substance and Sexual Activity    Alcohol use: No    Drug use: No    Sexual activity: Never   Other Topics Concern    Not on file   Social History Narrative    Not on file     Social Determinants of Health     Financial Resource Strain:     Difficulty of Paying Living Expenses: Not on file   Food Insecurity:     Worried About Running Out of Food in the Last Year: Not on file    Mari of Food in the Last Year: Not on file   Transportation Needs:     Lack of Transportation (Medical): Not on file    Lack of Transportation (Non-Medical):  Not on file   Physical Activity:     Days of Exercise per Week: Not on file    Minutes of Exercise per Session: Not on file   Stress:     Feeling of Stress : Not on file   Social Connections:     Frequency of Communication with Friends and Family: Not on file    Frequency of Social Gatherings with Friends and Family: Not on file    Attends Yarsanism Services: Not on file    Active Member of Clubs or Organizations: Not on file    Attends Club or Organization Meetings: Not on file    Marital Status: Not on file   Intimate Partner Violence:  Fear of Current or Ex-Partner: Not on file    Emotionally Abused: Not on file    Physically Abused: Not on file    Sexually Abused: Not on file   Housing Stability:     Unable to Pay for Housing in the Last Year: Not on file    Number of Places Lived in the Last Year: Not on file    Unstable Housing in the Last Year: Not on file       Family History:       Problem Relation Age of Onset    Other Other         Son DVT    Cancer Mother         colon     Heart Attack Mother     Heart Attack Father        Home Meds:  Prior to Admission medications    Medication Sig Start Date End Date Taking? Authorizing Provider   pantoprazole sodium (PROTONIX) 40 MG PACK packet Take 40 mg by mouth every morning (before breakfast)   Yes Historical Provider, MD   diphenhydrAMINE (BENADRYL) 50 MG capsule Take 50 mg by mouth nightly as needed for Itching   Yes Historical Provider, MD   simethicone (MYLICON) 80 MG chewable tablet Take 125 mg by mouth every 6 hours as needed for Flatulence   Yes Historical Provider, MD   DM-APAP-CPM (CORICIDIN HBP) -2 MG TABS Take 325 mg by mouth every 4-6 hours as needed   Yes Historical Provider, MD   dicyclomine (BENTYL) 10 MG capsule Take 10 mg by mouth three times daily    Historical Provider, MD   benzonatate (TESSALON) 200 MG capsule Take 200 mg by mouth 3 times daily    Historical Provider, MD   hydrOXYzine (ATARAX) 25 MG tablet Take 25 mg by mouth at bedtime 2/23/22   Historical Provider, MD   traMADol (ULTRAM) 50 MG tablet Take 50 mg by mouth 2 times daily.     Historical Provider, MD   apixaban (ELIQUIS) 5 MG TABS tablet Take 5 mg by mouth 2 times daily    Historical Provider, MD   metoprolol succinate (TOPROL XL) 25 MG extended release tablet Take 1 tablet by mouth daily 11/4/21   KATI Holliday   vitamin D (ERGOCALCIFEROL) 1.25 MG (56993 UT) CAPS capsule Take 50,000 Units by mouth once a week    Historical Provider, MD   ondansetron (ZOFRAN) 4 MG tablet Take 1 tablet by mouth every 8 hours as needed for Nausea or Vomiting 9/2/21   Rose Mary Tavarez MD   lisinopril (PRINIVIL;ZESTRIL) 20 MG tablet Take 20 mg by mouth nightly Indications: High Blood Pressure Disorder    Historical Provider, MD   fluticasone (FLONASE) 50 MCG/ACT nasal spray 2 sprays by Nasal route daily     Historical Provider, MD   loratadine (CLARITIN) 10 MG capsule Take 10 mg by mouth daily    Historical Provider, MD   furosemide (LASIX) 20 MG tablet Take 1 tablet by mouth daily as needed (swelling)  Patient taking differently: Take 40 mg by mouth daily as needed (swelling)  5/6/20   Jose Owens MD   celecoxib (CELEBREX) 200 MG capsule Take 1 capsule by mouth daily 6/19/18   Jose Owens MD       Current Meds:   amiodarone  200 mg Oral BID    potassium chloride  20 mEq Oral TID WC    metoprolol succinate  50 mg Oral Daily    nystatin  5 mL Oral 4x Daily    sodium bicarbonate  1,300 mg Oral 4x Daily    polyethylene glycol  17 g Oral Daily    bisacodyl  10 mg Rectal Daily    sodium chloride flush  5-40 mL IntraVENous 2 times per day    lactulose  20 g Oral TID    apixaban  5 mg Oral BID    fluticasone  2 spray Nasal Daily    hydrOXYzine  25 mg Oral Nightly    vitamin D  50,000 Units Oral Weekly    nystatin   Topical BID       Current Infused Meds:   sodium chloride 125 mL/hr at 04/04/22 0804    sodium chloride         Physical Exam:  Vitals:    04/04/22 0608   BP: (!) 159/89   Pulse: 72   Resp: 20   Temp: 97 °F (36.1 °C)   SpO2: 92%       Intake/Output Summary (Last 24 hours) at 4/4/2022 0934  Last data filed at 4/3/2022 2000  Gross per 24 hour   Intake 1997.96 ml   Output 1100 ml   Net 897.96 ml     Estimated body mass index is 25.69 kg/m² as calculated from the following:    Height as of this encounter: 5' 3\" (1.6 m). Weight as of this encounter: 145 lb (65.8 kg). Physical Exam  Constitutional:       General: She is not in acute distress. Appearance: She is not diaphoretic. HENT:      Mouth/Throat:      Pharynx: No oropharyngeal exudate. Eyes:      General: No scleral icterus. Right eye: No discharge. Left eye: No discharge. Neck:      Thyroid: No thyromegaly. Vascular: No JVD. Cardiovascular:      Rate and Rhythm: Normal rate and regular rhythm. No extrasystoles are present. Heart sounds: Normal heart sounds, S1 normal and S2 normal. No murmur heard. No systolic murmur is present. No diastolic murmur is present. No friction rub. No gallop. No S3 or S4 sounds. Comments: No JVD  No edema  Systolic murmur in the aortic area grade 2/6 to 3/6 with A2 well heard  Pulmonary:      Effort: Pulmonary effort is normal. No respiratory distress. Breath sounds: Normal breath sounds. No wheezing or rales. Chest:      Chest wall: No tenderness. Abdominal:      General: Bowel sounds are normal. There is no distension. Palpations: Abdomen is soft. There is no mass. Tenderness: There is no abdominal tenderness. There is no guarding or rebound. Hernia: No hernia is present. Comments: No palpable organomegaly   Musculoskeletal:         General: Normal range of motion. Skin:     General: Skin is warm. Coloration: Skin is not pale. Findings: No rash. Neurological:      Mental Status: She is alert and oriented to person, place, and time. Cranial Nerves: Cranial nerve deficit present.       Comments: Right facial droop with dysarthria  Generalized weakness without clear focality           Labs:  Recent Labs     04/03/22  0613 04/03/22  0800 04/04/22  0346   WBC 10.7 10.7 8.3   HGB 11.4* 11.8* 11.2*    244 228       Recent Labs     04/03/22  0613 04/03/22  0800 04/04/22  0346    140 139   K 3.0* 3.3* 3.9    106 107   CO2 21* 21* 21*   BUN 25* 24* 17   CREATININE 0.6 0.6 0.6   LABGLOM >60 >60 >60   MG 1.8 1.7 1.9   CALCIUM 8.2* 8.3* 8.4*       CK, CKMB, Troponin: @LABRCNT (CKTOTAL:3, CKMB:3, TROPONINI:3)@    Last 3 BNP:          IMAGING:  ECHO Complete 2D W Doppler W Color    Result Date: 4/4/2022  Transthoracic Echocardiography Report (TTE)  Demographics   Patient Name  March Sequin Date of Study          04/03/2022   MRN           [id number]            Gender                 Female   Date of Birth 1945        Room Number            MHL-0432   Age           67 year(s)   Height:       63 inches         Referring Physician    Margarita Molina   Weight:       145 pounds        Sonographer            Mary Tolentino [facility]   BSA:          1.69 m^2          Interpreting Physician Olive Hartman MD   BMI:          25.69 kg/m^2  Procedure Type of Study   TTE procedure:ECHO NO CONTRAST WITH DOP/COLR. Study Location: Echo Lab Technical Quality: Adequate visualization Patient Status: Inpatient Rhythm: Within normal limits BP: 121/92 mmHg Indications:TIA. Conclusions   Summary  Mitral valve leaflets are mildly thickened with preserved leaflet  mobility. Mitral annular calcification is present. Mild mitral regurgitation is present. Mild aortic stenosis. Bicuspid valve not excluded  Mean gradient 15 mm hg  Tricuspid valve is structurally normal.  Mild tricuspid regurgitation with estimated RVSP of 39 mm Hg. Moderately dilated left atrium. Normal left ventricular size with preserved LV function and an estimated  ejection fraction of approximately 55-60%. Mild concentric left ventricular hypertrophy. Grade II diastolic dysfunction  Mildly enlarged right ventricle cavity. IVC dilated. Signature   ----------------------------------------------------------------  Electronically signed by Olive Hartman MD(Interpreting  physician) on 04/04/2022 08:25 AM  ----------------------------------------------------------------   Findings   Mitral Valve  Mitral valve leaflets are mildly thickened with preserved leaflet  mobility. Mitral annular calcification is present. Mild mitral regurgitation is present.    Aortic Valve  Mild aortic stenosis. Bicuspid valve not excluded  Mean gradient 15 mm hg   Tricuspid Valve  Tricuspid valve is structurally normal.  Mild tricuspid regurgitation with estimated RVSP of 39 mm Hg. Pulmonic Valve  The pulmonic valve was not well visualized . Left Atrium  Moderately dilated left atrium. Left Ventricle  Normal left ventricular size with preserved LV function and an estimated  ejection fraction of approximately 55-60%. Mild concentric left ventricular hypertrophy. Grade II diastolic dysfunction   Right Atrium  Normal right atrial dimension with no evidence of thrombus or mass noted. Right Ventricle  Mildly enlarged right ventricle cavity. Pericardial Effusion  No evidence of significant pericardial effusion is noted. Pleural Effusion  No evidence of pleural effusion. Miscellaneous  IVC dilated. M-Mode Measurements (cm)   LVIDd: 4.61 cm                         LVIDs: 2.95 cm  IVSd: 1.33 cm  LVPWd: 1.33 cm                         AO Root Dimension: 3 cm  % Ejection Fraction: 61 %              LA: 3.7 cm                                         LVOT: 2 cm  Doppler Measurements:   AV Peak Velocity:264 cm/s            MV Peak E-Wave: 102 cm/s  AV Peak Gradient: 27.88 mmHg         MV Peak A-Wave: 90.4 cm/s  AV Mean Gradient: 15 mmHg            MV E/A Ratio: 1.13 %  AV Area (Continuity):1.14 cm^2       MV Peak Gradient: 4.16 mmHg  TR Velocity:300 cm/s                 MV P1/2t: 37 msec  TR Gradient:36 mmHg                  MVA by PHT5.95 cm^2  Estimated RAP:3 mmHg  RVSP:39 mmHg      CT ABDOMEN PELVIS WO CONTRAST Additional Contrast? None    Result Date: 4/1/2022  CT ABDOMEN PELVIS WO CONTRAST 4/1/2022 12:23 PM HISTORY: Fall COMPARISON: None DLP: 972 mGy cm TECHNIQUE: Noncontrast enhanced images of the abdomen and pelvis obtained without oral contrast. Automated exposure control was also utilized to decrease patient radiation dose. FINDINGS: Lung bases are clear.  Coronary atheromatous calcification. LIVER: No focal liver lesion. BILIARY SYSTEM: The gallbladder is unremarkable. No intrahepatic or extrahepatic ductal dilatation. PANCREAS: No focal pancreatic lesion. SPLEEN: Unremarkable. KIDNEYS AND ADRENALS: Bilateral kidneys and adrenal glands are unremarkable. The ureters are decompressed and normal in appearance. RETROPERITONEUM: No mass, lymphadenopathy or hemorrhage. GI TRACT: Stomach and small bowel are unremarkable. Moderate colonic stool. OTHER: There is no mesenteric mass, lymphadenopathy or fluid collection. Unremarkable bilateral hip arthroplasties. Old healed right pubic bone fracture. Advanced lumbar spondylosis. No acute bony abnormality. PELVIS: No mass lesion, fluid collection or significant lymphadenopathy is seen in the pelvis. The urinary bladder is normal in appearance. 1. No acute process in the abdomen or pelvis. 2. Moderate colonic stool. Bowel loops are nondilated. 3. Unremarkable bilateral hip arthroplasties. Lumbar spondylosis. No acute bony normality. Signed by Dr Nicole Will    CT Head WO Contrast    Result Date: 4/2/2022  CT BRAIN without contrast 4/2/2022 12:38 PM HISTORY: New onset weakness yesterday COMPARISON: 4/1/2022 DLP: 632 mGy cm. All CT scans are performed using dose optimization techniques as appropriate to the performed exam and include at least one of the following: Automated exposure control, adjustment of the mA and/or kV according to size, and the use of the iterative reconstruction technique. TECHNIQUE: Serial axial tomographic images of the brain were obtained without the use of intravenous contrast. FINDINGS: The midline structures are nondisplaced. There is mild cerebral and cerebellar volume loss, with an associated increase in the prominence of the ventricles and sulci. The basilar cisterns are normal in size and configuration. There is no evidence of intracranial hemorrhage or mass-effect.  There is low attenuation in the periventricular white matter, consistent with chronic ischemic change. There are no abnormal extra-axial fluid collections. There is no evidence of tonsillar herniation. The included orbits and their contents are unremarkable. The visualized paranasal sinuses, mastoid air cells and middle ear cavities are clear. The visualized osseous structures and overlying soft tissues of the skull and face are intact. 1. Mild cerebral and cerebellar volume loss with chronic microvascular disease but no evidence of acute intracranial process. Signed by Dr Ryne Mason    Result Date: 4/1/2022  Examination. CT HEAD WO CONTRAST 4/1/2022 12:38 PM History: The patient fell. DLP: 1229 mGycm. The automated exposure control was utilized to minimize the patient's radiation exposure. The CT scan of the head is performed without intravenous contrast enhancement. The images are acquired in axial plane with subsequent reconstruction in coronal and sagittal planes. There is no previous study for comparison There is no evidence of an intracranial hemorrhage or hematoma. There is no evidence of a mass. No midline shift. Moderately dilated ventricles, basal cistern and the cortical sulci suggestive chronic volume loss/atrophy. The scattered areas of chronic white matter ischemia are seen in the centrum semiovale bilaterally. The gray-white matter differentiation is maintained. The images reviewed in bone window show no evidence of a skull fracture. Severe atheromatous changes of the intracranial internal carotid arteries bilaterally noted. 1. No acute intracranial abnormality. 2. No skull fracture. 3. Chronic ischemic and atrophic changes. Signed by Dr Lillie Garcia    Result Date: 4/1/2022  CT cervical spine 4/1/2022 12:22 PM HISTORY: Fall TECHNIQUE: Axial images of the cervical spine were obtained without IV contrast. Coronal and sagittal reformatted images are reconstructed and reviewed. COMPARISON: None. DLP: 425 mGy cm Automated exposure control was utilized to minimize patient radiation dose. FINDINGS: Alignment of the cervical spine is appropriate. Moderate to advanced degenerative disc change. Diffuse uncinate process hypertrophy with mild facet osteoarthropathy. No acute cervical vertebral fracture. Incomplete fusion of posterior arch of C1, developmental finding. Mild to moderate canal stenosis at C5/6 with mild narrowing about below this level. Scattered moderate to severe foraminal stenosis. Moderate carotid bulb calcification. No apical lung nodule. 1. Moderate to advanced degenerative changes cervical spine with no acute cervical vertebral fracture or traumatic malalignment. Signed by Dr Geraldine Rodriguez Additional Contrast? None    Result Date: 4/2/2022  EXAMINATION: CT pelvis without contrast 4/2/2022 HISTORY: Pubic pain post fall Dose: 807 mGycm. All CT scans are performed using dose optimization techniques as appropriate to the performed exam and include at least one of the following: Automated exposure control, adjustment of the mA and/or kV according to size, and the use of the iterative reconstruction technique. FINDINGS: Multiple contiguous axial images are obtained through the bony pelvis per protocol without venous contrast enhancement with reformatted images obtained in the sagittal coronal projections from the original data set. At the L4-L5 level there is broad-based bulging of disc as well as moderate facet and ligamentous hypertrophy. There is moderate central spinal stenosis as well as moderate bilateral neural foraminal narrowing. There is mild grade 1 anterolisthesis of L4 on L5 likely representing subluxation at the level of the facets. The SI joints are intact. No evidence of sacral fracture. The patient is status post bilateral total hip arthroplasty. This does obscure some of the anatomic detail secondary to streaking artifact.  There are old fractures of the right superior and inferior pubic ramus. No acute fractures are present. The pubic symphysis is intact. A Saeed catheter is in place within the bladder. There is mild fecal impaction within the rectal vault. 1.. Old fractures of the right superior and inferior pubic rami. No additional fractures are present. The SI joints and pubic symphysis are intact with no evidence of diastases. 2. Facet and ligamentous hypertrophy as well as bulging of the disc contributes to central and foraminal stenosis at L4-L5. There is grade 1 anterolisthesis of L4 on L5 felt to represent subluxation at the level of the facets. 3. Constipation with increased stool throughout the colon including fecal impaction within the rectal vault. Signed by Dr Tiffany Eastman    Result Date: 4/1/2022  XR CHEST PORTABLE 4/1/2022 12:21 PM HISTORY: Fatigue COMPARISON: 4/3/2019 CXR: A single frontal view of the chest is performed. Findings: There is volume loss of the right upper hemithorax with ipsilateral shifting of the mediastinal structures. Partial right upper lobe collapse considered. Left lung appears clear. Probable right lower lobe granuloma. Heart is upper limits of normal in size. No pleural effusion or pneumothorax. Degenerative change regional skeleton. 1. Right paratracheal upper lobe opacities with associated volume loss may relate to right upper lobe collapse. Follow-up imaging recommended. CT chest preferably with IV contrast could be performed for further assessment if clinically indicated. Signed by Dr Swapna Weber    MRI C/ Esther 29    Result Date: 4/3/2022  MRI brain without and with contrast 4/3/2022 2:22 PM History: Facial droop Comparison: None. Technique: Multiplanar imaging of the brain was performed in a routine fashion before and after the intravenous injection of gadolinium contrast. Findings: Midline structures are nondisplaced.  Ventricular system is normal. There is no significant mass effect or hydrocephalus. Basilar cisterns are preserved. There are scattered foci of T2 abnormality involving the periventricular and higher white matter tracts consistent with moderate small vessel ischemic disease. There is mild gliosis within the nely. There is a focus of low signal within the left thalamus near the posterior limb of the internal capsule on gradient imaging suggesting a site of previous hemorrhage or calcification. On diffusion-weighted imaging this is separable from the area of acute ischemic infarction. No additional sites of acute or chronic hemorrhage are demonstrated. . No abnormal extra axial fluid collections are noted. There is diffusion restriction within the left basal ganglia with involvement of the thalamus, posterior limb of the left internal capsule and putamen with involvement of the external capsule also demonstrated. These are nonhemorrhagic infarcts. There is associated signal loss on ADC mapping imaging consistent with acute ischemic infarction. No additional sites of diffusion restriction are present. Bessie Brittany Postcontrast imaging demonstrates a developmental venous anomaly which is parafalcine in location within the right parietal lobe extending into the region of the corpus callosum. No additional sites of abnormal enhancement are present. Proximal cervical spinal cord, brainstem, and cerebellum are unremarkable. Normal cerebrovascular flow voids are seen. Bilateral globes and orbits are normal in appearance. No abnormal signal is noted in the mastoid air cells or paranasal sinuses. Impression: 1. Diffusion restriction within the left thalamus and basal ganglia with associated ADC mapping abnormality consistent with an acute left-sided infarct. This is nonhemorrhagic. There is no associated mass effect or shift of the midline. 2. There is mild atrophy of the brain.  Small vessel ischemic changes are noted involving the periventricular and higher white matter tracts. There is evidence of a remote small focus of hemorrhage within the left thalamus. No additional sites of blooming are present on gradient imaging. 3. DVA within the parafalcine right parietal lobe extending into the region of the corpus callosum. No additional sites of abnormal enhancement are present. Signed by Dr Ezequiel Hill 2-3 VW W PELVIS RIGHT    Result Date: 4/1/2022  History: Pain after fall Right hip: Frontal view the pelvis as well as frontal and crosstable lateral views of the right hip are obtained. The right prosthesis. No periprosthetic fracture. Old fractures of the right inferior pelvis involving right superior and inferior rami. Enthesophytes project from the right iliac spine. Chronic calcification along the right acetabular roof. Degenerative change of the lower lumbar spine. Vascular calcification. 1. Old fractures of the right inferior pelvis. Bilateral hip prostheses. No acute appearing bony injury identified. Signed by Dr Anastasia Drew and Plan: This is a 67y.o. year old female with past medical history of hypertension, active ongoing tobacco use, paroxysmal atrial fibrillation on amiodarone and Eliquis (reduced compliance), moderate aortic stenosis, normal LV ejection fraction presenting with fall with probable acute left thalamic/basal ganglia infarct. 1.  She has not been fully compliant with her Eliquis and possibly only taking it once a day. High likelihood of cardio embolic CVA with known atrial fibrillation and 29% atrial fibrillation burden noted on most recent ZIO. Has been on amiodarone. I have discussed with patient need for compliance with Eliquis. Remains in sinus rhythm currently. Echocardiogram reviewed. Gradient appears lower than echo performed 9/2021. Noncoronary leaflet is calcified and nonmobile. Mild mitral regurgitation with MAC. Moderate left atrial enlargement with preserved LV systolic function.   2.  Chest x-ray reviewed. Significant tracheal deviation to the right with possible upper lobe collapse reported. Would obtain a CTA chest as well.       Electronically signed by Elle Tafoya MD on 4/4/2022 at 9:34 AM

## 2022-04-04 NOTE — CARE COORDINATION
Solomon/kendrick accepted bed offer at [facility].  Will need precert  Electronically signed by Sheng Dewey RN on 4/4/2022 at 2:11 PM

## 2022-04-04 NOTE — CARE COORDINATION
The [address] at [facility]  Notification of Admission Decision      [] Patient has been accepted for admit to [facility] on :       Please write discharge orders and summary prior to discharge. [] Patient acceptance to Rehab pending the following :    [] Eval in progress       [x] Patient determined to be ineligible for services at [facility] because : Too low level with therapy and no discharge plan. We recommend you consider: SNF        Thank you for your referral, we appreciate you. If you have any questions, please feel   free to contact me at [phone].     Electronically Signed by Keyla Spain, Admissions Coordinator 4/4/2022 9:04 AM

## 2022-04-04 NOTE — PROGRESS NOTES
ROM: Decreased, on the right, during labial retraction trials and labial protrusion trials. Labial Strength: Adequate during labial compression trials. Labial Coordination: Adequate movements were noted. Lingual ROM: Adequate during lingual extension trials with full point achieved; adequate during lingual elevation trials without use of accessory jaw movement; adequate movements noted bilaterally. Lingual Strength: Decreased   Lingual Coordination: Slowed movements were noted. Auditory Comprehension  Comprehension:  (Patient demonstrated ability to answer simple yes/no questions regarding immediate environment and current state of being at independent level and with adequate processing speed. Patient demonstrated ability to follow simple 1 and simple 2 step commands independently and with adequate processing speed. While delayed, patient demonstrated ability to answer yes/no questions of increased complexity and to follow complex 1 and simple 3 step commands at independent level.)     Expression  Primary Mode of Expression:  (Confrontation naming of items in room was considered to be [facility]. Structured responsive speech and responses in natural conversation were considered to be mildly delayed but appropriate.)     Motor Speech:  (Patient exhibited decreased volume of speech, decreased labial movements, and slowed lingual movements during verbalizations.)     Overall Orientation Status:  (Patient demonstrated ability to verbalize name, birthday, age, address, year, season, date, day of week, state, county, city, hospital and floor of hospital at independent level. Patient did not verbalize month independently.)     Attention:  (Patient demonstrated appropriate select and sustained attention during assessment.)     Memory:  (Patient demonstrated appropriate immediate memory with sequences of unrelated numbers/words set up to 5 items without repetitions provided.  Patient demonstrated appropriate short-term memory with 10 minute delay+distractions present.)     Problem Solving:  (It is noted that during evaluation, patient demonstrated ability to verbalize current PCP and pharmacy at independent level. Patient demonstrated ability to verbalize appropriate simple solutions to situations that could occur during activities of daily living independently (ie. 911, acid reflux, burns, cuts, falls, fever, fire, headache).     Electronically signed by LUISA Yuen on 4/4/2022 at 2:00 PM

## 2022-04-04 NOTE — PROGRESS NOTES
04/04/22 1546   Restrictions/Precautions   Restrictions/Precautions Fall Risk;Contact Precautions   Subjective   Subjective Patient sitting EOB with NSG waiting to TR to [facility]   Pain Screening   Patient Currently in Pain No   Vital Signs   Level of Consciousness Alert (0)   Oxygen Therapy   O2 Device None (Room air)   Bed Mobility   Sit to Supine Maximal assistance  (x 2)   Transfers   Sit to Stand Dependent/Total;2 Person Assistance  (IN and out of SS )   Bed to Chair Dependent/Total  (In SS)   Comment Patient used BSC stood in SS for clean up. MAX /DEP for standing in SS for cleanup   Ambulation   Ambulation? No   Activity Tolerance   Activity Tolerance Patient limited by fatigue;Patient limited by endurance   Safety Devices   Type of devices Left in bed;Call light within reach; Bed alarm in place   Physical Therapy    Electronically signed by Bonny Kamara PTA on 4/4/2022 at 3:54 PM

## 2022-04-04 NOTE — PROGRESS NOTES
[facility]      [facility]    Patient:  Teresia Schaumann  YOB: 1945  Date of Service: 4/4/2022  MRN: [id number]   Acct: [de-identified]   Primary Care Physician: KATI Perez CNP  Advance Directive: Full Code  Admit Date: 4/1/2022       Hospital Day: 3    Portions of this note have been copied forward, however, updated to reflect the most current clinical status of this patient. CHIEF COMPLAINT Fall    SUBJECTIVE: Speech still slurred. .  Very quick to tell me she will do reha here but she WILL go home. Family here, a niece who would like a  Meeting with social work. Has agreed to go to [facility] 10:   The patient is a 67 y.o. female who presented to [address] [place] [address] ED complaining of fall. Pt has history of paroxysmal afib on eliquis, moderate aortic stenosis and diastolic dysfunction followed by cardiology. She has history of HTN and osteoarthritis as well. She fell 3 days ago at home reporting problems with bilateral leg weakness. She denied syncope. She lives alone and her neighbor today went to her house as she hadn't been answering her phone. Pt had crawled to unlock door however did not have strength to get up out of floor reporting generalized weakness. She denied injury from fall. She has not had anything to eat or drink over past few days. She tells me that her hips have been painful. She denied problems with cough, fevers, shortness of breath as well as chest pain. She had previously reported abdominal discomfort that has since resolved. He neighbor is hearing impaired and reads lips relating that she hadn't been able to read pt's lips today giving concern for speech changes. Pt tells me that she doesn't have her bottom dentures and that her mouth has been dry. She denied history of CVA as well as prior TIA.    In ED, CK 7136, creatinine 0.9/BUN elevated at 41, sodium 141, potassium 4.4, CO2 low at 16, anion gap elevated at 20, alk phos 95, AST 213/ALT 68, UA with 15ketones, large blood, negative nitrite, negative leukocyte esterase, covid test negative, ek sr hr 89, wbc 17, hgb 14, platelets 476, INR 9.00  CT head-no acute intracranial abnormality, CT cspine-no acute cervical fracture or traumatic malalignment, CXR-right paratracheal upper lobe opacities with volume loss may relate to RUL collapse w/recommendation for CT chest with contrast, pelvis right hip imaging with old fractures, bilateral hip prosthesis, no acute appearing bony injury. Pt is admitted to hospitalist service for further evaluation and treatment. Today she is hurting allover. Her groin where she had previous pelvic fx is main complaint. Her left leg appears rotated inward. Xray negative for acute problem. She has left side facial drooping of the mouth that is still present. She has had several bowel movements     Review of Systems   Constitutional: Negative. HENT: Negative. Eyes: Negative. Respiratory: Positive for choking. Cardiovascular: Negative. Gastrointestinal: Negative. Endocrine: Negative. Musculoskeletal: Positive for arthralgias and myalgias. Allergic/Immunologic: Negative. Neurological: Positive for weakness. Negative for dizziness. Hematological: Negative. Psychiatric/Behavioral: Negative. All other systems reviewed and are negative. Objective:   VITALS:  BP (!) 159/89   Pulse 72   Temp 97 °F (36.1 °C) (Temporal)   Resp 20   Ht 5' 3\" (1.6 m)   Wt 145 lb (65.8 kg)   SpO2 92%   BMI 25.69 kg/m²   24HR INTAKE/OUTPUT:      Intake/Output Summary (Last 24 hours) at 4/4/2022 1433  Last data filed at 4/3/2022 2000  Gross per 24 hour   Intake 0 ml   Output 1100 ml   Net -1100 ml         Physical Exam  Vitals and nursing note reviewed. Constitutional:       Appearance: She is ill-appearing. HENT:      Head: Normocephalic and atraumatic.       Nose: Nose normal.      Mouth/Throat:      Mouth: Mucous membranes are moist.   Eyes: Extraocular Movements: Extraocular movements intact. Comments: Cataract lenses   Cardiovascular:      Rate and Rhythm: Normal rate. Rhythm irregular. Heart sounds: Murmur heard. Pulmonary:      Breath sounds: Normal breath sounds. Abdominal:      General: Bowel sounds are normal.      Palpations: Abdomen is soft. Musculoskeletal:      Cervical back: Neck supple. Skin:     General: Skin is warm and dry. Coloration: Skin is pale. Neurological:      Comments: Left mouth droop            Medications:      sodium chloride 125 mL/hr at 04/04/22 0804    sodium chloride        amiodarone  200 mg Oral BID    potassium chloride  20 mEq Oral TID WC    metoprolol succinate  50 mg Oral Daily    nystatin  5 mL Oral 4x Daily    sodium bicarbonate  1,300 mg Oral 4x Daily    polyethylene glycol  17 g Oral Daily    bisacodyl  10 mg Rectal Daily    sodium chloride flush  5-40 mL IntraVENous 2 times per day    lactulose  20 g Oral TID    apixaban  5 mg Oral BID    fluticasone  2 spray Nasal Daily    hydrOXYzine  25 mg Oral Nightly    vitamin D  50,000 Units Oral Weekly    nystatin   Topical BID     oxyCODONE-acetaminophen, potassium chloride **OR** potassium alternative oral replacement **OR** potassium chloride, magic butt cream, HYDROmorphone, sodium chloride flush, sodium chloride, ondansetron **OR** ondansetron, polyethylene glycol, acetaminophen **OR** acetaminophen, traMADol  ADULT DIET;  Regular  ADULT ORAL NUTRITION SUPPLEMENT; Breakfast, Lunch, Dinner; Standard High Calorie/High Protein Oral Supplement     Lab and other Data:     Recent Labs     04/03/22  0613 04/03/22  0800 04/04/22  0346   WBC 10.7 10.7 8.3   HGB 11.4* 11.8* 11.2*    244 228     Recent Labs     04/03/22  0613 04/03/22  0800 04/04/22  0346    140 139   K 3.0* 3.3* 3.9    106 107   CO2 21* 21* 21*   BUN 25* 24* 17   CREATININE 0.6 0.6 0.6   GLUCOSE 75 82 132*     Recent Labs     04/03/22  0224 04/03/22  0800 04/04/22  0346   * 112* 75*   ALT 58* 63* 60*   BILITOT 0.4 0.5 0.3   ALKPHOS 76 79 77     Troponin T:   Recent Labs     04/02/22  1205 04/03/22  0613 04/03/22  0800   TROPONINI 0.04* 0.03 0.03     Pro-BNP: No results for input(s): BNP in the last 72 hours. INR:   No results for input(s): INR in the last 72 hours. UA:  No results for input(s): NITRITE, COLORU, PHUR, LABCAST, WBCUA, RBCUA, MUCUS, TRICHOMONAS, YEAST, BACTERIA, CLARITYU, SPECGRAV, LEUKOCYTESUR, UROBILINOGEN, BILIRUBINUR, BLOODU, GLUCOSEU, AMORPHOUS in the last 72 hours. Invalid input(s): Gary Calico  A1C: No results for input(s): LABA1C in the last 72 hours. ABG:No results for input(s): PHART, SWC9TKQ, PO2ART, MWQ4NGE, BEART, HGBAE, G4PGWUXE, CARBOXHGBART in the last 72 hours. RAD:   CT ABDOMEN PELVIS WO CONTRAST Additional Contrast? None    Result Date: 4/1/2022  CT ABDOMEN PELVIS WO CONTRAST 4/1/2022 12:23 PM HISTORY: Fall COMPARISON: None DLP: 972 mGy cm TECHNIQUE: Noncontrast enhanced images of the abdomen and pelvis obtained without oral contrast. Automated exposure control was also utilized to decrease patient radiation dose. FINDINGS: Lung bases are clear. Coronary atheromatous calcification. LIVER: No focal liver lesion. BILIARY SYSTEM: The gallbladder is unremarkable. No intrahepatic or extrahepatic ductal dilatation. PANCREAS: No focal pancreatic lesion. SPLEEN: Unremarkable. KIDNEYS AND ADRENALS: Bilateral kidneys and adrenal glands are unremarkable. The ureters are decompressed and normal in appearance. RETROPERITONEUM: No mass, lymphadenopathy or hemorrhage. GI TRACT: Stomach and small bowel are unremarkable. Moderate colonic stool. OTHER: There is no mesenteric mass, lymphadenopathy or fluid collection. Unremarkable bilateral hip arthroplasties. Old healed right pubic bone fracture. Advanced lumbar spondylosis. No acute bony abnormality.  PELVIS: No mass lesion, fluid collection or significant lymphadenopathy is seen in the pelvis. The urinary bladder is normal in appearance. 1. No acute process in the abdomen or pelvis. 2. Moderate colonic stool. Bowel loops are nondilated. 3. Unremarkable bilateral hip arthroplasties. Lumbar spondylosis. No acute bony normality. Signed by Dr Nita Mclain    CT Head WO Contrast    Result Date: 4/2/2022  CT BRAIN without contrast 4/2/2022 12:38 PM HISTORY: New onset weakness yesterday COMPARISON: 4/1/2022 DLP: 632 mGy cm. All CT scans are performed using dose optimization techniques as appropriate to the performed exam and include at least one of the following: Automated exposure control, adjustment of the mA and/or kV according to size, and the use of the iterative reconstruction technique. TECHNIQUE: Serial axial tomographic images of the brain were obtained without the use of intravenous contrast. FINDINGS: The midline structures are nondisplaced. There is mild cerebral and cerebellar volume loss, with an associated increase in the prominence of the ventricles and sulci. The basilar cisterns are normal in size and configuration. There is no evidence of intracranial hemorrhage or mass-effect. There is low attenuation in the periventricular white matter, consistent with chronic ischemic change. There are no abnormal extra-axial fluid collections. There is no evidence of tonsillar herniation. The included orbits and their contents are unremarkable. The visualized paranasal sinuses, mastoid air cells and middle ear cavities are clear. The visualized osseous structures and overlying soft tissues of the skull and face are intact. 1. Mild cerebral and cerebellar volume loss with chronic microvascular disease but no evidence of acute intracranial process. Signed by Dr Ramirez Covert    Result Date: 4/1/2022  Examination. CT HEAD WO CONTRAST 4/1/2022 12:38 PM History: The patient fell. DLP: 1229 mGycm.  The automated exposure control was utilized to minimize the patient's radiation exposure. The CT scan of the head is performed without intravenous contrast enhancement. The images are acquired in axial plane with subsequent reconstruction in coronal and sagittal planes. There is no previous study for comparison There is no evidence of an intracranial hemorrhage or hematoma. There is no evidence of a mass. No midline shift. Moderately dilated ventricles, basal cistern and the cortical sulci suggestive chronic volume loss/atrophy. The scattered areas of chronic white matter ischemia are seen in the centrum semiovale bilaterally. The gray-white matter differentiation is maintained. The images reviewed in bone window show no evidence of a skull fracture. Severe atheromatous changes of the intracranial internal carotid arteries bilaterally noted. 1. No acute intracranial abnormality. 2. No skull fracture. 3. Chronic ischemic and atrophic changes. Signed by Dr Zayra Kay    Result Date: 4/1/2022  CT cervical spine 4/1/2022 12:22 PM HISTORY: Fall TECHNIQUE: Axial images of the cervical spine were obtained without IV contrast. Coronal and sagittal reformatted images are reconstructed and reviewed. COMPARISON: None. DLP: 425 mGy cm Automated exposure control was utilized to minimize patient radiation dose. FINDINGS: Alignment of the cervical spine is appropriate. Moderate to advanced degenerative disc change. Diffuse uncinate process hypertrophy with mild facet osteoarthropathy. No acute cervical vertebral fracture. Incomplete fusion of posterior arch of C1, developmental finding. Mild to moderate canal stenosis at C5/6 with mild narrowing about below this level. Scattered moderate to severe foraminal stenosis. Moderate carotid bulb calcification. No apical lung nodule. 1. Moderate to advanced degenerative changes cervical spine with no acute cervical vertebral fracture or traumatic malalignment.  Signed by Dr Sunshine Coil CONTRAST Additional Contrast? None    Result Date: 4/2/2022  EXAMINATION: CT pelvis without contrast 4/2/2022 HISTORY: Pubic pain post fall Dose: 807 mGycm. All CT scans are performed using dose optimization techniques as appropriate to the performed exam and include at least one of the following: Automated exposure control, adjustment of the mA and/or kV according to size, and the use of the iterative reconstruction technique. FINDINGS: Multiple contiguous axial images are obtained through the bony pelvis per protocol without venous contrast enhancement with reformatted images obtained in the sagittal coronal projections from the original data set. At the L4-L5 level there is broad-based bulging of disc as well as moderate facet and ligamentous hypertrophy. There is moderate central spinal stenosis as well as moderate bilateral neural foraminal narrowing. There is mild grade 1 anterolisthesis of L4 on L5 likely representing subluxation at the level of the facets. The SI joints are intact. No evidence of sacral fracture. The patient is status post bilateral total hip arthroplasty. This does obscure some of the anatomic detail secondary to streaking artifact. There are old fractures of the right superior and inferior pubic ramus. No acute fractures are present. The pubic symphysis is intact. A Saeed catheter is in place within the bladder. There is mild fecal impaction within the rectal vault. 1.. Old fractures of the right superior and inferior pubic rami. No additional fractures are present. The SI joints and pubic symphysis are intact with no evidence of diastases. 2. Facet and ligamentous hypertrophy as well as bulging of the disc contributes to central and foraminal stenosis at L4-L5. There is grade 1 anterolisthesis of L4 on L5 felt to represent subluxation at the level of the facets. 3. Constipation with increased stool throughout the colon including fecal impaction within the rectal vault.  Signed by  [facility]    XR CHEST PORTABLE    Result Date: 4/1/2022  XR CHEST PORTABLE 4/1/2022 12:21 PM HISTORY: Fatigue COMPARISON: 4/3/2019 CXR: A single frontal view of the chest is performed. Findings: There is volume loss of the right upper hemithorax with ipsilateral shifting of the mediastinal structures. Partial right upper lobe collapse considered. Left lung appears clear. Probable right lower lobe granuloma. Heart is upper limits of normal in size. No pleural effusion or pneumothorax. Degenerative change regional skeleton. 1. Right paratracheal upper lobe opacities with associated volume loss may relate to right upper lobe collapse. Follow-up imaging recommended. CT chest preferably with IV contrast could be performed for further assessment if clinically indicated. Signed by Dr Evelia Anne    XR HIP 2-3 VW W PELVIS RIGHT    Result Date: 4/1/2022  History: Pain after fall Right hip: Frontal view the pelvis as well as frontal and crosstable lateral views of the right hip are obtained. The right prosthesis. No periprosthetic fracture. Old fractures of the right inferior pelvis involving right superior and inferior rami. Enthesophytes project from the right iliac spine. Chronic calcification along the right acetabular roof. Degenerative change of the lower lumbar spine. Vascular calcification. 1. Old fractures of the right inferior pelvis. Bilateral hip prostheses. No acute appearing bony injury identified.  Signed by Dr Evelia Anne          Assessment/Plan   Principal Problem:    Rhabdomyolysis   Ck improved   Continue daily lab   Pain control   Strict I & O     Active Problems:    Fall   Ct pelvis left hip    HTN (hypertension)   Home meds=-metoprolol   Continue to moniter   Echo completed result pending    Generalized weakness   Pt/ot    Dehydration   Moniter renal function   Daily lab    Paroxysmal atrial fibrillation (HCC)   Telemetry    eliquis    Transaminitis   improved   Daily lab  Neuro status change   Ct head -nl   Neurology following    MRI left infarct  Resolved Problems:    * No resolved hospital problems. Discharge planning: snf most likely      Further Orders per Clinical course/attending. Electronically signed by KATI Coleman CNP on 4/4/2022 at 2:33 PM       EMR Dragon/Transcription disclaimer:   Much of this encounter note is an electronic transcription/translation of spoken language to printed text. The electronic translation of spoken language may permit erroneous, or at times, nonsensical words or phrases to be inadvertently transcribed; although attempts have made to review the note for such errors, some may still exist.      Attestation Statement     I have independently seen and examined this patient and agree with the asesment and plan by [facility] level [facility] MEDICINE  - PROGRESS NOTE         Objective:   Vitals: BP (!) 159/89   Pulse 72   Temp 97 °F (36.1 °C) (Temporal)   Resp 20   Ht 5' 3\" (1.6 m)   Wt 145 lb (65.8 kg)   SpO2 92%   BMI 25.69 kg/m²   General appearance: alert, appears stated age and cooperative  Skin: Skin color, texture, turgor normal.   HEENT: Head: Normocephalic, no lesions, without obvious abnormality.   Neck: no adenopathy, no carotid bruit, no JVD and supple, symmetrical, trachea midline  Lungs: clear to auscultation bilaterally  Heart: regular rate and rhythm, S1, S2 normal, no murmur, click, rub or gallop  Abdomen: soft, non-tender; bowel sounds normal; no masses,  no organomegaly  Extremities: extremities normal, atraumatic, no cyanosis or edema  Lymphatic: No significant lymph node enlargement papable  Neurologic: Mental status: Alert, oriented, thought content appropriate, facial droop      Assessment & Plan:    Traumatic rhabdomyolysis- cont IVF  Metabolic acidosis- cont bicarb   Hypokalemia -replaced  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- follow OP  Constipation - treated  Groundglass nodule/opacities predominantly in the right upper lobe   may represent an inflammatory/infectious or an evolving neoplastic process- pulmonary eval  Severe deconditioning - PT/OT- needs SNF  Acute CVA with Facial droop-- neurology on board  Significant dilatation of the common bile duct and pancreatic duct - NPO- US liver- GI consult     Анна Lees MD

## 2022-04-04 NOTE — PROGRESS NOTES
Visited with pt to assist her in completing an AD/LW. Pt's IV was beeping and this  notified the nurses station. Pt's nurse came in and took care of it. Pt says she is hurting and was rubbing her belly and she had an emesis bag and says she is nauseous. Pt says she laid in the floor for two days. This  informed pt we will follow up tomorrow to assist her in completing the AD/LW. Also provided sustaining presence, nurtured hope, and prayer. Pt expressed gratitude for spiritual care.     Electronically signed by Kin Hussein on 4/4/2022 at 3:17 PM

## 2022-04-04 NOTE — CARE COORDINATION
CM met with patient and 2 nieces, Alon Mota and Siena Amin. Pt granted permission to speak in front of nieces. Patient is alert and answering questions appropriately. Patient has asymmetry of the face, which pt stated she had \"some prior\" from previous surgery\" consequently speech is difficult to understand. Pt states, she has been independent prior to admission. Pt states, she was driving, did not use cane/walker, or 02. Pt reports, she was doing laundry and \"leg just gave out and could not get up\" Pt reports, a neighbor has not heard from her for several days and came to check on her and \"found her down\" Pt lives alone, as spouse passed away several years ago. CM advised patient that as of now she does not meet the criteria for AR. Pt/nieces are agreeable to referrals to the [facility]: [place] [address], [address] [place] [address]. CM has called referrals to respective facilities. CM has also provided patient with Stroke booklet and reviewed \"BE FAST\" CM recommended to patient obtaining a \"Life Alert system\" as she lives alone. Electronically signed by Zack Mendoza RN on 4/4/2022 at 1:16 PM  Pt stated, she received Modern x 2 but has not gotten her COVID booster.  Pt states, she does not have LW/AD/POA  Electronically signed by Zack Mendoza RN on 4/4/2022 at 1:22 PM

## 2022-04-05 ENCOUNTER — APPOINTMENT (OUTPATIENT)
Dept: ULTRASOUND IMAGING | Age: 77
DRG: 564 | End: 2022-04-05
Payer: MEDICARE

## 2022-04-05 LAB
ALBUMIN SERPL-MCNC: 3 G/DL (ref 3.5–5.2)
ALP BLD-CCNC: 72 U/L (ref 35–104)
ALT SERPL-CCNC: 50 U/L (ref 5–33)
ANION GAP SERPL CALCULATED.3IONS-SCNC: 9 MMOL/L (ref 7–19)
AST SERPL-CCNC: 46 U/L (ref 5–32)
BILIRUB SERPL-MCNC: <0.2 MG/DL (ref 0.2–1.2)
BUN BLDV-MCNC: 19 MG/DL (ref 8–23)
CALCIUM SERPL-MCNC: 8.5 MG/DL (ref 8.8–10.2)
CHLORIDE BLD-SCNC: 109 MMOL/L (ref 98–111)
CO2: 24 MMOL/L (ref 22–29)
CREAT SERPL-MCNC: 0.7 MG/DL (ref 0.5–0.9)
GFR AFRICAN AMERICAN: >59
GFR NON-AFRICAN AMERICAN: >60
GLUCOSE BLD-MCNC: 93 MG/DL (ref 74–109)
HCT VFR BLD CALC: 36.2 % (ref 37–47)
HEMOGLOBIN: 11.3 G/DL (ref 12–16)
LIPASE: 19 U/L (ref 13–60)
MAGNESIUM: 2 MG/DL (ref 1.6–2.4)
MCH RBC QN AUTO: 30.1 PG (ref 27–31)
MCHC RBC AUTO-ENTMCNC: 31.2 G/DL (ref 33–37)
MCV RBC AUTO: 96.3 FL (ref 81–99)
PDW BLD-RTO: 13.9 % (ref 11.5–14.5)
PLATELET # BLD: 236 K/UL (ref 130–400)
PMV BLD AUTO: 10.5 FL (ref 9.4–12.3)
POTASSIUM SERPL-SCNC: 4.2 MMOL/L (ref 3.5–5)
RBC # BLD: 3.76 M/UL (ref 4.2–5.4)
RHEUMATOID FACTOR: <10 IU/ML
SODIUM BLD-SCNC: 142 MMOL/L (ref 136–145)
TOTAL CK: 226 U/L (ref 26–192)
TOTAL PROTEIN: 4.6 G/DL (ref 6.6–8.7)
URIC ACID, SERUM: 4.3 MG/DL (ref 2.4–5.7)
WBC # BLD: 9.3 K/UL (ref 4.8–10.8)

## 2022-04-05 PROCEDURE — 85027 COMPLETE CBC AUTOMATED: CPT

## 2022-04-05 PROCEDURE — 99232 SBSQ HOSP IP/OBS MODERATE 35: CPT | Performed by: INTERNAL MEDICINE

## 2022-04-05 PROCEDURE — 6370000000 HC RX 637 (ALT 250 FOR IP): Performed by: HOSPITALIST

## 2022-04-05 PROCEDURE — 86038 ANTINUCLEAR ANTIBODIES: CPT

## 2022-04-05 PROCEDURE — 83735 ASSAY OF MAGNESIUM: CPT

## 2022-04-05 PROCEDURE — 6370000000 HC RX 637 (ALT 250 FOR IP): Performed by: PSYCHIATRY & NEUROLOGY

## 2022-04-05 PROCEDURE — 2580000003 HC RX 258: Performed by: HOSPITALIST

## 2022-04-05 PROCEDURE — 97530 THERAPEUTIC ACTIVITIES: CPT

## 2022-04-05 PROCEDURE — 99232 SBSQ HOSP IP/OBS MODERATE 35: CPT | Performed by: PSYCHIATRY & NEUROLOGY

## 2022-04-05 PROCEDURE — 76705 ECHO EXAM OF ABDOMEN: CPT

## 2022-04-05 PROCEDURE — 83690 ASSAY OF LIPASE: CPT

## 2022-04-05 PROCEDURE — 86431 RHEUMATOID FACTOR QUANT: CPT

## 2022-04-05 PROCEDURE — 6360000002 HC RX W HCPCS: Performed by: NURSE PRACTITIONER

## 2022-04-05 PROCEDURE — 80053 COMPREHEN METABOLIC PANEL: CPT

## 2022-04-05 PROCEDURE — 99223 1ST HOSP IP/OBS HIGH 75: CPT | Performed by: INTERNAL MEDICINE

## 2022-04-05 PROCEDURE — 82550 ASSAY OF CK (CPK): CPT

## 2022-04-05 PROCEDURE — 99222 1ST HOSP IP/OBS MODERATE 55: CPT | Performed by: INTERNAL MEDICINE

## 2022-04-05 PROCEDURE — 1210000000 HC MED SURG R&B

## 2022-04-05 PROCEDURE — 6370000000 HC RX 637 (ALT 250 FOR IP): Performed by: INTERNAL MEDICINE

## 2022-04-05 PROCEDURE — 36415 COLL VENOUS BLD VENIPUNCTURE: CPT

## 2022-04-05 PROCEDURE — 84550 ASSAY OF BLOOD/URIC ACID: CPT

## 2022-04-05 RX ORDER — OXYCODONE HYDROCHLORIDE AND ACETAMINOPHEN 5; 325 MG/1; MG/1
1 TABLET ORAL EVERY 6 HOURS
Status: DISCONTINUED | OUTPATIENT
Start: 2022-04-05 | End: 2022-04-08

## 2022-04-05 RX ORDER — ATORVASTATIN CALCIUM 10 MG/1
10 TABLET, FILM COATED ORAL DAILY
Status: DISCONTINUED | OUTPATIENT
Start: 2022-04-05 | End: 2022-04-11 | Stop reason: HOSPADM

## 2022-04-05 RX ORDER — LACTOBACILLUS RHAMNOSUS GG 10B CELL
1 CAPSULE ORAL DAILY
Status: DISCONTINUED | OUTPATIENT
Start: 2022-04-05 | End: 2022-04-11 | Stop reason: HOSPADM

## 2022-04-05 RX ORDER — SODIUM BICARBONATE 650 MG/1
650 TABLET ORAL 2 TIMES DAILY
Status: DISCONTINUED | OUTPATIENT
Start: 2022-04-05 | End: 2022-04-08

## 2022-04-05 RX ORDER — AMIODARONE HYDROCHLORIDE 200 MG/1
200 TABLET ORAL DAILY
Status: DISCONTINUED | OUTPATIENT
Start: 2022-04-10 | End: 2022-04-07

## 2022-04-05 RX ORDER — CYCLOBENZAPRINE HCL 10 MG
5 TABLET ORAL 2 TIMES DAILY PRN
Status: DISCONTINUED | OUTPATIENT
Start: 2022-04-05 | End: 2022-04-08

## 2022-04-05 RX ADMIN — NYSTATIN: 100000 CREAM TOPICAL at 10:23

## 2022-04-05 RX ADMIN — OXYCODONE HYDROCHLORIDE AND ACETAMINOPHEN 1 TABLET: 5; 325 TABLET ORAL at 16:32

## 2022-04-05 RX ADMIN — SODIUM CHLORIDE, PRESERVATIVE FREE 10 ML: 5 INJECTION INTRAVENOUS at 10:22

## 2022-04-05 RX ADMIN — Medication 1 CAPSULE: at 16:32

## 2022-04-05 RX ADMIN — OXYCODONE HYDROCHLORIDE AND ACETAMINOPHEN 1 TABLET: 5; 325 TABLET ORAL at 21:34

## 2022-04-05 RX ADMIN — POTASSIUM CHLORIDE 20 MEQ: 20 TABLET, EXTENDED RELEASE ORAL at 10:20

## 2022-04-05 RX ADMIN — HYDROMORPHONE HYDROCHLORIDE 0.5 MG: 1 INJECTION, SOLUTION INTRAMUSCULAR; INTRAVENOUS; SUBCUTANEOUS at 08:23

## 2022-04-05 RX ADMIN — NYSTATIN 500000 UNITS: 100000 SUSPENSION ORAL at 16:32

## 2022-04-05 RX ADMIN — AMIODARONE HYDROCHLORIDE 200 MG: 200 TABLET ORAL at 10:20

## 2022-04-05 RX ADMIN — FLUTICASONE PROPIONATE 2 SPRAY: 50 SPRAY, METERED NASAL at 10:21

## 2022-04-05 RX ADMIN — POLYETHYLENE GLYCOL 3350 17 G: 17 POWDER, FOR SOLUTION ORAL at 10:21

## 2022-04-05 RX ADMIN — POTASSIUM CHLORIDE 20 MEQ: 20 TABLET, EXTENDED RELEASE ORAL at 16:31

## 2022-04-05 RX ADMIN — APIXABAN 5 MG: 5 TABLET, FILM COATED ORAL at 20:12

## 2022-04-05 RX ADMIN — SODIUM BICARBONATE 650 MG: 650 TABLET ORAL at 20:12

## 2022-04-05 RX ADMIN — HYDROXYZINE HYDROCHLORIDE 25 MG: 25 TABLET ORAL at 20:12

## 2022-04-05 RX ADMIN — LACTULOSE 20 G: 20 SOLUTION ORAL at 10:21

## 2022-04-05 RX ADMIN — NYSTATIN 500000 UNITS: 100000 SUSPENSION ORAL at 10:21

## 2022-04-05 RX ADMIN — NYSTATIN: 100000 CREAM TOPICAL at 20:13

## 2022-04-05 RX ADMIN — LACTULOSE 20 G: 20 SOLUTION ORAL at 16:32

## 2022-04-05 RX ADMIN — CYCLOBENZAPRINE 5 MG: 10 TABLET, FILM COATED ORAL at 17:14

## 2022-04-05 RX ADMIN — ATORVASTATIN CALCIUM 10 MG: 10 TABLET, FILM COATED ORAL at 10:20

## 2022-04-05 RX ADMIN — METOPROLOL SUCCINATE 50 MG: 50 TABLET, EXTENDED RELEASE ORAL at 10:20

## 2022-04-05 RX ADMIN — APIXABAN 5 MG: 5 TABLET, FILM COATED ORAL at 10:20

## 2022-04-05 RX ADMIN — LACTULOSE 20 G: 20 SOLUTION ORAL at 20:12

## 2022-04-05 RX ADMIN — OXYCODONE HYDROCHLORIDE AND ACETAMINOPHEN 1 TABLET: 5; 325 TABLET ORAL at 03:09

## 2022-04-05 RX ADMIN — NYSTATIN 500000 UNITS: 100000 SUSPENSION ORAL at 20:12

## 2022-04-05 RX ADMIN — AMIODARONE HYDROCHLORIDE 200 MG: 200 TABLET ORAL at 20:12

## 2022-04-05 RX ADMIN — SODIUM BICARBONATE 650 MG: 650 TABLET ORAL at 10:20

## 2022-04-05 RX ADMIN — SODIUM CHLORIDE, PRESERVATIVE FREE 10 ML: 5 INJECTION INTRAVENOUS at 20:12

## 2022-04-05 ASSESSMENT — ENCOUNTER SYMPTOMS
EYES NEGATIVE: 1
ALLERGIC/IMMUNOLOGIC NEGATIVE: 1
CHOKING: 0
DIARRHEA: 1
EYE DISCHARGE: 0
CHOKING: 1
ABDOMINAL DISTENTION: 0
ABDOMINAL PAIN: 1
EYE PAIN: 0
BLOOD IN STOOL: 0
BACK PAIN: 1
GASTROINTESTINAL NEGATIVE: 1
SHORTNESS OF BREATH: 0
COUGH: 0
TROUBLE SWALLOWING: 0

## 2022-04-05 ASSESSMENT — PAIN SCALES - GENERAL
PAINLEVEL_OUTOF10: 8
PAINLEVEL_OUTOF10: 0
PAINLEVEL_OUTOF10: 5
PAINLEVEL_OUTOF10: 10
PAINLEVEL_OUTOF10: 10

## 2022-04-05 NOTE — PROGRESS NOTES
Physical Therapy  Name: Angus Galarza  MRN:  [id number]  Date of service:  4/5/2022 04/05/22 1335   Restrictions/Precautions   Restrictions/Precautions Fall Risk;Contact Precautions   Subjective   Subjective Patient is sitting up in bed and agrees to therapy stating that she just had her pain meds and is no longer hurting but is nasueas and groggy   Pain Screening   Patient Currently in Pain No   Bed Mobility   Sit to Supine Maximal assistance  (x 2 for safety )   Scooting Maximal assistance  (x 2 for safety )   Transfers   Sit to Stand Moderate Assistance;2 Person Assistance   Stand to sit Moderate Assistance;2 Person Assistance   Bed to Chair Dependent/Total  (SS)   Ambulation   Ambulation? No   Short term goals   Time Frame for Short term goals 14 DAYS   Short term goal 1 BED MOB MIN ASSIST   Short term goal 2 SIT TO STAND MIN ASSIST   Short term goal 3 BED TO CHAIR MIN ASSIST   Conditions Requiring Skilled Therapeutic Intervention   Body structures, Functions, Activity limitations Decreased functional mobility ; Decreased ADL status; Decreased ROM; Decreased strength;Decreased safe awareness;Decreased balance; Increased pain;Decreased posture   Assessment Patient is able to sit EOB and transfer to chair via SS this date having no c/o increased pain or issues. Cues needed for safety and technique during tranfer. She was positioned in her chair with all needs in reach. Will continue to follow. Activity Tolerance   Activity Tolerance Patient limited by fatigue;Patient limited by endurance   Safety Devices   Type of devices Gait belt;Call light within reach; Chair alarm in place; Left in chair         Electronically signed by Krishna Campbell PTA on 4/5/2022 at 1:43 PM

## 2022-04-05 NOTE — PROGRESS NOTES
This  was asked by Suzan Rubin, Palliative Care nurse, to assist pt in completing an AD/LW. Pt was feeling well enough to complete today. Pt asked about the document saying, \"now this covers medical only? \" This  responded with a yes. Pt named her two nieces, Chichi Aden, and Emily Harman as primary and secondary decision makers, and her friend and neighbor Ty Alva as alternate. Pt also made advance care planning decisions. During the visit dr Anastasia Rosa came in to visit with the pt. This pt completed pt's demographic info while the dr visited. Pt then initialed and signed the document and this  witnessed and then notarized the document. Pt's nieces came to visit and this  provided the original and copies to pt's nieces. They spoke about getting a POA for the pt and asked if the  can come and visit with the pt in the hospital and this  responded with a yes. Pt and nieces expressed gratitude for assistance with AD/LW.      Electronically signed by Haven Beckford on 4/5/2022 at 11:34 AM

## 2022-04-05 NOTE — CONSULTS
Consult Note            Date:4/5/2022        Patient Name:Jesse Dahl Journey     YOB: 1945     Age:76 y.o. Inpatient consult to GI  Consult performed by: Wan Noriega MD  Consult ordered by: Gabe Jensen MD  Reason for consult: Biliary and pancreatic ductal dilation          Chief Complaint     Chief Complaint   Patient presents with   Ngozi Murrain     per [address] [facility], pt fell on Wednesday and has been on floor since           History Obtained From   Patient and EMR    History of Present Illness   This 67 y.o. female with a past medical history significant for atrial fibrillation on chronic anticoagulation with Eliquis admitted on 4/1/2022 after being found down at home. She reports that she had fallen approximately 3 days prior to admission due to leg weakness. She was laying on the ground and was unable to get help. The neighbor went to her house because as she had not had communication with her and found her on the floor. The patient reported a previous history of pelvic fracture. On admission her serum CK level was 7136. Her serum creatinine was 0.9 with a BUN of 41. CT of the head did showed no acute abnormality, CT of the pelvis revealed old fractures of the right inferior pelvis with bilateral hip protheses, CT cervical spine with DJD and CT of the abdomen essentially negative except for constipation. Due to facial droop patient was seen by neurology and MRI brain did show acute left-sided infarct. Patient also with abnormal CXR on admission prompting CT chest with show dilation of the common bile duct and pancreatic duct not seen on previous CT scan at the time of admission. Patient was found to have elevated AST/ALT with normal bili and alkaline phosphatase on admission. AST/AST have continued to decline along with CK as patient treated for Rhabdomyolysis. Patient denies any prior history of liver, gall bladder or pancreatic disease.   She does have chronic diarrhea which she controls with fiber and Bentyl. Past Medical History     Past Medical History:   Diagnosis Date    Chronic back pain     Hypertension     Inflammatory arthritis     Knee pain     Spastic colon         Past Surgical History     Past Surgical History:   Procedure Laterality Date    ABDOMEN SURGERY      APPENDECTOMY      BACK SURGERY      neck; bone out of hip to fuse neck    FINGER TRIGGER RELEASE Left 12/17/2020    LEFT MIDDLE TRIGGER FINGER RELEASE performed by Mary Alice Pacheco MD at [facility] HAND SURGERY Left 2012    fx repair (3 total surgeries)    HAND SURGERY Left 12/17/2020    LEFT WRIST MASS EXCISION performed by Mary Alice Pacheco MD at [place] [address] Bilateral     HIP    [address] ARTHROPLASTY Right 9/2/2021    RIGHT TOTAL KNEE ARTHROPLASTY performed by Kenyatta Dutta MD at [address] [place] [address]          Medications     Prior to Admission medications    Medication Sig Start Date End Date Taking? Authorizing Provider   pantoprazole sodium (PROTONIX) 40 MG PACK packet Take 40 mg by mouth every morning (before breakfast)   Yes Historical Provider, MD   diphenhydrAMINE (BENADRYL) 50 MG capsule Take 50 mg by mouth nightly as needed for Itching   Yes Historical Provider, MD   simethicone (MYLICON) 80 MG chewable tablet Take 125 mg by mouth every 6 hours as needed for Flatulence   Yes Historical Provider, MD   DM-APAP-CPM (CORICIDIN HBP) -2 MG TABS Take 325 mg by mouth every 4-6 hours as needed   Yes Historical Provider, MD   dicyclomine (BENTYL) 10 MG capsule Take 10 mg by mouth three times daily    Historical Provider, MD   benzonatate (TESSALON) 200 MG capsule Take 200 mg by mouth 3 times daily    Historical Provider, MD   hydrOXYzine (ATARAX) 25 MG tablet Take 25 mg by mouth at bedtime 2/23/22   Historical Provider, MD   traMADol (ULTRAM) 50 MG tablet Take 50 mg by mouth 2 times daily.     Historical Provider, MD   apixaban Torri Westbrook) 5 MG TABS tablet Take 5 mg by mouth 2 times daily    Historical Provider, MD   metoprolol succinate (TOPROL XL) 25 MG extended release tablet Take 1 tablet by mouth daily 11/4/21   KATI Jenkins   vitamin D (ERGOCALCIFEROL) 1.25 MG (72447 UT) CAPS capsule Take 50,000 Units by mouth once a week    Historical Provider, MD   ondansetron (ZOFRAN) 4 MG tablet Take 1 tablet by mouth every 8 hours as needed for Nausea or Vomiting 9/2/21   Josh Mart MD   lisinopril (PRINIVIL;ZESTRIL) 20 MG tablet Take 20 mg by mouth nightly Indications: High Blood Pressure Disorder    Historical Provider, MD   fluticasone (FLONASE) 50 MCG/ACT nasal spray 2 sprays by Nasal route daily     Historical Provider, MD   loratadine (CLARITIN) 10 MG capsule Take 10 mg by mouth daily    Historical Provider, MD   furosemide (LASIX) 20 MG tablet Take 1 tablet by mouth daily as needed (swelling)  Patient taking differently: Take 40 mg by mouth daily as needed (swelling)  5/6/20   Radha Banuelos MD   celecoxib (CELEBREX) 200 MG capsule Take 1 capsule by mouth daily 6/19/18   Radha Banuelos MD        atorvastatin (LIPITOR) tablet 10 mg, Daily  sodium bicarbonate tablet 650 mg, BID  simethicone (MYLICON) chewable tablet 80 mg, 4x Daily PRN  potassium chloride (KLOR-CON M) extended release tablet 20 mEq, BID WC  amiodarone (CORDARONE) tablet 200 mg, BID  oxyCODONE-acetaminophen (PERCOCET) 5-325 MG per tablet 1 tablet, Q6H PRN  metoprolol succinate (TOPROL XL) extended release tablet 50 mg, Daily  nystatin (MYCOSTATIN) 013729 UNIT/ML suspension 500,000 Units, 4x Daily  potassium chloride (KLOR-CON M) extended release tablet 40 mEq, PRN   Or  potassium bicarb-citric acid (EFFER-K) effervescent tablet 40 mEq, PRN   Or  potassium chloride 10 mEq/100 mL IVPB (Peripheral Line), PRN  magic butt cream, Q4H PRN  HYDROmorphone HCl PF (DILAUDID) injection 0.5 mg, Q3H PRN  polyethylene glycol (GLYCOLAX) packet 17 g, Daily  0.9 % sodium chloride infusion, Continuous  sodium chloride flush 0.9 % injection 5-40 mL, 2 times per day  sodium chloride flush 0.9 % injection 5-40 mL, PRN  0.9 % sodium chloride infusion, PRN  ondansetron (ZOFRAN-ODT) disintegrating tablet 4 mg, Q8H PRN   Or  ondansetron (ZOFRAN) injection 4 mg, Q6H PRN  polyethylene glycol (GLYCOLAX) packet 17 g, Daily PRN  lactulose (CHRONULAC) 10 GM/15ML solution 20 g, TID  apixaban (ELIQUIS) tablet 5 mg, BID  fluticasone (FLONASE) 50 MCG/ACT nasal spray 2 spray, Daily  hydrOXYzine (ATARAX) tablet 25 mg, Nightly  traMADol (ULTRAM) tablet 50 mg, Q6H PRN  vitamin D (ERGOCALCIFEROL) capsule 50,000 Units, Weekly  nystatin (MYCOSTATIN) cream, BID        Allergies   Levofloxacin in d5w, Pyridium [phenazopyridine], Sulfamethoxazole-trimethoprim, Codeine, Hydrocodone-acetaminophen, Hydromorphone hcl, and Oxycodone    Social History     Social History     Tobacco History     Smoking Status  Current Every Day Smoker Smoking Frequency  0.5 packs/day Smoking Tobacco Type  Cigarettes    Smokeless Tobacco Use  Never Used          Alcohol History     Alcohol Use Status  No          Drug Use     Drug Use Status  No          Sexual Activity     Sexually Active  Never                Family History     Family History   Problem Relation Age of Onset    Other Other         Son DVT    Cancer Mother         colon     Heart Attack Mother     Heart Attack Father        Review of Systems   Review of Systems   Constitutional: Positive for activity change. Negative for appetite change and unexpected weight change. HENT: Positive for dental problem and drooling. Negative for trouble swallowing. Eyes: Negative for pain and discharge. Respiratory: Negative for cough, choking and shortness of breath. Cardiovascular: Positive for leg swelling. Negative for chest pain. Gastrointestinal: Positive for abdominal pain and diarrhea. Negative for abdominal distention and blood in stool. Endocrine: Negative.     Genitourinary: Positive for pelvic pain. Negative for dysuria and flank pain. Musculoskeletal: Positive for arthralgias, back pain, gait problem and myalgias. Neurological: Positive for facial asymmetry, speech difficulty and weakness. Hematological: Negative for adenopathy. Psychiatric/Behavioral: Negative for behavioral problems and confusion. The patient is not nervous/anxious. Physical Exam   /70   Pulse 70   Temp 97.7 °F (36.5 °C)   Resp 16   Ht 5' 3\" (1.6 m)   Wt 145 lb (65.8 kg)   SpO2 94%   BMI 25.69 kg/m²      Physical Exam  Constitutional:       General: She is not in acute distress. HENT:      Mouth/Throat:      Mouth: Mucous membranes are dry. Eyes:      Conjunctiva/sclera: Conjunctivae normal.      Pupils: Pupils are equal, round, and reactive to light. Cardiovascular:      Rate and Rhythm: Normal rate and regular rhythm. Pulses: Normal pulses. Pulmonary:      Effort: Pulmonary effort is normal.   Abdominal:      General: Bowel sounds are normal. There is no distension. Palpations: Abdomen is soft. Tenderness: There is no abdominal tenderness. Musculoskeletal:      Cervical back: Normal range of motion. Skin:     General: Skin is warm and dry. Neurological:      Mental Status: She is alert. Mental status is at baseline. Psychiatric:         Mood and Affect: Mood normal.         Thought Content:  Thought content normal.         Judgment: Judgment normal.         Labs    CBC:  Recent Labs     04/03/22  0800 04/04/22  0346 04/05/22  0254   WBC 10.7 8.3 9.3   RBC 3.84* 3.65* 3.76*   HGB 11.8* 11.2* 11.3*   HCT 35.9* 35.0* 36.2*   MCV 93.5 95.9 96.3   RDW 13.6 13.4 13.9    228 236     CHEMISTRIES:  Recent Labs     04/03/22  0800 04/04/22  0346 04/05/22  0254    139 142   K 3.3* 3.9 4.2    107 109   CO2 21* 21* 24   BUN 24* 17 19   CREATININE 0.6 0.6 0.7   GLUCOSE 82 132* 93   MG 1.7 1.9 2.0     PT/INR:No results for input(s): PROTIME, INR in the last 72 hours. APTT:No results for input(s): APTT in the last 72 hours. LIVER PROFILE:  Recent Labs     04/03/22  0800 04/04/22  0346 04/05/22  0254   * 75* 46*   ALT 63* 60* 50*   BILITOT 0.5 0.3 <0.2   ALKPHOS 79 77 72       Imaging/Diagnostics   CT ABDOMEN PELVIS WO CONTRAST Additional Contrast? None    Result Date: 4/1/2022  1. No acute process in the abdomen or pelvis. 2. Moderate colonic stool. Bowel loops are nondilated. 3. Unremarkable bilateral hip arthroplasties. Lumbar spondylosis. No acute bony normality. Signed by Dr Donald Dickey    CT Head WO Contrast    Result Date: 4/2/2022  1. Mild cerebral and cerebellar volume loss with chronic microvascular disease but no evidence of acute intracranial process. Signed by Dr Aaron Eaton    Result Date: 4/1/2022  1. No acute intracranial abnormality. 2. No skull fracture. 3. Chronic ischemic and atrophic changes. Signed by Dr Bethany Simmons    Result Date: 4/4/2022  1. No finding to suggest collapse of the right upper lobe. Detail is given above. 2. Groundglass nodule/opacities predominantly in the right upper lobe may represent an inflammatory/infectious or an evolving neoplastic process. Further follow-up may be obtained. 3. Small bibasal pleural effusion. 4. Small areas of consolidation and atelectasis in the lower lobes bilaterally. 5. The limited visualized abdomen show significant dilatation of the common bile duct and pancreatic duct which were not noted in the previous study obtained 3 days ago which was done without contrast enhancement. The etiology and clinical significance is not certain. A follow-up CT scan of the abdomen after oral and intravenous contrast enhancement. Thin. Signed by Dr Wayne Stubbs    Result Date: 4/1/2022  1. Moderate to advanced degenerative changes cervical spine with no acute cervical vertebral fracture or traumatic malalignment.  Signed by Dr Ansley Tamez Additional Contrast? None    Result Date: 4/2/2022  1. . Old fractures of the right superior and inferior pubic rami. No additional fractures are present. The SI joints and pubic symphysis are intact with no evidence of diastases. 2. Facet and ligamentous hypertrophy as well as bulging of the disc contributes to central and foraminal stenosis at L4-L5. There is grade 1 anterolisthesis of L4 on L5 felt to represent subluxation at the level of the facets. 3. Constipation with increased stool throughout the colon including fecal impaction within the rectal vault. Signed by Dr Anahi Lopez    Result Date: 4/1/2022  1. Right paratracheal upper lobe opacities with associated volume loss may relate to right upper lobe collapse. Follow-up imaging recommended. CT chest preferably with IV contrast could be performed for further assessment if clinically indicated. Signed by Dr Michael Brown    MRI BRAIN W WO CONTRAST    Result Date: 4/3/2022  Impression: 1. Diffusion restriction within the left thalamus and basal ganglia with associated ADC mapping abnormality consistent with an acute left-sided infarct. This is nonhemorrhagic. There is no associated mass effect or shift of the midline. 2. There is mild atrophy of the brain. Small vessel ischemic changes are noted involving the periventricular and higher white matter tracts. There is evidence of a remote small focus of hemorrhage within the left thalamus. No additional sites of blooming are present on gradient imaging. 3. DVA within the parafalcine right parietal lobe extending into the region of the corpus callosum. No additional sites of abnormal enhancement are present. Signed by Dr Jose Angel Borrero 2-3 VW W PELVIS RIGHT    Result Date: 4/1/2022  1. Old fractures of the right inferior pelvis. Bilateral hip prostheses. No acute appearing bony injury identified.  Signed by Dr Isaac Davis [facility] Problems           Last Modified POA    * (Principal) Rhabdomyolysis 4/1/2022 Yes    Fall 4/1/2022 Yes    HTN (hypertension) 4/1/2022 Yes    Generalized weakness 4/1/2022 Yes    Anticoagulated 4/1/2022 Yes    Dehydration 4/1/2022 Yes    Intertrigo 4/1/2022 Yes    Paroxysmal atrial fibrillation ([address] 4/1/2022 Yes    Transaminitis 4/1/2022 Yes    Abnormal CXR 4/1/2022 Yes    Facial droop 4/3/2022 Yes    Dysarthria 4/3/2022 Yes    History of pelvic fracture 4/3/2022 Yes    Hip pain 4/3/2022 Yes    Hypokalemia 4/3/2022 Yes    Vitamin D deficiency 4/3/2022 Yes    Gait abnormality 4/3/2022 Yes    Palliative care patient 1/1/7100 Yes    Diastolic dysfunction 0/3/2158 Yes                  Asked to evaluate patient for dilated common bile duct and pancreatic duct incidentally noted on CT chest.  RUQ US obtained this am is inconclusive. Patient noted to have elevated AST/ALT with AST predominance on admission likely from muscle as patient noted to have Rhabdomyolysis and AST/ALT mirroring improvement  in CK. Bilirubin and alkaline phosphatase have remained normal.  Clinically patient does not have symptoms of biliary colic or obstruction so I am unsure of the clinical significance of this. Due to uncertainity of distal common bile duct filling defect would recommend MRCP for further evaluation. In the event that ERCP would be indicated and patient remains assymptomatic without indication of obstruction then patient can follow up with Dr. Barbara Nunez next week. If urgent ECRP should be indicated then patient will need transfer to other facility. Plan   1. Continue supportive care  2. Monitor daily LFTs  3. Proceed with MRCP for further evaluation. Patient may require mild sedation to be able to lay still for examination. Further recommendations to follow results of MRCP.      Electronically signed by Kassi Anderson MD on 4/5/22 at 9:41 AM CDT

## 2022-04-05 NOTE — PROGRESS NOTES
Cardiology Progress Note Justo Gonsalves MD      Patient:  Tor Shea  [id number]    Patient Active Problem List    Diagnosis Date Noted    Chronic pain disorder 04/04/2022     Priority: Low    Gastroesophageal reflux disease 04/04/2022     Priority: Low    Palliative care patient 04/04/2022     Priority: Low    Diastolic dysfunction 73/09/5500     Priority: Low    Facial droop 04/03/2022     Priority: Low    Dysarthria 04/03/2022     Priority: Low    History of pelvic fracture 04/03/2022     Priority: Low    Hip pain 04/03/2022     Priority: Low    Hypokalemia 04/03/2022     Priority: Low    Vitamin D deficiency 04/03/2022     Priority: Low    Gait abnormality 04/03/2022     Priority: Low    Rhabdomyolysis 04/01/2022     Priority: Low    Fall 04/01/2022     Priority: Low    HTN (hypertension) 04/01/2022     Priority: Low    Generalized weakness 04/01/2022     Priority: Low    Anticoagulated 04/01/2022     Priority: Low    Dehydration 04/01/2022     Priority: Low    Intertrigo 04/01/2022     Priority: Low    Paroxysmal atrial fibrillation ([address] 04/01/2022     Priority: Low    Transaminitis 04/01/2022     Priority: Low    Abnormal CXR 04/01/2022     Priority: Low    Osteoarthritis of right knee 09/02/2021     Priority: Low    Trigger middle finger of right hand 12/17/2020     Priority: Low    Mass of right wrist 12/17/2020     Priority: Low       Admit Date:  4/1/2022    Admission Problem List: Present on Admission:   Rhabdomyolysis   Fall   HTN (hypertension)   Generalized weakness   Anticoagulated   Dehydration   Intertrigo   Paroxysmal atrial fibrillation (HCC)   Transaminitis   Abnormal CXR   Facial droop   Dysarthria   History of pelvic fracture   Hip pain   Hypokalemia   Vitamin D deficiency   Gait abnormality   Palliative care patient   Diastolic dysfunction      Cardiac Specific Data:  Specialty Problems        Cardiology Problems    HTN (hypertension) Paroxysmal atrial fibrillation (HCC)            1. Paroxysmal atrial fibrillation on Eliquis (reduced compliance) and amiodarone (29% burden on Zio). 2.  Moderate aortic stenosis (echo 9/2021 with mean gradient 27 mmHg), normal LV ejection fraction. 3.  Active ongoing tobacco use. 4.  Acute left thalamic/basal ganglia infarct, intermediate right ICA stenosis. Subjective:  Ms. Archana Moyer still has considerable weakness bilaterally. Needs assistance to sit and stand. Remains in sinus rhythm. Objective:   BP (!) 178/89   Pulse 63   Temp 98.2 °F (36.8 °C) (Temporal)   Resp 14   Ht 5' 3\" (1.6 m)   Wt 145 lb (65.8 kg)   SpO2 91%   BMI 25.69 kg/m²       Intake/Output Summary (Last 24 hours) at 4/5/2022 1435  Last data filed at 4/5/2022 0944  Gross per 24 hour   Intake 5335 ml   Output 1100 ml   Net 4235 ml       Prior to Admission medications    Medication Sig Start Date End Date Taking? Authorizing Provider   pantoprazole sodium (PROTONIX) 40 MG PACK packet Take 40 mg by mouth every morning (before breakfast)   Yes Historical Provider, MD   diphenhydrAMINE (BENADRYL) 50 MG capsule Take 50 mg by mouth nightly as needed for Itching   Yes Historical Provider, MD   simethicone (MYLICON) 80 MG chewable tablet Take 125 mg by mouth every 6 hours as needed for Flatulence   Yes Historical Provider, MD   DM-APAP-CPM (CORICIDIN HBP) -2 MG TABS Take 325 mg by mouth every 4-6 hours as needed   Yes Historical Provider, MD   dicyclomine (BENTYL) 10 MG capsule Take 10 mg by mouth three times daily    Historical Provider, MD   benzonatate (TESSALON) 200 MG capsule Take 200 mg by mouth 3 times daily    Historical Provider, MD   hydrOXYzine (ATARAX) 25 MG tablet Take 25 mg by mouth at bedtime 2/23/22   Historical Provider, MD   traMADol (ULTRAM) 50 MG tablet Take 50 mg by mouth 2 times daily.     Historical Provider, MD   apixaban (ELIQUIS) 5 MG TABS tablet Take 5 mg by mouth 2 times daily    Historical Provider, MD metoprolol succinate (TOPROL XL) 25 MG extended release tablet Take 1 tablet by mouth daily 11/4/21   KATI Harper   vitamin D (ERGOCALCIFEROL) 1.25 MG (70730 UT) CAPS capsule Take 50,000 Units by mouth once a week    Historical Provider, MD   ondansetron (ZOFRAN) 4 MG tablet Take 1 tablet by mouth every 8 hours as needed for Nausea or Vomiting 9/2/21   Edilson Kendrick MD   lisinopril (PRINIVIL;ZESTRIL) 20 MG tablet Take 20 mg by mouth nightly Indications: High Blood Pressure Disorder    Historical Provider, MD   fluticasone (FLONASE) 50 MCG/ACT nasal spray 2 sprays by Nasal route daily     Historical Provider, MD   loratadine (CLARITIN) 10 MG capsule Take 10 mg by mouth daily    Historical Provider, MD   furosemide (LASIX) 20 MG tablet Take 1 tablet by mouth daily as needed (swelling)  Patient taking differently: Take 40 mg by mouth daily as needed (swelling)  5/6/20   John Cunha MD   celecoxib (CELEBREX) 200 MG capsule Take 1 capsule by mouth daily 6/19/18   John Cunha MD        atorvastatin  10 mg Oral Daily    sodium bicarbonate  650 mg Oral BID    oxyCODONE-acetaminophen  1 tablet Oral Q6H    lactobacillus  1 capsule Oral Daily    potassium chloride  20 mEq Oral BID WC    amiodarone  200 mg Oral BID    metoprolol succinate  50 mg Oral Daily    nystatin  5 mL Oral 4x Daily    polyethylene glycol  17 g Oral Daily    sodium chloride flush  5-40 mL IntraVENous 2 times per day    lactulose  20 g Oral TID    apixaban  5 mg Oral BID    fluticasone  2 spray Nasal Daily    hydrOXYzine  25 mg Oral Nightly    vitamin D  50,000 Units Oral Weekly    nystatin   Topical BID       TELEMETRY: Sinus     Physical Exam:      Physical Exam  Constitutional:       General: She is not in acute distress. Appearance: She is not diaphoretic. HENT:      Mouth/Throat:      Pharynx: No oropharyngeal exudate. Eyes:      General: No scleral icterus. Right eye: No discharge.          Left eye: No discharge. Neck:      Thyroid: No thyromegaly. Vascular: No JVD. Cardiovascular:      Rate and Rhythm: Normal rate and regular rhythm. No extrasystoles are present. Heart sounds: S1 normal and S2 normal. Murmur heard. No systolic murmur is present. No diastolic murmur is present. No friction rub. No gallop. No S3 or S4 sounds. Comments: No JVD  No edema    Pulmonary:      Effort: Pulmonary effort is normal. No respiratory distress. Breath sounds: Normal breath sounds. No wheezing or rales. Chest:      Chest wall: No tenderness. Abdominal:      General: Bowel sounds are normal. There is no distension. Palpations: Abdomen is soft. There is no mass. Tenderness: There is no abdominal tenderness. There is no guarding or rebound. Hernia: No hernia is present. Comments: No palpable organomegaly   Musculoskeletal:         General: Normal range of motion. Skin:     General: Skin is warm. Coloration: Skin is not pale. Findings: No rash. Neurological:      Mental Status: She is alert and oriented to person, place, and time. Cranial Nerves: Cranial nerve deficit present. Comments: Right facial droop. Bilateral weakness                 Lab Data:  CBC:   Recent Labs     04/03/22  0800 04/04/22  0346 04/05/22  0254   WBC 10.7 8.3 9.3   HGB 11.8* 11.2* 11.3*   HCT 35.9* 35.0* 36.2*   MCV 93.5 95.9 96.3    228 236     BMP:   Recent Labs     04/03/22  0800 04/04/22  0346 04/05/22  0254    139 142   K 3.3* 3.9 4.2    107 109   CO2 21* 21* 24   BUN 24* 17 19   CREATININE 0.6 0.6 0.7     LIVER PROFILE:   Recent Labs     04/03/22  0800 04/04/22  0346 04/05/22  0254   * 75* 46*   ALT 63* 60* 50*   LIPASE  --  12* 19   BILITOT 0.5 0.3 <0.2   ALKPHOS 79 77 72     PT/INR: No results for input(s): PROTIME, INR in the last 72 hours. APTT: No results for input(s): APTT in the last 72 hours.   BNP:  No results for input(s): BNP in the last 72 hours. CK, CKMB, Troponin: @LABRCNT (CKTOTAL:3, CKMB:3, TROPONINI:3)@    IMAGING:  ECHO Complete 2D W Doppler W Color    Result Date: 4/4/2022  Transthoracic Echocardiography Report (TTE)  Demographics   Patient Name  Radha Mendoza Date of Study          04/03/2022   MRN           [id number]            Gender                 Female   Date of Birth 1945        Room Number            RYV-1015   Age           67 year(s)   Height:       63 inches         Referring Physician    Farshad Robbins   Weight:       145 pounds        Sonographer            Mary Tolentino [facility]   BSA:          1.69 m^2          Interpreting Physician Pj Tang MD   BMI:          25.69 kg/m^2  Procedure Type of Study   TTE procedure:ECHO NO CONTRAST WITH DOP/COLR. Study Location: Echo Lab Technical Quality: Adequate visualization Patient Status: Inpatient Rhythm: Within normal limits BP: 121/92 mmHg Indications:TIA. Conclusions   Summary  Mitral valve leaflets are mildly thickened with preserved leaflet  mobility. Mitral annular calcification is present. Mild mitral regurgitation is present. Mild aortic stenosis. Bicuspid valve not excluded  Mean gradient 15 mm hg  Tricuspid valve is structurally normal.  Mild tricuspid regurgitation with estimated RVSP of 39 mm Hg. Moderately dilated left atrium. Normal left ventricular size with preserved LV function and an estimated  ejection fraction of approximately 55-60%. Mild concentric left ventricular hypertrophy. Grade II diastolic dysfunction  Mildly enlarged right ventricle cavity. IVC dilated. Signature   ----------------------------------------------------------------  Electronically signed by Pj Tang MD(Interpreting  physician) on 04/04/2022 08:25 AM  ----------------------------------------------------------------   Findings   Mitral Valve  Mitral valve leaflets are mildly thickened with preserved leaflet  mobility. Mitral annular calcification is present. Mild mitral regurgitation is present. Aortic Valve  Mild aortic stenosis. Bicuspid valve not excluded  Mean gradient 15 mm hg   Tricuspid Valve  Tricuspid valve is structurally normal.  Mild tricuspid regurgitation with estimated RVSP of 39 mm Hg. Pulmonic Valve  The pulmonic valve was not well visualized . Left Atrium  Moderately dilated left atrium. Left Ventricle  Normal left ventricular size with preserved LV function and an estimated  ejection fraction of approximately 55-60%. Mild concentric left ventricular hypertrophy. Grade II diastolic dysfunction   Right Atrium  Normal right atrial dimension with no evidence of thrombus or mass noted. Right Ventricle  Mildly enlarged right ventricle cavity. Pericardial Effusion  No evidence of significant pericardial effusion is noted. Pleural Effusion  No evidence of pleural effusion. Miscellaneous  IVC dilated. M-Mode Measurements (cm)   LVIDd: 4.61 cm                         LVIDs: 2.95 cm  IVSd: 1.33 cm  LVPWd: 1.33 cm                         AO Root Dimension: 3 cm  % Ejection Fraction: 61 %              LA: 3.7 cm                                         LVOT: 2 cm  Doppler Measurements:   AV Peak Velocity:264 cm/s            MV Peak E-Wave: 102 cm/s  AV Peak Gradient: 27.88 mmHg         MV Peak A-Wave: 90.4 cm/s  AV Mean Gradient: 15 mmHg            MV E/A Ratio: 1.13 %  AV Area (Continuity):1.14 cm^2       MV Peak Gradient: 4.16 mmHg  TR Velocity:300 cm/s                 MV P1/2t: 37 msec  TR Gradient:36 mmHg                  MVA by PHT5.95 cm^2  Estimated RAP:3 mmHg  RVSP:39 mmHg      CT ABDOMEN PELVIS WO CONTRAST Additional Contrast? None    Result Date: 4/1/2022  CT ABDOMEN PELVIS WO CONTRAST 4/1/2022 12:23 PM HISTORY: Fall COMPARISON: None DLP: 972 mGy cm TECHNIQUE: Noncontrast enhanced images of the abdomen and pelvis obtained without oral contrast. Automated exposure control was also utilized to decrease patient radiation dose.  FINDINGS: Lung bases are clear. Coronary atheromatous calcification. LIVER: No focal liver lesion. BILIARY SYSTEM: The gallbladder is unremarkable. No intrahepatic or extrahepatic ductal dilatation. PANCREAS: No focal pancreatic lesion. SPLEEN: Unremarkable. KIDNEYS AND ADRENALS: Bilateral kidneys and adrenal glands are unremarkable. The ureters are decompressed and normal in appearance. RETROPERITONEUM: No mass, lymphadenopathy or hemorrhage. GI TRACT: Stomach and small bowel are unremarkable. Moderate colonic stool. OTHER: There is no mesenteric mass, lymphadenopathy or fluid collection. Unremarkable bilateral hip arthroplasties. Old healed right pubic bone fracture. Advanced lumbar spondylosis. No acute bony abnormality. PELVIS: No mass lesion, fluid collection or significant lymphadenopathy is seen in the pelvis. The urinary bladder is normal in appearance. 1. No acute process in the abdomen or pelvis. 2. Moderate colonic stool. Bowel loops are nondilated. 3. Unremarkable bilateral hip arthroplasties. Lumbar spondylosis. No acute bony normality. Signed by Dr Jamila Mathews    CT Head WO Contrast    Result Date: 4/2/2022  CT BRAIN without contrast 4/2/2022 12:38 PM HISTORY: New onset weakness yesterday COMPARISON: 4/1/2022 DLP: 632 mGy cm. All CT scans are performed using dose optimization techniques as appropriate to the performed exam and include at least one of the following: Automated exposure control, adjustment of the mA and/or kV according to size, and the use of the iterative reconstruction technique. TECHNIQUE: Serial axial tomographic images of the brain were obtained without the use of intravenous contrast. FINDINGS: The midline structures are nondisplaced. There is mild cerebral and cerebellar volume loss, with an associated increase in the prominence of the ventricles and sulci. The basilar cisterns are normal in size and configuration. There is no evidence of intracranial hemorrhage or mass-effect.  There is low attenuation in the periventricular white matter, consistent with chronic ischemic change. There are no abnormal extra-axial fluid collections. There is no evidence of tonsillar herniation. The included orbits and their contents are unremarkable. The visualized paranasal sinuses, mastoid air cells and middle ear cavities are clear. The visualized osseous structures and overlying soft tissues of the skull and face are intact. 1. Mild cerebral and cerebellar volume loss with chronic microvascular disease but no evidence of acute intracranial process. Signed by Dr Jerilyn Spurling    Result Date: 4/1/2022  Examination. CT HEAD WO CONTRAST 4/1/2022 12:38 PM History: The patient fell. DLP: 1229 mGycm. The automated exposure control was utilized to minimize the patient's radiation exposure. The CT scan of the head is performed without intravenous contrast enhancement. The images are acquired in axial plane with subsequent reconstruction in coronal and sagittal planes. There is no previous study for comparison There is no evidence of an intracranial hemorrhage or hematoma. There is no evidence of a mass. No midline shift. Moderately dilated ventricles, basal cistern and the cortical sulci suggestive chronic volume loss/atrophy. The scattered areas of chronic white matter ischemia are seen in the centrum semiovale bilaterally. The gray-white matter differentiation is maintained. The images reviewed in bone window show no evidence of a skull fracture. Severe atheromatous changes of the intracranial internal carotid arteries bilaterally noted. 1. No acute intracranial abnormality. 2. No skull fracture. 3. Chronic ischemic and atrophic changes. Signed by Dr Gagan Zepeda Date: 4/4/2022  Examination. CT CHEST W CONTRAST 4/4/2022 12:26 PM History: Right upper lobe collapse. DLP: 1949 mGycm.  The automated exposure control was utilized to minimize the patient's radiation exposure. CT scan of the chest is performed after intravenous contrast enhancement. The images are acquired in axial plane and subsequent reconstruction in coronal and sagittal planes. The comparison is made with the previous study dated 4/1/2022. The opacity in the right upper lobe paratracheal area represent a distended superior vena cava and moderately tortuous and displays brachiocephalic vessels. There is no evidence of right upper lobe collapse. The lungs are poorly distended. There are several groundglass opacity/nodule in the right apex, the largest one located anteriorly and laterally, image #34 in series 4, measuring 1.6 cm in AP dimension. There are atelectatic changes in the lower lungs bilaterally. This more pronounced on the right side. There are small area of consolidation involving the posterior segments of the lower lobes bilaterally adjacent to a small bibasal pleural effusion. There is moderate deviation of the trachea towards the right. The remaining tracheobronchial structures are normal and patent. The limited visualized soft tissues of the neck are unremarkable. The thyroid gland appear normal. Severe atheromatous changes of thoracic aorta are seen. No aneurysmal dilatation. Severe atheromatous changes of coronary arteries are seen. There is no evidence of mediastinal or hilar mass or lymphadenopathy. There is no axillary lymphadenopathy. Limited visualized liver and spleen are unremarkable. Incompletely visualized gallbladder show no gallstone. There is significant dilatation of the common bile duct and pancreatic duct which are incompletely evaluated in this study. However when compared with the previous study obtained 3 days ago these findings are not visualized? Darshan Mcconnell The study was unenhanced. The images reviewed in bone window show no acute bony abnormality or a bone lesion. Chronic degenerative changes of thoracic spine are seen.  There is subcutaneous fat infiltration of the lower chest and upper abdominal wall, more pronounced laterally which may represent edema due to fluid overload. 1. No finding to suggest collapse of the right upper lobe. Detail is given above. 2. Groundglass nodule/opacities predominantly in the right upper lobe may represent an inflammatory/infectious or an evolving neoplastic process. Further follow-up may be obtained. 3. Small bibasal pleural effusion. 4. Small areas of consolidation and atelectasis in the lower lobes bilaterally. 5. The limited visualized abdomen show significant dilatation of the common bile duct and pancreatic duct which were not noted in the previous study obtained 3 days ago which was done without contrast enhancement. The etiology and clinical significance is not certain. A follow-up CT scan of the abdomen after oral and intravenous contrast enhancement. Thin. Signed by Dr Wayne Stubbs    Result Date: 4/1/2022  CT cervical spine 4/1/2022 12:22 PM HISTORY: Fall TECHNIQUE: Axial images of the cervical spine were obtained without IV contrast. Coronal and sagittal reformatted images are reconstructed and reviewed. COMPARISON: None. DLP: 425 mGy cm Automated exposure control was utilized to minimize patient radiation dose. FINDINGS: Alignment of the cervical spine is appropriate. Moderate to advanced degenerative disc change. Diffuse uncinate process hypertrophy with mild facet osteoarthropathy. No acute cervical vertebral fracture. Incomplete fusion of posterior arch of C1, developmental finding. Mild to moderate canal stenosis at C5/6 with mild narrowing about below this level. Scattered moderate to severe foraminal stenosis. Moderate carotid bulb calcification. No apical lung nodule. 1. Moderate to advanced degenerative changes cervical spine with no acute cervical vertebral fracture or traumatic malalignment.  Signed by Dr Ekta Morales Additional Contrast? None    Result Date: 4/2/2022  EXAMINATION: CT pelvis without contrast 4/2/2022 HISTORY: Pubic pain post fall Dose: 807 mGycm. All CT scans are performed using dose optimization techniques as appropriate to the performed exam and include at least one of the following: Automated exposure control, adjustment of the mA and/or kV according to size, and the use of the iterative reconstruction technique. FINDINGS: Multiple contiguous axial images are obtained through the bony pelvis per protocol without venous contrast enhancement with reformatted images obtained in the sagittal coronal projections from the original data set. At the L4-L5 level there is broad-based bulging of disc as well as moderate facet and ligamentous hypertrophy. There is moderate central spinal stenosis as well as moderate bilateral neural foraminal narrowing. There is mild grade 1 anterolisthesis of L4 on L5 likely representing subluxation at the level of the facets. The SI joints are intact. No evidence of sacral fracture. The patient is status post bilateral total hip arthroplasty. This does obscure some of the anatomic detail secondary to streaking artifact. There are old fractures of the right superior and inferior pubic ramus. No acute fractures are present. The pubic symphysis is intact. A Saeed catheter is in place within the bladder. There is mild fecal impaction within the rectal vault. 1.. Old fractures of the right superior and inferior pubic rami. No additional fractures are present. The SI joints and pubic symphysis are intact with no evidence of diastases. 2. Facet and ligamentous hypertrophy as well as bulging of the disc contributes to central and foraminal stenosis at L4-L5. There is grade 1 anterolisthesis of L4 on L5 felt to represent subluxation at the level of the facets. 3. Constipation with increased stool throughout the colon including fecal impaction within the rectal vault.  Signed by Dr Jag Villaseñor LIVER    Result Date: 4/5/2022  US LIVER 4/5/2022 8:29 AM HISTORY: Biliary duct dilatation COMPARISON: NONE TECHNIQUE: Multiple longitudinal and transverse realtime sonographic images of the right upper quadrant of the abdomen are obtained. FINDINGS:  Pancreas: Normal in size and echogenicity. Liver: Normal in size, echogenicity and echotexture. No focal lesion. A small amount of free fluid is noted within the perihepatic space. Hepatopedal flow is noted within the portal vein. Gallbladder: No intraluminal echoes, wall thickening, or pericholecystic fluid. Bile ducts: The CBD measures 1.0 cm in diameter. There is extrahepatic biliary ductal dilatation. There is a questionable filling defect within the common hepatic duct measuring approximately 13 x 12 x 5 mm in size. Follow-up with MR imaging to include MRCP would be helpful although the patient has great difficulty in remaining motionless on both CT imaging from earlier this month and today's ultrasound and may result in a poor quality study. . Right kidney: Normal in size, shape and contour. No mass, hydronephrosis or calculi. No perinephric fluid collection. Other: There is a small amount of free fluid within the perisplenic space. Small pleural effusions are present. .     1. Mild extrahepatic biliary dilatation is present. There is a question of a filling defect within the common hepatic duct near the mike hepatis. This may be artifactual in nature as the patient had difficulty in cooperating with the exam with some motion artifact. By CT imaging the more distal common duct is also dilated. MRCP may be helpful but given the patient's limitations may be of limited quality. 2. The gallbladder is unremarkable. No evidence of gallstones. A small amount of free fluid is noted within the perihepatic and perisplenic space. Small pleural effusions are present.  Signed by Dr Melina Pandey    Result Date: 4/1/2022  XR CHEST PORTABLE 4/1/2022 12:21 PM HISTORY: Fatigue COMPARISON: 4/3/2019 CXR: A single frontal view of the chest is performed. Findings: There is volume loss of the right upper hemithorax with ipsilateral shifting of the mediastinal structures. Partial right upper lobe collapse considered. Left lung appears clear. Probable right lower lobe granuloma. Heart is upper limits of normal in size. No pleural effusion or pneumothorax. Degenerative change regional skeleton. 1. Right paratracheal upper lobe opacities with associated volume loss may relate to right upper lobe collapse. Follow-up imaging recommended. CT chest preferably with IV contrast could be performed for further assessment if clinically indicated. Signed by Dr Rui Gomes     DUP CAROTID BILATERAL    Result Date: 4/4/2022  Vascular Carotid Procedure  Demographics   Patient Name   Darshan Gunn  Age                  67                 C   Patient Number [id number]           Gender               Female   Visit Number   [id number]        Interpreting         Neftali Bridges MD                                  Physician   Date of Birth  1945       Referring Physician  Hussein Coronel   Accession      [id number]       [address], [place],  Number                                               RDMS  Procedure Type of Study:   Cerebral:Carotid, VL CAROTID BILATERAL. Indications for Study:Questionable CVA/TIA, Unsteady gait and Facial droop, left. Risk Factors   - The patient's risk factor(s) include: arterial hypertension.   - Current - Every day. Impression   There is mixed plaque visualized in the bilateral internal carotid  arteries. There is 50-69% stenosis in the right internal carotid artery. There is less than 50% stenosis of the left internal carotid artery. There is normal antegrade flow in the bilateral vertebral arteries.    Signature   ----------------------------------------------------------------  Electronically signed by Neftali Bridges MD(Interpreting  physician) on 04/04/2022 03:18 PM  ----------------------------------------------------------------  Blood Pressure:Right arm 121/97 mmHg. Velocities are measured in cm/s ; Diameters are measured in mm Carotid Right Measurements +------------+-------+-------+--------+-------+------------+---------------+ ! Location    ! PSV    ! EDV    ! Angle   ! RI     !%Stenosis   ! Tortuosity     ! +------------+-------+-------+--------+-------+------------+---------------+ ! Prox CCA    !60.3   !20.5   !60      !0.66   !            !               ! +------------+-------+-------+--------+-------+------------+---------------+ ! Mid CCA     !70.8   !19.9   !60      !0.72   !            !               ! +------------+-------+-------+--------+-------+------------+---------------+ ! Prox ICA    !203    !60.4   !60      !0.7    ! !               ! +------------+-------+-------+--------+-------+------------+---------------+ ! Mid ICA     !124    !35.1   ! 60      !0.72   !            !               ! +------------+-------+-------+--------+-------+------------+---------------+ ! Dist ICA    !81.2   !30.7   ! 60      !0.62   !            !               ! +------------+-------+-------+--------+-------+------------+---------------+ ! Prox ECA    !188    !20.9   !60      !0.89   !            !               ! +------------+-------+-------+--------+-------+------------+---------------+ ! Vertebral   !77.9   !16.5   !60      !0.79   !            !               ! +------------+-------+-------+--------+-------+------------+---------------+   - There is antegrade vertebral flow noted on the right side. - Additional Measurements:ICAPSV/CCAPSV 3.37. ICAEDV/CCAEDV 2.95. Carotid Left Measurements +------------+-------+-------+--------+-------+------------+---------------+ ! Location    ! PSV    ! EDV    ! Angle   ! RI     !%Stenosis   ! Tortuosity     ! +------------+-------+-------+--------+-------+------------+---------------+ ! Prox CCA    !120    !22     !60      !0.82   ! !               ! +------------+-------+-------+--------+-------+------------+---------------+ ! Mid CCA     !82.3   !28.5   !60      !0.65   !            !               ! +------------+-------+-------+--------+-------+------------+---------------+ ! Prox ICA    !74.6   !26.3   ! 60      !0.65   !            !               ! +------------+-------+-------+--------+-------+------------+---------------+ ! Mid ICA     !92.2   !34     !60      !0.63   !            !               ! +------------+-------+-------+--------+-------+------------+---------------+ ! Dist ICA    ! 88.9   !28.5   !60      !0.68   !            !               ! +------------+-------+-------+--------+-------+------------+---------------+ ! Prox ECA    !93.3   !16.5   !60      !0.82   !            !               ! +------------+-------+-------+--------+-------+------------+---------------+ ! Vertebral   !40.4   !18.6   ! 60      !0.54   !            !               ! +------------+-------+-------+--------+-------+------------+---------------+   - There is antegrade vertebral flow noted on the left side. - Additional Measurements:ICAPSV/CCAPSV 0.77. ICAEDV/CCAEDV 1.55. MRI BRAIN W WO CONTRAST    Result Date: 4/3/2022  MRI brain without and with contrast 4/3/2022 2:22 PM History: Facial droop Comparison: None. Technique: Multiplanar imaging of the brain was performed in a routine fashion before and after the intravenous injection of gadolinium contrast. Findings: Midline structures are nondisplaced. Ventricular system is normal. There is no significant mass effect or hydrocephalus. Basilar cisterns are preserved. There are scattered foci of T2 abnormality involving the periventricular and higher white matter tracts consistent with moderate small vessel ischemic disease. There is mild gliosis within the nely.  There is a focus of low signal within the left thalamus near the posterior limb of the internal capsule on gradient imaging suggesting a site of previous hemorrhage or calcification. On diffusion-weighted imaging this is separable from the area of acute ischemic infarction. No additional sites of acute or chronic hemorrhage are demonstrated. . No abnormal extra axial fluid collections are noted. There is diffusion restriction within the left basal ganglia with involvement of the thalamus, posterior limb of the left internal capsule and putamen with involvement of the external capsule also demonstrated. These are nonhemorrhagic infarcts. There is associated signal loss on ADC mapping imaging consistent with acute ischemic infarction. No additional sites of diffusion restriction are present. Darlynn Magic Postcontrast imaging demonstrates a developmental venous anomaly which is parafalcine in location within the right parietal lobe extending into the region of the corpus callosum. No additional sites of abnormal enhancement are present. Proximal cervical spinal cord, brainstem, and cerebellum are unremarkable. Normal cerebrovascular flow voids are seen. Bilateral globes and orbits are normal in appearance. No abnormal signal is noted in the mastoid air cells or paranasal sinuses. Impression: 1. Diffusion restriction within the left thalamus and basal ganglia with associated ADC mapping abnormality consistent with an acute left-sided infarct. This is nonhemorrhagic. There is no associated mass effect or shift of the midline. 2. There is mild atrophy of the brain. Small vessel ischemic changes are noted involving the periventricular and higher white matter tracts. There is evidence of a remote small focus of hemorrhage within the left thalamus. No additional sites of blooming are present on gradient imaging. 3. DVA within the parafalcine right parietal lobe extending into the region of the corpus callosum. No additional sites of abnormal enhancement are present.  Signed by Dr Jose Angel Borrero 2-3 VW W PELVIS RIGHT    Result Date: 4/1/2022  History: Pain after fall Right hip: Frontal view the pelvis as well as frontal and crosstable lateral views of the right hip are obtained. The right prosthesis. No periprosthetic fracture. Old fractures of the right inferior pelvis involving right superior and inferior rami. Enthesophytes project from the right iliac spine. Chronic calcification along the right acetabular roof. Degenerative change of the lower lumbar spine. Vascular calcification. 1. Old fractures of the right inferior pelvis. Bilateral hip prostheses. No acute appearing bony injury identified. Signed by Dr Jg Keen and Plan: This is a 67y.o. year old female with past medical history of hypertension, active ongoing tobacco use, paroxysmal atrial fibrillation on amiodarone and Eliquis (reduced compliance), moderate aortic stenosis, normal LV ejection fraction presenting with fall with probable acute left thalamic/basal ganglia infarct. 1.  Maintains on Eliquis. Was started on amiodarone in hospital by hospitalist.  Can continue amiodarone 200 mg p.o. twice daily for 7 days and then reduce to 200 mg p.o. daily. 2.  Possible filling defect noted in common hepatic duct with extrahepatic biliary duct dilatation. Being evaluated. Normal ALP. Monitor LFTs is on amiodarone. Unclear etiology of right tracheal deviation which was not noted previously.     Dia Haas MD, MD 4/5/2022 2:35 PM

## 2022-04-05 NOTE — PLAN OF CARE
Problem: Skin Integrity:  Goal: Will show no infection signs and symptoms  Outcome: Ongoing  Goal: Absence of new skin breakdown  Outcome: Ongoing     Problem: Falls - Risk of:  Goal: Will remain free from falls  Outcome: Ongoing  Goal: Absence of physical injury  Outcome: Ongoing     Problem: Pain:  Description: Pain management should include both nonpharmacologic and pharmacologic interventions.   Goal: Pain level will decrease  Outcome: Ongoing  Goal: Control of acute pain  Outcome: Ongoing  Goal: Control of chronic pain  Outcome: Ongoing

## 2022-04-05 NOTE — PROGRESS NOTES
Physician Progress Note      PATIENT:               Eli Hayes  CSN #:                  [id number]  :                       1945  ADMIT DATE:       2022 11:50 AM  DISCH DATE:  RESPONDING  PROVIDER #:        René Sierra MD          QUERY TEXT:    Pt admitted after having a fall. Noted documentation of traumatic   rhabdomyolysis in ED impression dated 22. If possible, please document in   progress notes and discharge summary:    The medical record reflects the following:  Risk Factors: fall, couldn't get up for 2 days  Clinical Indicators: weakness after a fall and unable to get up and was found   on floor per EMS; CK 7136  Treatment: LR 1000 ml IV bolus x2, NS @ 75 ml/hr, serial labs including   creatinine and CK    Thank you,  Luis Antonio [facility], [facility]  [phone]  Options provided:  -- Traumatic rhabdomyolysis confirmed present on admission  -- Nontraumatic rhabdomyolysis  -- Defer to the ED physician's documentation regarding traumatic   rhabdomyolysis  -- Other - I will add my own diagnosis  -- Disagree - Not applicable / Not valid  -- Disagree - Clinically unable to determine / Unknown  -- Refer to Clinical Documentation Reviewer    PROVIDER RESPONSE TEXT:    The diagnosis of traumatic rhabdomyolysis was confirmed as present on   admission.     Query created by: Vasile Kim on 2022 2:23 PM      Electronically signed by:  René Sierra MD 2022 2:30 PM

## 2022-04-05 NOTE — PROGRESS NOTES
[facility]      Progress Note    Patient:  Salinas Tobias  YOB: 1945  Date of Service: 4/5/2022  MRN: [id number]   Acct: [de-identified]   Primary Care Physician: KATI Gallardo CNP  Advance Directive: Full Code  Admit Date: 4/1/2022       Hospital Day: 4    Portions of this note have been copied forward, however, updated to reflect the most current clinical status of this patient. CHIEF COMPLAINT Fall    SUBJECTIVE: Speech still slurred. [address] COURSE:   The patient is a 67 y.o. female who presented to [facility] ED complaining of fall. Pt has history of paroxysmal afib on eliquis, moderate aortic stenosis and diastolic dysfunction followed by cardiology. She has history of HTN and osteoarthritis as well. She fell 3 days ago at home reporting problems with bilateral leg weakness. She denied syncope. She lives alone and her neighbor today went to her house as she hadn't been answering her phone. Pt had crawled to unlock door however did not have strength to get up out of floor reporting generalized weakness. She denied injury from fall. She has not had anything to eat or drink over past few days. She tells me that her hips have been painful. She denied problems with cough, fevers, shortness of breath as well as chest pain. She had previously reported abdominal discomfort that has since resolved. He neighbor is hearing impaired and reads lips relating that she hadn't been able to read pt's lips today giving concern for speech changes. Pt tells me that she doesn't have her bottom dentures and that her mouth has been dry. She denied history of CVA as well as prior TIA.    In ED, CK 7136, creatinine 0.9/BUN elevated at 41, sodium 141, potassium 4.4, CO2 low at 16, anion gap elevated at 20, alk phos 95, /ALT 68, UA with 15ketones, large blood, negative nitrite, negative leukocyte esterase, covid test negative, ek sr hr 89, wbc 17, hgb 14, platelets 011, INR 1.03  CT head-no acute intracranial abnormality, CT cspine-no acute cervical fracture or traumatic malalignment, CXR-right paratracheal upper lobe opacities with volume loss may relate to RUL collapse w/recommendation for CT chest with contrast, pelvis right hip imaging with old fractures, bilateral hip prosthesis, no acute appearing bony injury. Pt is admitted to hospitalist service for further evaluation and treatment. Today she is hurting allover. Her groin where she had previous pelvic fx is main complaint. Her left leg appears rotated inward. Xray negative for acute problem. She has left side facial drooping of the mouth that is still present. She has had several bowel movements but still complains of colicky abd pain     Review of Systems   Constitutional: Negative. HENT: Negative. Eyes: Negative. Respiratory: Positive for choking. Cardiovascular: Negative. Gastrointestinal: Negative. Endocrine: Negative. Musculoskeletal: Positive for arthralgias and myalgias. Allergic/Immunologic: Negative. Neurological: Positive for weakness. Negative for dizziness. Hematological: Negative. Psychiatric/Behavioral: Negative. All other systems reviewed and are negative. Objective:   VITALS:  BP (!) 178/89   Pulse 63   Temp 98.2 °F (36.8 °C) (Temporal)   Resp 14   Ht 5' 3\" (1.6 m)   Wt 145 lb (65.8 kg)   SpO2 91%   BMI 25.69 kg/m²   24HR INTAKE/OUTPUT:      Intake/Output Summary (Last 24 hours) at 4/5/2022 1527  Last data filed at 4/5/2022 0944  Gross per 24 hour   Intake 5335 ml   Output 1100 ml   Net 4235 ml         Physical Exam  Vitals and nursing note reviewed. Constitutional:       Appearance: She is ill-appearing. HENT:      Head: Normocephalic and atraumatic. Nose: Nose normal.      Mouth/Throat:      Mouth: Mucous membranes are moist.   Eyes:      Extraocular Movements: Extraocular movements intact.       Comments: Cataract lenses   Cardiovascular:      Rate and Rhythm: Normal rate. Rhythm irregular. Heart sounds: Murmur heard. Pulmonary:      Breath sounds: Normal breath sounds. Abdominal:      General: Bowel sounds are normal.      Palpations: Abdomen is soft. Musculoskeletal:      Cervical back: Neck supple. Skin:     General: Skin is warm and dry. Coloration: Skin is pale. Neurological:      Comments: Left mouth droop            Medications:      sodium chloride 75 mL/hr at 04/04/22 1824    sodium chloride        atorvastatin  10 mg Oral Daily    sodium bicarbonate  650 mg Oral BID    oxyCODONE-acetaminophen  1 tablet Oral Q6H    lactobacillus  1 capsule Oral Daily    [START ON 4/10/2022] amiodarone  200 mg Oral Daily    potassium chloride  20 mEq Oral BID WC    amiodarone  200 mg Oral BID    metoprolol succinate  50 mg Oral Daily    nystatin  5 mL Oral 4x Daily    polyethylene glycol  17 g Oral Daily    sodium chloride flush  5-40 mL IntraVENous 2 times per day    lactulose  20 g Oral TID    apixaban  5 mg Oral BID    fluticasone  2 spray Nasal Daily    hydrOXYzine  25 mg Oral Nightly    vitamin D  50,000 Units Oral Weekly    nystatin   Topical BID     simethicone, potassium chloride **OR** potassium alternative oral replacement **OR** potassium chloride, magic butt cream, HYDROmorphone, sodium chloride flush, sodium chloride, ondansetron **OR** ondansetron, polyethylene glycol, traMADol  ADULT DIET;  Regular  ADULT ORAL NUTRITION SUPPLEMENT; Breakfast; Standard High Calorie/High Protein Oral Supplement     Lab and other Data:     Recent Labs     04/03/22  0800 04/04/22  0346 04/05/22  0254   WBC 10.7 8.3 9.3   HGB 11.8* 11.2* 11.3*    228 236     Recent Labs     04/03/22  0800 04/04/22  0346 04/05/22  0254    139 142   K 3.3* 3.9 4.2    107 109   CO2 21* 21* 24   BUN 24* 17 19   CREATININE 0.6 0.6 0.7   GLUCOSE 82 132* 93     Recent Labs     04/03/22  0800 04/04/22  0346 04/05/22  0254   * 75* 46* ALT 63* 60* 50*   BILITOT 0.5 0.3 <0.2   ALKPHOS 79 77 72     Troponin T:   Recent Labs     04/03/22  0613 04/03/22  0800   TROPONINI 0.03 0.03     Pro-BNP: No results for input(s): BNP in the last 72 hours. INR:   No results for input(s): INR in the last 72 hours. UA:  No results for input(s): NITRITE, COLORU, PHUR, LABCAST, WBCUA, RBCUA, MUCUS, TRICHOMONAS, YEAST, BACTERIA, CLARITYU, SPECGRAV, LEUKOCYTESUR, UROBILINOGEN, BILIRUBINUR, BLOODU, GLUCOSEU, AMORPHOUS in the last 72 hours. Invalid input(s): Scharlene Marlo  A1C: No results for input(s): LABA1C in the last 72 hours. ABG:No results for input(s): PHART, CYA6FSZ, PO2ART, MGA1XKP, BEART, HGBAE, T1JIMJJP, CARBOXHGBART in the last 72 hours. RAD:   CT ABDOMEN PELVIS WO CONTRAST Additional Contrast? None    Result Date: 4/1/2022  CT ABDOMEN PELVIS WO CONTRAST 4/1/2022 12:23 PM HISTORY: Fall COMPARISON: None DLP: 972 mGy cm TECHNIQUE: Noncontrast enhanced images of the abdomen and pelvis obtained without oral contrast. Automated exposure control was also utilized to decrease patient radiation dose. FINDINGS: Lung bases are clear. Coronary atheromatous calcification. LIVER: No focal liver lesion. BILIARY SYSTEM: The gallbladder is unremarkable. No intrahepatic or extrahepatic ductal dilatation. PANCREAS: No focal pancreatic lesion. SPLEEN: Unremarkable. KIDNEYS AND ADRENALS: Bilateral kidneys and adrenal glands are unremarkable. The ureters are decompressed and normal in appearance. RETROPERITONEUM: No mass, lymphadenopathy or hemorrhage. GI TRACT: Stomach and small bowel are unremarkable. Moderate colonic stool. OTHER: There is no mesenteric mass, lymphadenopathy or fluid collection. Unremarkable bilateral hip arthroplasties. Old healed right pubic bone fracture. Advanced lumbar spondylosis. No acute bony abnormality. PELVIS: No mass lesion, fluid collection or significant lymphadenopathy is seen in the pelvis.  The urinary bladder is normal in appearance. 1. No acute process in the abdomen or pelvis. 2. Moderate colonic stool. Bowel loops are nondilated. 3. Unremarkable bilateral hip arthroplasties. Lumbar spondylosis. No acute bony normality. Signed by Dr Victor Hugo Coleman    CT Head WO Contrast    Result Date: 4/2/2022  CT BRAIN without contrast 4/2/2022 12:38 PM HISTORY: New onset weakness yesterday COMPARISON: 4/1/2022 DLP: 632 mGy cm. All CT scans are performed using dose optimization techniques as appropriate to the performed exam and include at least one of the following: Automated exposure control, adjustment of the mA and/or kV according to size, and the use of the iterative reconstruction technique. TECHNIQUE: Serial axial tomographic images of the brain were obtained without the use of intravenous contrast. FINDINGS: The midline structures are nondisplaced. There is mild cerebral and cerebellar volume loss, with an associated increase in the prominence of the ventricles and sulci. The basilar cisterns are normal in size and configuration. There is no evidence of intracranial hemorrhage or mass-effect. There is low attenuation in the periventricular white matter, consistent with chronic ischemic change. There are no abnormal extra-axial fluid collections. There is no evidence of tonsillar herniation. The included orbits and their contents are unremarkable. The visualized paranasal sinuses, mastoid air cells and middle ear cavities are clear. The visualized osseous structures and overlying soft tissues of the skull and face are intact. 1. Mild cerebral and cerebellar volume loss with chronic microvascular disease but no evidence of acute intracranial process. Signed by Dr Esther Okeefe    Result Date: 4/1/2022  Examination. CT HEAD WO CONTRAST 4/1/2022 12:38 PM History: The patient fell. DLP: 1229 mGycm. The automated exposure control was utilized to minimize the patient's radiation exposure.  The CT scan of the head is performed without intravenous contrast enhancement. The images are acquired in axial plane with subsequent reconstruction in coronal and sagittal planes. There is no previous study for comparison There is no evidence of an intracranial hemorrhage or hematoma. There is no evidence of a mass. No midline shift. Moderately dilated ventricles, basal cistern and the cortical sulci suggestive chronic volume loss/atrophy. The scattered areas of chronic white matter ischemia are seen in the centrum semiovale bilaterally. The gray-white matter differentiation is maintained. The images reviewed in bone window show no evidence of a skull fracture. Severe atheromatous changes of the intracranial internal carotid arteries bilaterally noted. 1. No acute intracranial abnormality. 2. No skull fracture. 3. Chronic ischemic and atrophic changes. Signed by Dr Oneida Pelaez    Result Date: 4/1/2022  CT cervical spine 4/1/2022 12:22 PM HISTORY: Fall TECHNIQUE: Axial images of the cervical spine were obtained without IV contrast. Coronal and sagittal reformatted images are reconstructed and reviewed. COMPARISON: None. DLP: 425 mGy cm Automated exposure control was utilized to minimize patient radiation dose. FINDINGS: Alignment of the cervical spine is appropriate. Moderate to advanced degenerative disc change. Diffuse uncinate process hypertrophy with mild facet osteoarthropathy. No acute cervical vertebral fracture. Incomplete fusion of posterior arch of C1, developmental finding. Mild to moderate canal stenosis at C5/6 with mild narrowing about below this level. Scattered moderate to severe foraminal stenosis. Moderate carotid bulb calcification. No apical lung nodule. 1. Moderate to advanced degenerative changes cervical spine with no acute cervical vertebral fracture or traumatic malalignment.  Signed by Dr Miguelina Garcia Additional Contrast? None    Result Date: 4/2/2022  EXAMINATION: CT pelvis without contrast 4/2/2022 HISTORY: Pubic pain post fall Dose: 807 mGycm. All CT scans are performed using dose optimization techniques as appropriate to the performed exam and include at least one of the following: Automated exposure control, adjustment of the mA and/or kV according to size, and the use of the iterative reconstruction technique. FINDINGS: Multiple contiguous axial images are obtained through the bony pelvis per protocol without venous contrast enhancement with reformatted images obtained in the sagittal coronal projections from the original data set. At the L4-L5 level there is broad-based bulging of disc as well as moderate facet and ligamentous hypertrophy. There is moderate central spinal stenosis as well as moderate bilateral neural foraminal narrowing. There is mild grade 1 anterolisthesis of L4 on L5 likely representing subluxation at the level of the facets. The SI joints are intact. No evidence of sacral fracture. The patient is status post bilateral total hip arthroplasty. This does obscure some of the anatomic detail secondary to streaking artifact. There are old fractures of the right superior and inferior pubic ramus. No acute fractures are present. The pubic symphysis is intact. A Saeed catheter is in place within the bladder. There is mild fecal impaction within the rectal vault. 1.. Old fractures of the right superior and inferior pubic rami. No additional fractures are present. The SI joints and pubic symphysis are intact with no evidence of diastases. 2. Facet and ligamentous hypertrophy as well as bulging of the disc contributes to central and foraminal stenosis at L4-L5. There is grade 1 anterolisthesis of L4 on L5 felt to represent subluxation at the level of the facets. 3. Constipation with increased stool throughout the colon including fecal impaction within the rectal vault.  Signed by Dr Mona Lynch    Result Date: left infarct  Resolved Problems:    * No resolved hospital problems. Discharge planning: snf most likely      Further Orders per Clinical course/attending. Electronically signed by KATI Harding CNP on 4/5/2022 at 3:27 PM       EMR Dragon/Transcription disclaimer:   Much of this encounter note is an electronic transcription/translation of spoken language to printed text. The electronic translation of spoken language may permit erroneous, or at times, nonsensical words or phrases to be inadvertently transcribed; although attempts have made to review the note for such errors, some may still exist.      Attestation Statement     I have independently seen and examined this patient and agree with the asesment and plan by [facility] MEDICINE  - PROGRESS NOTE         Objective:   Vitals: /70   Pulse 89   Temp 98.4 °F (36.9 °C) (Temporal)   Resp 18   Ht 5' 3\" (1.6 m)   Wt 145 lb (65.8 kg)   SpO2 92%   BMI 25.69 kg/m²   General appearance: alert, appears stated age and cooperative  Skin: Skin color, texture, turgor normal.   HEENT: Head: Normocephalic, no lesions, without obvious abnormality.   Neck: no adenopathy, no carotid bruit, no JVD and supple, symmetrical, trachea midline  Lungs: clear to auscultation bilaterally  Heart: regular rate and rhythm, S1, S2 normal, no murmur, click, rub or gallop  Abdomen: soft, non-tender; bowel sounds normal; no masses,  no organomegaly  Extremities: extremities normal, atraumatic, no cyanosis or edema  Lymphatic: No significant lymph node enlargement papable  Neurologic: Mental status: Alert, oriented, thought content appropriate      Assessment & Plan:    Traumatic rhabdomyolysis- cont IVF  Metabolic acidosis- cont bicarb   Hypokalemia   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- follow OP  Constipation - treated  Groundglass nodule/opacities predominantly in the right upper lobe   may represent an inflammatory/infectious or an evolving neoplastic process- pulmonary eval pending  Severe deconditioning - PT/OT- needs SNF  Acute CVA with Facial droop-- neurology on board  Significant dilatation of the common bile duct and pancreatic duct - GI consulted- plans MRCP     Ca Maradiaga MD

## 2022-04-05 NOTE — CONSULTS
Pulmonary and Critical Care Consult Note    [facility] Tania Ernandez    MRN# [id number]    Acct# [de-identified]  4/5/2022   5:26 PM CDT    Referring Kathryn Saldaña, *      Chief Complaint: Lung nodule    Requesting physician: Dr. Mariam Grewal    Reason for consult: Lung nodule      HPI: We have been consulted to see this 67y.o. year old female born on 1945. The patient presented to the hospital on the first episode of fall and was admitted. Apparently she has been having symptoms of leg weakness. She had a CT of the chest done yesterday after the chest x-ray suggested the presence of right upper lobe collapse. CT of the chest did not show any evidence of the right upper lobe collapse however there was right upper lobe small groundglass opacity. I was asked to see her regarding the above. The patient admits to smoking about a half a pack a day. Denies hemoptysis. At this time she developed the symptoms of right facial droop. Neurology is following.       Past Medical History      Past Medical History:   Diagnosis Date    Chronic back pain     Hypertension     Inflammatory arthritis     Knee pain     Spastic colon      SurgicalHistory  Past Surgical History:   Procedure Laterality Date    ABDOMEN SURGERY      APPENDECTOMY      BACK SURGERY      neck; bone out of hip to fuse neck    FINGER TRIGGER RELEASE Left 12/17/2020    LEFT MIDDLE TRIGGER FINGER RELEASE performed by Nuno Bentley MD at [address] [place] [address] HAND SURGERY Left 2012    fx repair (3 total surgeries)    HAND SURGERY Left 12/17/2020    LEFT WRIST MASS EXCISION [facility] [place] [facility] B[facility]     HIP    [address] Right 9/2/2021    RIGHT TOTAL KNEE ARTHROPLASTY performed by Rose Mary Tavarez MD at [address] [place] [address] LIGATION       Allergies  Allergies   Allergen Reactions    Levofloxacin In D5w Rash     Itchy rash    Pyridium [Phenazopyridine] Nausea And Vomiting    Sulfamethoxazole-Trimethoprim Nausea And Vomiting    Codeine Nausea And Vomiting    Hydrocodone-Acetaminophen Nausea And Vomiting    Hydromorphone Hcl Nausea And Vomiting    Oxycodone Nausea And Vomiting     Medications    atorvastatin, 10 mg, Oral, Daily    sodium bicarbonate, 650 mg, Oral, BID    oxyCODONE-acetaminophen, 1 tablet, Oral, Q6H    lactobacillus, 1 capsule, Oral, Daily    [START ON 4/10/2022] amiodarone, 200 mg, Oral, Daily    potassium chloride, 20 mEq, Oral, BID WC    amiodarone, 200 mg, Oral, BID    metoprolol succinate, 50 mg, Oral, Daily    nystatin, 5 mL, Oral, 4x Daily    polyethylene glycol, 17 g, Oral, Daily    sodium chloride flush, 5-40 mL, IntraVENous, 2 times per day    lactulose, 20 g, Oral, TID    apixaban, 5 mg, Oral, BID    fluticasone, 2 spray, Nasal, Daily    hydrOXYzine, 25 mg, Oral, Nightly    vitamin D, 50,000 Units, Oral, Weekly    nystatin, , Topical, BID   Social History   reports that she has been smoking cigarettes. She has been smoking about 0.50 packs per day. She has never used smokeless tobacco. She reports that she does not drink alcohol and does not use drugs. Family History  family history includes Cancer in her mother; Heart Attack in her father and mother; Other in an other family member.     Review of Systems:  12 point review of systems is negative except as below:  CONSTITUTIONAL:  positive for  malaise  RESPIRATORY:  positive for lung nodule  NEUROLOGICAL:  positive for weakness    Physical Exam:  BP (!) 181/79   Pulse 68   Temp 98.1 °F (36.7 °C) (Temporal)   Resp 14   Ht 5' 3\" (1.6 m)   Wt 145 lb (65.8 kg)   SpO2 92%   BMI 25.69 kg/m²     Intake/Output Summary (Last 24 hours) at 4/5/2022 5279  Last data filed at 4/5/2022 0944  Gross per 24 hour   Intake 5335 ml   Output 550 ml Net 4785 ml       General appearance: Elderly white female who appears to be in no distress   HEENT: Normocephalic atraumatic  Heart: S1-S2 distant sounds no murmurs  Lungs: Diminished bilaterally no rubs or chest tenderness or dullness to percussion  Abdomen: Soft nontender no organomegalies normal bowel sounds  Extremities: No clubbing cyanosis or edema  Neuro: Right facial droop and generalized weakness  Skin: Intact        Labs:  Recent Labs     04/03/22  0613 04/03/22  0800 04/04/22  0346 04/05/22  0254   WBC 10.7 10.7 8.3 9.3   RBC 3.69* 3.84* 3.65* 3.76*   HGB 11.4* 11.8* 11.2* 11.3*   HCT 34.9* 35.9* 35.0* 36.2*    244 228 236   MCV 94.6 93.5 95.9 96.3   MCH 30.9 30.7 30.7 30.1   MCHC 32.7* 32.9* 32.0* 31.2*   RDW 13.7 13.6 13.4 13.9      Recent Labs     04/03/22  0613 04/03/22  0800 04/04/22  0346 04/05/22  0254    140 139 142   K 3.0* 3.3* 3.9 4.2    106 107 109   CO2 21* 21* 21* 24   BUN 25* 24* 17 19   CREATININE 0.6 0.6 0.6 0.7   CALCIUM 8.2* 8.3* 8.4* 8.5*   GLUCOSE 75 82 132* 93      No results for input(s): PHART, MVJ2URN, PO2ART, EBR3UBH, W4HWTJVE, BEART in the last 72 hours. Recent Labs     04/03/22  0800 04/04/22  0346 04/05/22  0254   *   < > 46*   ALT 63*   < > 50*   ALKPHOS 79   < > 72   BILITOT 0.5   < > <0.2   MG 1.7   < > 2.0   CALCIUM 8.3*   < > 8.5*   TROPONINI 0.03  --   --     < > = values in this interval not displayed. No results for input(s): BC, LABGRAM, CULTRESP, BFCX in the last 72 hours. Radiograph: CT ABDOMEN PELVIS WO CONTRAST Additional Contrast? None    Result Date: 4/1/2022  1. No acute process in the abdomen or pelvis. 2. Moderate colonic stool. Bowel loops are nondilated. 3. Unremarkable bilateral hip arthroplasties. Lumbar spondylosis. No acute bony normality. Signed by Dr Daniele Melton    CT Head WO Contrast    Result Date: 4/2/2022  1.  Mild cerebral and cerebellar volume loss with chronic microvascular disease but no evidence of acute intracranial process. Signed by Dr Lloyd Thomas    Result Date: 4/1/2022  1. No acute intracranial abnormality. 2. No skull fracture. 3. Chronic ischemic and atrophic changes. Signed by Dr Ramón Gupta    Result Date: 4/4/2022  1. No finding to suggest collapse of the right upper lobe. Detail is given above. 2. Groundglass nodule/opacities predominantly in the right upper lobe may represent an inflammatory/infectious or an evolving neoplastic process. Further follow-up may be obtained. 3. Small bibasal pleural effusion. 4. Small areas of consolidation and atelectasis in the lower lobes bilaterally. 5. The limited visualized abdomen show significant dilatation of the common bile duct and pancreatic duct which were not noted in the previous study obtained 3 days ago which was done without contrast enhancement. The etiology and clinical significance is not certain. A follow-up CT scan of the abdomen after oral and intravenous contrast enhancement. Thin. Signed by Dr Erin Randall    Result Date: 4/1/2022  1. Moderate to advanced degenerative changes cervical spine with no acute cervical vertebral fracture or traumatic malalignment. Signed by Dr Norris Garcia Additional Contrast? None    Result Date: 4/2/2022  1. . Old fractures of the right superior and inferior pubic rami. No additional fractures are present. The SI joints and pubic symphysis are intact with no evidence of diastases. 2. Facet and ligamentous hypertrophy as well as bulging of the disc contributes to central and foraminal stenosis at L4-L5. There is grade 1 anterolisthesis of L4 on L5 felt to represent subluxation at the level of the facets. 3. Constipation with increased stool throughout the colon including fecal impaction within the rectal vault. Signed by Dr Katerina Marcos LIVER    Result Date: 4/5/2022  1.  Mild extrahepatic biliary dilatation is present. There is a question of a filling defect within the common hepatic duct near the mike hepatis. This may be artifactual in nature as the patient had difficulty in cooperating with the exam with some motion artifact. By CT imaging the more distal common duct is also dilated. MRCP may be helpful but given the patient's limitations may be of limited quality. 2. The gallbladder is unremarkable. No evidence of gallstones. A small amount of free fluid is noted within the perihepatic and perisplenic space. Small pleural effusions are present. Signed by Dr Crissy Knowles    Result Date: 4/1/2022  1. Right paratracheal upper lobe opacities with associated volume loss may relate to right upper lobe collapse. Follow-up imaging recommended. CT chest preferably with IV contrast could be performed for further assessment if clinically indicated. Signed by Dr Gurjit Earl    MRI BRAIN W WO CONTRAST    Result Date: 4/3/2022  Impression: 1. Diffusion restriction within the left thalamus and basal ganglia with associated ADC mapping abnormality consistent with an acute left-sided infarct. This is nonhemorrhagic. There is no associated mass effect or shift of the midline. 2. There is mild atrophy of the brain. Small vessel ischemic changes are noted involving the periventricular and higher white matter tracts. There is evidence of a remote small focus of hemorrhage within the left thalamus. No additional sites of blooming are present on gradient imaging. 3. DVA within the parafalcine right parietal lobe extending into the region of the corpus callosum. No additional sites of abnormal enhancement are present. Signed by Dr Clinton Lopez 2-3 VW W PELVIS RIGHT    Result Date: 4/1/2022  1. Old fractures of the right inferior pelvis. Bilateral hip prostheses. No acute appearing bony injury identified.  Signed by Dr Gurjit Earl       My radiograph interpretation/independent review of imaging: Reviewed    Problem list generated by [employer]:  Hospital Problems           Last Modified POA    * (Principal) Rhabdomyolysis 4/1/2022 Yes    Fall 4/1/2022 Yes    HTN (hypertension) 4/1/2022 Yes    Generalized weakness 4/1/2022 Yes    Anticoagulated 4/1/2022 Yes    Dehydration 4/1/2022 Yes    Intertrigo 4/1/2022 Yes    Paroxysmal atrial fibrillation ([address] 4/1/2022 Yes    Transaminitis 4/1/2022 Yes    Abnormal CXR 4/1/2022 Yes    Facial droop 4/3/2022 Yes    Dysarthria 4/3/2022 Yes    History of pelvic fracture 4/3/2022 Yes    Hip pain 4/3/2022 Yes    Hypokalemia 4/3/2022 Yes    Vitamin D deficiency 4/3/2022 Yes    Gait abnormality 4/3/2022 Yes    Palliative care patient 9/4/9944 Yes    Diastolic dysfunction 3/9/2677 Yes                         Pulmonary Assessment/plan:    1. Right upper lobe groundglass opacity of unclear significance at this point. Doubt malignancy. consider repeating CT of the chest in 3 months and outpatient follow up after the CT. 2. Generalized weakness and right facial droop who is acute ischemic changes on MRI of the brain. Neurology following. 3. Status post fall and left hip pain. Per your Care. 4. Filling defect in bile duct. Work-up in progress and GI following. 5. We will see as needed we will sign off at this time. Please recall if necessary         Baldomero Miranda MD, [facility], [facility]    The above note was generated using voice recognition software. Inadvertent typographical errors in transcription may have occurred.     Electronically signed by Baldomero Miranda MD on 04/05/22 at 5:26 PM

## 2022-04-05 NOTE — PROGRESS NOTES
Occupational Therapy     04/05/22 1253   Restrictions/Precautions   Restrictions/Precautions Fall Risk;Contact Precautions   Vision   Vision [facility]   Hearing   Hearing [facility]   General   Chart Reviewed Yes   Patient assessed for rehabilitation services? Yes   Response to previous treatment Patient with no complaints from previous session   Family / Caregiver Present Yes   Subjective   Subjective Pt in bed upon arrival for therapy. Pt agreeable to participate despite feeling fatigued. Pain Assessment   Patient Currently in Pain No   Orientation   Overall Orientation Status WFL   ADL   Feeding Supervision   Grooming Minimal assistance   UE Bathing Minimal assistance   LE Bathing Moderate assistance   UE Dressing Minimal assistance   LE Dressing Maximum assistance   Toileting Maximum assistance   Additional Comments Vel Lezama for transfer; Min assist to maintain sitting balance this date. Balance   Sitting Balance Minimal assistance   Standing Balance Maximum assistance   Functional Mobility   Functional Mobility Comments Vel Lezama    Bed mobility   Supine to Sit Maximum assistance   Sit to Supine Maximum assistance   Scooting Maximal assistance   Transfers   Sit to stand Moderate assistance;2 Person assistance   Stand to sit Moderate assistance;2 Person assistance   Transfer Comments Vel Lezama    Assessment   Performance deficits / Impairments Decreased functional mobility ; Decreased ADL status; Decreased strength;Decreased safe awareness;Decreased endurance;Decreased balance;Decreased posture;Decreased coordination   Assessment Pt limited by fatigue this date but wants to participate. Treatment Diagnosis Rhabdomyolosis, Left side infarct   REQUIRES OT FOLLOW UP Yes   Treatment Initiated  Tx focused on sitting EOB, STS mobility and transfer in [facility] to recliner to eat lunch sitting up.     Discharge Recommendations Patient would benefit from continued therapy after discharge   Activity Tolerance   Activity Tolerance Patient Tolerated treatment well;Patient limited by fatigue   Safety Devices   Safety Devices in place Yes   Type of devices Call light within reach; Chair alarm in place;Nurse notified   Plan   Times per week 3-5   Times per day Daily   Electronically signed by ROMAN Pride on 4/5/2022 at 1:01 PM

## 2022-04-05 NOTE — PROGRESS NOTES
Patient:   Teresia Schaumann  MR#:    [id number]   Room:    [facility]   YOB: 1945  Date of Progress Note: 4/5/2022  Time of Note                           8:00 AM  Consulting Physician:   Catina Myrick M.D. Attending Physician:  Nikia Razo, *     Chief complaint Slurred speech, facial droop    S:This 67 y.o. female  with a past medical history significant for atrial fibrillation on chronic anticoagulation with Eliquis, previous pelvic fracture and hypertension is seen for evaluation of facial droop and slurred speech. The patient has been on 4/1/2022 after being found down at home. Had fallen approximately 3 days prior to admission due to leg weakness. She was laying on the ground and was unable to get help. She does not recall why she fell. The neighbor tried to go over to the house that she had not heard from her and the patient was able to crawl to unlock the door but was unable to get up. The patient indicates she has a previous history of pelvic fracture in office take some Tylenol and tramadol at home with good pain relief. On admission her serum CK level was 7136. Her serum creatinine was 0.9 with a BUN of 41. Her CT of the head had some mild generalized volume loss with no acute changes noted. CT of the pelvis revealed old fracture of the right superior and inferior pubic rami. Patient is complaining of right pelvic pain. Patient denies any history of stroke. She indicates she had some dental work done and ever since then her face has been droop. She wears dentures and indicates her speech is slurred due to that and a dry mouth. Pelvic pain better Feels Percocet helps.      REVIEW OF SYSTEMS:  Constitutional: No fevers No chills  Neck:No stiffness  Respiratory: No shortness of breath  Cardiovascular: No chest pain No palpitations  Gastrointestinal: No abdominal pain    Genitourinary: No Dysuria  Neurological: No headache, no confusion    Past Medical History: Diagnosis Date    Chronic back pain     Hypertension     Inflammatory arthritis     Knee pain     Spastic colon        Past Surgical History:      Procedure Laterality Date    ABDOMEN SURGERY      APPENDECTOMY      BACK SURGERY      neck; bone out of hip to fuse neck    FINGER TRIGGER RELEASE Left 12/17/2020    LEFT MIDDLE TRIGGER FINGER RELEASE performed by Lindsay eKen MD at [address] [place] [address] HAND SURGERY Left 2012    fx repair (3 total surgeries)    HAND SURGERY Left 12/17/2020    LEFT WRIST MASS EXCISION performed by Lindsay Keen MD at [facility] Bilateral     HIP    MOUTH SURGERY      TOTAL KNEE ARTHROPLASTY Right 9/2/2021    RIGHT TOTAL KNEE ARTHROPLASTY performed by Fernando Reyna MD at [address] [place] [address]         Medications in Hospital:      Current Facility-Administered Medications:     simethicone (MYLICON) chewable tablet 80 mg, 80 mg, Oral, 4x Daily PRN, KATI Tian - CNP    potassium chloride (KLOR-CON M) extended release tablet 20 mEq, 20 mEq, Oral, BID , Mehreen Bello MD    sodium bicarbonate tablet 650 mg, 650 mg, Oral, 4x Daily, Mehreen Bello MD, 650 mg at 04/04/22 2107    amiodarone (CORDARONE) tablet 200 mg, 200 mg, Oral, BID, Rossana Mckeon MD, 200 mg at 04/04/22 2107    oxyCODONE-acetaminophen (PERCOCET) 5-325 MG per tablet 1 tablet, 1 tablet, Oral, Q6H PRN, Griselda Castro MD, 1 tablet at 04/05/22 0309    metoprolol succinate (TOPROL XL) extended release tablet 50 mg, 50 mg, Oral, Daily, Mehreen Bello MD, 50 mg at 04/04/22 0805    nystatin (MYCOSTATIN) 537724 UNIT/ML suspension 500,000 Units, 5 mL, Oral, 4x Daily, Mehreen Bello MD, 500,000 Units at 04/04/22 2107    potassium chloride (KLOR-CON M) extended release tablet 40 mEq, 40 mEq, Oral, PRN **OR** potassium bicarb-citric acid (EFFER-K) effervescent tablet 40 mEq, 40 mEq, Oral, PRN **OR** potassium chloride 10 mEq/100 mL IVPB (Peripheral Line), 10 mEq, IntraVENous, PRN, KATI Veliz - CNP    magic butt cream, , Topical, Q4H PRN, KATI Veliz CNP, Given at 04/02/22 2136    HYDROmorphone HCl PF (DILAUDID) injection 0.5 mg, 0.5 mg, IntraVENous, Q3H PRN, KATI Veliz - CNP, 0.5 mg at 04/04/22 2111    polyethylene glycol (GLYCOLAX) packet 17 g, 17 g, Oral, Daily, Анна Lees MD, 17 g at 04/04/22 0806    0.9 % sodium chloride infusion, , IntraVENous, Continuous, Анна Lees MD, Last Rate: 75 mL/hr at 04/04/22 1824, Rate Change at 04/04/22 1824    sodium chloride flush 0.9 % injection 5-40 mL, 5-40 mL, IntraVENous, 2 times per day, Анна Lees MD, 10 mL at 04/04/22 0806    sodium chloride flush 0.9 % injection 5-40 mL, 5-40 mL, IntraVENous, PRN, Анна Lees MD, 10 mL at 04/03/22 1657    0.9 % sodium chloride infusion, , IntraVENous, PRN, Анна Lees MD    ondansetron (ZOFRAN-ODT) disintegrating tablet 4 mg, 4 mg, Oral, Q8H PRN **OR** ondansetron (ZOFRAN) injection 4 mg, 4 mg, IntraVENous, Q6H PRN, Анна Lees MD, 4 mg at 04/04/22 1652    polyethylene glycol (GLYCOLAX) packet 17 g, 17 g, Oral, Daily PRN, Анна Lees MD    lactulose (CHRONULAC) 10 GM/15ML solution 20 g, 20 g, Oral, TID, Анна Lese MD, 20 g at 04/04/22 2107    apixaban (ELIQUIS) tablet 5 mg, 5 mg, Oral, BID, Анна Lees MD, 5 mg at 04/04/22 2107    fluticasone (FLONASE) 50 MCG/ACT nasal spray 2 spray, 2 spray, Nasal, Daily, Анна Lees MD    hydrOXYzine (ATARAX) tablet 25 mg, 25 mg, Oral, Nightly, Анна Lees MD, 25 mg at 04/04/22 2107    traMADol (ULTRAM) tablet 50 mg, 50 mg, Oral, Q6H PRN, Анна Lees MD, 50 mg at 04/04/22 0818    vitamin D (ERGOCALCIFEROL) capsule 50,000 Units, 50,000 Units, Oral, Weekly, Анна Lees MD    nystatin (MYCOSTATIN) cream, , Topical, BID, Ra Sahu KATI Hanson - CNP, Given at 04/04/22 3984    Allergies:  Levofloxacin in d5w, Pyridium [phenazopyridine], Sulfamethoxazole-trimethoprim, Codeine, Hydrocodone-acetaminophen, Hydromorphone hcl, and Oxycodone    Social History:   TOBACCO:   reports that she has been smoking cigarettes. She has been smoking about 0.50 packs per day. She has never used smokeless tobacco.  ETOH:   reports no history of alcohol use. Family History:       Problem Relation Age of Onset    Other Other         Son DVT    Cancer Mother         colon     Heart Attack Mother     Heart Attack Father          PHYSICAL EXAM:  /70   Pulse 70   Temp 97.7 °F (36.5 °C)   Resp 16   Ht 5' 3\" (1.6 m)   Wt 145 lb (65.8 kg)   SpO2 94%   BMI 25.69 kg/m²     Constitutional - well developed, well nourished. Eyes - conjunctiva normal.   Ear, nose, throat - No scars, masses, or lesions over external nose or ears, no atrophy of tongue  Neck-symmetric, no masses noted, no jugular vein distension  Respiration- chest wall appears symmetric, good expansion,   normal effort without use of accessory muscles  Musculoskeletal - no significant wasting of muscles noted, no bony deformities  Extremities-no clubbing, cyanosis or edema  Skin - warm, dry, and intact. No rash, erythema, or pallor. Psychiatric - mood, affect, and behavior appear normal.      Neurological exam  Awake, alert, fluent oriented appropriate affect  Attention and concentration appear appropriate  Recent and remote memory appears unremarkable  Speech with dysarthria  No clear issues with language of fund of knowledge     Cranial Nerve Exam     CN III, IV,VI-EOMI, No nystagmus, conjugate eye movements, no ptosis    CN VII-right lower facial droop       Motor Exam  antigravity throughout upper and lower extremities bilaterally  Giveway more on the right      Tremors- no tremors in hands or head noted     Gait  Not tested     Nursing/pcp notes, imaging,labs and vitals reviewed. PT,OT and/or speech notes reviewed    Lab Results   Component Value Date    WBC 9.3 04/05/2022    HGB 11.3 (L) 04/05/2022    HCT 36.2 (L) 04/05/2022    MCV 96.3 04/05/2022     04/05/2022     Lab Results   Component Value Date     04/05/2022    K 4.2 04/05/2022     04/05/2022    CO2 24 04/05/2022    BUN 19 04/05/2022    CREATININE 0.7 04/05/2022    GLUCOSE 93 04/05/2022    CALCIUM 8.5 (L) 04/05/2022    PROT 4.6 (L) 04/05/2022    LABALBU 3.0 (L) 04/05/2022    BILITOT <0.2 04/05/2022    ALKPHOS 72 04/05/2022    AST 46 (H) 04/05/2022    ALT 50 (H) 04/05/2022    LABGLOM >60 04/05/2022    GFRAA >59 04/05/2022     Lab Results   Component Value Date    INR 1.03 04/01/2022    INR 0.96 08/30/2021    PROTIME 13.7 04/01/2022    PROTIME 13.0 08/30/2021       MRI BRAIN W WO CONTRAST [[id number]]    Resulted: 04/03/22 1553    Updated: 04/03/22 1555    Narrative:     MRI brain without and with contrast 4/3/2022 2:22 PM   History: Facial droop   Comparison: None.     Technique: Multiplanar imaging of the brain was performed in a routine   fashion before and after the intravenous injection of gadolinium   contrast.   Findings:   Midline structures are nondisplaced. Ventricular system is normal.   There is no significant mass effect or hydrocephalus. Basilar cisterns   are preserved. There are scattered foci of T2 abnormality involving   the periventricular and higher white matter tracts consistent with   moderate small vessel ischemic disease. There is mild gliosis within   the nely. There is a focus of low signal within the left thalamus near   the posterior limb of the internal capsule on gradient imaging   suggesting a site of previous hemorrhage or calcification. On   diffusion-weighted imaging this is separable from the area of acute   ischemic infarction. No additional sites of acute or chronic   hemorrhage are demonstrated. . No abnormal extra axial fluid   collections are noted.  There is diffusion restriction within the left   basal ganglia with involvement of the thalamus, posterior limb of the   left internal capsule and putamen with involvement of the external   capsule also demonstrated. These are nonhemorrhagic infarcts. There is   associated signal loss on ADC mapping imaging consistent with acute   ischemic infarction. No additional sites of diffusion restriction are   present. Brandon Wyatt Postcontrast imaging demonstrates a developmental venous anomaly which   is parafalcine in location within the right parietal lobe extending   into the region of the corpus callosum. No additional sites of   abnormal enhancement are present. Proximal cervical spinal cord, brainstem, and cerebellum are   unremarkable. Normal cerebrovascular flow voids are seen. Bilateral   globes and orbits are normal in appearance. No abnormal signal is noted in the mastoid air cells or paranasal   sinuses. Impression:     Impression:   1. Diffusion restriction within the left thalamus and basal ganglia   with associated ADC mapping abnormality consistent with an acute   left-sided infarct. This is nonhemorrhagic. There is no associated   mass effect or shift of the midline. 2. There is mild atrophy of the brain. Small vessel ischemic changes   are noted involving the periventricular and higher white matter   tracts. There is evidence of a remote small focus of hemorrhage within   the left thalamus. No additional sites of blooming are present on   gradient imaging. 3. DVA within the parafalcine right parietal lobe extending into the   region of the corpus callosum. No additional sites of abnormal   enhancement are present.    Signed by Dr Gustavo Huntley     ECHO Complete 2D W Doppler [facility] [place] [[id number]]    Collected: 04/03/22 1020    Updated: 04/04/22 0825    Narrative:     Transthoracic Echocardiography Report (TTE)      Demographics        Patient Name Damian Santos Date of Study          04/03/2022        MRN           [id number]            Gender                 Female        Date of Birth 1945        Room Number            [id number]        Age           76 year(s)        Height:       63 inches         Referring Physician    Ravinder Benitez        Weight:       145 pounds        Sonographer            Mary Tolentino [facility]        BSA:          1.69 m^2          Interpreting Physician Niurka Diaz MD        BMI:          25.69 kg/m^2       Procedure     Type of Study        TTE procedure:ECHO NO CONTRAST WITH DOP/COLR.       Study Location: Echo Lab   Technical Quality: Adequate visualization     Patient Status: Inpatient     Rhythm: Within normal limits BP: 121/92 mmHg     Indications:TIA.      Conclusions        Summary    Mitral valve leaflets are mildly thickened with preserved leaflet    mobility.    Mitral annular calcification is present.    Mild mitral regurgitation is present.    Mild aortic stenosis.    Bicuspid valve not excluded    Mean gradient 15 mm hg    Tricuspid valve is structurally normal.    Mild tricuspid regurgitation with estimated RVSP of 39 mm Hg.    Moderately dilated left atrium.    Normal left ventricular size with preserved LV function and an estimated    ejection fraction of approximately 55-60%.  Mild concentric left ventricular hypertrophy.    Grade II diastolic dysfunction    Mildly enlarged right ventricle cavity.     IVC dilated.        Signature        ----------------------------------------------------------------    Electronically signed by Niurka Diaz MD(Interpreting    [id number]) on 04/04/2022 08:25 AM    ----------------------------------------------------------------                RECORD REVIEW: Previous medical records, medications were reviewed at today's visit    IMPRESSION:   Acute ischemic stroke-2 areas of ischemia- left basal ganglia and left thalamus presumed cardi embolic with history of atrial fibrillation as patient taking her Eliquis only once a day    Facial droop, dysarthria, fall, weakness     Initial ExaminationGeneralized giveaway weakness worse in the right arm and right leg  Right lower facial droop with dysarthric speech    MRI of the brain-acute ischemia left basal ganglia and thalamus-personally reviewed  Carotid ultrasound-preliminarily <50% stenosis left internal carotid and asymptomatic 50-69% stenosis right internal carotid-medical management  2D echo-LV EF 55-60%-moderately dilated LA  On Eliquis now twice a day     GI-bowel regimen  Atrial fibrillation-Eliquis/amiodarone/metoprolol  Allergies-Flonase  Hypertension-on medications monitor  Hypokalemia-on potassium  Rhabdomyolysis-IV fluids-CK trending down  Vitamin D deficiency-on vitamin D  Pain control-try Percocet as needed  DVT prophylaxis-Eliquis  PT/OT/speech     Too low functioning for rehab    Continue current care    Disposition pending     Expected duration and frequency therapy: 180 minutes per day, 5 days per week    CALL WITH ANY QUESTIONS  [phone] CELL  Dr Audie Joe

## 2022-04-06 ENCOUNTER — APPOINTMENT (OUTPATIENT)
Dept: CT IMAGING | Age: 77
DRG: 564 | End: 2022-04-06
Payer: MEDICARE

## 2022-04-06 LAB
ALBUMIN SERPL-MCNC: 3.4 G/DL (ref 3.5–5.2)
ALP BLD-CCNC: 90 U/L (ref 35–104)
ALT SERPL-CCNC: 48 U/L (ref 5–33)
ANA IGG, ELISA: NORMAL
ANION GAP SERPL CALCULATED.3IONS-SCNC: 14 MMOL/L (ref 7–19)
AST SERPL-CCNC: 35 U/L (ref 5–32)
BILIRUB SERPL-MCNC: 0.3 MG/DL (ref 0.2–1.2)
BLOOD CULTURE, ROUTINE: NORMAL
BUN BLDV-MCNC: 13 MG/DL (ref 8–23)
CALCIUM SERPL-MCNC: 8.7 MG/DL (ref 8.8–10.2)
CHLORIDE BLD-SCNC: 102 MMOL/L (ref 98–111)
CO2: 25 MMOL/L (ref 22–29)
CREAT SERPL-MCNC: 0.7 MG/DL (ref 0.5–0.9)
GFR AFRICAN AMERICAN: >59
GFR NON-AFRICAN AMERICAN: >60
GLUCOSE BLD-MCNC: 86 MG/DL (ref 74–109)
HCT VFR BLD CALC: 41.2 % (ref 37–47)
HEMOGLOBIN: 13.2 G/DL (ref 12–16)
LIPASE: 15 U/L (ref 13–60)
MAGNESIUM: 1.9 MG/DL (ref 1.6–2.4)
MCH RBC QN AUTO: 30.4 PG (ref 27–31)
MCHC RBC AUTO-ENTMCNC: 32 G/DL (ref 33–37)
MCV RBC AUTO: 94.9 FL (ref 81–99)
PDW BLD-RTO: 13.4 % (ref 11.5–14.5)
PLATELET # BLD: 303 K/UL (ref 130–400)
PMV BLD AUTO: 10.5 FL (ref 9.4–12.3)
POTASSIUM SERPL-SCNC: 3.7 MMOL/L (ref 3.5–5)
RBC # BLD: 4.34 M/UL (ref 4.2–5.4)
SODIUM BLD-SCNC: 141 MMOL/L (ref 136–145)
TOTAL CK: 179 U/L (ref 26–192)
TOTAL PROTEIN: 5.8 G/DL (ref 6.6–8.7)
WBC # BLD: 9.8 K/UL (ref 4.8–10.8)

## 2022-04-06 PROCEDURE — 36415 COLL VENOUS BLD VENIPUNCTURE: CPT

## 2022-04-06 PROCEDURE — 73700 CT LOWER EXTREMITY W/O DYE: CPT

## 2022-04-06 PROCEDURE — 82550 ASSAY OF CK (CPK): CPT

## 2022-04-06 PROCEDURE — 6370000000 HC RX 637 (ALT 250 FOR IP): Performed by: INTERNAL MEDICINE

## 2022-04-06 PROCEDURE — 99232 SBSQ HOSP IP/OBS MODERATE 35: CPT | Performed by: PSYCHIATRY & NEUROLOGY

## 2022-04-06 PROCEDURE — 97530 THERAPEUTIC ACTIVITIES: CPT

## 2022-04-06 PROCEDURE — 6360000002 HC RX W HCPCS: Performed by: NURSE PRACTITIONER

## 2022-04-06 PROCEDURE — 2580000003 HC RX 258: Performed by: HOSPITALIST

## 2022-04-06 PROCEDURE — 83690 ASSAY OF LIPASE: CPT

## 2022-04-06 PROCEDURE — 83735 ASSAY OF MAGNESIUM: CPT

## 2022-04-06 PROCEDURE — 6370000000 HC RX 637 (ALT 250 FOR IP): Performed by: HOSPITALIST

## 2022-04-06 PROCEDURE — 99232 SBSQ HOSP IP/OBS MODERATE 35: CPT | Performed by: INTERNAL MEDICINE

## 2022-04-06 PROCEDURE — 85027 COMPLETE CBC AUTOMATED: CPT

## 2022-04-06 PROCEDURE — 80053 COMPREHEN METABOLIC PANEL: CPT

## 2022-04-06 PROCEDURE — 1210000000 HC MED SURG R&B

## 2022-04-06 RX ORDER — METOPROLOL SUCCINATE 50 MG/1
50 TABLET, EXTENDED RELEASE ORAL 2 TIMES DAILY
Status: DISCONTINUED | OUTPATIENT
Start: 2022-04-06 | End: 2022-04-07

## 2022-04-06 RX ORDER — DILTIAZEM HYDROCHLORIDE 120 MG/1
120 CAPSULE, COATED, EXTENDED RELEASE ORAL 2 TIMES DAILY
Status: DISCONTINUED | OUTPATIENT
Start: 2022-04-06 | End: 2022-04-11 | Stop reason: HOSPADM

## 2022-04-06 RX ADMIN — NYSTATIN 500000 UNITS: 100000 SUSPENSION ORAL at 17:28

## 2022-04-06 RX ADMIN — TRAMADOL HYDROCHLORIDE 50 MG: 50 TABLET, COATED ORAL at 17:39

## 2022-04-06 RX ADMIN — SODIUM BICARBONATE 650 MG: 650 TABLET ORAL at 20:34

## 2022-04-06 RX ADMIN — METOPROLOL SUCCINATE 75 MG: 50 TABLET, EXTENDED RELEASE ORAL at 08:14

## 2022-04-06 RX ADMIN — SODIUM CHLORIDE: 9 INJECTION, SOLUTION INTRAVENOUS at 20:31

## 2022-04-06 RX ADMIN — APIXABAN 5 MG: 5 TABLET, FILM COATED ORAL at 20:33

## 2022-04-06 RX ADMIN — HYDROXYZINE HYDROCHLORIDE 25 MG: 25 TABLET ORAL at 20:34

## 2022-04-06 RX ADMIN — HYDROMORPHONE HYDROCHLORIDE 0.5 MG: 1 INJECTION, SOLUTION INTRAMUSCULAR; INTRAVENOUS; SUBCUTANEOUS at 01:37

## 2022-04-06 RX ADMIN — APIXABAN 5 MG: 5 TABLET, FILM COATED ORAL at 08:14

## 2022-04-06 RX ADMIN — NYSTATIN 500000 UNITS: 100000 SUSPENSION ORAL at 20:32

## 2022-04-06 RX ADMIN — METOPROLOL SUCCINATE 50 MG: 50 TABLET, EXTENDED RELEASE ORAL at 20:35

## 2022-04-06 RX ADMIN — NYSTATIN: 100000 CREAM TOPICAL at 08:15

## 2022-04-06 RX ADMIN — OXYCODONE HYDROCHLORIDE AND ACETAMINOPHEN 1 TABLET: 5; 325 TABLET ORAL at 10:37

## 2022-04-06 RX ADMIN — DILTIAZEM HYDROCHLORIDE 120 MG: 120 CAPSULE, COATED, EXTENDED RELEASE ORAL at 14:07

## 2022-04-06 RX ADMIN — POTASSIUM CHLORIDE 20 MEQ: 20 TABLET, EXTENDED RELEASE ORAL at 08:14

## 2022-04-06 RX ADMIN — AMIODARONE HYDROCHLORIDE 200 MG: 200 TABLET ORAL at 08:14

## 2022-04-06 RX ADMIN — NYSTATIN: 100000 CREAM TOPICAL at 20:32

## 2022-04-06 RX ADMIN — OXYCODONE HYDROCHLORIDE AND ACETAMINOPHEN 1 TABLET: 5; 325 TABLET ORAL at 20:33

## 2022-04-06 RX ADMIN — Medication 1 CAPSULE: at 08:14

## 2022-04-06 RX ADMIN — ATORVASTATIN CALCIUM 10 MG: 10 TABLET, FILM COATED ORAL at 08:14

## 2022-04-06 RX ADMIN — SODIUM BICARBONATE 650 MG: 650 TABLET ORAL at 08:14

## 2022-04-06 RX ADMIN — LACTULOSE 20 G: 20 SOLUTION ORAL at 14:06

## 2022-04-06 RX ADMIN — NYSTATIN 500000 UNITS: 100000 SUSPENSION ORAL at 08:14

## 2022-04-06 RX ADMIN — POTASSIUM CHLORIDE 20 MEQ: 20 TABLET, EXTENDED RELEASE ORAL at 17:28

## 2022-04-06 RX ADMIN — DILTIAZEM HYDROCHLORIDE 120 MG: 120 CAPSULE, COATED, EXTENDED RELEASE ORAL at 20:33

## 2022-04-06 RX ADMIN — FLUTICASONE PROPIONATE 2 SPRAY: 50 SPRAY, METERED NASAL at 08:15

## 2022-04-06 RX ADMIN — LACTULOSE 20 G: 20 SOLUTION ORAL at 08:14

## 2022-04-06 RX ADMIN — NYSTATIN 500000 UNITS: 100000 SUSPENSION ORAL at 14:06

## 2022-04-06 RX ADMIN — LACTULOSE 20 G: 20 SOLUTION ORAL at 20:33

## 2022-04-06 RX ADMIN — POLYETHYLENE GLYCOL 3350 17 G: 17 POWDER, FOR SOLUTION ORAL at 08:15

## 2022-04-06 RX ADMIN — SODIUM CHLORIDE, PRESERVATIVE FREE 10 ML: 5 INJECTION INTRAVENOUS at 20:32

## 2022-04-06 RX ADMIN — OXYCODONE HYDROCHLORIDE AND ACETAMINOPHEN 1 TABLET: 5; 325 TABLET ORAL at 04:35

## 2022-04-06 RX ADMIN — TRAMADOL HYDROCHLORIDE 50 MG: 50 TABLET, COATED ORAL at 08:14

## 2022-04-06 RX ADMIN — CYCLOBENZAPRINE 5 MG: 10 TABLET, FILM COATED ORAL at 20:33

## 2022-04-06 ASSESSMENT — PAIN SCALES - GENERAL
PAINLEVEL_OUTOF10: 10
PAINLEVEL_OUTOF10: 6
PAINLEVEL_OUTOF10: 7
PAINLEVEL_OUTOF10: 6
PAINLEVEL_OUTOF10: 9

## 2022-04-06 ASSESSMENT — PAIN SCALES - WONG BAKER
WONGBAKER_NUMERICALRESPONSE: 4

## 2022-04-06 ASSESSMENT — PAIN DESCRIPTION - PAIN TYPE: TYPE: CHRONIC PAIN

## 2022-04-06 NOTE — PROGRESS NOTES
Occupational Therapy     04/06/22 1428   Restrictions/Precautions   Restrictions/Precautions Fall Risk;Contact Precautions   Vision   Vision [facility]   Hearing   Hearing [facility]   General   Chart Reviewed Yes   Patient assessed for rehabilitation services? Yes   Response to previous treatment Patient with no complaints from previous session   Family / Caregiver Present No   Subjective   Subjective Pt in bed upon arrival for therapy. Pt agreeable to participate despite drowsiness. Transport showed to take patient to CT during treatment. pt assisted to transport bed with Emmette Koyanagi. Pain Assessment   Patient Currently in Pain No   Patient Observation   Observations Pt very drowsy this date. Orientation   Overall Orientation Status WFL   ADL   Feeding Setup;Supervision;Minimal assistance   Grooming Minimal assistance   UE Bathing Minimal assistance   LE Bathing Moderate assistance   UE Dressing Minimal assistance   LE Dressing Maximum assistance   Toileting Maximum assistance   Additional Comments Emmette Koyanagi for transfer; Min-Mod assist to maintain sitting balance this date. Balance   Sitting Balance Minimal assistance   Standing Balance Maximum assistance   Functional Mobility   Functional Mobility Comments Emmette Koyanagi    Bed mobility   Supine to Sit Maximum assistance   Scooting Maximal assistance;2 Person assistance   Transfers   Sit to stand Moderate assistance;2 Person assistance   Stand to sit Moderate assistance;2 Person assistance   Transfer Comments Emmette Koyanagi    Assessment   Performance deficits / Impairments Decreased functional mobility ; Decreased ADL status; Decreased strength;Decreased safe awareness;Decreased endurance;Decreased balance;Decreased posture;Decreased coordination   Assessment Pt limited by fatigue and drowsiness this date. Treatment Diagnosis Rhabdomyolosis, Left side infarct   REQUIRES OT FOLLOW UP Yes   Treatment Initiated  Tx focused on bed mobility and t/f using Emmette Koyanagi. Discharge Recommendations Patient would benefit from continued therapy after discharge   Activity Tolerance   Activity Tolerance Patient Tolerated treatment well;Patient limited by fatigue   Safety Devices   Safety Devices in place N/A   Type of devices   (left with transport. )   Plan   Times per week 3-5   Times per day Daily   Electronically signed by ROMAN Hewitt on 4/6/2022 at 2:39 PM

## 2022-04-06 NOTE — PROGRESS NOTES
[facility]      Progress Note    Patient:  Pat Aleman  YOB: 1945  Date of Service: 4/6/2022  MRN: [id number]   Acct: [de-identified]   Primary Care Physician: KATI Cowan CNP  Advance Directive: Full Code  Admit Date: 4/1/2022       Hospital Day: 5    Portions of this note have been copied forward, however, updated to reflect the most current clinical status of this patient. CHIEF COMPLAINT Fall    SUBJECTIVE: Speech still slurred. [address] COURSE:   The patient is a 67 y.o. female who presented to [address] [place] [address] ED complaining of fall. Pt has history of paroxysmal afib on eliquis, moderate aortic stenosis and diastolic dysfunction followed by cardiology. She has history of HTN and osteoarthritis as well. She fell 3 days ago at home reporting problems with bilateral leg weakness. She denied syncope. She lives alone and her neighbor today went to her house as she hadn't been answering her phone. Pt had crawled to unlock door however did not have strength to get up out of floor reporting generalized weakness. She denied injury from fall. She has not had anything to eat or drink over past few days. She tells me that her hips have been painful. She denied problems with cough, fevers, shortness of breath as well as chest pain. She had previously reported abdominal discomfort that has since resolved. He neighbor is hearing impaired and reads lips relating that she hadn't been able to read pt's lips today giving concern for speech changes. Pt tells me that she doesn't have her bottom dentures and that her mouth has been dry. She denied history of CVA as well as prior TIA.    In ED, CK 7136, creatinine 0.9/BUN elevated at 41, sodium 141, potassium 4.4, CO2 low at 16, anion gap elevated at 20, alk phos 95, /ALT 68, UA with 15ketones, large blood, negative nitrite, negative leukocyte esterase, covid test negative, ek sr hr 89, wbc 17, hgb 14, platelets 797, INR 1.03  CT head-no acute intracranial abnormality, CT cspine-no acute cervical fracture or traumatic malalignment, CXR-right paratracheal upper lobe opacities with volume loss may relate to RUL collapse w/recommendation for CT chest with contrast, pelvis right hip imaging with old fractures, bilateral hip prosthesis, no acute appearing bony injury. Pt is admitted to hospitalist service for further evaluation and treatment. Today she is hurting allover. Her groin where she had previous pelvic fx is main complaint. Her left leg appeared rotated inward but films negative. She continued to have significant pain CT of left hip done and no acute fracture but significant bruising.       Review of Systems   Constitutional: Negative. HENT: Negative. Eyes: Negative. Respiratory: Positive for choking. Cardiovascular: Negative. Gastrointestinal: Negative. Endocrine: Negative. Musculoskeletal: Positive for arthralgias and myalgias. Allergic/Immunologic: Negative. Neurological: Positive for weakness. Negative for dizziness. Hematological: Negative. Psychiatric/Behavioral: Negative. All other systems reviewed and are negative. Objective:   VITALS:  /70   Pulse 89   Temp 98.4 °F (36.9 °C) (Temporal)   Resp 18   Ht 5' 3\" (1.6 m)   Wt 145 lb (65.8 kg)   SpO2 92%   BMI 25.69 kg/m²   24HR INTAKE/OUTPUT:      Intake/Output Summary (Last 24 hours) at 4/6/2022 1648  Last data filed at 4/6/2022 1551  Gross per 24 hour   Intake 1656 ml   Output 5650 ml   Net -3994 ml         Physical Exam  Vitals and nursing note reviewed. Constitutional:       Appearance: She is ill-appearing. HENT:      Head: Normocephalic and atraumatic. Nose: Nose normal.      Mouth/Throat:      Mouth: Mucous membranes are moist.   Eyes:      Extraocular Movements: Extraocular movements intact. Comments: Cataract lenses   Cardiovascular:      Rate and Rhythm: Normal rate. Rhythm irregular.       Heart sounds: Murmur heard. Pulmonary:      Breath sounds: Normal breath sounds. Abdominal:      General: Bowel sounds are normal.      Palpations: Abdomen is soft. Musculoskeletal:      Cervical back: Neck supple. Skin:     General: Skin is warm and dry. Coloration: Skin is pale. Neurological:      Comments: Left mouth droop            Medications:      sodium chloride 75 mL/hr at 04/04/22 1824    sodium chloride        dilTIAZem  120 mg Oral BID    metoprolol succinate  50 mg Oral BID    atorvastatin  10 mg Oral Daily    sodium bicarbonate  650 mg Oral BID    oxyCODONE-acetaminophen  1 tablet Oral Q6H    lactobacillus  1 capsule Oral Daily    [START ON 4/10/2022] amiodarone  200 mg Oral Daily    potassium chloride  20 mEq Oral BID WC    nystatin  5 mL Oral 4x Daily    polyethylene glycol  17 g Oral Daily    sodium chloride flush  5-40 mL IntraVENous 2 times per day    lactulose  20 g Oral TID    apixaban  5 mg Oral BID    fluticasone  2 spray Nasal Daily    hydrOXYzine  25 mg Oral Nightly    vitamin D  50,000 Units Oral Weekly    nystatin   Topical BID     cyclobenzaprine, simethicone, potassium chloride **OR** potassium alternative oral replacement **OR** potassium chloride, magic butt cream, HYDROmorphone, sodium chloride flush, sodium chloride, ondansetron **OR** ondansetron, polyethylene glycol, traMADol  ADULT DIET;  Regular  ADULT ORAL NUTRITION SUPPLEMENT; Breakfast; Standard High Calorie/High Protein Oral Supplement     Lab and other Data:     Recent Labs     04/04/22  0346 04/05/22  0254 04/06/22  0305   WBC 8.3 9.3 9.8   HGB 11.2* 11.3* 13.2    236 303     Recent Labs     04/04/22  0346 04/05/22  0254 04/06/22  0305    142 141   K 3.9 4.2 3.7    109 102   CO2 21* 24 25   BUN 17 19 13   CREATININE 0.6 0.7 0.7   GLUCOSE 132* 93 86     Recent Labs     04/04/22  0346 04/05/22  0254 04/06/22  0305   AST 75* 46* 35*   ALT 60* 50* 48*   BILITOT 0.3 <0.2 0.3 ALKPHOS 77 72 90     Troponin T:   No results for input(s): TROPONINI in the last 72 hours. Pro-BNP: No results for input(s): BNP in the last 72 hours. INR:   No results for input(s): INR in the last 72 hours. UA:  No results for input(s): NITRITE, COLORU, PHUR, LABCAST, WBCUA, RBCUA, MUCUS, TRICHOMONAS, YEAST, BACTERIA, CLARITYU, SPECGRAV, LEUKOCYTESUR, UROBILINOGEN, BILIRUBINUR, BLOODU, GLUCOSEU, AMORPHOUS in the last 72 hours. Invalid input(s): Jessica Callery  A1C: No results for input(s): LABA1C in the last 72 hours. ABG:No results for input(s): PHART, WVS0JCW, PO2ART, QAA2YTX, BEART, HGBAE, M7TENMDO, CARBOXHGBART in the last 72 hours. RAD:   CT ABDOMEN PELVIS WO CONTRAST Additional Contrast? None    Result Date: 4/1/2022  CT ABDOMEN PELVIS WO CONTRAST 4/1/2022 12:23 PM HISTORY: Fall COMPARISON: None DLP: 972 mGy cm TECHNIQUE: Noncontrast enhanced images of the abdomen and pelvis obtained without oral contrast. Automated exposure control was also utilized to decrease patient radiation dose. FINDINGS: Lung bases are clear. Coronary atheromatous calcification. LIVER: No focal liver lesion. BILIARY SYSTEM: The gallbladder is unremarkable. No intrahepatic or extrahepatic ductal dilatation. PANCREAS: No focal pancreatic lesion. SPLEEN: Unremarkable. KIDNEYS AND ADRENALS: Bilateral kidneys and adrenal glands are unremarkable. The ureters are decompressed and normal in appearance. RETROPERITONEUM: No mass, lymphadenopathy or hemorrhage. GI TRACT: Stomach and small bowel are unremarkable. Moderate colonic stool. OTHER: There is no mesenteric mass, lymphadenopathy or fluid collection. Unremarkable bilateral hip arthroplasties. Old healed right pubic bone fracture. Advanced lumbar spondylosis. No acute bony abnormality. PELVIS: No mass lesion, fluid collection or significant lymphadenopathy is seen in the pelvis. The urinary bladder is normal in appearance. 1. No acute process in the abdomen or pelvis.  2. Moderate colonic stool. Bowel loops are nondilated. 3. Unremarkable bilateral hip arthroplasties. Lumbar spondylosis. No acute bony normality. Signed by Dr Rina Martínez    CT Head WO Contrast    Result Date: 4/2/2022  CT BRAIN without contrast 4/2/2022 12:38 PM HISTORY: New onset weakness yesterday COMPARISON: 4/1/2022 DLP: 632 mGy cm. All CT scans are performed using dose optimization techniques as appropriate to the performed exam and include at least one of the following: Automated exposure control, adjustment of the mA and/or kV according to size, and the use of the iterative reconstruction technique. TECHNIQUE: Serial axial tomographic images of the brain were obtained without the use of intravenous contrast. FINDINGS: The midline structures are nondisplaced. There is mild cerebral and cerebellar volume loss, with an associated increase in the prominence of the ventricles and sulci. The basilar cisterns are normal in size and configuration. There is no evidence of intracranial hemorrhage or mass-effect. There is low attenuation in the periventricular white matter, consistent with chronic ischemic change. There are no abnormal extra-axial fluid collections. There is no evidence of tonsillar herniation. The included orbits and their contents are unremarkable. The visualized paranasal sinuses, mastoid air cells and middle ear cavities are clear. The visualized osseous structures and overlying soft tissues of the skull and face are intact. 1. Mild cerebral and cerebellar volume loss with chronic microvascular disease but no evidence of acute intracranial process. Signed by Dr Maxim Carlisle    Result Date: 4/1/2022  Examination. CT HEAD WO CONTRAST 4/1/2022 12:38 PM History: The patient fell. DLP: 1229 mGycm. The automated exposure control was utilized to minimize the patient's radiation exposure. The CT scan of the head is performed without intravenous contrast enhancement.  The images are acquired in axial plane with subsequent reconstruction in coronal and sagittal planes. There is no previous study for comparison There is no evidence of an intracranial hemorrhage or hematoma. There is no evidence of a mass. No midline shift. Moderately dilated ventricles, basal cistern and the cortical sulci suggestive chronic volume loss/atrophy. The scattered areas of chronic white matter ischemia are seen in the centrum semiovale bilaterally. The gray-white matter differentiation is maintained. The images reviewed in bone window show no evidence of a skull fracture. Severe atheromatous changes of the intracranial internal carotid arteries bilaterally noted. 1. No acute intracranial abnormality. 2. No skull fracture. 3. Chronic ischemic and atrophic changes. Signed by Dr Rosana Adkins    Result Date: 4/1/2022  CT cervical spine 4/1/2022 12:22 PM HISTORY: Fall TECHNIQUE: Axial images of the cervical spine were obtained without IV contrast. Coronal and sagittal reformatted images are reconstructed and reviewed. COMPARISON: None. DLP: 425 mGy cm Automated exposure control was utilized to minimize patient radiation dose. FINDINGS: Alignment of the cervical spine is appropriate. Moderate to advanced degenerative disc change. Diffuse uncinate process hypertrophy with mild facet osteoarthropathy. No acute cervical vertebral fracture. Incomplete fusion of posterior arch of C1, developmental finding. Mild to moderate canal stenosis at C5/6 with mild narrowing about below this level. Scattered moderate to severe foraminal stenosis. Moderate carotid bulb calcification. No apical lung nodule. 1. Moderate to advanced degenerative changes cervical spine with no acute cervical vertebral fracture or traumatic malalignment.  Signed by Dr Michael Gore Additional Contrast? None    Result Date: 4/2/2022  EXAMINATION: CT pelvis without contrast 4/2/2022 HISTORY: Pubic pain post fall Dose: 807 mGycm. All CT scans are performed using dose optimization techniques as appropriate to the performed exam and include at least one of the following: Automated exposure control, adjustment of the mA and/or kV according to size, and the use of the iterative reconstruction technique. FINDINGS: Multiple contiguous axial images are obtained through the bony pelvis per protocol without venous contrast enhancement with reformatted images obtained in the sagittal coronal projections from the original data set. At the L4-L5 level there is broad-based bulging of disc as well as moderate facet and ligamentous hypertrophy. There is moderate central spinal stenosis as well as moderate bilateral neural foraminal narrowing. There is mild grade 1 anterolisthesis of L4 on L5 likely representing subluxation at the level of the facets. The SI joints are intact. No evidence of sacral fracture. The patient is status post bilateral total hip arthroplasty. This does obscure some of the anatomic detail secondary to streaking artifact. There are old fractures of the right superior and inferior pubic ramus. No acute fractures are present. The pubic symphysis is intact. A Saeed catheter is in place within the bladder. There is mild fecal impaction within the rectal vault. 1.. Old fractures of the right superior and inferior pubic rami. No additional fractures are present. The SI joints and pubic symphysis are intact with no evidence of diastases. 2. Facet and ligamentous hypertrophy as well as bulging of the disc contributes to central and foraminal stenosis at L4-L5. There is grade 1 anterolisthesis of L4 on L5 felt to represent subluxation at the level of the facets. 3. Constipation with increased stool throughout the colon including fecal impaction within the rectal vault.  Signed by Dr Paola Conti    Result Date: 4/1/2022  XR CHEST PORTABLE 4/1/2022 12:21 PM HISTORY: Fatigue COMPARISON: 4/3/2019 CXR: A single frontal view of the chest is performed. Findings: There is volume loss of the right upper hemithorax with ipsilateral shifting of the mediastinal structures. Partial right upper lobe collapse considered. Left lung appears clear. Probable right lower lobe granuloma. Heart is upper limits of normal in size. No pleural effusion or pneumothorax. Degenerative change regional skeleton. 1. Right paratracheal upper lobe opacities with associated volume loss may relate to right upper lobe collapse. Follow-up imaging recommended. CT chest preferably with IV contrast could be performed for further assessment if clinically indicated. Signed by Dr Shante Tay    XR HIP 2-3 VW W PELVIS RIGHT    Result Date: 4/1/2022  History: Pain after fall Right hip: Frontal view the pelvis as well as frontal and crosstable lateral views of the right hip are obtained. The right prosthesis. No periprosthetic fracture. Old fractures of the right inferior pelvis involving right superior and inferior rami. Enthesophytes project from the right iliac spine. Chronic calcification along the right acetabular roof. Degenerative change of the lower lumbar spine. Vascular calcification. 1. Old fractures of the right inferior pelvis. Bilateral hip prostheses. No acute appearing bony injury identified.  Signed by Dr Shante Tay          Assessment/Plan   Principal Problem:    Rhabdomyolysis   Ck nl   Continue daily lab   Pain control   Strict I & O  Active Problems:    Fall   CT hop    HTN (hypertension)   Home meds-metoprolol   Continue to moniter   Echo completed result pending    Generalized weakness   Pt/ot-making progress    Dehydration   Moniter renal function   Daily lab-nl    Paroxysmal atrial fibrillation (HCC)   Telemetry    eliquis    Transaminitis   improved   Daily lab  Neuro status change   Ct head -nl   Neurology following    MRI left infarct  Resolved Problems:    * No resolved hospital problems. Discharge planning: snf most likely      Further Orders per Clinical course/attending. Electronically signed by KATI Meneses CNP on 4/6/2022 at 4:48 PM         Attestation Statement     I have independently seen and examined this patient and agree with the asesment and plan by mid level provider                                                           [facility] MEDICINE  - PROGRESS NOTE         Objective:   Vitals: BP (!) 113/55   Pulse (!) 49   Temp 97.3 °F (36.3 °C) (Temporal)   Resp 17   Ht 5' 3\" (1.6 m)   Wt 145 lb (65.8 kg)   SpO2 92%   BMI 25.69 kg/m²   General appearance: alert, appears stated age and cooperative  Skin: Skin color, texture, turgor normal.   HEENT: Head: Normocephalic, no lesions, without obvious abnormality.   Neck: no adenopathy, no carotid bruit, no JVD and supple, symmetrical, trachea midline  Lungs: clear to auscultation bilaterally  Heart: regular rate and rhythm, S1, S2 normal, no murmur, click, rub or gallop  Abdomen: soft, non-tender; bowel sounds normal; no masses,  no organomegaly  Extremities: extremities normal, atraumatic, no cyanosis or edema  Lymphatic: No significant lymph node enlargement papable  Neurologic: Mental status: Alert, oriented, thought content appropriate, facial droop      Assessment & Plan:    · Traumatic rhabdomyolysis- resolved   · Metabolic acidosis- resolved    · Hypokalemia - resolved   · 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- follow OP  · Constipation - treated  · Groundglass nodule/opacities predominantly in the right upper lobe   may represent an inflammatory/infectious or an evolving neoplastic process- pulmonary evaluated - follow up OP  · Severe deconditioning with fall - PT/OT- needs SNF  · Acute CVA with Facial droop-- neurology evaluated   · Significant dilatation of the common bile duct and pancreatic duct - MRCP pending  · PAF -amiodarone, Cardizem, metoprolol- treatment per cardiology  · Carotid stenosis- Lipitor, eliquis - follow up OP   · Vit d def- on replacement   · History of pelvic fx  · Severe pain left femur- ct femur today    Needs SNF    Suri Rivas MD

## 2022-04-06 NOTE — PLAN OF CARE
Problem: Skin Integrity:  Goal: Will show no infection signs and symptoms  Description: Will show no infection signs and symptoms  4/6/2022 0156 by Miguel Pelaez RN  Outcome: Ongoing  4/5/2022 1340 by John Cadena RN  Outcome: Ongoing  Goal: Absence of new skin breakdown  Description: Absence of new skin breakdown  4/6/2022 0156 by Miguel Pelaez RN  Outcome: Ongoing  4/5/2022 1340 by John Cadena RN  Outcome: Ongoing     Problem: Falls - Risk of:  Goal: Will remain free from falls  Description: Will remain free from falls  4/6/2022 0156 by Miguel Pelaez RN  Outcome: Ongoing  4/5/2022 1340 by John Cadena RN  Outcome: Ongoing  Goal: Absence of physical injury  Description: Absence of physical injury  4/6/2022 0156 by Miguel Pelaez RN  Outcome: Ongoing  4/5/2022 1340 by John Cadena RN  Outcome: Ongoing     Problem: Pain:  Goal: Pain level will decrease  Description: Pain level will decrease  4/6/2022 0156 by Miguel Pelaez RN  Outcome: Ongoing  4/5/2022 1340 by John Cadena RN  Outcome: Ongoing  Goal: Control of acute pain  Description: Control of acute pain  4/6/2022 0156 by Miguel Pelaez RN  Outcome: Ongoing  4/5/2022 1340 by John Cadena RN  Outcome: Ongoing  Goal: Control of chronic pain  Description: Control of chronic pain  4/6/2022 0156 by Miguel Pelaez RN  Outcome: Ongoing  4/5/2022 1340 by John Cadena RN  Outcome: Ongoing

## 2022-04-06 NOTE — PROGRESS NOTES
Patient:   Tamara Wiley  MR#:    [id number]   Room:    [facility]   YOB: 1945  Date of Progress Note: 4/6/2022  Time of Note                           7:55 AM  Consulting Physician:   Jesus Fairchild M.D. Attending Physician:  Jo Ann Bird, *     Chief complaint Slurred speech, facial droop    S:This 67 y.o. female  with a past medical history significant for atrial fibrillation on chronic anticoagulation with Eliquis, previous pelvic fracture and hypertension is seen for evaluation of facial droop and slurred speech. The patient has been on 4/1/2022 after being found down at home. Had fallen approximately 3 days prior to admission due to leg weakness. She was laying on the ground and was unable to get help. She does not recall why she fell. The neighbor tried to go over to the house that she had not heard from her and the patient was able to crawl to unlock the door but was unable to get up. The patient indicates she has a previous history of pelvic fracture in office take some Tylenol and tramadol at home with good pain relief. On admission her serum CK level was 7136. Her serum creatinine was 0.9 with a BUN of 41. Her CT of the head had some mild generalized volume loss with no acute changes noted. CT of the pelvis revealed old fracture of the right superior and inferior pubic rami. Patient is complaining of right pelvic pain. Patient denies any history of stroke. She indicates she had some dental work done and ever since then her face has been droop. She wears dentures and indicates her speech is slurred due to that and a dry mouth. Pelvic pain better Feels Percocet helps. Feels overall doing okay.     REVIEW OF SYSTEMS:  Constitutional: No fevers No chills  Neck:No stiffness  Respiratory: No shortness of breath  Cardiovascular: No chest pain No palpitations  Gastrointestinal: No abdominal pain    Genitourinary: No Dysuria  Neurological: No headache, no confusion    Past Medical History:      Diagnosis Date    Chronic back pain     Hypertension     Inflammatory arthritis     Knee pain     Spastic colon        Past Surgical History:      Procedure Laterality Date    ABDOMEN SURGERY      APPENDECTOMY      BACK SURGERY      neck; bone out of hip to fuse neck    FINGER TRIGGER RELEASE Left 12/17/2020    LEFT MIDDLE TRIGGER FINGER RELEASE performed by Josiane Martel MD at [facility] HAND SURGERY Left 2012    fx repair (3 total surgeries)    HAND SURGERY Left 12/17/2020    LEFT WRIST MASS EXCISION performed by Josiane Martel MD at [address] Bilateral     HIP    MOUTH SURGERY      TOTAL KNEE ARTHROPLASTY Right 9/2/2021    RIGHT TOTAL KNEE ARTHROPLASTY performed by Micaela Sheikh MD at [address] [facility]         Medications in Hospital:      Current Facility-Administered Medications:     metoprolol succinate (TOPROL XL) extended release tablet 75 mg, 75 mg, Oral, Daily, Nahum Bond MD    atorvastatin (LIPITOR) tablet 10 mg, 10 mg, Oral, Daily, Nahum Bond MD, 10 mg at 04/05/22 1020    sodium bicarbonate tablet 650 mg, 650 mg, Oral, BID, Nahum Bond MD, 650 mg at 04/05/22 2012    oxyCODONE-acetaminophen (PERCOCET) 5-325 MG per tablet 1 tablet, 1 tablet, Oral, Q6H, Nahum Bond MD, 1 tablet at 04/06/22 0435    lactobacillus (CULTURELLE) capsule 1 capsule, 1 capsule, Oral, Daily, Nahum Bond MD, 1 capsule at 04/05/22 1632    [START ON 4/10/2022] amiodarone (CORDARONE) tablet 200 mg, 200 mg, Oral, Daily, Aparna Lucas MD    cyclobenzaprine (FLEXERIL) tablet 5 mg, 5 mg, Oral, BID PRN, Nahum Bond MD, 5 mg at 04/05/22 1714    simethicone (MYLICON) chewable tablet 80 mg, 80 mg, Oral, 4x Daily PRN, KATI Andre - CNP    potassium chloride (KLOR-CON M) extended release tablet 20 mEq, 20 mEq, Oral, BID WC, Nahum Bond MD, 20 mEq fluticasone (FLONASE) 50 MCG/ACT nasal spray 2 spray, 2 spray, Nasal, Daily, Alva Askew MD, 2 spray at 04/05/22 1021    hydrOXYzine (ATARAX) tablet 25 mg, 25 mg, Oral, Nightly, Alva Askew MD, 25 mg at 04/05/22 2012    traMADol (ULTRAM) tablet 50 mg, 50 mg, Oral, Q6H PRN, Alva Askew MD, 50 mg at 04/04/22 0818    vitamin D (ERGOCALCIFEROL) capsule 50,000 Units, 50,000 Units, Oral, Weekly, Alva Askew MD    nystatin (MYCOSTATIN) cream, , Topical, BID, KATI Ballard CNP, Given at 04/05/22 2013    Allergies:  Levofloxacin in d5w, Pyridium [phenazopyridine], Sulfamethoxazole-trimethoprim, Codeine, Hydrocodone-acetaminophen, Hydromorphone hcl, and Oxycodone    Social History:   TOBACCO:   reports that she has been smoking cigarettes. She has been smoking about 0.50 packs per day. She has never used smokeless tobacco.  ETOH:   reports no history of alcohol use. Family History:       Problem Relation Age of Onset    Other Other         Son DVT    Cancer Mother         colon     Heart Attack Mother     Heart Attack Father          PHYSICAL EXAM:  BP (!) 123/97   Pulse 108   Temp 96.8 °F (36 °C)   Resp 18   Ht 5' 3\" (1.6 m)   Wt 145 lb (65.8 kg)   SpO2 92%   BMI 25.69 kg/m²     Constitutional - well developed, well nourished. Eyes - conjunctiva normal.   Ear, nose, throat - No scars, masses, or lesions over external nose or ears, no atrophy of tongue  Neck-symmetric, no masses noted, no jugular vein distension  Respiration- chest wall appears symmetric, good expansion,   normal effort without use of accessory muscles  Musculoskeletal - no significant wasting of muscles noted, no bony deformities  Extremities-no clubbing, cyanosis or edema  Skin - warm, dry, and intact. No rash, erythema, or pallor.   Psychiatric - mood, affect, and behavior appear normal.      Neurological exam  Awake, alert, fluent oriented appropriate affect  Attention and concentration appear appropriate  Recent and remote memory appears unremarkable  Speech with dysarthria  No clear issues with language of fund of knowledge     Cranial Nerve Exam     CN III, IV,VI-EOMI, No nystagmus, conjugate eye movements, no ptosis    CN VII-right lower facial droop       Motor Exam  antigravity throughout upper and lower extremities bilaterally  Giveway more on the right      Tremors- no tremors in hands or head noted     Gait  Not tested     Nursing/pcp notes, imaging,labs and vitals reviewed. PT,OT and/or speech notes reviewed    Lab Results   Component Value Date    WBC 9.8 04/06/2022    HGB 13.2 04/06/2022    HCT 41.2 04/06/2022    MCV 94.9 04/06/2022     04/06/2022     Lab Results   Component Value Date     04/06/2022    K 3.7 04/06/2022     04/06/2022    CO2 25 04/06/2022    BUN 13 04/06/2022    CREATININE 0.7 04/06/2022    GLUCOSE 86 04/06/2022    CALCIUM 8.7 (L) 04/06/2022    PROT 5.8 (L) 04/06/2022    LABALBU 3.4 (L) 04/06/2022    BILITOT 0.3 04/06/2022    ALKPHOS 90 04/06/2022    AST 35 (H) 04/06/2022    ALT 48 (H) 04/06/2022    LABGLOM >60 04/06/2022    GFRAA >59 04/06/2022     Lab Results   Component Value Date    INR 1.03 04/01/2022    INR 0.96 08/30/2021    PROTIME 13.7 04/01/2022    PROTIME 13.0 08/30/2021       MRI BRAIN W WO CONTRAST [[id number]]    Resulted: 04/03/22 1553    Updated: 04/03/22 1555    Narrative:     MRI brain without and with contrast 4/3/2022 2:22 PM   History: Facial droop   Comparison: None.     Technique: Multiplanar imaging of the brain was performed in a routine   fashion before and after the intravenous injection of gadolinium   contrast.   Findings:   Midline structures are nondisplaced. Ventricular system is normal.   There is no significant mass effect or hydrocephalus. Basilar cisterns   are preserved.  There are scattered foci of T2 abnormality involving   the periventricular and higher white matter tracts consistent with moderate small vessel ischemic disease. There is mild gliosis within   the nely. There is a focus of low signal within the left thalamus near   the posterior limb of the internal capsule on gradient imaging   suggesting a site of previous hemorrhage or calcification. On   diffusion-weighted imaging this is separable from the area of acute   ischemic infarction. No additional sites of acute or chronic   hemorrhage are demonstrated. . No abnormal extra axial fluid   collections are noted. There is diffusion restriction within the left   basal ganglia with involvement of the thalamus, posterior limb of the   left internal capsule and putamen with involvement of the external   capsule also demonstrated. These are nonhemorrhagic infarcts. There is   associated signal loss on ADC mapping imaging consistent with acute   ischemic infarction. No additional sites of diffusion restriction are   present. Bessie Brittany Postcontrast imaging demonstrates a developmental venous anomaly which   is parafalcine in location within the right parietal lobe extending   into the region of the corpus callosum. No additional sites of   abnormal enhancement are present. Proximal cervical spinal cord, brainstem, and cerebellum are   unremarkable. Normal cerebrovascular flow voids are seen. Bilateral   globes and orbits are normal in appearance. No abnormal signal is noted in the mastoid air cells or paranasal   sinuses. Impression:     Impression:   1. Diffusion restriction within the left thalamus and basal ganglia   with associated ADC mapping abnormality consistent with an acute   left-sided infarct. This is nonhemorrhagic. There is no associated   mass effect or shift of the midline. 2. There is mild atrophy of the brain. Small vessel ischemic changes   are noted involving the periventricular and higher white matter   tracts. There is evidence of a remote small focus of hemorrhage within   the left thalamus.  No additional sites of blooming are present on   gradient imaging. 3. DVA within the parafalcine right parietal lobe extending into the   region of the corpus callosum. No additional sites of abnormal   enhancement are present. Signed by Dr Caitlin Lombardi     ECHO Complete 2D W Doppler W Color [[id number]]    Collected: 04/03/22 1020    Updated: 04/04/22 0825    Narrative:     Transthoracic Echocardiography Report (TTE)      Demographics        Patient Name Beatrice Mcarthur Date of Study          04/03/2022        MRN           [id number]            Gender                 Female        Date of Birth 1945        Room Number            [id number]        Age           76 year(s)        Height:       63 inches         Referring Physician    Ravinder Benitez        Weight:       145 pounds        Sonographer            Mary Tolentino [facility]        BSA:          1.69 m^2          Interpreting Physician Frank Peterson MD        BMI:          25.69 kg/m^2       Procedure     Type of Study        TTE procedure:ECHO NO CONTRAST WITH DOP/COLR.       Study Location: Echo Lab   Technical Quality: Adequate visualization     Patient Status: Inpatient     Rhythm: Within normal limits BP: 121/92 mmHg     Indications:TIA.      Conclusions        Summary    Mitral valve leaflets are mildly thickened with preserved leaflet    mobility.    Mitral annular calcification is present.    Mild mitral regurgitation is present.    Mild aortic stenosis.    Bicuspid valve not excluded    Mean gradient 15 mm hg    Tricuspid valve is structurally normal.    Mild tricuspid regurgitation with estimated RVSP of 39 mm Hg.    Moderately dilated left atrium.    Normal left ventricular size with preserved LV function and an estimated    ejection fraction of approximately 55-60%.  Mild concentric left ventricular hypertrophy.    Grade II diastolic dysfunction    Mildly enlarged right ventricle cavity.     IVC dilated.        Signature      ----------------------------------------------------------------    Electronically signed by Gissell Yusuf MD(Interpreting    [id number]) on 04/04/2022 08:25 AM    ----------------------------------------------------------------                RECORD REVIEW: Previous medical records, medications were reviewed at today's visit    IMPRESSION:   Acute ischemic stroke-2 areas of ischemia- left basal ganglia and left thalamus presumed cardi embolic with history of atrial fibrillation and patient taking her Eliquis only once a day prior to admission    Facial droop, dysarthria, fall, weakness     Initial ExaminationGeneralized giveaway weakness worse in the right arm and right leg  Right lower facial droop with dysarthric speech    MRI of the brain-acute ischemia left basal ganglia and thalamus-personally reviewed  Carotid ultrasound-preliminarily <50% stenosis left internal carotid and asymptomatic 50-69% stenosis right internal carotid-medical management  2D echo-LV EF 55-60%-moderately dilated LA  On Eliquis now twice a day     GI-bowel regimen  Atrial fibrillation-Eliquis/amiodarone/metoprolol  Allergies-Flonase  Hypertension-on medications monitor  Hypokalemia-on potassium  Rhabdomyolysis-IV fluids-CK trending down  Vitamin D deficiency-on vitamin D  Pain control-try Percocet as needed  DVT prophylaxis-Eliquis  PT/OT/speech     Too low functioning for rehab    Continue current care    MRCP for evaluation of extrahepatic biliary dilatation questionable filling defect within the common hepatic duct near the mike hepatis    Disposition pending     Expected duration and frequency therapy: 180 minutes per day, 5 days per week    CALL WITH ANY QUESTIONS  [phone] CELL  Dr Korey Colón

## 2022-04-06 NOTE — PROGRESS NOTES
Cardiology Progress Note Dia Haas MD      Patient:  Teresia Schaumann  [id number]    Patient Active Problem List    Diagnosis Date Noted    Chronic pain disorder 04/04/2022     Priority: Low    Gastroesophageal reflux disease 04/04/2022     Priority: Low    Palliative care patient 04/04/2022     Priority: Low    Diastolic dysfunction 19/17/4060     Priority: Low    Facial droop 04/03/2022     Priority: Low    Dysarthria 04/03/2022     Priority: Low    History of pelvic fracture 04/03/2022     Priority: Low    Hip pain 04/03/2022     Priority: Low    Hypokalemia 04/03/2022     Priority: Low    Vitamin D deficiency 04/03/2022     Priority: Low    Gait abnormality 04/03/2022     Priority: Low    Rhabdomyolysis 04/01/2022     Priority: Low    Fall 04/01/2022     Priority: Low    HTN (hypertension) 04/01/2022     Priority: Low    Generalized weakness 04/01/2022     Priority: Low    Anticoagulated 04/01/2022     Priority: Low    Dehydration 04/01/2022     Priority: Low    Intertrigo 04/01/2022     Priority: Low    Paroxysmal atrial fibrillation ([address] 04/01/2022     Priority: Low    Transaminitis 04/01/2022     Priority: Low    Abnormal CXR 04/01/2022     Priority: Low    Osteoarthritis of right knee 09/02/2021     Priority: Low    Trigger middle finger of right hand 12/17/2020     Priority: Low    Mass of right wrist 12/17/2020     Priority: Low       Admit Date:  4/1/2022    Admission Problem List: Present on Admission:   Rhabdomyolysis   Fall   HTN (hypertension)   Generalized weakness   Anticoagulated   Dehydration   Intertrigo   Paroxysmal atrial fibrillation (HCC)   Transaminitis   Abnormal CXR   Facial droop   Dysarthria   History of pelvic fracture   Hip pain   Hypokalemia   Vitamin D deficiency   Gait abnormality   Palliative care patient   Diastolic dysfunction      Cardiac Specific Data:  Specialty Problems        Cardiology Problems    HTN (hypertension) Paroxysmal atrial fibrillation (HCC)            1.  Paroxysmal atrial fibrillation on Eliquis (reduced compliance) and amiodarone (29% burden on Zio). 2.  Moderate aortic stenosis (echo 9/2021 with mean gradient 27 mmHg), normal LV ejection fraction. 3.  Active ongoing tobacco use. 4.  Acute left thalamic/basal ganglia infarct, intermediate right ICA stenosis.     Subjective:  Ms. Winifred Croft reverted back to atrial fibrillation with moderate rapid ventricular response. Remains weak. Objective:   /65   Pulse 60   Temp 98.5 °F (36.9 °C) (Temporal)   Resp 18   Ht 5' 3\" (1.6 m)   Wt 145 lb (65.8 kg)   SpO2 94%   BMI 25.69 kg/m²       Intake/Output Summary (Last 24 hours) at 4/6/2022 1734  Last data filed at 4/6/2022 1551  Gross per 24 hour   Intake 1656 ml   Output 4650 ml   Net -2994 ml       Prior to Admission medications    Medication Sig Start Date End Date Taking? Authorizing Provider   pantoprazole sodium (PROTONIX) 40 MG PACK packet Take 40 mg by mouth every morning (before breakfast)   Yes Historical Provider, MD   diphenhydrAMINE (BENADRYL) 50 MG capsule Take 50 mg by mouth nightly as needed for Itching   Yes Historical Provider, MD   simethicone (MYLICON) 80 MG chewable tablet Take 125 mg by mouth every 6 hours as needed for Flatulence   Yes Historical Provider, MD   DM-APAP-CPM (CORICIDIN HBP) -2 MG TABS Take 325 mg by mouth every 4-6 hours as needed   Yes Historical Provider, MD   dicyclomine (BENTYL) 10 MG capsule Take 10 mg by mouth three times daily    Historical Provider, MD   benzonatate (TESSALON) 200 MG capsule Take 200 mg by mouth 3 times daily    Historical Provider, MD   hydrOXYzine (ATARAX) 25 MG tablet Take 25 mg by mouth at bedtime 2/23/22   Historical Provider, MD   traMADol (ULTRAM) 50 MG tablet Take 50 mg by mouth 2 times daily.     Historical Provider, MD   apixaban (ELIQUIS) 5 MG TABS tablet Take 5 mg by mouth 2 times daily    Historical Provider, MD   metoprolol succinate (TOPROL XL) 25 MG extended release tablet Take 1 tablet by mouth daily 11/4/21   KATI Bales   vitamin D (ERGOCALCIFEROL) 1.25 MG (01328 UT) CAPS capsule Take 50,000 Units by mouth once a week    Historical Provider, MD   ondansetron (ZOFRAN) 4 MG tablet Take 1 tablet by mouth every 8 hours as needed for Nausea or Vomiting 9/2/21   Shamir Cannon MD   lisinopril (PRINIVIL;ZESTRIL) 20 MG tablet Take 20 mg by mouth nightly Indications: High Blood Pressure Disorder    Historical Provider, MD   fluticasone (FLONASE) 50 MCG/ACT nasal spray 2 sprays by Nasal route daily     Historical Provider, MD   loratadine (CLARITIN) 10 MG capsule Take 10 mg by mouth daily    Historical Provider, MD   furosemide (LASIX) 20 MG tablet Take 1 tablet by mouth daily as needed (swelling)  Patient taking differently: Take 40 mg by mouth daily as needed (swelling)  5/6/20   Mady Marion MD   celecoxib (CELEBREX) 200 MG capsule Take 1 capsule by mouth daily 6/19/18   Mady Marion MD        dilTIAZem  120 mg Oral BID    metoprolol succinate  50 mg Oral BID    atorvastatin  10 mg Oral Daily    sodium bicarbonate  650 mg Oral BID    oxyCODONE-acetaminophen  1 tablet Oral Q6H    lactobacillus  1 capsule Oral Daily    [START ON 4/10/2022] amiodarone  200 mg Oral Daily    potassium chloride  20 mEq Oral BID WC    nystatin  5 mL Oral 4x Daily    polyethylene glycol  17 g Oral Daily    sodium chloride flush  5-40 mL IntraVENous 2 times per day    lactulose  20 g Oral TID    apixaban  5 mg Oral BID    fluticasone  2 spray Nasal Daily    hydrOXYzine  25 mg Oral Nightly    vitamin D  50,000 Units Oral Weekly    nystatin   Topical BID       TELEMETRY: Atrial fibrillation     Physical Exam:      Physical Exam  Constitutional:       General: She is not in acute distress. Appearance: She is not diaphoretic. HENT:      Mouth/Throat:      Pharynx: No oropharyngeal exudate.    Eyes:      General: No scleral icterus. Right eye: No discharge. Left eye: No discharge. Neck:      Thyroid: No thyromegaly. Vascular: No JVD. Cardiovascular:      Rate and Rhythm: Tachycardia present. Rhythm irregular. No extrasystoles are present. Heart sounds: Normal heart sounds, S1 normal and S2 normal. No murmur heard. No systolic murmur is present. No diastolic murmur is present. No friction rub. No gallop. No S3 or S4 sounds. Comments: No JVD  No edema    Pulmonary:      Effort: Pulmonary effort is normal. No respiratory distress. Breath sounds: Normal breath sounds. No wheezing or rales. Chest:      Chest wall: No tenderness. Abdominal:      General: Bowel sounds are normal. There is no distension. Palpations: Abdomen is soft. There is no mass. Tenderness: There is no abdominal tenderness. There is no guarding or rebound. Hernia: No hernia is present. Musculoskeletal:         General: Normal range of motion. Skin:     General: Skin is warm. Coloration: Skin is not pale. Findings: No rash. Neurological:      Mental Status: She is alert and oriented to person, place, and time. Cranial Nerves: No cranial nerve deficit. Deep Tendon Reflexes: Reflexes normal.      Comments: Facial droop to the right  Generalized weakness                 Lab Data:  CBC:   Recent Labs     04/04/22  0346 04/05/22  0254 04/06/22  0305   WBC 8.3 9.3 9.8   HGB 11.2* 11.3* 13.2   HCT 35.0* 36.2* 41.2   MCV 95.9 96.3 94.9    236 303     BMP:   Recent Labs     04/04/22  0346 04/05/22  0254 04/06/22  0305    142 141   K 3.9 4.2 3.7    109 102   CO2 21* 24 25   BUN 17 19 13   CREATININE 0.6 0.7 0.7     LIVER PROFILE:   Recent Labs     04/04/22  0346 04/05/22  0254 04/06/22  0305   AST 75* 46* 35*   ALT 60* 50* 48*   LIPASE 12* 19 15   BILITOT 0.3 <0.2 0.3   ALKPHOS 77 72 90     PT/INR: No results for input(s): PROTIME, INR in the last 72 hours.   APTT: No results for input(s): APTT in the last 72 hours. BNP:  No results for input(s): BNP in the last 72 hours. CK, CKMB, Troponin: @LABRCNT (CKTOTAL:3, CKMB:3, TROPONINI:3)@    IMAGING:  ECHO Complete 2D W Doppler W Color    Result Date: 4/4/2022  Transthoracic Echocardiography Report (TTE)  Demographics   Patient Name  Shaheed Carteraw Date of Study          04/03/2022   MRN           [id number]            Gender                 Female   Date of Birth 1945        Room Number            [id number]   Age           67 year(s)   Height:       63 inches         Referring Physician    Vance De La Cruz   Weight:       145 pounds        Sonographer            [facility]   BSA:          1.69 m^2          Interpreting Physician Jammie Rios MD   BMI:          25.69 kg/m^2  Procedure Type of Study   TTE procedure:ECHO NO CONTRAST WITH DOP/COLR. Study Location: Echo Lab Technical Quality: Adequate visualization Patient Status: Inpatient Rhythm: Within normal limits BP: 121/92 mmHg Indications:TIA. Conclusions   Summary  Mitral valve leaflets are mildly thickened with preserved leaflet  mobility. Mitral annular calcification is present. Mild mitral regurgitation is present. Mild aortic stenosis. Bicuspid valve not excluded  Mean gradient 15 mm hg  Tricuspid valve is structurally normal.  Mild tricuspid regurgitation with estimated RVSP of 39 mm Hg. Moderately dilated left atrium. Normal left ventricular size with preserved LV function and an estimated  ejection fraction of approximately 55-60%. Mild concentric left ventricular hypertrophy. Grade II diastolic dysfunction  Mildly enlarged right ventricle cavity. IVC dilated.    Signature   ----------------------------------------------------------------  Electronically signed by Jammie Rios MD(Interpreting  physician) on 04/04/2022 08:25 AM  ----------------------------------------------------------------   Findings   Mitral Valve  Mitral valve leaflets are mildly thickened with preserved leaflet  mobility. Mitral annular calcification is present. Mild mitral regurgitation is present. Aortic Valve  Mild aortic stenosis. Bicuspid valve not excluded  Mean gradient 15 mm hg   Tricuspid Valve  Tricuspid valve is structurally normal.  Mild tricuspid regurgitation with estimated RVSP of 39 mm Hg. Pulmonic Valve  The pulmonic valve was not well visualized . Left Atrium  Moderately dilated left atrium. Left Ventricle  Normal left ventricular size with preserved LV function and an estimated  ejection fraction of approximately 55-60%. Mild concentric left ventricular hypertrophy. Grade II diastolic dysfunction   Right Atrium  Normal right atrial dimension with no evidence of thrombus or mass noted. Right Ventricle  Mildly enlarged right ventricle cavity. Pericardial Effusion  No evidence of significant pericardial effusion is noted. Pleural Effusion  No evidence of pleural effusion. Miscellaneous  IVC dilated.   M-Mode Measurements (cm)   LVIDd: 4.61 cm                         LVIDs: 2.95 cm  IVSd: 1.33 cm  LVPWd: 1.33 cm                         AO Root Dimension: 3 cm  % Ejection Fraction: 61 %              LA: 3.7 cm                                         LVOT: 2 cm  Doppler Measurements:   AV Peak Velocity:264 cm/s            MV Peak E-Wave: 102 cm/s  AV Peak Gradient: 27.88 mmHg         MV Peak A-Wave: 90.4 cm/s  AV Mean Gradient: 15 mmHg            MV E/A Ratio: 1.13 %  AV Area (Continuity):1.14 cm^2       MV Peak Gradient: 4.16 mmHg  TR Velocity:300 cm/s                 MV P1/2t: 37 msec  TR Gradient:36 mmHg                  MVA by PHT5.95 cm^2  Estimated RAP:3 mmHg  RVSP:39 mmHg      CT ABDOMEN PELVIS WO CONTRAST Additional Contrast? None    Result Date: 4/1/2022  CT ABDOMEN PELVIS WO CONTRAST 4/1/2022 12:23 PM HISTORY: Fall COMPARISON: None DLP: 972 mGy cm TECHNIQUE: Noncontrast enhanced images of the abdomen and pelvis obtained without oral contrast. Automated exposure control was also utilized to decrease patient radiation dose. FINDINGS: Lung bases are clear. Coronary atheromatous calcification. LIVER: No focal liver lesion. BILIARY SYSTEM: The gallbladder is unremarkable. No intrahepatic or extrahepatic ductal dilatation. PANCREAS: No focal pancreatic lesion. SPLEEN: Unremarkable. KIDNEYS AND ADRENALS: Bilateral kidneys and adrenal glands are unremarkable. The ureters are decompressed and normal in appearance. RETROPERITONEUM: No mass, lymphadenopathy or hemorrhage. GI TRACT: Stomach and small bowel are unremarkable. Moderate colonic stool. OTHER: There is no mesenteric mass, lymphadenopathy or fluid collection. Unremarkable bilateral hip arthroplasties. Old healed right pubic bone fracture. Advanced lumbar spondylosis. No acute bony abnormality. PELVIS: No mass lesion, fluid collection or significant lymphadenopathy is seen in the pelvis. The urinary bladder is normal in appearance. 1. No acute process in the abdomen or pelvis. 2. Moderate colonic stool. Bowel loops are nondilated. 3. Unremarkable bilateral hip arthroplasties. Lumbar spondylosis. No acute bony normality. Signed by Dr tSephon Gipson    CT Head WO Contrast    Result Date: 4/2/2022  CT BRAIN without contrast 4/2/2022 12:38 PM HISTORY: New onset weakness yesterday COMPARISON: 4/1/2022 DLP: 632 mGy cm. All CT scans are performed using dose optimization techniques as appropriate to the performed exam and include at least one of the following: Automated exposure control, adjustment of the mA and/or kV according to size, and the use of the iterative reconstruction technique. TECHNIQUE: Serial axial tomographic images of the brain were obtained without the use of intravenous contrast. FINDINGS: The midline structures are nondisplaced. There is mild cerebral and cerebellar volume loss, with an associated increase in the prominence of the ventricles and sulci.  The basilar cisterns are normal in size and configuration. There is no evidence of intracranial hemorrhage or mass-effect. There is low attenuation in the periventricular white matter, consistent with chronic ischemic change. There are no abnormal extra-axial fluid collections. There is no evidence of tonsillar herniation. The included orbits and their contents are unremarkable. The visualized paranasal sinuses, mastoid air cells and middle ear cavities are clear. The visualized osseous structures and overlying soft tissues of the skull and face are intact. 1. Mild cerebral and cerebellar volume loss with chronic microvascular disease but no evidence of acute intracranial process. Signed by Dr Tamie Rico    Result Date: 4/1/2022  Examination. CT HEAD WO CONTRAST 4/1/2022 12:38 PM History: The patient fell. DLP: 1229 mGycm. The automated exposure control was utilized to minimize the patient's radiation exposure. The CT scan of the head is performed without intravenous contrast enhancement. The images are acquired in axial plane with subsequent reconstruction in coronal and sagittal planes. There is no previous study for comparison There is no evidence of an intracranial hemorrhage or hematoma. There is no evidence of a mass. No midline shift. Moderately dilated ventricles, basal cistern and the cortical sulci suggestive chronic volume loss/atrophy. The scattered areas of chronic white matter ischemia are seen in the centrum semiovale bilaterally. The gray-white matter differentiation is maintained. The images reviewed in bone window show no evidence of a skull fracture. Severe atheromatous changes of the intracranial internal carotid arteries bilaterally noted. 1. No acute intracranial abnormality. 2. No skull fracture. 3. Chronic ischemic and atrophic changes. Signed by Dr Sanabria Child Date: 4/4/2022  Examination. CT CHEST W CONTRAST 4/4/2022 12:26 PM History: Right upper lobe collapse.  DLP: 1949 mGycm. The automated exposure control was utilized to minimize the patient's radiation exposure. CT scan of the chest is performed after intravenous contrast enhancement. The images are acquired in axial plane and subsequent reconstruction in coronal and sagittal planes. The comparison is made with the previous study dated 4/1/2022. The opacity in the right upper lobe paratracheal area represent a distended superior vena cava and moderately tortuous and displays brachiocephalic vessels. There is no evidence of right upper lobe collapse. The lungs are poorly distended. There are several groundglass opacity/nodule in the right apex, the largest one located anteriorly and laterally, image #34 in series 4, measuring 1.6 cm in AP dimension. There are atelectatic changes in the lower lungs bilaterally. This more pronounced on the right side. There are small area of consolidation involving the posterior segments of the lower lobes bilaterally adjacent to a small bibasal pleural effusion. There is moderate deviation of the trachea towards the right. The remaining tracheobronchial structures are normal and patent. The limited visualized soft tissues of the neck are unremarkable. The thyroid gland appear normal. Severe atheromatous changes of thoracic aorta are seen. No aneurysmal dilatation. Severe atheromatous changes of coronary arteries are seen. There is no evidence of mediastinal or hilar mass or lymphadenopathy. There is no axillary lymphadenopathy. Limited visualized liver and spleen are unremarkable. Incompletely visualized gallbladder show no gallstone. There is significant dilatation of the common bile duct and pancreatic duct which are incompletely evaluated in this study. However when compared with the previous study obtained 3 days ago these findings are not visualized? Angella Money The study was unenhanced. The images reviewed in bone window show no acute bony abnormality or a bone lesion.  Chronic degenerative changes of thoracic spine are seen. There is subcutaneous fat infiltration of the lower chest and upper abdominal wall, more pronounced laterally which may represent edema due to fluid overload. 1. No finding to suggest collapse of the right upper lobe. Detail is given above. 2. Groundglass nodule/opacities predominantly in the right upper lobe may represent an inflammatory/infectious or an evolving neoplastic process. Further follow-up may be obtained. 3. Small bibasal pleural effusion. 4. Small areas of consolidation and atelectasis in the lower lobes bilaterally. 5. The limited visualized abdomen show significant dilatation of the common bile duct and pancreatic duct which were not noted in the previous study obtained 3 days ago which was done without contrast enhancement. The etiology and clinical significance is not certain. A follow-up CT scan of the abdomen after oral and intravenous contrast enhancement. Thin. Signed by Dr Ryne Maldonado    Result Date: 4/1/2022  CT cervical spine 4/1/2022 12:22 PM HISTORY: Fall TECHNIQUE: Axial images of the cervical spine were obtained without IV contrast. Coronal and sagittal reformatted images are reconstructed and reviewed. COMPARISON: None. DLP: 425 mGy cm Automated exposure control was utilized to minimize patient radiation dose. FINDINGS: Alignment of the cervical spine is appropriate. Moderate to advanced degenerative disc change. Diffuse uncinate process hypertrophy with mild facet osteoarthropathy. No acute cervical vertebral fracture. Incomplete fusion of posterior arch of C1, developmental finding. Mild to moderate canal stenosis at C5/6 with mild narrowing about below this level. Scattered moderate to severe foraminal stenosis. Moderate carotid bulb calcification. No apical lung nodule. 1. Moderate to advanced degenerative changes cervical spine with no acute cervical vertebral fracture or traumatic malalignment.  Signed by  Dave Degree    CT PELVIS WO CONTRAST Additional Contrast? None    Result Date: 4/2/2022  EXAMINATION: CT pelvis without contrast 4/2/2022 HISTORY: Pubic pain post fall Dose: 807 mGycm. All CT scans are performed using dose optimization techniques as appropriate to the performed exam and include at least one of the following: Automated exposure control, adjustment of the mA and/or kV according to size, and the use of the iterative reconstruction technique. FINDINGS: Multiple contiguous axial images are obtained through the bony pelvis per protocol without venous contrast enhancement with reformatted images obtained in the sagittal coronal projections from the original data set. At the L4-L5 level there is broad-based bulging of disc as well as moderate facet and ligamentous hypertrophy. There is moderate central spinal stenosis as well as moderate bilateral neural foraminal narrowing. There is mild grade 1 anterolisthesis of L4 on L5 likely representing subluxation at the level of the facets. The SI joints are intact. No evidence of sacral fracture. The patient is status post bilateral total hip arthroplasty. This does obscure some of the anatomic detail secondary to streaking artifact. There are old fractures of the right superior and inferior pubic ramus. No acute fractures are present. The pubic symphysis is intact. A Saeed catheter is in place within the bladder. There is mild fecal impaction within the rectal vault. 1.. Old fractures of the right superior and inferior pubic rami. No additional fractures are present. The SI joints and pubic symphysis are intact with no evidence of diastases. 2. Facet and ligamentous hypertrophy as well as bulging of the disc contributes to central and foraminal stenosis at L4-L5. There is grade 1 anterolisthesis of L4 on L5 felt to represent subluxation at the level of the facets.  3. Constipation with increased stool throughout the colon including fecal impaction within the Date: 4/1/2022  XR CHEST PORTABLE 4/1/2022 12:21 PM HISTORY: Fatigue COMPARISON: 4/3/2019 CXR: A single frontal view of the chest is performed. Findings: There is volume loss of the right upper hemithorax with ipsilateral shifting of the mediastinal structures. Partial right upper lobe collapse considered. Left lung appears clear. Probable right lower lobe granuloma. Heart is upper limits of normal in size. No pleural effusion or pneumothorax. Degenerative change regional skeleton. 1. Right paratracheal upper lobe opacities with associated volume loss may relate to right upper lobe collapse. Follow-up imaging recommended. CT chest preferably with IV contrast could be performed for further assessment if clinically indicated. Signed by Dr Omi Cheema    Result Date: 4/6/2022  EXAMINATION: CT left hip without contrast 4/6/2022 HISTORY: Severe left hip pain after fall. Dose: 996 mGycm. All CT scans are performed using dose optimization techniques as appropriate to the performed exam and include at least one of the following: Automated exposure control, adjustment of the mA and/or kV according to size, and the use of the iterative reconstruction technique. Ann Kahn FINDINGS: Multiple contiguous axial image are obtained through the left hip per protocol without intravenous contrast enhancement with reformatted images obtained in the sagittal and coronal projections from the original data set. There is skin thickening and ecchymosis overlying the gluteus musculature at the level of the left hip suggesting rather extensive bruising. The patient has undergone previous left total hip arthroplasty. The acetabular cup appears to remain well seated. There is some lucency within the acetabular roof at the interface of the acetabular cup. This could represent changes of ALTR(adverse local tissue reaction) to the arthroplasty. I do not see evidence of a displaced bony fracture.  No convincing evidence of loosening of the prosthesis within the proximal femoral shaft. The rami are intact. 1.. The patient's undergone previous left total hip arthroplasty. The acetabular cup appears to remain well seated. There is some lucency associated with the acetabular roof at the interface with the acetabular cup which could represent changes of adverse local tissue reaction. 2. No evidence of acute displaced fracture. There is rather extensive stranding and ecchymosis overlying the lower left pelvis and left hip and overlying the gluteus musculature consistent with rather extensive bruising. Signed by Dr Calvin Atwood     DUP CAROTID BILATERAL    Result Date: 4/4/2022  Vascular Carotid Procedure  Demographics   Patient Name   Fabienne Butterfield  Age                  67                 C   Patient Number [id number]           Gender               Female   Visit Number   [id number]        Interpreting         Miguelina Baxter MD                                  Physician   Date of Birth  1945       Referring Physician  Carlos Brink   Accession      [id number]       [address], [place],  Number                                               RDMS  Procedure Type of Study:   Cerebral:Carotid, VL CAROTID BILATERAL. Indications for Study:Questionable CVA/TIA, Unsteady gait and Facial droop, left. Risk Factors   - The patient's risk factor(s) include: arterial hypertension.   - Current - Every day. Impression   There is mixed plaque visualized in the bilateral internal carotid  arteries. There is 50-69% stenosis in the right internal carotid artery. There is less than 50% stenosis of the left internal carotid artery. There is normal antegrade flow in the bilateral vertebral arteries.    Signature   ----------------------------------------------------------------  Electronically signed by Miguelina Baxter MD(Interpreting  physician) on 04/04/2022 03:18 PM  ---------------------------------------------------------------- Blood Pressure:Right arm 121/97 mmHg. Velocities are measured in cm/s ; Diameters are measured in mm Carotid Right Measurements +------------+-------+-------+--------+-------+------------+---------------+ ! Location    ! PSV    ! EDV    ! Angle   ! RI     !%Stenosis   ! Tortuosity     ! +------------+-------+-------+--------+-------+------------+---------------+ ! Prox CCA    !60.3   !20.5   !60      !0.66   !            !               ! +------------+-------+-------+--------+-------+------------+---------------+ ! Mid CCA     !70.8   !19.9   !60      !0.72   !            !               ! +------------+-------+-------+--------+-------+------------+---------------+ ! Prox ICA    !203    !60.4   !60      !0.7    ! !               ! +------------+-------+-------+--------+-------+------------+---------------+ ! Mid ICA     !124    !35.1   ! 60      !0.72   !            !               ! +------------+-------+-------+--------+-------+------------+---------------+ ! Dist ICA    !81.2   !30.7   ! 60      !0.62   !            !               ! +------------+-------+-------+--------+-------+------------+---------------+ ! Prox ECA    !188    !20.9   !60      !0.89   !            !               ! +------------+-------+-------+--------+-------+------------+---------------+ ! Vertebral   !77.9   !16.5   !60      !0.79   !            !               ! +------------+-------+-------+--------+-------+------------+---------------+   - There is antegrade vertebral flow noted on the right side. - Additional Measurements:ICAPSV/CCAPSV 3.37. ICAEDV/CCAEDV 2.95. Carotid Left Measurements +------------+-------+-------+--------+-------+------------+---------------+ ! Location    ! PSV    ! EDV    ! Angle   ! RI     !%Stenosis   ! Tortuosity     ! +------------+-------+-------+--------+-------+------------+---------------+ ! Prox CCA    !120    !22     !60      !0.82   !            !               ! +------------+-------+-------+--------+-------+------------+---------------+ ! Mid CCA     !82.3   !28.5   !60      !0.65   !            !               ! +------------+-------+-------+--------+-------+------------+---------------+ ! Prox ICA    !74.6   !26.3   ! 60      !0.65   !            !               ! +------------+-------+-------+--------+-------+------------+---------------+ ! Mid ICA     !92.2   !34     !60      !0.63   !            !               ! +------------+-------+-------+--------+-------+------------+---------------+ ! Dist ICA    ! 88.9   !28.5   !60      !0.68   !            !               ! +------------+-------+-------+--------+-------+------------+---------------+ ! Prox ECA    !93.3   !16.5   !60      !0.82   !            !               ! +------------+-------+-------+--------+-------+------------+---------------+ ! Vertebral   !40.4   !18.6   ! 60      !0.54   !            !               ! +------------+-------+-------+--------+-------+------------+---------------+   - There is antegrade vertebral flow noted on the left side. - Additional Measurements:ICAPSV/CCAPSV 0.77. ICAEDV/CCAEDV 1.55. MRI BRAIN W WO CONTRAST    Result Date: 4/3/2022  MRI brain without and with contrast 4/3/2022 2:22 PM History: Facial droop Comparison: None. Technique: Multiplanar imaging of the brain was performed in a routine fashion before and after the intravenous injection of gadolinium contrast. Findings: Midline structures are nondisplaced. Ventricular system is normal. There is no significant mass effect or hydrocephalus. Basilar cisterns are preserved. There are scattered foci of T2 abnormality involving the periventricular and higher white matter tracts consistent with moderate small vessel ischemic disease. There is mild gliosis within the nely.  There is a focus of low signal within the left thalamus near the posterior limb of the internal capsule on gradient imaging suggesting a site of previous hemorrhage or calcification. On diffusion-weighted imaging this is separable from the area of acute ischemic infarction. No additional sites of acute or chronic hemorrhage are demonstrated. . No abnormal extra axial fluid collections are noted. There is diffusion restriction within the left basal ganglia with involvement of the thalamus, posterior limb of the left internal capsule and putamen with involvement of the external capsule also demonstrated. These are nonhemorrhagic infarcts. There is associated signal loss on ADC mapping imaging consistent with acute ischemic infarction. No additional sites of diffusion restriction are present. Mert Lawson Postcontrast imaging demonstrates a developmental venous anomaly which is parafalcine in location within the right parietal lobe extending into the region of the corpus callosum. No additional sites of abnormal enhancement are present. Proximal cervical spinal cord, brainstem, and cerebellum are unremarkable. Normal cerebrovascular flow voids are seen. Bilateral globes and orbits are normal in appearance. No abnormal signal is noted in the mastoid air cells or paranasal sinuses. Impression: 1. Diffusion restriction within the left thalamus and basal ganglia with associated ADC mapping abnormality consistent with an acute left-sided infarct. This is nonhemorrhagic. There is no associated mass effect or shift of the midline. 2. There is mild atrophy of the brain. Small vessel ischemic changes are noted involving the periventricular and higher white matter tracts. There is evidence of a remote small focus of hemorrhage within the left thalamus. No additional sites of blooming are present on gradient imaging. 3. DVA within the parafalcine right parietal lobe extending into the region of the corpus callosum. No additional sites of abnormal enhancement are present.  Signed by Dr Yasmany Neely 2-3 VW W PELVIS RIGHT    Result Date: 4/1/2022  History: Pain after fall Right hip: Frontal view the pelvis as well as frontal and crosstable lateral views of the right hip are obtained. The right prosthesis. No periprosthetic fracture. Old fractures of the right inferior pelvis involving right superior and inferior rami. Enthesophytes project from the right iliac spine. Chronic calcification along the right acetabular roof. Degenerative change of the lower lumbar spine. Vascular calcification. 1. Old fractures of the right inferior pelvis. Bilateral hip prostheses. No acute appearing bony injury identified. Signed by Dr Patrick Smith and Plan: This is a 74 y. o. year old female with past medical history of hypertension, active ongoing tobacco use, paroxysmal atrial fibrillation on amiodarone and Eliquis (reduced compliance), moderate aortic stenosis, normal LV ejection fraction presenting with fall with probable acute left thalamic/basal ganglia infarct. 1.  Has reverted to atrial fibrillation. Rates not well controlled. Add diltiazem  mg twice daily. Can increase Toprol-XL to 50 mg twice daily. Maintain on amiodarone at this time. Maintain on Eliquis.     Verónica Proctor MD, MD 4/6/2022 5:34 PM

## 2022-04-07 ENCOUNTER — APPOINTMENT (OUTPATIENT)
Dept: MRI IMAGING | Age: 77
DRG: 564 | End: 2022-04-07
Payer: MEDICARE

## 2022-04-07 LAB
ANION GAP SERPL CALCULATED.3IONS-SCNC: 13 MMOL/L (ref 7–19)
BUN BLDV-MCNC: 14 MG/DL (ref 8–23)
CALCIUM SERPL-MCNC: 8.3 MG/DL (ref 8.8–10.2)
CHLORIDE BLD-SCNC: 103 MMOL/L (ref 98–111)
CO2: 23 MMOL/L (ref 22–29)
CREAT SERPL-MCNC: 0.8 MG/DL (ref 0.5–0.9)
GFR AFRICAN AMERICAN: >59
GFR NON-AFRICAN AMERICAN: >60
GLUCOSE BLD-MCNC: 96 MG/DL (ref 74–109)
HCT VFR BLD CALC: 37.8 % (ref 37–47)
HEMOGLOBIN: 12 G/DL (ref 12–16)
MCH RBC QN AUTO: 30.8 PG (ref 27–31)
MCHC RBC AUTO-ENTMCNC: 31.7 G/DL (ref 33–37)
MCV RBC AUTO: 96.9 FL (ref 81–99)
PDW BLD-RTO: 13.7 % (ref 11.5–14.5)
PLATELET # BLD: 270 K/UL (ref 130–400)
PMV BLD AUTO: 9.9 FL (ref 9.4–12.3)
POTASSIUM SERPL-SCNC: 3.8 MMOL/L (ref 3.5–5)
RBC # BLD: 3.9 M/UL (ref 4.2–5.4)
SODIUM BLD-SCNC: 139 MMOL/L (ref 136–145)
WBC # BLD: 8.1 K/UL (ref 4.8–10.8)

## 2022-04-07 PROCEDURE — 1210000000 HC MED SURG R&B

## 2022-04-07 PROCEDURE — 74183 MRI ABD W/O CNTR FLWD CNTR: CPT

## 2022-04-07 PROCEDURE — 6360000002 HC RX W HCPCS: Performed by: NURSE PRACTITIONER

## 2022-04-07 PROCEDURE — 85027 COMPLETE CBC AUTOMATED: CPT

## 2022-04-07 PROCEDURE — 97530 THERAPEUTIC ACTIVITIES: CPT

## 2022-04-07 PROCEDURE — 6370000000 HC RX 637 (ALT 250 FOR IP): Performed by: INTERNAL MEDICINE

## 2022-04-07 PROCEDURE — A9577 INJ MULTIHANCE: HCPCS | Performed by: INTERNAL MEDICINE

## 2022-04-07 PROCEDURE — 99232 SBSQ HOSP IP/OBS MODERATE 35: CPT | Performed by: PSYCHIATRY & NEUROLOGY

## 2022-04-07 PROCEDURE — 6360000004 HC RX CONTRAST MEDICATION: Performed by: INTERNAL MEDICINE

## 2022-04-07 PROCEDURE — 99232 SBSQ HOSP IP/OBS MODERATE 35: CPT | Performed by: INTERNAL MEDICINE

## 2022-04-07 PROCEDURE — 80048 BASIC METABOLIC PNL TOTAL CA: CPT

## 2022-04-07 PROCEDURE — 2580000003 HC RX 258: Performed by: HOSPITALIST

## 2022-04-07 PROCEDURE — 6370000000 HC RX 637 (ALT 250 FOR IP): Performed by: HOSPITALIST

## 2022-04-07 PROCEDURE — 36415 COLL VENOUS BLD VENIPUNCTURE: CPT

## 2022-04-07 RX ORDER — AMIODARONE HYDROCHLORIDE 200 MG/1
200 TABLET ORAL DAILY
Status: DISCONTINUED | OUTPATIENT
Start: 2022-04-10 | End: 2022-04-11 | Stop reason: HOSPADM

## 2022-04-07 RX ORDER — METOPROLOL SUCCINATE 25 MG/1
25 TABLET, EXTENDED RELEASE ORAL 2 TIMES DAILY
Status: DISCONTINUED | OUTPATIENT
Start: 2022-04-07 | End: 2022-04-11 | Stop reason: HOSPADM

## 2022-04-07 RX ORDER — AMIODARONE HYDROCHLORIDE 200 MG/1
200 TABLET ORAL 2 TIMES DAILY
Status: COMPLETED | OUTPATIENT
Start: 2022-04-07 | End: 2022-04-09

## 2022-04-07 RX ADMIN — AMIODARONE HYDROCHLORIDE 200 MG: 200 TABLET ORAL at 21:34

## 2022-04-07 RX ADMIN — NYSTATIN 500000 UNITS: 100000 SUSPENSION ORAL at 16:20

## 2022-04-07 RX ADMIN — DILTIAZEM HYDROCHLORIDE 120 MG: 120 CAPSULE, COATED, EXTENDED RELEASE ORAL at 10:08

## 2022-04-07 RX ADMIN — HYDROXYZINE HYDROCHLORIDE 25 MG: 25 TABLET ORAL at 21:33

## 2022-04-07 RX ADMIN — Medication 1 CAPSULE: at 10:08

## 2022-04-07 RX ADMIN — NYSTATIN 500000 UNITS: 100000 SUSPENSION ORAL at 10:08

## 2022-04-07 RX ADMIN — SODIUM BICARBONATE 650 MG: 650 TABLET ORAL at 10:07

## 2022-04-07 RX ADMIN — HYDROMORPHONE HYDROCHLORIDE 0.5 MG: 1 INJECTION, SOLUTION INTRAMUSCULAR; INTRAVENOUS; SUBCUTANEOUS at 05:32

## 2022-04-07 RX ADMIN — OXYCODONE HYDROCHLORIDE AND ACETAMINOPHEN 1 TABLET: 5; 325 TABLET ORAL at 10:15

## 2022-04-07 RX ADMIN — POLYETHYLENE GLYCOL 3350 17 G: 17 POWDER, FOR SOLUTION ORAL at 10:08

## 2022-04-07 RX ADMIN — FLUTICASONE PROPIONATE 2 SPRAY: 50 SPRAY, METERED NASAL at 10:12

## 2022-04-07 RX ADMIN — LACTULOSE 20 G: 20 SOLUTION ORAL at 10:08

## 2022-04-07 RX ADMIN — OXYCODONE HYDROCHLORIDE AND ACETAMINOPHEN 1 TABLET: 5; 325 TABLET ORAL at 21:34

## 2022-04-07 RX ADMIN — OXYCODONE HYDROCHLORIDE AND ACETAMINOPHEN 1 TABLET: 5; 325 TABLET ORAL at 16:21

## 2022-04-07 RX ADMIN — SODIUM BICARBONATE 650 MG: 650 TABLET ORAL at 21:35

## 2022-04-07 RX ADMIN — NYSTATIN 500000 UNITS: 100000 SUSPENSION ORAL at 21:33

## 2022-04-07 RX ADMIN — SODIUM CHLORIDE, PRESERVATIVE FREE 10 ML: 5 INJECTION INTRAVENOUS at 21:34

## 2022-04-07 RX ADMIN — SODIUM CHLORIDE, PRESERVATIVE FREE 10 ML: 5 INJECTION INTRAVENOUS at 10:13

## 2022-04-07 RX ADMIN — METOPROLOL SUCCINATE 50 MG: 50 TABLET, EXTENDED RELEASE ORAL at 10:08

## 2022-04-07 RX ADMIN — NYSTATIN: 100000 CREAM TOPICAL at 21:37

## 2022-04-07 RX ADMIN — NYSTATIN: 100000 CREAM TOPICAL at 10:12

## 2022-04-07 RX ADMIN — AMIODARONE HYDROCHLORIDE 200 MG: 200 TABLET ORAL at 10:15

## 2022-04-07 RX ADMIN — LACTULOSE 20 G: 20 SOLUTION ORAL at 21:33

## 2022-04-07 RX ADMIN — METOPROLOL SUCCINATE 25 MG: 25 TABLET, EXTENDED RELEASE ORAL at 21:35

## 2022-04-07 RX ADMIN — APIXABAN 5 MG: 5 TABLET, FILM COATED ORAL at 21:34

## 2022-04-07 RX ADMIN — ATORVASTATIN CALCIUM 10 MG: 10 TABLET, FILM COATED ORAL at 10:07

## 2022-04-07 RX ADMIN — DILTIAZEM HYDROCHLORIDE 120 MG: 120 CAPSULE, COATED, EXTENDED RELEASE ORAL at 21:35

## 2022-04-07 RX ADMIN — APIXABAN 5 MG: 5 TABLET, FILM COATED ORAL at 10:07

## 2022-04-07 RX ADMIN — GADOBENATE DIMEGLUMINE 14 ML: 529 INJECTION, SOLUTION INTRAVENOUS at 12:35

## 2022-04-07 RX ADMIN — POTASSIUM CHLORIDE 20 MEQ: 20 TABLET, EXTENDED RELEASE ORAL at 10:07

## 2022-04-07 RX ADMIN — POTASSIUM CHLORIDE 20 MEQ: 20 TABLET, EXTENDED RELEASE ORAL at 16:21

## 2022-04-07 ASSESSMENT — ENCOUNTER SYMPTOMS
EYES NEGATIVE: 1
ALLERGIC/IMMUNOLOGIC NEGATIVE: 1
GASTROINTESTINAL NEGATIVE: 1
CHOKING: 1

## 2022-04-07 ASSESSMENT — PAIN SCALES - GENERAL
PAINLEVEL_OUTOF10: 4
PAINLEVEL_OUTOF10: 6
PAINLEVEL_OUTOF10: 6
PAINLEVEL_OUTOF10: 5

## 2022-04-07 NOTE — PLAN OF CARE
Problem: Skin Integrity:  Goal: Will show no infection signs and symptoms  Description: Will show no infection signs and symptoms  4/7/2022 0016 by Nadine Fritz RN  Outcome: Ongoing  4/6/2022 1119 by Elif Leonard RN  Outcome: Ongoing  4/6/2022 1119 by Elif Leonard RN  Outcome: Ongoing  Goal: Absence of new skin breakdown  Description: Absence of new skin breakdown  4/7/2022 0016 by Nadine Fritz RN  Outcome: Ongoing  4/6/2022 1119 by Elif Leonard RN  Outcome: Ongoing  4/6/2022 1119 by Elif Leonard RN  Outcome: Ongoing     Problem: Falls - Risk of:  Goal: Will remain free from falls  Description: Will remain free from falls  4/7/2022 0016 by Nadine Fritz RN  Outcome: Ongoing  4/6/2022 1119 by Elif Leonard RN  Outcome: Ongoing  4/6/2022 1119 by Elif Leonard RN  Outcome: Ongoing  Goal: Absence of physical injury  Description: Absence of physical injury  4/7/2022 0016 by Nadine Fritz RN  Outcome: Ongoing  4/6/2022 1119 by Elif Leonard RN  Outcome: Ongoing  4/6/2022 1119 by Elif Leonard RN  Outcome: Ongoing     Problem: Pain:  Goal: Pain level will decrease  Description: Pain level will decrease  4/7/2022 0016 by Nadine Fritz RN  Outcome: Ongoing  4/6/2022 1119 by Elif Leonard RN  Outcome: Ongoing  4/6/2022 1119 by Elif Leonard RN  Outcome: Ongoing  Goal: Control of acute pain  Description: Control of acute pain  4/7/2022 0016 by Nadine Fritz RN  Outcome: Ongoing  4/6/2022 1119 by Elif Leonard RN  Outcome: Ongoing  4/6/2022 1119 by Elif Leonard RN  Outcome: Ongoing  Goal: Control of chronic pain  Description: Control of chronic pain  4/7/2022 0016 by Nadine Fritz RN  Outcome: Ongoing  4/6/2022 1119 by Elif Leonard RN  Outcome: Ongoing  4/6/2022 1119 by Elif Leonard RN  Outcome: Ongoing

## 2022-04-07 NOTE — PROGRESS NOTES
Occupational Therapy     04/07/22 1611   Restrictions/Precautions   Restrictions/Precautions Fall Risk;Contact Precautions   Vision   Vision [facility]   Hearing   Hearing [facility]   General   Chart Reviewed Yes   Patient assessed for rehabilitation services? Yes   Response to previous treatment Patient with no complaints from previous session   Family / Caregiver Present No   Subjective   Subjective Pt in bed upon arrival for therapy. pt agreeable to participate. Pain Assessment   Patient Currently in Pain No   Orientation   Overall Orientation Status WFL   ADL   Feeding Setup;Supervision;Minimal assistance   Grooming Minimal assistance   UE Bathing Minimal assistance   LE Bathing Moderate assistance   UE Dressing Minimal assistance   LE Dressing Maximum assistance   Toileting Maximum assistance   Additional Comments Daiana Alves for transfer; Min-Mod assist to maintain sitting balance this date. Balance   Sitting Balance Minimal assistance   Standing Balance Moderate assistance  (Mod assist x2 to maintain )   Bed mobility   Supine to Sit Moderate assistance   Sit to Supine Moderate assistance   Scooting Moderate assistance   Transfers   Sit to stand Moderate assistance;2 Person assistance   Stand to sit Moderate assistance;2 Person assistance   Transfer Comments at [address] [place] [address]   Assessment   Performance deficits / Impairments Decreased functional mobility ; Decreased ADL status; Decreased strength;Decreased safe awareness;Decreased endurance;Decreased balance;Decreased posture;Decreased coordination   Assessment Pt less drowsy this date and was able to participate a little more. Treatment Diagnosis Rhabdomyolosis, Left side infarct   Prognosis Good   REQUIRES OT FOLLOW UP Yes   Treatment Initiated  Tx focused on sitting EOB to work on sitting balance and STS mobility at RW level with Mod asist x2, 3x.     Discharge Recommendations Patient would benefit from continued therapy after discharge   Activity Tolerance   Activity Tolerance Patient Tolerated treatment well;Patient limited by fatigue   Safety Devices   Safety Devices in place Yes   Type of devices Bed alarm in place;Call light within reach; Left in bed   Plan   Times per week 3-5   Times per day Daily   Electronically signed by ROMAN Mcclelland Mc on 4/7/2022 at 4:17 PM

## 2022-04-07 NOTE — PROGRESS NOTES
GI  - PROGRESS NOTE    Subjective:   Admit Date: 4/1/2022  PCP: KATI Perez CNP    Patient being seen for: Dilated biliary system    24hr events/Interval History: Patient remains stable. She feels as if she is getting stronger daily. She is hoping to go home soon. ADULT DIET;  Regular     Tolerated                  Pain:Mild  Nausea:None      Medications:  Scheduled Meds:   amiodarone  200 mg Oral BID    [START ON 4/10/2022] amiodarone  200 mg Oral Daily    metoprolol succinate  25 mg Oral BID    dilTIAZem  120 mg Oral BID    atorvastatin  10 mg Oral Daily    sodium bicarbonate  650 mg Oral BID    oxyCODONE-acetaminophen  1 tablet Oral Q6H    lactobacillus  1 capsule Oral Daily    potassium chloride  20 mEq Oral BID WC    nystatin  5 mL Oral 4x Daily    polyethylene glycol  17 g Oral Daily    sodium chloride flush  5-40 mL IntraVENous 2 times per day    lactulose  20 g Oral TID    apixaban  5 mg Oral BID    fluticasone  2 spray Nasal Daily    hydrOXYzine  25 mg Oral Nightly    vitamin D  50,000 Units Oral Weekly    nystatin   Topical BID       Continuous Infusions:   sodium chloride 75 mL/hr at 04/06/22 2031    sodium chloride         PRN Meds:.cyclobenzaprine, simethicone, potassium chloride **OR** potassium alternative oral replacement **OR** potassium chloride, magic butt cream, HYDROmorphone, sodium chloride flush, sodium chloride, ondansetron **OR** ondansetron, polyethylene glycol, traMADol      Labs:     Recent Labs     04/05/22  0254 04/06/22  0305 04/07/22  0249   WBC 9.3 9.8 8.1   RBC 3.76* 4.34 3.90*   HGB 11.3* 13.2 12.0   HCT 36.2* 41.2 37.8   MCV 96.3 94.9 96.9   MCH 30.1 30.4 30.8   MCHC 31.2* 32.0* 31.7*    303 270     Recent Labs     04/05/22  0254 04/06/22  0305 04/07/22  0249    141 139   K 4.2 3.7 3.8   ANIONGAP 9 14 13    102 103   CO2 24 25 23   BUN 19 13 14   CREATININE 0.7 0.7 0.8   GLUCOSE 93 86 96   CALCIUM 8.5* 8.7* 8.3*     Recent Labs     04/05/22  0254 04/06/22  0305   MG 2.0 1.9     Recent Labs     04/05/22  0254 04/06/22  0305   AST 46* 35*   ALT 50* 48*   BILITOT <0.2 0.3   ALKPHOS 72 90     HgBA1c:  No components found for: HGBA1C  FLP:  No results found for: TRIG, HDL, LDLCALC, LDLDIRECT, LABVLDL  TSH:    Lab Results   Component Value Date    TSH 0.956 04/01/2022     Troponin T: No results for input(s): TROPONINI in the last 72 hours. INR: No results for input(s): INR in the last 72 hours. Recent Labs     04/05/22 0254 04/06/22  0305   LIPASE 19 15     -----------------------------------------------------------------  RAD:     CT HIP LEFT WO CONTRAST    Result Date: 4/6/2022  EXAMINATION: CT left hip without contrast 4/6/2022 HISTORY: Severe left hip pain after fall. Dose: 996 mGycm. All CT scans are performed using dose optimization techniques as appropriate to the performed exam and include at least one of the following: Automated exposure control, adjustment of the mA and/or kV according to size, and the use of the iterative reconstruction technique. Graylon Rohan FINDINGS: Multiple contiguous axial image are obtained through the left hip per protocol without intravenous contrast enhancement with reformatted images obtained in the sagittal and coronal projections from the original data set. There is skin thickening and ecchymosis overlying the gluteus musculature at the level of the left hip suggesting rather extensive bruising. The patient has undergone previous left total hip arthroplasty. The acetabular cup appears to remain well seated. There is some lucency within the acetabular roof at the interface of the acetabular cup. This could represent changes of ALTR(adverse local tissue reaction) to the arthroplasty. I do not see evidence of a displaced bony fracture. No convincing evidence of loosening of the prosthesis within the proximal femoral shaft. The rami are intact.     1.. The patient's undergone previous left total hip arthroplasty. The acetabular cup appears to remain well seated. There is some lucency associated with the acetabular roof at the interface with the acetabular cup which could represent changes of adverse local tissue reaction. 2. No evidence of acute displaced fracture. There is rather extensive stranding and ecchymosis overlying the lower left pelvis and left hip and overlying the gluteus musculature consistent with rather extensive bruising. Signed by Dr Wyatt Lerma MRCP    Result Date: 4/7/2022  EXAM: MRI abdomen without and with IV contrast, including 3-D MRCP sequences INDICATION: Further evaluation of potential biliary filling defect identified on recent ultrasound COMPARISON: Ultrasound 4/5/2022 FINDINGS: The study is degraded by patient motion. Liver: No hepatic steatosis or morphologic changes of cirrhosis. No suspicious focal liver lesion. Bile ducts: Intrahepatic and extra hepatic biliary ductal dilatation with gradual caliber tapering down to the ampulla. No obstructing stone or mass identified. Common bile duct measures 17 mm diameter. Gallbladder: No gallstones or gallbladder wall thickening. Pancreas: No pancreatic mass, cyst, or surrounding inflammatory change. Spleen: Not enlarged. No focal lesion. Adrenals: Normal. Kidneys: Tiny LEFT renal cyst. No solid enhancing renal mass. No hydronephrosis. Vasculature: Nonaneurysmal abdominal aorta. Patent portal vein. Other Findings: Distended colon with layering fluid. No ascites. No abdominal lymphadenopathy. No suspicious focal bone lesion. Small bilateral pleural effusions in the lung bases. 1.  Intrahepatic and extra hepatic biliary ductal dilatation with gradual caliber tapering down to the ampulla. No evidence of obstructing stone or mass.  Differential diagnosis includes biliary stricture although occult stone or neoplasm are also within the differential. Recommend correlation with laboratory analysis and consider ERCP for further evaluation. 2.  The study is degraded by patient motion. Signed by Dr Mar Norris          Physical Exam:     Vitals:    04/06/22 2033 04/06/22 2345 04/07/22 0600 04/07/22 1127   BP: 133/67 (!) 141/69 127/73 (!) 111/59   Pulse: 60 59 56 (!) 49   Resp:  18 18 17   Temp:  96.9 °F (36.1 °C) 97.3 °F (36.3 °C) 97.2 °F (36.2 °C)   TempSrc:  Temporal  Temporal   SpO2:  92%  92%   Weight:       Height:         24HR INTAKE/OUTPUT:      Intake/Output Summary (Last 24 hours) at 4/7/2022 1438  Last data filed at 4/7/2022 7386  Gross per 24 hour   Intake 1731 ml   Output 1050 ml   Net 681 ml     General appearance: alert and cooperative with exam  Lungs: clear to auscultation bilaterally  Heart: regular rate and rhythm  Abdomen: soft, non-tender; bowel sounds normal; no masses,  no organomegaly  Extremities: no ulcers, gangrene or trophic changes  Neurologic: No obvious focal neurologic deficits. Impression:              Last Modified POA     * (Principal) Rhabdomyolysis 4/1/2022 Yes     Fall 4/1/2022 Yes     HTN (hypertension) 4/1/2022 Yes     Generalized weakness 4/1/2022 Yes     Anticoagulated 4/1/2022 Yes     Dehydration 4/1/2022 Yes     Intertrigo 4/1/2022 Yes     Paroxysmal atrial fibrillation ([address] 4/1/2022 Yes     Transaminitis 4/1/2022 Yes     Abnormal CXR 4/1/2022 Yes     Facial droop 4/3/2022 Yes     Dysarthria 4/3/2022 Yes     History of pelvic fracture 4/3/2022 Yes     Hip pain 4/3/2022 Yes     Hypokalemia 4/3/2022 Yes     Vitamin D deficiency 4/3/2022 Yes     Gait abnormality 4/3/2022 Yes     Palliative care patient 2/1/6070 Yes     Diastolic dysfunction 4/1/4109 Yes                     Asked to evaluate patient for dilated common bile duct and pancreatic duct incidentally noted on CT chest. MRCP performed today and does show intrahepatic and extra hepatic biliary ductal dilatation with   gradual caliber tapering down to the ampulla.  No evidence of obstructing stone or mass. Differential diagnosis includes biliary stricture although occult stone or neoplasm are also within the   differential. Recommend correlation with laboratory analysis and consider ERCP for further evaluation. Patient noted to have elevated AST/ALT with AST predominance on admission likely from muscle as patient noted to have Rhabdomyolysis and AST/ALT have continued to improve along with CK. Bilirubin and alkaline phosphatase have remained normal.  Clinically patient does not have symptoms of biliary colic or obstruction. She will need follow up with Dr. Marlon Mccoy in the next few weeks to arrange ERCP for further evaluation. Plan:     No urgent GI intervention needed at this time. Patient will need follow up with Dr. Marlon Mccoy in the next 2 weeks to arrange outpatient ERCP. Please call if further GI assistance is needed.

## 2022-04-07 NOTE — PROGRESS NOTES
[facility] MEDICINE  - PROGRESS NOTE    Admit Date: 4/1/2022         CC: fall    Subjective: feels much better, pelvic and leg pain much better, no abd pain, no N/V/C, no dysuria, no chest pain, no dyspnea    Objective: no acute distress, hungry, asking for regular food and coffee    Diet: ADULT DIET;  Regular  Pain is:Mild  Nausea:None  Bowel Movement/Flatus yes    Data:   Scheduled Meds: Reviewed  Current Facility-Administered Medications   Medication Dose Route Frequency Provider Last Rate Last Admin    amiodarone (CORDARONE) tablet 200 mg  200 mg Oral BID Mike Frey MD   200 mg at 04/07/22 1015    [START ON 4/10/2022] amiodarone (CORDARONE) tablet 200 mg  200 mg Oral Daily Mike Frey MD        metoprolol succinate (TOPROL XL) extended release tablet 25 mg  25 mg Oral BID Mike Frey MD        dilTIAZem (CARDIZEM CD) extended release capsule 120 mg  120 mg Oral BID Mike Frey MD   120 mg at 04/07/22 1008    atorvastatin (LIPITOR) tablet 10 mg  10 mg Oral Daily Eryn Cisneros MD   10 mg at 04/07/22 1007    sodium bicarbonate tablet 650 mg  650 mg Oral BID Eryn Cisneros MD   650 mg at 04/07/22 1007    oxyCODONE-acetaminophen (PERCOCET) 5-325 MG per tablet 1 tablet  1 tablet Oral Q6H Eryn Cisneros MD   1 tablet at 04/07/22 1015    lactobacillus (CULTURELLE) capsule 1 capsule  1 capsule Oral Daily Eryn Cisneros MD   1 capsule at 04/07/22 1008    cyclobenzaprine (FLEXERIL) tablet 5 mg  5 mg Oral BID PRN Eryn Cisneros MD   5 mg at 04/06/22 2033    simethicone (MYLICON) chewable tablet 80 mg  80 mg Oral 4x Daily PRN Quincy Rising, APRN - CNP        potassium chloride (KLOR-CON M) extended release tablet 20 mEq  20 mEq Oral BID WC Eryn Cisneros MD   20 mEq at 04/07/22 1007    nystatin (MYCOSTATIN) 735702 UNIT/ML suspension 500,000 Units  5 mL Oral 4x Daily Eryn Cisneros MD   500,000 Units at 04/07/22 1008    potassium chloride (KLOR-CON M) extended release tablet 40 mEq  40 mEq Oral PRN Maite Beets, APRN - CNP        Or    potassium bicarb-citric acid (EFFER-K) effervescent tablet 40 mEq  40 mEq Oral PRN Maite Beets, APRN - CNP        Or    potassium chloride 10 mEq/100 mL IVPB (Peripheral Line)  10 mEq IntraVENous PRN Maite Beets, APRN - CNP        magic butt cream   Topical Q4H PRN Maite Beets, APRN - CNP   Given at 04/02/22 2136    HYDROmorphone HCl PF (DILAUDID) injection 0.5 mg  0.5 mg IntraVENous Q3H PRN Maite Beets, APRN - CNP   0.5 mg at 04/07/22 0532    polyethylene glycol (GLYCOLAX) packet 17 g  17 g Oral Daily Micah Escobar MD   17 g at 04/07/22 1008    0.9 % sodium chloride infusion   IntraVENous Continuous Micah Escobar MD 75 mL/hr at 04/06/22 2031 New Bag at 04/06/22 2031    sodium chloride flush 0.9 % injection 5-40 mL  5-40 mL IntraVENous 2 times per day Micah Escobar MD   10 mL at 04/07/22 1013    sodium chloride flush 0.9 % injection 5-40 mL  5-40 mL IntraVENous PRN Micah Escobar MD   10 mL at 04/03/22 1657    0.9 % sodium chloride infusion   IntraVENous PRN Miach Escobar MD        ondansetron (ZOFRAN-ODT) disintegrating tablet 4 mg  4 mg Oral Q8H PRN Micah Escobar MD        Or    ondansetron [facility]) injection 4 mg  4 mg IntraVENous Q6H PRN Micah Escobar MD   4 mg at 04/04/22 1652    polyethylene glycol (GLYCOLAX) packet 17 g  17 g Oral Daily PRN Micah Escobar MD        lactulose (CHRONULAC) 10 GM/15ML solution 20 g  20 g Oral TID Micah Escobar MD   20 g at 04/07/22 1008    apixaban (ELIQUIS) tablet 5 mg  5 mg Oral BID Micah Escobar MD   5 mg at 04/07/22 1007    fluticasone (FLONASE) 50 MCG/ACT nasal spray 2 spray  2 spray Nasal Daily Micah Escobar MD   2 spray at 04/07/22 1012    hydrOXYzine (ATARAX) tablet 25 mg  25 mg Oral Nightly Jorge Guidry MD   25 mg at 04/06/22 2034    traMADol (ULTRAM) tablet 50 mg  50 mg Oral Q6H PRN Jorge Guidry MD   50 mg at 04/06/22 1739    vitamin D (ERGOCALCIFEROL) capsule 50,000 Units  50,000 Units Oral Weekly Jorge Guidry MD        nystatin (MYCOSTATIN) cream   Topical BID Elly Iggy, APRN - CNP   Given at 04/07/22 1012     DVT Prophylaxis: eliquis    Continuous Infusions:   sodium chloride 75 mL/hr at 04/06/22 2031    sodium chloride         Intake/Output Summary (Last 24 hours) at 4/7/2022 1445  Last data filed at 4/7/2022 0839  Gross per 24 hour   Intake 1731 ml   Output 1050 ml   Net 681 ml     CBC:   Recent Labs     04/05/22 0254 04/06/22  0305 04/07/22  0249   WBC 9.3 9.8 8.1   HGB 11.3* 13.2 12.0    303 270     BMP:  Recent Labs     04/05/22 0254 04/06/22  0305 04/07/22  0249    141 139   K 4.2 3.7 3.8    102 103   CO2 24 25 23   BUN 19 13 14   CREATININE 0.7 0.7 0.8   GLUCOSE 93 86 96     ABGs: No results found for: PHART, PO2ART, JPN6YHQ  INR: No results for input(s): INR in the last 72 hours. Objective:   Vitals: BP (!) 111/59   Pulse (!) 49   Temp 97.2 °F (36.2 °C) (Temporal)   Resp 17   Ht 5' 3\" (1.6 m)   Wt 145 lb (65.8 kg)   SpO2 92%   BMI 25.69 kg/m²   General appearance: alert, appears stated age and cooperative  Skin: Skin color, texture, turgor normal.   HEENT: Head: Normocephalic, no lesions, without obvious abnormality.   Neck: no adenopathy, no carotid bruit, no JVD and supple, symmetrical, trachea midline  Lungs: clear to auscultation bilaterally  Heart: regular rate and rhythm, S1, S2 normal, no murmur, click, rub or gallop  Abdomen: soft, non-tender; bowel sounds normal; no masses,  no organomegaly  Extremities: extremities normal, atraumatic, no cyanosis or edema  Lymphatic: No significant lymph node enlargement papable  Neurologic: Mental status: Alert, oriented, thought content appropriate, facial droop Assessment & Plan:    · Traumatic rhabdomyolysis- resolved   · Metabolic acidosis- resolved    · Hypokalemia - resolved   · 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- follow OP  · Constipation - treated  · Groundglass nodule/opacities predominantly in the right upper lobe   may represent an inflammatory/infectious or an evolving neoplastic process- pulmonary evaluated - follow up OP  · Severe deconditioning with fall - PT/OT- needs SNF  · Acute CVA with Facial droop-- neurology evaluated   · Significant dilatation of the common bile duct and pancreatic duct -per ERCP MRCP recommended- patient is asymptotic- follow up as OP with Dr Briseyda Herzog in 2 weeks  · PAF -amiodarone, Cardizem, metoprolol- treatment per cardiology  · Bradycardia- per cardiology  · Carotid stenosis- Lipitor, eliquis - follow up OP   · Vit d def- on replacement   · History of pelvic fx                     See orders   Disposition: cleared for DC, awaiting SNF placement     Nikia Razo MD

## 2022-04-07 NOTE — PROGRESS NOTES
Patient:   Ara Anthony  MR#:    [id number]   Room:    0428/433-95   YOB: 1945  Date of Progress Note: 4/7/2022  Time of Note                           11:09 AM  Consulting Physician:   Reid Gallardo M.D. Attending Physician:  Carl Gonzalez, *     Chief complaint Slurred speech, facial droop    S:This 67 y.o. female  with a past medical history significant for atrial fibrillation on chronic anticoagulation with Eliquis, previous pelvic fracture and hypertension is seen for evaluation of facial droop and slurred speech. The patient has been on 4/1/2022 after being found down at home. Had fallen approximately 3 days prior to admission due to leg weakness. She was laying on the ground and was unable to get help. She does not recall why she fell. The neighbor tried to go over to the house that she had not heard from her and the patient was able to crawl to unlock the door but was unable to get up. The patient indicates she has a previous history of pelvic fracture in office take some Tylenol and tramadol at home with good pain relief. On admission her serum CK level was 7136. Her serum creatinine was 0.9 with a BUN of 41. Her CT of the head had some mild generalized volume loss with no acute changes noted. CT of the pelvis revealed old fracture of the right superior and inferior pubic rami. Patient is complaining of right pelvic pain. Patient denies any history of stroke. She indicates she had some dental work done and ever since then her face has been droop. She wears dentures and indicates her speech is slurred due to that and a dry mouth. Pelvic pain better Feels Percocet helps. Overall feels about the same.     REVIEW OF SYSTEMS:  Constitutional: No fevers No chills  Neck:No stiffness  Respiratory: No shortness of breath  Cardiovascular: No chest pain No palpitations  Gastrointestinal: No abdominal pain    Genitourinary: No Dysuria  Neurological: No headache, no confusion    Past Medical History:      Diagnosis Date    Chronic back pain     Hypertension     Inflammatory arthritis     Knee pain     Spastic colon        Past Surgical History:      Procedure Laterality Date    ABDOMEN SURGERY      APPENDECTOMY      BACK SURGERY      neck; bone out of hip to fuse neck    FINGER TRIGGER RELEASE Left 12/17/2020    LEFT MIDDLE TRIGGER FINGER RELEASE performed by Destinee Goodman MD at [address] [place] [address] HAND SURGERY Left 2012    fx repair (3 total surgeries)    HAND SURGERY Left 12/17/2020    LEFT WRIST MASS EXCISION performed by Destinee Goodman MD at [place] [facility] Bilateral     HIP    MOUTH SURGERY      TOTAL KNEE ARTHROPLASTY Right 9/2/2021    RIGHT TOTAL KNEE ARTHROPLASTY performed by Sara Puentes MD at [address] [place] [address]         Medications in Hospital:      Current Facility-Administered Medications:     amiodarone (CORDARONE) tablet 200 mg, 200 mg, Oral, BID, Scot Van MD, 200 mg at 04/07/22 1015    [START ON 4/10/2022] amiodarone (CORDARONE) tablet 200 mg, 200 mg, Oral, Daily, Scot Van MD    dilTIAZem (CARDIZEM CD) extended release capsule 120 mg, 120 mg, Oral, BID, Scot Van MD, 120 mg at 04/07/22 1008    metoprolol succinate (TOPROL XL) extended release tablet 50 mg, 50 mg, Oral, BID, Scot Van MD, 50 mg at 04/07/22 1008    atorvastatin (LIPITOR) tablet 10 mg, 10 mg, Oral, Daily, Cyndie Hernández MD, 10 mg at 04/07/22 1007    sodium bicarbonate tablet 650 mg, 650 mg, Oral, BID, Cyndie Hernández MD, 650 mg at 04/07/22 1007    oxyCODONE-acetaminophen (PERCOCET) 5-325 MG per tablet 1 tablet, 1 tablet, Oral, Q6H, Cyndie Hernández MD, 1 tablet at 04/07/22 1015    lactobacillus (CULTURELLE) capsule 1 capsule, 1 capsule, Oral, Daily, Cyndie Hernández MD, 1 capsule at 04/07/22 1008    cyclobenzaprine (FLEXERIL) tablet 5 mg, 5 mg, Oral, BID PRN, Cyndie Hernández MD, 5 mg at 04/06/22 2033    simethicone (MYLICON) chewable tablet 80 mg, 80 mg, Oral, 4x Daily PRN, KATI Oviedo CNP    potassium chloride (KLOR-CON M) extended release tablet 20 mEq, 20 mEq, Oral, BID WC, Anahi Burgess MD, 20 mEq at 04/07/22 1007    nystatin (MYCOSTATIN) 784817 UNIT/ML suspension 500,000 Units, 5 mL, Oral, 4x Daily, Anahi Burgess MD, 500,000 Units at 04/07/22 1008    potassium chloride (KLOR-CON M) extended release tablet 40 mEq, 40 mEq, Oral, PRN **OR** potassium bicarb-citric acid (EFFER-K) effervescent tablet 40 mEq, 40 mEq, Oral, PRN **OR** potassium chloride 10 mEq/100 mL IVPB (Peripheral Line), 10 mEq, IntraVENous, PRN, KATI Oviedo CNP    magic butt cream, , Topical, Q4H PRN, KATI Oviedo CNP, Given at 04/02/22 2136    HYDROmorphone HCl PF (DILAUDID) injection 0.5 mg, 0.5 mg, IntraVENous, Q3H PRN, KATI Oviedo CNP, 0.5 mg at 04/07/22 0532    polyethylene glycol (GLYCOLAX) packet 17 g, 17 g, Oral, Daily, Anahi Burgess MD, 17 g at 04/07/22 1008    0.9 % sodium chloride infusion, , IntraVENous, Continuous, Anahi Burgess MD, Last Rate: 75 mL/hr at 04/06/22 2031, New Bag at 04/06/22 2031    sodium chloride flush 0.9 % injection 5-40 mL, 5-40 mL, IntraVENous, 2 times per day, Anahi Burgess MD, 10 mL at 04/07/22 1013    sodium chloride flush 0.9 % injection 5-40 mL, 5-40 mL, IntraVENous, PRN, Anahi Burgess MD, 10 mL at 04/03/22 1657    0.9 % sodium chloride infusion, , IntraVENous, PRN, Anahi Burgess MD    ondansetron (ZOFRAN-ODT) disintegrating tablet 4 mg, 4 mg, Oral, Q8H PRN **OR** ondansetron (ZOFRAN) injection 4 mg, 4 mg, IntraVENous, Q6H PRN, Anahi Burgess MD, 4 mg at 04/04/22 8555    polyethylene glycol (GLYCOLAX) packet 17 g, 17 g, Oral, Daily PRN, Anahi Burgess MD    lactulose (CHRONULAC) 10 GM/15ML solution 20 g, 20 g, Oral, TID, Anahi Burgess MD, 20 g at 04/07/22 1008    apixaban (ELIQUIS) tablet 5 mg, 5 mg, Oral, BID, Silviano Howard MD, 5 mg at 04/07/22 1007    fluticasone (FLONASE) 50 MCG/ACT nasal spray 2 spray, 2 spray, Nasal, Daily, Silviano Howard MD, 2 spray at 04/07/22 1012    hydrOXYzine (ATARAX) tablet 25 mg, 25 mg, Oral, Nightly, Silviano Howard MD, 25 mg at 04/06/22 2034    traMADol (ULTRAM) tablet 50 mg, 50 mg, Oral, Q6H PRN, Silviano Howard MD, 50 mg at 04/06/22 1739    vitamin D (ERGOCALCIFEROL) capsule 50,000 Units, 50,000 Units, Oral, Weekly, Silviano Howard MD    nystatin (MYCOSTATIN) cream, , Topical, BID, Ramirez Dberaon, APRN - CNP, Given at 04/07/22 1012    Allergies:  Levofloxacin in d5w, Pyridium [phenazopyridine], Sulfamethoxazole-trimethoprim, Codeine, Hydrocodone-acetaminophen, Hydromorphone hcl, and Oxycodone    Social History:   TOBACCO:   reports that she has been smoking cigarettes. She has been smoking about 0.50 packs per day. She has never used smokeless tobacco.  ETOH:   reports no history of alcohol use. Family History:       Problem Relation Age of Onset    Other Other         Son DVT    Cancer Mother         colon     Heart Attack Mother     Heart Attack Father          PHYSICAL EXAM:  /73   Pulse 56   Temp 97.3 °F (36.3 °C)   Resp 18   Ht 5' 3\" (1.6 m)   Wt 145 lb (65.8 kg)   SpO2 92%   BMI 25.69 kg/m²     Constitutional - well developed, well nourished. Eyes - conjunctiva normal.   Ear, nose, throat - No scars, masses, or lesions over external nose or ears, no atrophy of tongue  Neck-symmetric, no masses noted, no jugular vein distension  Respiration- chest wall appears symmetric, good expansion,   normal effort without use of accessory muscles  Musculoskeletal - no significant wasting of muscles noted, no bony deformities  Extremities-no clubbing, cyanosis or edema  Skin - warm, dry, and intact. No rash, erythema, or pallor.   Psychiatric - mood, affect, and behavior appear normal.      Neurological exam  Awake, alert, fluent oriented appropriate affect  Attention and concentration appear appropriate  Recent and remote memory appears unremarkable  Speech with dysarthria  No clear issues with language of fund of knowledge     Cranial Nerve Exam     CN III, IV,VI-EOMI, No nystagmus, conjugate eye movements, no ptosis    CN VII-right lower facial droop       Motor Exam  antigravity throughout upper and lower extremities bilaterally  Giveway more on the right      Tremors- no tremors in hands or head noted     Gait  Not tested     Nursing/pcp notes, imaging,labs and vitals reviewed. PT,OT and/or speech notes reviewed    Lab Results   Component Value Date    WBC 8.1 04/07/2022    HGB 12.0 04/07/2022    HCT 37.8 04/07/2022    MCV 96.9 04/07/2022     04/07/2022     Lab Results   Component Value Date     04/07/2022    K 3.8 04/07/2022     04/07/2022    CO2 23 04/07/2022    BUN 14 04/07/2022    CREATININE 0.8 04/07/2022    GLUCOSE 96 04/07/2022    CALCIUM 8.3 (L) 04/07/2022    PROT 5.8 (L) 04/06/2022    LABALBU 3.4 (L) 04/06/2022    BILITOT 0.3 04/06/2022    ALKPHOS 90 04/06/2022    AST 35 (H) 04/06/2022    ALT 48 (H) 04/06/2022    LABGLOM >60 04/07/2022    GFRAA >59 04/07/2022     Lab Results   Component Value Date    INR 1.03 04/01/2022    INR 0.96 08/30/2021    PROTIME 13.7 04/01/2022    PROTIME 13.0 08/30/2021       MRI BRAIN W WO CONTRAST [[id number]]    Resulted: 04/03/22 1553    Updated: 04/03/22 1555    Narrative:     MRI brain without and with contrast 4/3/2022 2:22 PM   History: Facial droop   Comparison: None.     Technique: Multiplanar imaging of the brain was performed in a routine   fashion before and after the intravenous injection of gadolinium   contrast.   Findings:   Midline structures are nondisplaced. Ventricular system is normal.   There is no significant mass effect or hydrocephalus. Basilar cisterns   are preserved.  There are scattered foci of T2 abnormality involving   the periventricular and higher white matter tracts consistent with   moderate small vessel ischemic disease. There is mild gliosis within   the nely. There is a focus of low signal within the left thalamus near   the posterior limb of the internal capsule on gradient imaging   suggesting a site of previous hemorrhage or calcification. On   diffusion-weighted imaging this is separable from the area of acute   ischemic infarction. No additional sites of acute or chronic   hemorrhage are demonstrated. . No abnormal extra axial fluid   collections are noted. There is diffusion restriction within the left   basal ganglia with involvement of the thalamus, posterior limb of the   left internal capsule and putamen with involvement of the external   capsule also demonstrated. These are nonhemorrhagic infarcts. There is   associated signal loss on ADC mapping imaging consistent with acute   ischemic infarction. No additional sites of diffusion restriction are   present. Kang Mikhail Postcontrast imaging demonstrates a developmental venous anomaly which   is parafalcine in location within the right parietal lobe extending   into the region of the corpus callosum. No additional sites of   abnormal enhancement are present. Proximal cervical spinal cord, brainstem, and cerebellum are   unremarkable. Normal cerebrovascular flow voids are seen. Bilateral   globes and orbits are normal in appearance. No abnormal signal is noted in the mastoid air cells or paranasal   sinuses. Impression:     Impression:   1. Diffusion restriction within the left thalamus and basal ganglia   with associated ADC mapping abnormality consistent with an acute   left-sided infarct. This is nonhemorrhagic. There is no associated   mass effect or shift of the midline. 2. There is mild atrophy of the brain. Small vessel ischemic changes   are noted involving the periventricular and higher white matter   tracts. There is evidence of a remote small focus of hemorrhage within   the left thalamus. No additional sites of blooming are present on   gradient imaging. 3. DVA within the parafalcine right parietal lobe extending into the   region of the corpus callosum. No additional sites of abnormal   enhancement are present. Signed by Dr Liv Rojas     ECHO Complete 2D W Doppler W Color [[id number]]    Collected: 04/03/22 1020    Updated: 04/04/22 0825    Narrative:     Transthoracic Echocardiography Report (TTE)      Demographics        Patient Name Jh Cruz Date of Study          04/03/2022        MRN           [id number]            Gender                 Female        Date of Birth 1945        Room Number            [id number]        Age           76 year(s)        Height:       63 inches         Referring Physician    Ravinder Benitez        Weight:       145 pounds        Sonographer            Mary Tolentino RDCS        BSA:          1.69 m^2          Interpreting Physician Azul Garcia MD        BMI:          25.69 kg/m^2       Procedure     Type of Study        TTE procedure:ECHO NO CONTRAST WITH DOP/COLR.       Study Location: Echo Lab   Technical Quality: Adequate visualization     Patient Status: Inpatient     Rhythm: Within normal limits BP: 121/92 mmHg     Indications:TIA.      Conclusions        Summary    Mitral valve leaflets are mildly thickened with preserved leaflet    mobility.    Mitral annular calcification is present.    Mild mitral regurgitation is present.    Mild aortic stenosis.    Bicuspid valve not excluded    Mean gradient 15 mm hg    Tricuspid valve is structurally normal.    Mild tricuspid regurgitation with estimated RVSP of 39 mm Hg.    Moderately dilated left atrium.    Normal left ventricular size with preserved LV function and an estimated    ejection fraction of approximately 55-60%.     Mild concentric left ventricular hypertrophy.    Grade II diastolic dysfunction   Mervat Reveal enlarged right ventricle cavity.     IVC dilated.        Signature        ----------------------------------------------------------------    Electronically signed by Annika Villarreal MD(Interpreting    [id number]) on 04/04/2022 08:25 AM    ----------------------------------------------------------------                RECORD REVIEW: Previous medical records, medications were reviewed at today's visit    IMPRESSION:   Acute ischemic stroke-2 areas of ischemia- left basal ganglia and left thalamus presumed cardi embolic with history of atrial fibrillation and patient taking her Eliquis only once a day prior to admission    Facial droop, dysarthria, fall, weakness     Initial ExaminationGeneralized giveaway weakness worse in the right arm and right leg  Right lower facial droop with dysarthric speech    MRI of the brain-acute ischemia left basal ganglia and thalamus-personally reviewed  Carotid ultrasound-preliminarily <50% stenosis left internal carotid and asymptomatic 50-69% stenosis right internal carotid-medical management  2D echo-LV EF 55-60%-moderately dilated LA  On Eliquis now twice a day     GI-bowel regimen  Atrial fibrillation-Eliquis/amiodarone/metoprolol  Allergies-Flonase  Hypertension-on medications monitor  Hypokalemia-on potassium  Rhabdomyolysis-IV fluids-CK trending down  Vitamin D deficiency-on vitamin D  Pain control-try Percocet as needed  DVT prophylaxis-Eliquis  PT/OT/speech     Too low functioning for rehab    Continue current care    MRCP for evaluation of extrahepatic biliary dilatation questionable filling defect within the common hepatic duct near the mike hepatis    Disposition pending     Supportive care    Expected duration and frequency therapy: 180 minutes per day, 5 days per week    CALL WITH ANY QUESTIONS  [phone] CELL  Dr Lin Valdes

## 2022-04-07 NOTE — PROGRESS NOTES
Cardiology Progress Note Justo Gonsalves MD      Patient:  Tor Shea  [id number]    Patient Active Problem List    Diagnosis Date Noted    Chronic pain disorder 04/04/2022     Priority: Low    Gastroesophageal reflux disease 04/04/2022     Priority: Low    Palliative care patient 04/04/2022     Priority: Low    Diastolic dysfunction 66/38/3106     Priority: Low    Facial droop 04/03/2022     Priority: Low    Dysarthria 04/03/2022     Priority: Low    History of pelvic fracture 04/03/2022     Priority: Low    Hip pain 04/03/2022     Priority: Low    Hypokalemia 04/03/2022     Priority: Low    Vitamin D deficiency 04/03/2022     Priority: Low    Gait abnormality 04/03/2022     Priority: Low    Rhabdomyolysis 04/01/2022     Priority: Low    Fall 04/01/2022     Priority: Low    HTN (hypertension) 04/01/2022     Priority: Low    Generalized weakness 04/01/2022     Priority: Low    Anticoagulated 04/01/2022     Priority: Low    Dehydration 04/01/2022     Priority: Low    Intertrigo 04/01/2022     Priority: Low    Paroxysmal atrial fibrillation ([address] 04/01/2022     Priority: Low    Transaminitis 04/01/2022     Priority: Low    Abnormal CXR 04/01/2022     Priority: Low    Osteoarthritis of right knee 09/02/2021     Priority: Low    Trigger middle finger of right hand 12/17/2020     Priority: Low    Mass of right wrist 12/17/2020     Priority: Low       Admit Date:  4/1/2022    Admission Problem List: Present on Admission:   Rhabdomyolysis   Fall   HTN (hypertension)   Generalized weakness   Anticoagulated   Dehydration   Intertrigo   Paroxysmal atrial fibrillation (HCC)   Transaminitis   Abnormal CXR   Facial droop   Dysarthria   History of pelvic fracture   Hip pain   Hypokalemia   Vitamin D deficiency   Gait abnormality   Palliative care patient   Diastolic dysfunction      Cardiac Specific Data:  Specialty Problems        Cardiology Problems    HTN (hypertension) Paroxysmal atrial fibrillation (HCC)            1.  Paroxysmal atrial fibrillation on Eliquis (reduced compliance) and amiodarone (29% burden on Zio). 2.  Moderate aortic stenosis (echo 9/2021 with mean gradient 27 mmHg), normal LV ejection fraction. 3.  Active ongoing tobacco use. 4.  Acute left thalamic/basal ganglia infarct, intermediate right ICA stenosis. Subjective:  Ms. Phoenix Rodriguez appears to be doing better. Is in sinus rhythm with slower heart rates. Asymptomatic. Continued extremity weakness. Objective:   BP (!) 111/59   Pulse (!) 49   Temp 97.2 °F (36.2 °C) (Temporal)   Resp 17   Ht 5' 3\" (1.6 m)   Wt 145 lb (65.8 kg)   SpO2 92%   BMI 25.69 kg/m²       Intake/Output Summary (Last 24 hours) at 4/7/2022 1642  Last data filed at 4/7/2022 0839  Gross per 24 hour   Intake 1731 ml   Output 1050 ml   Net 681 ml       Prior to Admission medications    Medication Sig Start Date End Date Taking? Authorizing Provider   pantoprazole sodium (PROTONIX) 40 MG PACK packet Take 40 mg by mouth every morning (before breakfast)   Yes Historical Provider, MD   diphenhydrAMINE (BENADRYL) 50 MG capsule Take 50 mg by mouth nightly as needed for Itching   Yes Historical Provider, MD   simethicone (MYLICON) 80 MG chewable tablet Take 125 mg by mouth every 6 hours as needed for Flatulence   Yes Historical Provider, MD   DM-APAP-CPM (CORICIDIN HBP) -2 MG TABS Take 325 mg by mouth every 4-6 hours as needed   Yes Historical Provider, MD   dicyclomine (BENTYL) 10 MG capsule Take 10 mg by mouth three times daily    Historical Provider, MD   benzonatate (TESSALON) 200 MG capsule Take 200 mg by mouth 3 times daily    Historical Provider, MD   hydrOXYzine (ATARAX) 25 MG tablet Take 25 mg by mouth at bedtime 2/23/22   Historical Provider, MD   traMADol (ULTRAM) 50 MG tablet Take 50 mg by mouth 2 times daily.     Historical Provider, MD   apixaban (ELIQUIS) 5 MG TABS tablet Take 5 mg by mouth 2 times daily    Historical exudate. Eyes:      General: No scleral icterus. Right eye: No discharge. Left eye: No discharge. Neck:      Thyroid: No thyromegaly. Vascular: No JVD. Cardiovascular:      Rate and Rhythm: Normal rate and regular rhythm. No extrasystoles are present. Heart sounds: Normal heart sounds, S1 normal and S2 normal. No murmur heard. No systolic murmur is present. No diastolic murmur is present. No friction rub. No gallop. No S3 or S4 sounds. Comments: No jugular venous distention  No edema    Pulmonary:      Effort: Pulmonary effort is normal. No respiratory distress. Breath sounds: Normal breath sounds. No wheezing or rales. Chest:      Chest wall: No tenderness. Abdominal:      General: Bowel sounds are normal. There is no distension. Palpations: Abdomen is soft. There is no mass. Tenderness: There is no abdominal tenderness. There is no guarding or rebound. Hernia: No hernia is present. Comments: Soft, nontender  No palpable organomegaly   Musculoskeletal:         General: Normal range of motion. Skin:     General: Skin is warm. Coloration: Skin is not pale. Findings: No rash. Neurological:      Mental Status: She is alert and oriented to person, place, and time. Comments: Facial droop less prominent  Generalized weakness                 Lab Data:  CBC:   Recent Labs     04/05/22 0254 04/06/22  0305 04/07/22  0249   WBC 9.3 9.8 8.1   HGB 11.3* 13.2 12.0   HCT 36.2* 41.2 37.8   MCV 96.3 94.9 96.9    303 270     BMP:   Recent Labs     04/05/22 0254 04/06/22  0305 04/07/22  0249    141 139   K 4.2 3.7 3.8    102 103   CO2 24 25 23   BUN 19 13 14   CREATININE 0.7 0.7 0.8     LIVER PROFILE:   Recent Labs     04/05/22 0254 04/06/22  0305   AST 46* 35*   ALT 50* 48*   LIPASE 19 15   BILITOT <0.2 0.3   ALKPHOS 72 90     PT/INR: No results for input(s): PROTIME, INR in the last 72 hours.   APTT: No results for input(s): APTT in the last 72 hours. BNP:  No results for input(s): BNP in the last 72 hours. CK, CKMB, Troponin: @LABRCNT (CKTOTAL:3, CKMB:3, TROPONINI:3)@    IMAGING:  ECHO Complete 2D W Doppler W Color    Result Date: 4/4/2022  Transthoracic Echocardiography Report (TTE)  Demographics   Patient Name  Carissa Marroquin Date of Study          04/03/2022   MRN           [id number]            Gender                 Female   Date of Birth 1945        Room Number            GOK-0436   Age           67 year(s)   Height:       63 inches         Referring Physician    Steve Monge   Weight:       145 pounds        Sonographer            Mary Tolentino RDCS   BSA:          1.69 m^2          Interpreting Physician Cristiano Christina MD   BMI:          25.69 kg/m^2  Procedure Type of Study   TTE procedure:ECHO NO CONTRAST WITH DOP/COLR. Study Location: Echo Lab Technical Quality: Adequate visualization Patient Status: Inpatient Rhythm: Within normal limits BP: 121/92 mmHg Indications:TIA. Conclusions   Summary  Mitral valve leaflets are mildly thickened with preserved leaflet  mobility. Mitral annular calcification is present. Mild mitral regurgitation is present. Mild aortic stenosis. Bicuspid valve not excluded  Mean gradient 15 mm hg  Tricuspid valve is structurally normal.  Mild tricuspid regurgitation with estimated RVSP of 39 mm Hg. Moderately dilated left atrium. Normal left ventricular size with preserved LV function and an estimated  ejection fraction of approximately 55-60%. Mild concentric left ventricular hypertrophy. Grade II diastolic dysfunction  Mildly enlarged right ventricle cavity. IVC dilated.    Signature   ----------------------------------------------------------------  Electronically signed by Cristiano Christina MD(Interpreting  physician) on 04/04/2022 08:25 AM  ----------------------------------------------------------------   Findings   Mitral Valve  Mitral valve leaflets are mildly thickened with preserved leaflet  mobility. Mitral annular calcification is present. Mild mitral regurgitation is present. Aortic Valve  Mild aortic stenosis. Bicuspid valve not excluded  Mean gradient 15 mm hg   Tricuspid Valve  Tricuspid valve is structurally normal.  Mild tricuspid regurgitation with estimated RVSP of 39 mm Hg. Pulmonic Valve  The pulmonic valve was not well visualized . Left Atrium  Moderately dilated left atrium. Left Ventricle  Normal left ventricular size with preserved LV function and an estimated  ejection fraction of approximately 55-60%. Mild concentric left ventricular hypertrophy. Grade II diastolic dysfunction   Right Atrium  Normal right atrial dimension with no evidence of thrombus or mass noted. Right Ventricle  Mildly enlarged right ventricle cavity. Pericardial Effusion  No evidence of significant pericardial effusion is noted. Pleural Effusion  No evidence of pleural effusion. Miscellaneous  IVC dilated.   M-Mode Measurements (cm)   LVIDd: 4.61 cm                         LVIDs: 2.95 cm  IVSd: 1.33 cm  LVPWd: 1.33 cm                         AO Root Dimension: 3 cm  % Ejection Fraction: 61 %              LA: 3.7 cm                                         LVOT: 2 cm  Doppler Measurements:   AV Peak Velocity:264 cm/s            MV Peak E-Wave: 102 cm/s  AV Peak Gradient: 27.88 mmHg         MV Peak A-Wave: 90.4 cm/s  AV Mean Gradient: 15 mmHg            MV E/A Ratio: 1.13 %  AV Area (Continuity):1.14 cm^2       MV Peak Gradient: 4.16 mmHg  TR Velocity:300 cm/s                 MV P1/2t: 37 msec  TR Gradient:36 mmHg                  MVA by PHT5.95 cm^2  Estimated RAP:3 mmHg  RVSP:39 mmHg      CT ABDOMEN PELVIS WO CONTRAST Additional Contrast? None    Result Date: 4/1/2022  CT ABDOMEN PELVIS WO CONTRAST 4/1/2022 12:23 PM HISTORY: Fall COMPARISON: None DLP: 972 mGy cm TECHNIQUE: Noncontrast enhanced images of the abdomen and pelvis obtained without oral contrast. Automated exposure control was also utilized to decrease patient radiation dose. FINDINGS: Lung bases are clear. Coronary atheromatous calcification. LIVER: No focal liver lesion. BILIARY SYSTEM: The gallbladder is unremarkable. No intrahepatic or extrahepatic ductal dilatation. PANCREAS: No focal pancreatic lesion. SPLEEN: Unremarkable. KIDNEYS AND ADRENALS: Bilateral kidneys and adrenal glands are unremarkable. The ureters are decompressed and normal in appearance. RETROPERITONEUM: No mass, lymphadenopathy or hemorrhage. GI TRACT: Stomach and small bowel are unremarkable. Moderate colonic stool. OTHER: There is no mesenteric mass, lymphadenopathy or fluid collection. Unremarkable bilateral hip arthroplasties. Old healed right pubic bone fracture. Advanced lumbar spondylosis. No acute bony abnormality. PELVIS: No mass lesion, fluid collection or significant lymphadenopathy is seen in the pelvis. The urinary bladder is normal in appearance. 1. No acute process in the abdomen or pelvis. 2. Moderate colonic stool. Bowel loops are nondilated. 3. Unremarkable bilateral hip arthroplasties. Lumbar spondylosis. No acute bony normality. Signed by Dr Mandi Gutierrez    CT Head WO Contrast    Result Date: 4/2/2022  CT BRAIN without contrast 4/2/2022 12:38 PM HISTORY: New onset weakness yesterday COMPARISON: 4/1/2022 DLP: 632 mGy cm. All CT scans are performed using dose optimization techniques as appropriate to the performed exam and include at least one of the following: Automated exposure control, adjustment of the mA and/or kV according to size, and the use of the iterative reconstruction technique. TECHNIQUE: Serial axial tomographic images of the brain were obtained without the use of intravenous contrast. FINDINGS: The midline structures are nondisplaced. There is mild cerebral and cerebellar volume loss, with an associated increase in the prominence of the ventricles and sulci.  The basilar cisterns are normal in size and configuration. There is no evidence of intracranial hemorrhage or mass-effect. There is low attenuation in the periventricular white matter, consistent with chronic ischemic change. There are no abnormal extra-axial fluid collections. There is no evidence of tonsillar herniation. The included orbits and their contents are unremarkable. The visualized paranasal sinuses, mastoid air cells and middle ear cavities are clear. The visualized osseous structures and overlying soft tissues of the skull and face are intact. 1. Mild cerebral and cerebellar volume loss with chronic microvascular disease but no evidence of acute intracranial process. Signed by Dr Tevin Venegas    Result Date: 4/1/2022  Examination. CT HEAD WO CONTRAST 4/1/2022 12:38 PM History: The patient fell. DLP: 1229 mGycm. The automated exposure control was utilized to minimize the patient's radiation exposure. The CT scan of the head is performed without intravenous contrast enhancement. The images are acquired in axial plane with subsequent reconstruction in coronal and sagittal planes. There is no previous study for comparison There is no evidence of an intracranial hemorrhage or hematoma. There is no evidence of a mass. No midline shift. Moderately dilated ventricles, basal cistern and the cortical sulci suggestive chronic volume loss/atrophy. The scattered areas of chronic white matter ischemia are seen in the centrum semiovale bilaterally. The gray-white matter differentiation is maintained. The images reviewed in bone window show no evidence of a skull fracture. Severe atheromatous changes of the intracranial internal carotid arteries bilaterally noted. 1. No acute intracranial abnormality. 2. No skull fracture. 3. Chronic ischemic and atrophic changes. Signed by Dr Cici Colvin Date: 4/4/2022  Examination. CT CHEST W CONTRAST 4/4/2022 12:26 PM History: Right upper lobe collapse.  DLP: 1949 mGycm. The automated exposure control was utilized to minimize the patient's radiation exposure. CT scan of the chest is performed after intravenous contrast enhancement. The images are acquired in axial plane and subsequent reconstruction in coronal and sagittal planes. The comparison is made with the previous study dated 4/1/2022. The opacity in the right upper lobe paratracheal area represent a distended superior vena cava and moderately tortuous and displays brachiocephalic vessels. There is no evidence of right upper lobe collapse. The lungs are poorly distended. There are several groundglass opacity/nodule in the right apex, the largest one located anteriorly and laterally, image #34 in series 4, measuring 1.6 cm in AP dimension. There are atelectatic changes in the lower lungs bilaterally. This more pronounced on the right side. There are small area of consolidation involving the posterior segments of the lower lobes bilaterally adjacent to a small bibasal pleural effusion. There is moderate deviation of the trachea towards the right. The remaining tracheobronchial structures are normal and patent. The limited visualized soft tissues of the neck are unremarkable. The thyroid gland appear normal. Severe atheromatous changes of thoracic aorta are seen. No aneurysmal dilatation. Severe atheromatous changes of coronary arteries are seen. There is no evidence of mediastinal or hilar mass or lymphadenopathy. There is no axillary lymphadenopathy. Limited visualized liver and spleen are unremarkable. Incompletely visualized gallbladder show no gallstone. There is significant dilatation of the common bile duct and pancreatic duct which are incompletely evaluated in this study. However when compared with the previous study obtained 3 days ago these findings are not visualized? Julissa Sharp The study was unenhanced. The images reviewed in bone window show no acute bony abnormality or a bone lesion.  Chronic degenerative changes of thoracic spine are seen. There is subcutaneous fat infiltration of the lower chest and upper abdominal wall, more pronounced laterally which may represent edema due to fluid overload. 1. No finding to suggest collapse of the right upper lobe. Detail is given above. 2. Groundglass nodule/opacities predominantly in the right upper lobe may represent an inflammatory/infectious or an evolving neoplastic process. Further follow-up may be obtained. 3. Small bibasal pleural effusion. 4. Small areas of consolidation and atelectasis in the lower lobes bilaterally. 5. The limited visualized abdomen show significant dilatation of the common bile duct and pancreatic duct which were not noted in the previous study obtained 3 days ago which was done without contrast enhancement. The etiology and clinical significance is not certain. A follow-up CT scan of the abdomen after oral and intravenous contrast enhancement. Thin. Signed by Dr Paola Chisholm    Result Date: 4/1/2022  CT cervical spine 4/1/2022 12:22 PM HISTORY: Fall TECHNIQUE: Axial images of the cervical spine were obtained without IV contrast. Coronal and sagittal reformatted images are reconstructed and reviewed. COMPARISON: None. DLP: 425 mGy cm Automated exposure control was utilized to minimize patient radiation dose. FINDINGS: Alignment of the cervical spine is appropriate. Moderate to advanced degenerative disc change. Diffuse uncinate process hypertrophy with mild facet osteoarthropathy. No acute cervical vertebral fracture. Incomplete fusion of posterior arch of C1, developmental finding. Mild to moderate canal stenosis at C5/6 with mild narrowing about below this level. Scattered moderate to severe foraminal stenosis. Moderate carotid bulb calcification. No apical lung nodule. 1. Moderate to advanced degenerative changes cervical spine with no acute cervical vertebral fracture or traumatic malalignment.  Signed by  Karen Bad    CT PELVIS WO CONTRAST Additional Contrast? None    Result Date: 4/2/2022  EXAMINATION: CT pelvis without contrast 4/2/2022 HISTORY: Pubic pain post fall Dose: 807 mGycm. All CT scans are performed using dose optimization techniques as appropriate to the performed exam and include at least one of the following: Automated exposure control, adjustment of the mA and/or kV according to size, and the use of the iterative reconstruction technique. FINDINGS: Multiple contiguous axial images are obtained through the bony pelvis per protocol without venous contrast enhancement with reformatted images obtained in the sagittal coronal projections from the original data set. At the L4-L5 level there is broad-based bulging of disc as well as moderate facet and ligamentous hypertrophy. There is moderate central spinal stenosis as well as moderate bilateral neural foraminal narrowing. There is mild grade 1 anterolisthesis of L4 on L5 likely representing subluxation at the level of the facets. The SI joints are intact. No evidence of sacral fracture. The patient is status post bilateral total hip arthroplasty. This does obscure some of the anatomic detail secondary to streaking artifact. There are old fractures of the right superior and inferior pubic ramus. No acute fractures are present. The pubic symphysis is intact. A Saeed catheter is in place within the bladder. There is mild fecal impaction within the rectal vault. 1.. Old fractures of the right superior and inferior pubic rami. No additional fractures are present. The SI joints and pubic symphysis are intact with no evidence of diastases. 2. Facet and ligamentous hypertrophy as well as bulging of the disc contributes to central and foraminal stenosis at L4-L5. There is grade 1 anterolisthesis of L4 on L5 felt to represent subluxation at the level of the facets.  3. Constipation with increased stool throughout the colon including fecal impaction within the rectal vault. Signed by Dr Parker Servin LIVER    Result Date: 4/5/2022   LIVER 4/5/2022 8:29 AM HISTORY: Biliary duct dilatation COMPARISON: NONE TECHNIQUE: Multiple longitudinal and transverse realtime sonographic images of the right upper quadrant of the abdomen are obtained. FINDINGS:  Pancreas: Normal in size and echogenicity. Liver: Normal in size, echogenicity and echotexture. No focal lesion. A small amount of free fluid is noted within the perihepatic space. Hepatopedal flow is noted within the portal vein. Gallbladder: No intraluminal echoes, wall thickening, or pericholecystic fluid. Bile ducts: The CBD measures 1.0 cm in diameter. There is extrahepatic biliary ductal dilatation. There is a questionable filling defect within the common hepatic duct measuring approximately 13 x 12 x 5 mm in size. Follow-up with MR imaging to include MRCP would be helpful although the patient has great difficulty in remaining motionless on both CT imaging from earlier this month and today's ultrasound and may result in a poor quality study. . Right kidney: Normal in size, shape and contour. No mass, hydronephrosis or calculi. No perinephric fluid collection. Other: There is a small amount of free fluid within the perisplenic space. Small pleural effusions are present. .     1. Mild extrahepatic biliary dilatation is present. There is a question of a filling defect within the common hepatic duct near the mike hepatis. This may be artifactual in nature as the patient had difficulty in cooperating with the exam with some motion artifact. By CT imaging the more distal common duct is also dilated. MRCP may be helpful but given the patient's limitations may be of limited quality. 2. The gallbladder is unremarkable. No evidence of gallstones. A small amount of free fluid is noted within the perihepatic and perisplenic space. Small pleural effusions are present.  Signed by Dr Hailey Marrero Date: 4/1/2022  XR CHEST PORTABLE 4/1/2022 12:21 PM HISTORY: Fatigue COMPARISON: 4/3/2019 CXR: A single frontal view of the chest is performed. Findings: There is volume loss of the right upper hemithorax with ipsilateral shifting of the mediastinal structures. Partial right upper lobe collapse considered. Left lung appears clear. Probable right lower lobe granuloma. Heart is upper limits of normal in size. No pleural effusion or pneumothorax. Degenerative change regional skeleton. 1. Right paratracheal upper lobe opacities with associated volume loss may relate to right upper lobe collapse. Follow-up imaging recommended. CT chest preferably with IV contrast could be performed for further assessment if clinically indicated. Signed by Dr Willam Carroll    Result Date: 4/6/2022  EXAMINATION: CT left hip without contrast 4/6/2022 HISTORY: Severe left hip pain after fall. Dose: 996 mGycm. All CT scans are performed using dose optimization techniques as appropriate to the performed exam and include at least one of the following: Automated exposure control, adjustment of the mA and/or kV according to size, and the use of the iterative reconstruction technique. Janie Haji FINDINGS: Multiple contiguous axial image are obtained through the left hip per protocol without intravenous contrast enhancement with reformatted images obtained in the sagittal and coronal projections from the original data set. There is skin thickening and ecchymosis overlying the gluteus musculature at the level of the left hip suggesting rather extensive bruising. The patient has undergone previous left total hip arthroplasty. The acetabular cup appears to remain well seated. There is some lucency within the acetabular roof at the interface of the acetabular cup. This could represent changes of ALTR(adverse local tissue reaction) to the arthroplasty. I do not see evidence of a displaced bony fracture.  No convincing evidence of loosening of the prosthesis within the proximal femoral shaft. The rami are intact. 1.. The patient's undergone previous left total hip arthroplasty. The acetabular cup appears to remain well seated. There is some lucency associated with the acetabular roof at the interface with the acetabular cup which could represent changes of adverse local tissue reaction. 2. No evidence of acute displaced fracture. There is rather extensive stranding and ecchymosis overlying the lower left pelvis and left hip and overlying the gluteus musculature consistent with rather extensive bruising. Signed by Dr Dickson Aldridge MRCP    Result Date: 4/7/2022  EXAM: MRI abdomen without and with IV contrast, including 3-D MRCP sequences INDICATION: Further evaluation of potential biliary filling defect identified on recent ultrasound COMPARISON: Ultrasound 4/5/2022 FINDINGS: The study is degraded by patient motion. Liver: No hepatic steatosis or morphologic changes of cirrhosis. No suspicious focal liver lesion. Bile ducts: Intrahepatic and extra hepatic biliary ductal dilatation with gradual caliber tapering down to the ampulla. No obstructing stone or mass identified. Common bile duct measures 17 mm diameter. Gallbladder: No gallstones or gallbladder wall thickening. Pancreas: No pancreatic mass, cyst, or surrounding inflammatory change. Spleen: Not enlarged. No focal lesion. Adrenals: Normal. Kidneys: Tiny LEFT renal cyst. No solid enhancing renal mass. No hydronephrosis. Vasculature: Nonaneurysmal abdominal aorta. Patent portal vein. Other Findings: Distended colon with layering fluid. No ascites. No abdominal lymphadenopathy. No suspicious focal bone lesion. Small bilateral pleural effusions in the lung bases. 1.  Intrahepatic and extra hepatic biliary ductal dilatation with gradual caliber tapering down to the ampulla. No evidence of obstructing stone or mass.  Differential diagnosis includes biliary stricture although occult stone or neoplasm are also within the differential. Recommend correlation with laboratory analysis and consider ERCP for further evaluation. 2.  The study is degraded by patient motion. Signed by Dr Yesica Wisdom    VL DUP CAROTID BILATERAL    Result Date: 4/4/2022  Vascular Carotid Procedure  Demographics   Patient Name   Treasure Shaw  Age                  67                 C   Patient Number [id number]           Gender               Female   Visit Number   [id number]        Interpreting         Becky Hammonds MD                                  Physician   Date of Birth  1945       Referring Physician  Dusty Gonzalez   Accession      [id number]       [address], [address]T,  Number                                               RDMS  Procedure Type of Study:   Cerebral:Carotid, VL CAROTID BILATERAL. Indications for Study:Questionable CVA/TIA, Unsteady gait and Facial droop, left. Risk Factors   - The patient's risk factor(s) include: arterial hypertension.   - Current - Every day. Impression   There is mixed plaque visualized in the bilateral internal carotid  arteries. There is 50-69% stenosis in the right internal carotid artery. There is less than 50% stenosis of the left internal carotid artery. There is normal antegrade flow in the bilateral vertebral arteries. Signature   ----------------------------------------------------------------  Electronically signed by Becky Hammonds MD(Interpreting  physician) on 04/04/2022 03:18 PM  ----------------------------------------------------------------  Blood Pressure:Right arm 121/97 mmHg. Velocities are measured in cm/s ; Diameters are measured in mm Carotid Right Measurements +------------+-------+-------+--------+-------+------------+---------------+ ! Location    ! PSV    ! EDV    ! Angle   ! RI     !%Stenosis   ! Tortuosity     ! +------------+-------+-------+--------+-------+------------+---------------+ ! Prox CCA !60.3   !20.5   !60      !0.66   !            !               ! +------------+-------+-------+--------+-------+------------+---------------+ ! Mid CCA     !70.8   !19.9   !60      !0.72   !            !               ! +------------+-------+-------+--------+-------+------------+---------------+ ! Prox ICA    !203    !60.4   !60      !0.7    ! !               ! +------------+-------+-------+--------+-------+------------+---------------+ ! Mid ICA     !124    !35.1   ! 60      !0.72   !            !               ! +------------+-------+-------+--------+-------+------------+---------------+ ! Dist ICA    !81.2   !30.7   ! 60      !0.62   !            !               ! +------------+-------+-------+--------+-------+------------+---------------+ ! Prox ECA    !188    !20.9   !60      !0.89   !            !               ! +------------+-------+-------+--------+-------+------------+---------------+ ! Vertebral   !77.9   !16.5   !60      !0.79   !            !               ! +------------+-------+-------+--------+-------+------------+---------------+   - There is antegrade vertebral flow noted on the right side. - Additional Measurements:ICAPSV/CCAPSV 3.37. ICAEDV/CCAEDV 2.95. Carotid Left Measurements +------------+-------+-------+--------+-------+------------+---------------+ ! Location    ! PSV    ! EDV    ! Angle   ! RI     !%Stenosis   ! Tortuosity     ! +------------+-------+-------+--------+-------+------------+---------------+ ! Prox CCA    !120    !22     !60      !0.82   !            !               ! +------------+-------+-------+--------+-------+------------+---------------+ ! Mid CCA     !82.3   !28.5   !60      !0.65   !            !               ! +------------+-------+-------+--------+-------+------------+---------------+ ! Prox ICA    !74.6   !26.3   ! 60      !0.65   !            !               ! +------------+-------+-------+--------+-------+------------+---------------+ ! Mid ICA     !92.2   !34     !60 !0.63   !            !               ! +------------+-------+-------+--------+-------+------------+---------------+ ! Dist ICA    ! 88.9   !28.5   !60      !0.68   !            !               ! +------------+-------+-------+--------+-------+------------+---------------+ ! Prox ECA    !93.3   !16.5   !60      !0.82   !            !               ! +------------+-------+-------+--------+-------+------------+---------------+ ! Vertebral   !40.4   !18.6   ! 60      !0.54   !            !               ! +------------+-------+-------+--------+-------+------------+---------------+   - There is antegrade vertebral flow noted on the left side. - Additional Measurements:ICAPSV/CCAPSV 0.77. ICAEDV/CCAEDV 1.55. MRI BRAIN W WO CONTRAST    Result Date: 4/3/2022  MRI brain without and with contrast 4/3/2022 2:22 PM History: Facial droop Comparison: None. Technique: Multiplanar imaging of the brain was performed in a routine fashion before and after the intravenous injection of gadolinium contrast. Findings: Midline structures are nondisplaced. Ventricular system is normal. There is no significant mass effect or hydrocephalus. Basilar cisterns are preserved. There are scattered foci of T2 abnormality involving the periventricular and higher white matter tracts consistent with moderate small vessel ischemic disease. There is mild gliosis within the nely. There is a focus of low signal within the left thalamus near the posterior limb of the internal capsule on gradient imaging suggesting a site of previous hemorrhage or calcification. On diffusion-weighted imaging this is separable from the area of acute ischemic infarction. No additional sites of acute or chronic hemorrhage are demonstrated. . No abnormal extra axial fluid collections are noted.  There is diffusion restriction within the left basal ganglia with involvement of the thalamus, posterior limb of the left internal capsule and putamen with involvement of the external capsule also demonstrated. These are nonhemorrhagic infarcts. There is associated signal loss on ADC mapping imaging consistent with acute ischemic infarction. No additional sites of diffusion restriction are present. Avril Graven Postcontrast imaging demonstrates a developmental venous anomaly which is parafalcine in location within the right parietal lobe extending into the region of the corpus callosum. No additional sites of abnormal enhancement are present. Proximal cervical spinal cord, brainstem, and cerebellum are unremarkable. Normal cerebrovascular flow voids are seen. Bilateral globes and orbits are normal in appearance. No abnormal signal is noted in the mastoid air cells or paranasal sinuses. Impression: 1. Diffusion restriction within the left thalamus and basal ganglia with associated ADC mapping abnormality consistent with an acute left-sided infarct. This is nonhemorrhagic. There is no associated mass effect or shift of the midline. 2. There is mild atrophy of the brain. Small vessel ischemic changes are noted involving the periventricular and higher white matter tracts. There is evidence of a remote small focus of hemorrhage within the left thalamus. No additional sites of blooming are present on gradient imaging. 3. DVA within the parafalcine right parietal lobe extending into the region of the corpus callosum. No additional sites of abnormal enhancement are present. Signed by Dr Nelida Shine 2-3 VW W PELVIS RIGHT    Result Date: 4/1/2022  History: Pain after fall Right hip: Frontal view the pelvis as well as frontal and crosstable lateral views of the right hip are obtained. The right prosthesis. No periprosthetic fracture. Old fractures of the right inferior pelvis involving right superior and inferior rami. Enthesophytes project from the right iliac spine. Chronic calcification along the right acetabular roof. Degenerative change of the lower lumbar spine. Vascular calcification.     1. Old fractures of the right inferior pelvis. Bilateral hip prostheses. No acute appearing bony injury identified. Signed by Dr Carmona Remedies and Plan: This is a 74 y. o. year old female with past medical history of hypertension, active ongoing tobacco use, paroxysmal atrial fibrillation on amiodarone and Eliquis (reduced compliance), moderate aortic stenosis, normal LV ejection fraction presenting with fall with probable acute left thalamic/basal ganglia infarct. 1.  Converted back to sinus rhythm. Slower heart rates. Reduce Toprol-XL to 25 mg twice daily. Continue Cardizem at this time. Maintained on amiodarone and Eliquis.     Aparna Lucas MD, MD 4/7/2022 4:42 PM

## 2022-04-07 NOTE — CARE COORDINATION
Dc to [address] pending precert (initiated 4/7).    [address] [facility] (formerly [address] [place] [address])   [phone] P  [phone] F  [phone] Referral fax number for   Electronically signed by Enedelia Jean on 4/7/2022 at 1:52 PM

## 2022-04-07 NOTE — PROGRESS NOTES
Physical Therapy  Name: Liz Miller  MRN:  [id number]  Date of service:  4/7/2022 04/07/22 1531   Restrictions/Precautions   Restrictions/Precautions Fall Risk;Contact Precautions   Subjective   Subjective Pt in bed and very motivated to participate in therapy. Pain Screening   Patient Currently in Pain No   Bed Mobility   Supine to Sit Moderate assistance   Sit to Supine Moderate assistance   Scooting Moderate assistance   Comment pt sat EOB for several minutes working on sitting balance and coming to center. Transfers   Sit to Stand Minimal Assistance; Moderate Assistance;2 Person Assistance   Stand to sit Minimal Assistance; Moderate Assistance;2 Person Assistance   Comment stood with RW 3x, worked on standing balance and coming to center in [facility] 1936? No   Short term goals   Time Frame for Short term goals 14 DAYS   Short term goal 1 BED MOB MIN ASSIST   Short term goal 2 SIT TO STAND MIN ASSIST   Short term goal 3 BED TO CHAIR MIN ASSIST   Conditions Requiring Skilled Therapeutic Intervention   Body structures, Functions, Activity limitations Decreased functional mobility ; Decreased ADL status; Decreased ROM; Decreased strength;Decreased safe awareness;Decreased balance; Increased pain;Decreased posture   Assessment Pt exhibits R lateral lean in sitting and standing. Used v/c's, t/c's, and demonstration to assist pt with correcting lean. Pt improved standing balance with each stand. Pt assisted back to bed with all needs in reach. Activity Tolerance   Activity Tolerance Patient Tolerated treatment well   Safety Devices   Type of devices Bed alarm in place;Call light within reach; Left in bed         Electronically signed by John Caraballo PTA on 4/7/2022 at 3:37 PM

## 2022-04-08 ENCOUNTER — APPOINTMENT (OUTPATIENT)
Dept: GENERAL RADIOLOGY | Age: 77
DRG: 564 | End: 2022-04-08
Payer: MEDICARE

## 2022-04-08 ENCOUNTER — APPOINTMENT (OUTPATIENT)
Dept: CT IMAGING | Age: 77
DRG: 564 | End: 2022-04-08
Payer: MEDICARE

## 2022-04-08 LAB
ANA IGG, ELISA: NORMAL
ANION GAP SERPL CALCULATED.3IONS-SCNC: 10 MMOL/L (ref 7–19)
BUN BLDV-MCNC: 22 MG/DL (ref 8–23)
CALCIUM SERPL-MCNC: 8.5 MG/DL (ref 8.8–10.2)
CHLORIDE BLD-SCNC: 106 MMOL/L (ref 98–111)
CO2: 24 MMOL/L (ref 22–29)
CREAT SERPL-MCNC: 0.9 MG/DL (ref 0.5–0.9)
GFR AFRICAN AMERICAN: >59
GFR NON-AFRICAN AMERICAN: >60
GLUCOSE BLD-MCNC: 93 MG/DL (ref 74–109)
HCT VFR BLD CALC: 39.4 % (ref 37–47)
HEMOGLOBIN: 12.3 G/DL (ref 12–16)
MCH RBC QN AUTO: 30.4 PG (ref 27–31)
MCHC RBC AUTO-ENTMCNC: 31.2 G/DL (ref 33–37)
MCV RBC AUTO: 97.5 FL (ref 81–99)
PDW BLD-RTO: 13.8 % (ref 11.5–14.5)
PLATELET # BLD: 296 K/UL (ref 130–400)
PMV BLD AUTO: 10.1 FL (ref 9.4–12.3)
POTASSIUM SERPL-SCNC: 4.4 MMOL/L (ref 3.5–5)
RBC # BLD: 4.04 M/UL (ref 4.2–5.4)
SODIUM BLD-SCNC: 140 MMOL/L (ref 136–145)
WBC # BLD: 7.1 K/UL (ref 4.8–10.8)

## 2022-04-08 PROCEDURE — 6370000000 HC RX 637 (ALT 250 FOR IP): Performed by: HOSPITALIST

## 2022-04-08 PROCEDURE — 1210000000 HC MED SURG R&B

## 2022-04-08 PROCEDURE — 99232 SBSQ HOSP IP/OBS MODERATE 35: CPT | Performed by: INTERNAL MEDICINE

## 2022-04-08 PROCEDURE — 85027 COMPLETE CBC AUTOMATED: CPT

## 2022-04-08 PROCEDURE — 73700 CT LOWER EXTREMITY W/O DYE: CPT

## 2022-04-08 PROCEDURE — 2580000003 HC RX 258: Performed by: HOSPITALIST

## 2022-04-08 PROCEDURE — 6370000000 HC RX 637 (ALT 250 FOR IP): Performed by: INTERNAL MEDICINE

## 2022-04-08 PROCEDURE — 6360000002 HC RX W HCPCS: Performed by: HOSPITALIST

## 2022-04-08 PROCEDURE — 36415 COLL VENOUS BLD VENIPUNCTURE: CPT

## 2022-04-08 PROCEDURE — 6370000000 HC RX 637 (ALT 250 FOR IP): Performed by: NURSE PRACTITIONER

## 2022-04-08 PROCEDURE — 97116 GAIT TRAINING THERAPY: CPT

## 2022-04-08 PROCEDURE — 74018 RADEX ABDOMEN 1 VIEW: CPT

## 2022-04-08 PROCEDURE — 99233 SBSQ HOSP IP/OBS HIGH 50: CPT | Performed by: PSYCHIATRY & NEUROLOGY

## 2022-04-08 PROCEDURE — 80048 BASIC METABOLIC PNL TOTAL CA: CPT

## 2022-04-08 RX ORDER — OXYCODONE HYDROCHLORIDE AND ACETAMINOPHEN 5; 325 MG/1; MG/1
1 TABLET ORAL EVERY 6 HOURS PRN
Status: DISCONTINUED | OUTPATIENT
Start: 2022-04-08 | End: 2022-04-08

## 2022-04-08 RX ORDER — HYDROCHLOROTHIAZIDE 25 MG/1
25 TABLET ORAL DAILY
Status: DISCONTINUED | OUTPATIENT
Start: 2022-04-08 | End: 2022-04-11 | Stop reason: HOSPADM

## 2022-04-08 RX ADMIN — APIXABAN 5 MG: 5 TABLET, FILM COATED ORAL at 21:42

## 2022-04-08 RX ADMIN — AMIODARONE HYDROCHLORIDE 200 MG: 200 TABLET ORAL at 21:42

## 2022-04-08 RX ADMIN — METOPROLOL SUCCINATE 25 MG: 25 TABLET, EXTENDED RELEASE ORAL at 21:41

## 2022-04-08 RX ADMIN — SODIUM CHLORIDE, PRESERVATIVE FREE 10 ML: 5 INJECTION INTRAVENOUS at 21:42

## 2022-04-08 RX ADMIN — POTASSIUM CHLORIDE 20 MEQ: 20 TABLET, EXTENDED RELEASE ORAL at 10:41

## 2022-04-08 RX ADMIN — NYSTATIN 500000 UNITS: 100000 SUSPENSION ORAL at 13:40

## 2022-04-08 RX ADMIN — APIXABAN 5 MG: 5 TABLET, FILM COATED ORAL at 10:41

## 2022-04-08 RX ADMIN — SODIUM CHLORIDE, PRESERVATIVE FREE 10 ML: 5 INJECTION INTRAVENOUS at 10:55

## 2022-04-08 RX ADMIN — NYSTATIN: 100000 CREAM TOPICAL at 21:42

## 2022-04-08 RX ADMIN — ERGOCALCIFEROL 50000 UNITS: 1.25 CAPSULE ORAL at 10:41

## 2022-04-08 RX ADMIN — ONDANSETRON 4 MG: 2 INJECTION INTRAMUSCULAR; INTRAVENOUS at 13:40

## 2022-04-08 RX ADMIN — METOPROLOL SUCCINATE 25 MG: 25 TABLET, EXTENDED RELEASE ORAL at 10:41

## 2022-04-08 RX ADMIN — SIMETHICONE 80 MG: 80 TABLET, CHEWABLE ORAL at 19:42

## 2022-04-08 RX ADMIN — NYSTATIN 500000 UNITS: 100000 SUSPENSION ORAL at 21:41

## 2022-04-08 RX ADMIN — SODIUM BICARBONATE 650 MG: 650 TABLET ORAL at 10:41

## 2022-04-08 RX ADMIN — NYSTATIN 500000 UNITS: 100000 SUSPENSION ORAL at 10:42

## 2022-04-08 RX ADMIN — HYDROXYZINE HYDROCHLORIDE 25 MG: 25 TABLET ORAL at 21:42

## 2022-04-08 RX ADMIN — FLUTICASONE PROPIONATE 2 SPRAY: 50 SPRAY, METERED NASAL at 10:43

## 2022-04-08 RX ADMIN — POLYETHYLENE GLYCOL 3350 17 G: 17 POWDER, FOR SOLUTION ORAL at 10:43

## 2022-04-08 RX ADMIN — OXYCODONE HYDROCHLORIDE AND ACETAMINOPHEN 1 TABLET: 5; 325 TABLET ORAL at 10:49

## 2022-04-08 RX ADMIN — ATORVASTATIN CALCIUM 10 MG: 10 TABLET, FILM COATED ORAL at 10:41

## 2022-04-08 RX ADMIN — LACTULOSE 20 G: 20 SOLUTION ORAL at 10:41

## 2022-04-08 RX ADMIN — OXYCODONE HYDROCHLORIDE AND ACETAMINOPHEN 1 TABLET: 5; 325 TABLET ORAL at 04:47

## 2022-04-08 RX ADMIN — AMIODARONE HYDROCHLORIDE 200 MG: 200 TABLET ORAL at 10:43

## 2022-04-08 RX ADMIN — HYDROCHLOROTHIAZIDE 25 MG: 25 TABLET ORAL at 13:40

## 2022-04-08 RX ADMIN — TRAMADOL HYDROCHLORIDE 50 MG: 50 TABLET, COATED ORAL at 19:42

## 2022-04-08 RX ADMIN — DILTIAZEM HYDROCHLORIDE 120 MG: 120 CAPSULE, COATED, EXTENDED RELEASE ORAL at 21:42

## 2022-04-08 RX ADMIN — DILTIAZEM HYDROCHLORIDE 120 MG: 120 CAPSULE, COATED, EXTENDED RELEASE ORAL at 10:41

## 2022-04-08 RX ADMIN — NYSTATIN: 100000 CREAM TOPICAL at 10:44

## 2022-04-08 ASSESSMENT — PAIN SCALES - GENERAL
PAINLEVEL_OUTOF10: 0
PAINLEVEL_OUTOF10: 7
PAINLEVEL_OUTOF10: 8
PAINLEVEL_OUTOF10: 3
PAINLEVEL_OUTOF10: 5

## 2022-04-08 ASSESSMENT — PAIN DESCRIPTION - LOCATION: LOCATION: GROIN

## 2022-04-08 NOTE — PROGRESS NOTES
Patient:   Love Aquino  MR#:    [id number]   Room:    1966/303-26   YOB: 1945  Date of Progress Note: 4/8/2022  Time of Note                           11:37 AM  Consulting Physician:   Thea Kelley M.D. Attending Physician:  Jessica Kendrick, *     Chief complaint Slurred speech, facial droop    S:This 67 y.o. female  with a past medical history significant for atrial fibrillation on chronic anticoagulation with Eliquis, previous pelvic fracture and hypertension is seen for evaluation of facial droop and slurred speech. The patient has been on 4/1/2022 after being found down at home. Had fallen approximately 3 days prior to admission due to leg weakness. She was laying on the ground and was unable to get help. She does not recall why she fell. The neighbor tried to go over to the house that she had not heard from her and the patient was able to crawl to unlock the door but was unable to get up. The patient indicates she has a previous history of pelvic fracture in office take some Tylenol and tramadol at home with good pain relief. On admission her serum CK level was 7136. Her serum creatinine was 0.9 with a BUN of 41. Her CT of the head had some mild generalized volume loss with no acute changes noted. CT of the pelvis revealed old fracture of the right superior and inferior pubic rami. Patient is complaining of right pelvic pain. Patient denies any history of stroke. She indicates she had some dental work done and ever since then her face has been droop. She wears dentures and indicates her speech is slurred due to that and a dry mouth. Pelvic pain better Feels Percocet helps. Still pain in the right groin at times.     REVIEW OF SYSTEMS:  Constitutional: No fevers No chills  Neck:No stiffness  Respiratory: No shortness of breath  Cardiovascular: No chest pain No palpitations  Gastrointestinal: No abdominal pain    Genitourinary: No Dysuria  Neurological: No headache, no confusion    Past Medical History:      Diagnosis Date    Chronic back pain     Hypertension     Inflammatory arthritis     Knee pain     Spastic colon        Past Surgical History:      Procedure Laterality Date    ABDOMEN SURGERY      APPENDECTOMY      BACK SURGERY      neck; bone out of hip to fuse neck    FINGER TRIGGER RELEASE Left 12/17/2020    LEFT MIDDLE TRIGGER FINGER RELEASE performed by Beto Morales MD at [address] [place] [address] HAND SURGERY Left 2012    fx repair (3 total surgeries)    HAND SURGERY Left 12/17/2020    LEFT WRIST MASS EXCISION [facility] Bilateral     HIP    MOUTH SURGERY      TOTAL KNEE ARTHROPLASTY Right 9/2/2021    RIGHT TOTAL KNEE ARTHROPLASTY [facility]         Medications in Hospital:      Current Facility-Administered Medications:     hydroCHLOROthiazide (HYDRODIURIL) tablet 25 mg, 25 mg, Oral, Daily, Bryce Damon MD    amiodarone (CORDARONE) tablet 200 mg, 200 mg, Oral, BID, Rosy Beavers MD, 200 mg at 04/08/22 1043    [START ON 4/10/2022] amiodarone (CORDARONE) tablet 200 mg, 200 mg, Oral, Daily, Rosy Beavers MD    metoprolol succinate (TOPROL XL) extended release tablet 25 mg, 25 mg, Oral, BID, Rosy Beavers MD, 25 mg at 04/08/22 1041    dilTIAZem (CARDIZEM CD) extended release capsule 120 mg, 120 mg, Oral, BID, Rosy Beavers MD, 120 mg at 04/08/22 1041    atorvastatin (LIPITOR) tablet 10 mg, 10 mg, Oral, Daily, Bryce Damon MD, 10 mg at 04/08/22 1041    sodium bicarbonate tablet 650 mg, 650 mg, Oral, BID, Bryce Damon MD, 650 mg at 04/08/22 1041    oxyCODONE-acetaminophen (PERCOCET) 5-325 MG per tablet 1 tablet, 1 tablet, Oral, Q6H, Bryce Damon MD, 1 tablet at 04/08/22 1049    lactobacillus (CULTURELLE) capsule 1 capsule, 1 capsule, Oral, Daily, Bryce Damon MD, 1 capsule at 04/07/22 1008   cyclobenzaprine (FLEXERIL) tablet 5 mg, 5 mg, Oral, BID PRN, Martín Wesley MD, 5 mg at 04/06/22 2033    simethicone (MYLICON) chewable tablet 80 mg, 80 mg, Oral, 4x Daily PRN, Jill Hutchins, APRN - CNP    potassium chloride (KLOR-CON M) extended release tablet 20 mEq, 20 mEq, Oral, BID WC, Martín Wesley MD, 20 mEq at 04/08/22 1041    nystatin (MYCOSTATIN) 357731 UNIT/ML suspension 500,000 Units, 5 mL, Oral, 4x Daily, Martín Wesley MD, 500,000 Units at 04/08/22 1042    potassium chloride (KLOR-CON M) extended release tablet 40 mEq, 40 mEq, Oral, PRN **OR** potassium bicarb-citric acid (EFFER-K) effervescent tablet 40 mEq, 40 mEq, Oral, PRN **OR** potassium chloride 10 mEq/100 mL IVPB (Peripheral Line), 10 mEq, IntraVENous, PRN, Jill Hutchins, APRN - CNP    magic butt cream, , Topical, Q4H PRN, Jill Hutchins APRN - CNP, Given at 04/02/22 2136    HYDROmorphone HCl PF (DILAUDID) injection 0.5 mg, 0.5 mg, IntraVENous, Q3H PRN, Jill Hutchins APRN - CNP, 0.5 mg at 04/07/22 0532    polyethylene glycol (GLYCOLAX) packet 17 g, 17 g, Oral, Daily, Martín Wesley MD, 17 g at 04/08/22 1043    sodium chloride flush 0.9 % injection 5-40 mL, 5-40 mL, IntraVENous, 2 times per day, Martín Wesley MD, 10 mL at 04/08/22 1055    sodium chloride flush 0.9 % injection 5-40 mL, 5-40 mL, IntraVENous, PRN, Martín Wesley MD, 10 mL at 04/03/22 1657    0.9 % sodium chloride infusion, , IntraVENous, PRN, Martín Wesley MD    ondansetron (ZOFRAN-ODT) disintegrating tablet 4 mg, 4 mg, Oral, Q8H PRN **OR** ondansetron (ZOFRAN) injection 4 mg, 4 mg, IntraVENous, Q6H PRN, Martín Wesley MD, 4 mg at 04/04/22 1652    polyethylene glycol (GLYCOLAX) packet 17 g, 17 g, Oral, Daily PRN, Martín Wesley MD    lactulose (CHRONULAC) 10 GM/15ML solution 20 g, 20 g, Oral, TID, Martín Wesley MD, 20 g at 04/08/22 1041    apixaban (ELIQUIS) tablet 5 mg, 5 mg, Oral, BID, Jorge Guidry MD, 5 mg at 04/08/22 1041    fluticasone (FLONASE) 50 MCG/ACT nasal spray 2 spray, 2 spray, Nasal, Daily, Jorge Guidry MD, 2 spray at 04/08/22 1043    hydrOXYzine (ATARAX) tablet 25 mg, 25 mg, Oral, Nightly, Jorge Guidry MD, 25 mg at 04/07/22 2133    traMADol (ULTRAM) tablet 50 mg, 50 mg, Oral, Q6H PRN, Jorge Guidyr MD, 50 mg at 04/06/22 1739    vitamin D (ERGOCALCIFEROL) capsule 50,000 Units, 50,000 Units, Oral, Weekly, Jorge Guidry MD, 50,000 Units at 04/08/22 1041    nystatin (MYCOSTATIN) cream, , Topical, BID, Elly Iggy, APRN - CNP, Given at 04/08/22 1044    Allergies:  Levofloxacin in d5w, Pyridium [phenazopyridine], Sulfamethoxazole-trimethoprim, Codeine, Hydrocodone-acetaminophen, Hydromorphone hcl, and Oxycodone    Social History:   TOBACCO:   reports that she has been smoking cigarettes. She has been smoking about 0.50 packs per day. She has never used smokeless tobacco.  ETOH:   reports no history of alcohol use. Family History:       Problem Relation Age of Onset    Other Other         Son DVT    Cancer Mother         colon     Heart Attack Mother     Heart Attack Father          PHYSICAL EXAM:  BP (!) 151/70   Pulse 59   Temp 97.2 °F (36.2 °C)   Resp 18   Ht 5' 3\" (1.6 m)   Wt 145 lb (65.8 kg)   SpO2 91%   BMI 25.69 kg/m²     Constitutional - well developed, well nourished. Eyes - conjunctiva normal.   Ear, nose, throat - No scars, masses, or lesions over external nose or ears, no atrophy of tongue  Neck-symmetric, no masses noted, no jugular vein distension  Respiration- chest wall appears symmetric, good expansion,   normal effort without use of accessory muscles  Musculoskeletal - no significant wasting of muscles noted, no bony deformities  Extremities-no clubbing, cyanosis or edema  Skin - warm, dry, and intact. No rash, erythema, or pallor.   Psychiatric - mood, affect, and behavior appear normal.      Neurological exam  Awake, alert, fluent oriented appropriate affect  Attention and concentration appear appropriate  Recent and remote memory appears unremarkable  Speech with dysarthria  No clear issues with language of fund of knowledge     Cranial Nerve Exam     CN III, IV,VI-EOMI, No nystagmus, conjugate eye movements, no ptosis    CN VII-right lower facial droop       Motor Exam  antigravity throughout upper and lower extremities bilaterally  Giveway more on the right      Tremors- no tremors in hands or head noted     Gait  Not tested     Nursing/pcp notes, imaging,labs and vitals reviewed. PT,OT and/or speech notes reviewed    Lab Results   Component Value Date    WBC 7.1 04/08/2022    HGB 12.3 04/08/2022    HCT 39.4 04/08/2022    MCV 97.5 04/08/2022     04/08/2022     Lab Results   Component Value Date     04/08/2022    K 4.4 04/08/2022     04/08/2022    CO2 24 04/08/2022    BUN 22 04/08/2022    CREATININE 0.9 04/08/2022    GLUCOSE 93 04/08/2022    CALCIUM 8.5 (L) 04/08/2022    PROT 5.8 (L) 04/06/2022    LABALBU 3.4 (L) 04/06/2022    BILITOT 0.3 04/06/2022    ALKPHOS 90 04/06/2022    AST 35 (H) 04/06/2022    ALT 48 (H) 04/06/2022    LABGLOM >60 04/08/2022    GFRAA >59 04/08/2022     Lab Results   Component Value Date    INR 1.03 04/01/2022    INR 0.96 08/30/2021    PROTIME 13.7 04/01/2022    PROTIME 13.0 08/30/2021       MRI BRAIN W WO CONTRAST [[id number]]    Resulted: 04/03/22 1553    Updated: 04/03/22 1555    Narrative:     MRI brain without and with contrast 4/3/2022 2:22 PM   History: Facial droop   Comparison: None.     Technique: Multiplanar imaging of the brain was performed in a routine   fashion before and after the intravenous injection of gadolinium   contrast.   Findings:   Midline structures are nondisplaced. Ventricular system is normal.   There is no significant mass effect or hydrocephalus. Basilar cisterns   are preserved.  There are scattered foci of T2 abnormality involving   the periventricular and higher white matter tracts consistent with   moderate small vessel ischemic disease. There is mild gliosis within   the nely. There is a focus of low signal within the left thalamus near   the posterior limb of the internal capsule on gradient imaging   suggesting a site of previous hemorrhage or calcification. On   diffusion-weighted imaging this is separable from the area of acute   ischemic infarction. No additional sites of acute or chronic   hemorrhage are demonstrated. . No abnormal extra axial fluid   collections are noted. There is diffusion restriction within the left   basal ganglia with involvement of the thalamus, posterior limb of the   left internal capsule and putamen with involvement of the external   capsule also demonstrated. These are nonhemorrhagic infarcts. There is   associated signal loss on ADC mapping imaging consistent with acute   ischemic infarction. No additional sites of diffusion restriction are   present. Wellington Confer Postcontrast imaging demonstrates a developmental venous anomaly which   is parafalcine in location within the right parietal lobe extending   into the region of the corpus callosum. No additional sites of   abnormal enhancement are present. Proximal cervical spinal cord, brainstem, and cerebellum are   unremarkable. Normal cerebrovascular flow voids are seen. Bilateral   globes and orbits are normal in appearance. No abnormal signal is noted in the mastoid air cells or paranasal   sinuses. Impression:     Impression:   1. Diffusion restriction within the left thalamus and basal ganglia   with associated ADC mapping abnormality consistent with an acute   left-sided infarct. This is nonhemorrhagic. There is no associated   mass effect or shift of the midline. 2. There is mild atrophy of the brain. Small vessel ischemic changes   are noted involving the periventricular and higher white matter   tracts.  There is evidence of a remote small focus of hemorrhage within   the left thalamus. No additional sites of blooming are present on   gradient imaging. 3. DVA within the parafalcine right parietal lobe extending into the   region of the corpus callosum. No additional sites of abnormal   enhancement are present. Signed by Dr Liv Rojas     ECHO Complete 2D W Doppler W Color [[id number]]    Collected: 04/03/22 1020    Updated: 04/04/22 0825    Narrative:     Transthoracic Echocardiography Report (TTE)      Demographics        Patient Name Jh Cruz Date of Study          04/03/2022        MRN           [id number]            Gender                 Female        Date of Birth 1945        Room Number            [id number]        Age           76 year(s)        Height:       63 inches         Referring Physician    Ravinder Benitez        Weight:       145 pounds        Sonographer            Mary Tolentino RDCS        BSA:          1.69 m^2          Interpreting Physician Azul Garcia MD        BMI:          25.69 kg/m^2       Procedure     Type of Study        TTE procedure:ECHO NO CONTRAST WITH DOP/COLR.       Study Location: Echo Lab   Technical Quality: Adequate visualization     Patient Status: Inpatient     Rhythm: Within normal limits BP: 121/92 mmHg     Indications:TIA.      Conclusions        Summary    Mitral valve leaflets are mildly thickened with preserved leaflet    mobility.    Mitral annular calcification is present.    Mild mitral regurgitation is present.    Mild aortic stenosis.    Bicuspid valve not excluded    Mean gradient 15 mm hg    Tricuspid valve is structurally normal.    Mild tricuspid regurgitation with estimated RVSP of 39 mm Hg.    Moderately dilated left atrium.    Normal left ventricular size with preserved LV function and an estimated    ejection fraction of approximately 55-60%.     Mild concentric left ventricular hypertrophy.    Grade II diastolic dysfunction    Mildly enlarged right ventricle cavity.  IVC dilated.        Signature        ----------------------------------------------------------------    Electronically signed by Jackie Brar MD(Interpreting    [email or web address]) on 04/04/2022 08:25 AM    ----------------------------------------------------------------            CT HIP RIGHT WO CONTRAST [[id number]]    Resulted: 04/08/22 1052    Updated: 04/08/22 1054    Narrative:     CT HIP RIGHT WO CONTRAST 4/8/2022 10:43 AM   HISTORY: Right groin pain   COMPARISON: CT scan dated 4/2/2022   DOSE LENGTH PRODUCT: 989 mGy cm   TECHNIQUE: Helical tomographic images of the right hip were obtained   without the use of intravenous contrast. Automated exposure control   was also utilized to decrease patient radiation dose. FINDINGS:   No acute fracture is identified. Unremarkable right total hip   arthroplasty. No hardware complication identified. There are old   healed right pubic rami fractures. Pubic symphysis is intact. 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 Advanced atheromatous   vascular calcification. Surrounding soft tissue structures are   unremarkable.       Impression:     1. No acute fracture or malalignment. 2. Old healed right pubic rami fractures. 3. Unremarkable right total hip arthroplasty. Signed by Dr Cherelle Lafleur     [facility] Missael Lynn [[id number]]    Resulted: 04/07/22 1353    Updated: 04/07/22 1351    Narrative:     EXAM: MRI abdomen without and with IV contrast, including 3-D MRCP   sequences   INDICATION: Further evaluation of potential biliary filling defect   identified on recent ultrasound   COMPARISON: Ultrasound 4/5/2022   FINDINGS:   The study is degraded by patient motion. Liver: No hepatic steatosis or morphologic changes of cirrhosis. No   suspicious focal liver lesion. Bile ducts: Intrahepatic and extra hepatic biliary ductal dilatation   with gradual caliber tapering down to the ampulla. No obstructing   stone or mass identified.  Common bile duct measures 17 mm diameter. Gallbladder: No gallstones or gallbladder wall thickening. Pancreas: No pancreatic mass, cyst, or surrounding inflammatory   change. Spleen: Not enlarged. No focal lesion. Adrenals: Normal.   Kidneys: Tiny LEFT renal cyst. No solid enhancing renal mass. No   hydronephrosis. Vasculature: Nonaneurysmal abdominal aorta. Patent portal vein. Other Findings: Distended colon with layering fluid. No ascites. No   abdominal lymphadenopathy. No suspicious focal bone lesion. Small   bilateral pleural effusions in the lung bases. Impression:     1.  Intrahepatic and extra hepatic biliary ductal dilatation with   gradual caliber tapering down to the ampulla. No evidence of   obstructing stone or mass. Differential diagnosis includes biliary   stricture although occult stone or neoplasm are also within the   differential. Recommend correlation with laboratory analysis and   consider ERCP for further evaluation. 2.  The study is degraded by patient motion.    Signed by Dr Lena Marshall: Previous medical records, medications were reviewed at today's visit    IMPRESSION:   Acute ischemic stroke-2 areas of ischemia- left basal ganglia and left thalamus presumed cardi embolic with history of atrial fibrillation and patient taking her Eliquis only once a day prior to admission-table examination    Facial droop, dysarthria, fall, weakness     Initial ExaminationGeneralized giveaway weakness worse in the right arm and right leg  Right lower facial droop with dysarthric speech    MRI of the brain-acute ischemia left basal ganglia and thalamus-personally reviewed  Carotid ultrasound-preliminarily <50% stenosis left internal carotid and asymptomatic 50-69% stenosis right internal carotid-medical management  2D echo-LV EF 55-60%-moderately dilated LA  On Eliquis now twice a day-no bleeding noted     GI-bowel regimen  Atrial fibrillation-Eliquis/amiodarone/metoprolol  Allergies-Flonase  Hypertension-on medications monitor-systolic blood pressure [phone]  Hypokalemia-on potassium-serum potassium normal  Rhabdomyolysis-IV fluids-CK trending down  Vitamin D deficiency-on vitamin D  Pain control-try Percocet as needed  DVT prophylaxis-Eliquis  PT/OT/speech     Too low functioning for rehab    Continue current care    MRI of the abdomen-intrahepatic and extrahepatic biliary ductal dilatation with gradual tapering down to the ampulla-differential includes biliary stricture  CT of the right hip-no acute fracture noted  Disposition pending     Tinea supportive care    Expected duration and frequency therapy: 180 minutes per day, 5 days per week    [address]  [facility]  Dr Stephen Ocasio

## 2022-04-08 NOTE — PROGRESS NOTES
Physical Therapy  Name: Smiley Molina  MRN:  [id number]  Date of service:  4/8/2022 04/08/22 1553   Subjective   Subjective Patient in bed, willing to participate   General Comment   Comments very lethargic   Bed Mobility   Supine to Sit Moderate assistance   Sit to Supine Moderate assistance   Scooting Moderate assistance   Comment able to sit EOB but only briefly. patient very lethargic   Transfers   Comment patient too lethargic   Ambulation   Ambulation? No   Balance   Sitting - Static Poor   Sitting - Dynamic Poor   Other Activities   Comment tried to work on sittiing balance but only able to for a few mins and had to lay her back down     Short term goals   Time Frame for Short term goals 14 DAYS   Short term goal 1 BED MOB MIN ASSIST   Short term goal 2 SIT TO STAND MIN ASSIST   Short term goal 3 BED TO CHAIR MIN ASSIST   Conditions Requiring Skilled Therapeutic Intervention   Body structures, Functions, Activity limitations Decreased functional mobility ; Decreased ADL status; Decreased ROM; Decreased strength;Decreased safe awareness;Decreased balance; Increased pain;Decreased posture   Assessment Patient did try to participate, just very tired   Plan   Times per week AT LEAST 4-6   Current Treatment Recommendations Strengthening;ROM;Balance Training;Functional Mobility Training;Transfer Training;Patient/Caregiver Education & Training; Safety Education & Training   Safety Devices   Type of devices Bed alarm in place;Call light within reach; Left in bed   PT Whiteboard Notes   Therapy Whiteboard RE 4/16  FALL, RHABDO (? cva)       Electronically signed by Mello Barreto PTA on 4/8/2022 at 4:09 PM

## 2022-04-08 NOTE — PROGRESS NOTES
[facility] MEDICINE  - PROGRESS NOTE    Admit Date: 4/1/2022         CC: fall    Subjective: lower abd pain with some diarrhea, no N/V/C, no dysuria, no chest pain, no dyspnea    Objective: mild istress    Diet: ADULT DIET;  Regular  Pain is:Mild  Nausea:None  Bowel Movement/Flatus yes    Data:   Scheduled Meds: Reviewed  Current Facility-Administered Medications   Medication Dose Route Frequency Provider Last Rate Last Admin    hydroCHLOROthiazide (HYDRODIURIL) tablet 25 mg  25 mg Oral Daily Anahi Burgess MD        amiodarone (CORDARONE) tablet 200 mg  200 mg Oral BID Christina Roe MD   200 mg at 04/07/22 2134    [START ON 4/10/2022] amiodarone (CORDARONE) tablet 200 mg  200 mg Oral Daily Christina Roe MD        metoprolol succinate (TOPROL XL) extended release tablet 25 mg  25 mg Oral BID Christina Roe MD   25 mg at 04/07/22 2135    dilTIAZem (CARDIZEM CD) extended release capsule 120 mg  120 mg Oral BID Christina Roe MD   120 mg at 04/07/22 2135    atorvastatin (LIPITOR) tablet 10 mg  10 mg Oral Daily Anahi Burgess MD   10 mg at 04/07/22 1007    sodium bicarbonate tablet 650 mg  650 mg Oral BID Anahi Burgess MD   650 mg at 04/07/22 2135    oxyCODONE-acetaminophen (PERCOCET) 5-325 MG per tablet 1 tablet  1 tablet Oral Q6H Anahi Burgess MD   1 tablet at 04/08/22 0447    lactobacillus (CULTURELLE) capsule 1 capsule  1 capsule Oral Daily Anahi Burgess MD   1 capsule at 04/07/22 1008    cyclobenzaprine (FLEXERIL) tablet 5 mg  5 mg Oral BID PRN Anahi Burgess MD   5 mg at 04/06/22 2033    simethicone (MYLICON) chewable tablet 80 mg  80 mg Oral 4x Daily PRN KATI Oviedo - CNP        potassium chloride (KLOR-CON M) extended release tablet 20 mEq  20 mEq Oral BID WC Anahi Burgess MD   20 mEq at 04/07/22 1621    nystatin (MYCOSTATIN) 035904 UNIT/ML suspension 500,000 Units  5 mL Oral 4x Daily Bryce Damon MD   500,000 Units at 04/07/22 2133    potassium chloride (KLOR-CON M) extended release tablet 40 mEq  40 mEq Oral PRN Evalee Martinez, APRN - CNP        Or    potassium bicarb-citric acid (EFFER-K) effervescent tablet 40 mEq  40 mEq Oral PRN Evalee Martinez, APRN - CNP        Or    potassium chloride 10 mEq/100 mL IVPB (Peripheral Line)  10 mEq IntraVENous PRN Evalee Martinez, APRN - CNP        magic butt cream   Topical Q4H PRN Evalee Martinez, APRN - CNP   Given at 04/02/22 2136    HYDROmorphone HCl PF (DILAUDID) injection 0.5 mg  0.5 mg IntraVENous Q3H PRN Lorna Martinez, APRN - CNP   0.5 mg at 04/07/22 0532    polyethylene glycol (GLYCOLAX) packet 17 g  17 g Oral Daily Bryce Damon MD   17 g at 04/07/22 1008    sodium chloride flush 0.9 % injection 5-40 mL  5-40 mL IntraVENous 2 times per day Bryce Damon MD   10 mL at 04/07/22 2134    sodium chloride flush 0.9 % injection 5-40 mL  5-40 mL IntraVENous PRN Bryce Damon MD   10 mL at 04/03/22 1657    0.9 % sodium chloride infusion   IntraVENous PRN Bryce Damon MD        ondansetron (ZOFRAN-ODT) disintegrating tablet 4 mg  4 mg Oral Q8H PRN Bryce Damon MD        Or    ondansetron [facility]) injection 4 mg  4 mg IntraVENous Q6H PRN Bryce Damon MD   4 mg at 04/04/22 1652    polyethylene glycol (GLYCOLAX) packet 17 g  17 g Oral Daily PRN Bryce Damon MD        lactulose (CHRONULAC) 10 GM/15ML solution 20 g  20 g Oral TID Bryce Damon MD   20 g at 04/07/22 2133    apixaban (ELIQUIS) tablet 5 mg  5 mg Oral BID Bryce Damon MD   5 mg at 04/07/22 2134    fluticasone (FLONASE) 50 MCG/ACT nasal spray 2 spray  2 spray Nasal Daily Bryce Damon MD   2 spray at 04/07/22 1012    hydrOXYzine (ATARAX) tablet 25 mg  25 mg Oral Nightly Bryce Damon MD   25 mg at 04/07/22 2133    traMADol (ULTRAM) tablet 50 mg  50 mg Oral Q6H PRN Kristin Martinez MD   50 mg at 04/06/22 1739    vitamin D (ERGOCALCIFEROL) capsule 50,000 Units  50,000 Units Oral Weekly Kristin Martinez MD        nystatin (MYCOSTATIN) cream   Topical BID [employer] KATI Cooper CNP   Given at 04/07/22 2137     DVT Prophylaxis: eliquis    Continuous Infusions:   sodium chloride       No intake or output data in the 24 hours ending 04/08/22 1002  CBC:   Recent Labs     04/06/22  0305 04/07/22  0249 04/08/22  0351   WBC 9.8 8.1 7.1   HGB 13.2 12.0 12.3    270 296     BMP:  Recent Labs     04/06/22 0305 04/07/22  0249 04/08/22  0351    139 140   K 3.7 3.8 4.4    103 106   CO2 25 23 24   BUN 13 14 22   CREATININE 0.7 0.8 0.9   GLUCOSE 86 96 93     ABGs: No results found for: PHART, PO2ART, GCV6WNJ  INR: No results for input(s): INR in the last 72 hours. Objective:   Vitals: BP (!) 151/70   Pulse 59   Temp 97.2 °F (36.2 °C)   Resp 18   Ht 5' 3\" (1.6 m)   Wt 145 lb (65.8 kg)   SpO2 91%   BMI 25.69 kg/m²   General appearance: alert, appears stated age and cooperative  Skin: Skin color, texture, turgor normal.   HEENT: Head: Normocephalic, no lesions, without obvious abnormality.   Neck: no adenopathy, no carotid bruit, no JVD and supple, symmetrical, trachea midline  Lungs: clear to auscultation bilaterally  Heart: regular rate and rhythm, S1, S2 normal, no murmur, click, rub or gallop  Abdomen: soft low abdominal tenderness with distention, bs present, no rebound  Extremities: extremities normal, atraumatic, no cyanosis or edema  Lymphatic: No significant lymph node enlargement papable  Neurologic: Mental status: Alert, oriented, thought content appropriate, facial droop         Assessment & Plan:    · Traumatic rhabdomyolysis- resolved   · Metabolic acidosis- resolved    · Hypokalemia - resolved   · 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- follow OP  · Constipation - treated  · Groundglass nodule/opacities predominantly in the right upper lobe   may represent an inflammatory/infectious or an evolving neoplastic process- pulmonary evaluated - follow up OP  · Severe deconditioning with fall - PT/OT- needs SNF  · Acute CVA with Facial droop-- neurology evaluated   · Significant dilatation of the common bile duct and pancreatic duct -per ERCP MRCP recommended- patient is asymptotic- follow up as OP with Dr Kingsley Mccray in 2 weeks  · PAF -amiodarone, Cardizem, metoprolol- treatment per cardiology  · Carotid stenosis- Lipitor, eliquis - follow up OP   · Vit d def- on replacement   · History of pelvic fx  · Mild abd distention - KUB shows ileus- no obstruction                      See orders   Disposition: cleared for DC, awaiting SNF placement     Anthony Haley MD

## 2022-04-08 NOTE — PROGRESS NOTES
Cardiology Progress Note Breanna Arana MD      Patient:  Raeann Courser  [id number]    Patient Active Problem List    Diagnosis Date Noted    Chronic pain disorder 04/04/2022     Priority: Low    Gastroesophageal reflux disease 04/04/2022     Priority: Low    Palliative care patient 04/04/2022     Priority: Low    Diastolic dysfunction 21/58/0059     Priority: Low    Facial droop 04/03/2022     Priority: Low    Dysarthria 04/03/2022     Priority: Low    History of pelvic fracture 04/03/2022     Priority: Low    Hip pain 04/03/2022     Priority: Low    Hypokalemia 04/03/2022     Priority: Low    Vitamin D deficiency 04/03/2022     Priority: Low    Gait abnormality 04/03/2022     Priority: Low    Rhabdomyolysis 04/01/2022     Priority: Low    Fall 04/01/2022     Priority: Low    HTN (hypertension) 04/01/2022     Priority: Low    Generalized weakness 04/01/2022     Priority: Low    Anticoagulated 04/01/2022     Priority: Low    Dehydration 04/01/2022     Priority: Low    Intertrigo 04/01/2022     Priority: Low    Paroxysmal atrial fibrillation ([address] 04/01/2022     Priority: Low    Transaminitis 04/01/2022     Priority: Low    Abnormal CXR 04/01/2022     Priority: Low    Osteoarthritis of right knee 09/02/2021     Priority: Low    Trigger middle finger of right hand 12/17/2020     Priority: Low    Mass of right wrist 12/17/2020     Priority: Low       Admit Date:  4/1/2022    Admission Problem List: Present on Admission:   Rhabdomyolysis   Fall   HTN (hypertension)   Generalized weakness   Anticoagulated   Dehydration   Intertrigo   Paroxysmal atrial fibrillation (HCC)   Transaminitis   Abnormal CXR   Facial droop   Dysarthria   History of pelvic fracture   Hip pain   Hypokalemia   Vitamin D deficiency   Gait abnormality   Palliative care patient   Diastolic dysfunction      Cardiac Specific Data:  Specialty Problems        Cardiology Problems    HTN (hypertension) Paroxysmal atrial fibrillation (HCC)            1.  Paroxysmal atrial fibrillation on Eliquis (reduced compliance) and amiodarone (29% burden on Zio). 2.  Moderate aortic stenosis (echo 9/2021 with mean gradient 27 mmHg), normal LV ejection fraction. 3.  Active ongoing tobacco use. 4.  Acute left thalamic/basal ganglia infarct, intermediate right ICA stenosis.     Subjective:  Ms. Winifred Croft reports she had significant cramping in the lower right lower quadrant with moving her right leg. Remains in sinus rhythm. Objective:   BP (!) 151/70   Pulse 59   Temp 97.2 °F (36.2 °C)   Resp 18   Ht 5' 3\" (1.6 m)   Wt 145 lb (65.8 kg)   SpO2 91%   BMI 25.69 kg/m²     No intake or output data in the 24 hours ending 04/08/22 0930    Prior to Admission medications    Medication Sig Start Date End Date Taking? Authorizing Provider   pantoprazole sodium (PROTONIX) 40 MG PACK packet Take 40 mg by mouth every morning (before breakfast)   Yes Historical Provider, MD   diphenhydrAMINE (BENADRYL) 50 MG capsule Take 50 mg by mouth nightly as needed for Itching   Yes Historical Provider, MD   simethicone (MYLICON) 80 MG chewable tablet Take 125 mg by mouth every 6 hours as needed for Flatulence   Yes Historical Provider, MD   DM-APAP-CPM (CORICIDIN HBP) -2 MG TABS Take 325 mg by mouth every 4-6 hours as needed   Yes Historical Provider, MD   dicyclomine (BENTYL) 10 MG capsule Take 10 mg by mouth three times daily    Historical Provider, MD   benzonatate (TESSALON) 200 MG capsule Take 200 mg by mouth 3 times daily    Historical Provider, MD   hydrOXYzine (ATARAX) 25 MG tablet Take 25 mg by mouth at bedtime 2/23/22   Historical Provider, MD   traMADol (ULTRAM) 50 MG tablet Take 50 mg by mouth 2 times daily.     Historical Provider, MD   apixaban (ELIQUIS) 5 MG TABS tablet Take 5 mg by mouth 2 times daily    Historical Provider, MD   metoprolol succinate (TOPROL XL) 25 MG extended release tablet Take 1 tablet by mouth daily 11/4/21   KATI Mallory   vitamin D (ERGOCALCIFEROL) 1.25 MG (33785 UT) CAPS capsule Take 50,000 Units by mouth once a week    Historical Provider, MD   ondansetron (ZOFRAN) 4 MG tablet Take 1 tablet by mouth every 8 hours as needed for Nausea or Vomiting 9/2/21   Linda Burt MD   lisinopril (PRINIVIL;ZESTRIL) 20 MG tablet Take 20 mg by mouth nightly Indications: High Blood Pressure Disorder    Historical Provider, MD   fluticasone (FLONASE) 50 MCG/ACT nasal spray 2 sprays by Nasal route daily     Historical Provider, MD   loratadine (CLARITIN) 10 MG capsule Take 10 mg by mouth daily    Historical Provider, MD   furosemide (LASIX) 20 MG tablet Take 1 tablet by mouth daily as needed (swelling)  Patient taking differently: Take 40 mg by mouth daily as needed (swelling)  5/6/20   Kandis Funez MD   celecoxib (CELEBREX) 200 MG capsule Take 1 capsule by mouth daily 6/19/18   Kandis Funez MD        amiodarone  200 mg Oral BID    [START ON 4/10/2022] amiodarone  200 mg Oral Daily    metoprolol succinate  25 mg Oral BID    dilTIAZem  120 mg Oral BID    atorvastatin  10 mg Oral Daily    sodium bicarbonate  650 mg Oral BID    oxyCODONE-acetaminophen  1 tablet Oral Q6H    lactobacillus  1 capsule Oral Daily    potassium chloride  20 mEq Oral BID WC    nystatin  5 mL Oral 4x Daily    polyethylene glycol  17 g Oral Daily    sodium chloride flush  5-40 mL IntraVENous 2 times per day    lactulose  20 g Oral TID    apixaban  5 mg Oral BID    fluticasone  2 spray Nasal Daily    hydrOXYzine  25 mg Oral Nightly    vitamin D  50,000 Units Oral Weekly    nystatin   Topical BID       TELEMETRY: Sinus     Physical Exam:      Physical Exam  Constitutional:       General: She is not in acute distress. Appearance: She is not diaphoretic. HENT:      Mouth/Throat:      Pharynx: No oropharyngeal exudate. Eyes:      General: No scleral icterus. Right eye: No discharge.          Left eye: No discharge. Neck:      Thyroid: No thyromegaly. Vascular: No JVD. Cardiovascular:      Rate and Rhythm: Normal rate and regular rhythm. No extrasystoles are present. Heart sounds: Normal heart sounds, S1 normal and S2 normal. No murmur heard. No systolic murmur is present. No diastolic murmur is present. No friction rub. No gallop. No S3 or S4 sounds. Comments: No JVD  No edema    Pulmonary:      Effort: Pulmonary effort is normal. No respiratory distress. Breath sounds: Normal breath sounds. No wheezing or rales. Chest:      Chest wall: No tenderness. Abdominal:      General: Bowel sounds are normal. There is no distension. Palpations: Abdomen is soft. There is no mass. Tenderness: There is no abdominal tenderness. There is no guarding or rebound. Hernia: No hernia is present. Comments: Soft, no significant tenderness or rebound  No palpable organomegaly   Musculoskeletal:         General: Normal range of motion. Comments: No clear pain elicited on moving right leg   Skin:     General: Skin is warm. Coloration: Skin is not pale. Findings: No rash. Neurological:      Mental Status: She is alert and oriented to person, place, and time. Comments: Facial droop less prominent  Generalized weakness of lower extremities                 Lab Data:  CBC:   Recent Labs     04/06/22  0305 04/07/22  0249 04/08/22  0351   WBC 9.8 8.1 7.1   HGB 13.2 12.0 12.3   HCT 41.2 37.8 39.4   MCV 94.9 96.9 97.5    270 296     BMP:   Recent Labs     04/06/22  0305 04/07/22  0249 04/08/22  0351    139 140   K 3.7 3.8 4.4    103 106   CO2 25 23 24   BUN 13 14 22   CREATININE 0.7 0.8 0.9     LIVER PROFILE:   Recent Labs     04/06/22  0305   AST 35*   ALT 48*   LIPASE 15   BILITOT 0.3   ALKPHOS 90     PT/INR: No results for input(s): PROTIME, INR in the last 72 hours. APTT: No results for input(s): APTT in the last 72 hours.   BNP:  No results for input(s): BNP in the last 72 hours. CK, CKMB, Troponin: @LABRCNT (CKTOTAL:3, CKMB:3, TROPONINI:3)@    IMAGING:  ECHO Complete 2D W Doppler W Color    Result Date: 4/4/2022  Transthoracic Echocardiography Report (TTE)  Demographics   Patient Name  Raquel Morris Date of Study          04/03/2022   MRN           [id number]            Gender                 Female   Date of Birth 1945        Room Number            [place] [address]   Age           67 year(s)   Height:       63 inches         Referring Physician    Burley Hamman   Weight:       145 pounds        Sonographer            Mary Tolentino [facility]   BSA:          1.69 m^2          Interpreting Physician Azul Garcia MD   BMI:          25.69 kg/m^2  Procedure Type of Study   TTE procedure:ECHO NO CONTRAST WITH DOP/COLR. Study Location: Echo Lab Technical Quality: Adequate visualization Patient Status: Inpatient Rhythm: Within normal limits BP: 121/92 mmHg Indications:TIA. Conclusions   Summary  Mitral valve leaflets are mildly thickened with preserved leaflet  mobility. Mitral annular calcification is present. Mild mitral regurgitation is present. Mild aortic stenosis. Bicuspid valve not excluded  Mean gradient 15 mm hg  Tricuspid valve is structurally normal.  Mild tricuspid regurgitation with estimated RVSP of 39 mm Hg. Moderately dilated left atrium. Normal left ventricular size with preserved LV function and an estimated  ejection fraction of approximately 55-60%. Mild concentric left ventricular hypertrophy. Grade II diastolic dysfunction  Mildly enlarged right ventricle cavity. IVC dilated. Signature   ----------------------------------------------------------------  Electronically signed by Azul Garcia MD(Interpreting  physician) on 04/04/2022 08:25 AM  ----------------------------------------------------------------   Findings   Mitral Valve  Mitral valve leaflets are mildly thickened with preserved leaflet  mobility.   Mitral annular calcification is present. Mild mitral regurgitation is present. Aortic Valve  Mild aortic stenosis. Bicuspid valve not excluded  Mean gradient 15 mm hg   Tricuspid Valve  Tricuspid valve is structurally normal.  Mild tricuspid regurgitation with estimated RVSP of 39 mm Hg. Pulmonic Valve  The pulmonic valve was not well visualized . Left Atrium  Moderately dilated left atrium. Left Ventricle  Normal left ventricular size with preserved LV function and an estimated  ejection fraction of approximately 55-60%. Mild concentric left ventricular hypertrophy. Grade II diastolic dysfunction   Right Atrium  Normal right atrial dimension with no evidence of thrombus or mass noted. Right Ventricle  Mildly enlarged right ventricle cavity. Pericardial Effusion  No evidence of significant pericardial effusion is noted. Pleural Effusion  No evidence of pleural effusion. Miscellaneous  IVC dilated.   M-Mode Measurements (cm)   LVIDd: 4.61 cm                         LVIDs: 2.95 cm  IVSd: 1.33 cm  LVPWd: 1.33 cm                         AO Root Dimension: 3 cm  % Ejection Fraction: 61 %              LA: 3.7 cm                                         LVOT: 2 cm  Doppler Measurements:   AV Peak Velocity:264 cm/s            MV Peak E-Wave: 102 cm/s  AV Peak Gradient: 27.88 mmHg         MV Peak A-Wave: 90.4 cm/s  AV Mean Gradient: 15 mmHg            MV E/A Ratio: 1.13 %  AV Area (Continuity):1.14 cm^2       MV Peak Gradient: 4.16 mmHg  TR Velocity:300 cm/s                 MV P1/2t: 37 msec  TR Gradient:36 mmHg                  MVA by PHT5.95 cm^2  Estimated RAP:3 mmHg  RVSP:39 mmHg      CT ABDOMEN PELVIS WO CONTRAST Additional Contrast? None    Result Date: 4/1/2022  CT ABDOMEN PELVIS WO CONTRAST 4/1/2022 12:23 PM HISTORY: Fall COMPARISON: None DLP: 972 mGy cm TECHNIQUE: Noncontrast enhanced images of the abdomen and pelvis obtained without oral contrast. Automated exposure control was also utilized to decrease patient radiation dose. FINDINGS: Lung bases are clear. Coronary atheromatous calcification. LIVER: No focal liver lesion. BILIARY SYSTEM: The gallbladder is unremarkable. No intrahepatic or extrahepatic ductal dilatation. PANCREAS: No focal pancreatic lesion. SPLEEN: Unremarkable. KIDNEYS AND ADRENALS: Bilateral kidneys and adrenal glands are unremarkable. The ureters are decompressed and normal in appearance. RETROPERITONEUM: No mass, lymphadenopathy or hemorrhage. GI TRACT: Stomach and small bowel are unremarkable. Moderate colonic stool. OTHER: There is no mesenteric mass, lymphadenopathy or fluid collection. Unremarkable bilateral hip arthroplasties. Old healed right pubic bone fracture. Advanced lumbar spondylosis. No acute bony abnormality. PELVIS: No mass lesion, fluid collection or significant lymphadenopathy is seen in the pelvis. The urinary bladder is normal in appearance. 1. No acute process in the abdomen or pelvis. 2. Moderate colonic stool. Bowel loops are nondilated. 3. Unremarkable bilateral hip arthroplasties. Lumbar spondylosis. No acute bony normality. Signed by Dr Carolina Millard    CT Head WO Contrast    Result Date: 4/2/2022  CT BRAIN without contrast 4/2/2022 12:38 PM HISTORY: New onset weakness yesterday COMPARISON: 4/1/2022 DLP: 632 mGy cm. All CT scans are performed using dose optimization techniques as appropriate to the performed exam and include at least one of the following: Automated exposure control, adjustment of the mA and/or kV according to size, and the use of the iterative reconstruction technique. TECHNIQUE: Serial axial tomographic images of the brain were obtained without the use of intravenous contrast. FINDINGS: The midline structures are nondisplaced. There is mild cerebral and cerebellar volume loss, with an associated increase in the prominence of the ventricles and sulci. The basilar cisterns are normal in size and configuration.  There is no evidence of intracranial hemorrhage or mass-effect. There is low attenuation in the periventricular white matter, consistent with chronic ischemic change. There are no abnormal extra-axial fluid collections. There is no evidence of tonsillar herniation. The included orbits and their contents are unremarkable. The visualized paranasal sinuses, mastoid air cells and middle ear cavities are clear. The visualized osseous structures and overlying soft tissues of the skull and face are intact. 1. Mild cerebral and cerebellar volume loss with chronic microvascular disease but no evidence of acute intracranial process. Signed by Dr Saadia Porter    Result Date: 4/1/2022  Examination. CT HEAD WO CONTRAST 4/1/2022 12:38 PM History: The patient fell. DLP: 1229 mGycm. The automated exposure control was utilized to minimize the patient's radiation exposure. The CT scan of the head is performed without intravenous contrast enhancement. The images are acquired in axial plane with subsequent reconstruction in coronal and sagittal planes. There is no previous study for comparison There is no evidence of an intracranial hemorrhage or hematoma. There is no evidence of a mass. No midline shift. Moderately dilated ventricles, basal cistern and the cortical sulci suggestive chronic volume loss/atrophy. The scattered areas of chronic white matter ischemia are seen in the centrum semiovale bilaterally. The gray-white matter differentiation is maintained. The images reviewed in bone window show no evidence of a skull fracture. Severe atheromatous changes of the intracranial internal carotid arteries bilaterally noted. 1. No acute intracranial abnormality. 2. No skull fracture. 3. Chronic ischemic and atrophic changes. Signed by Dr Evelia Carrillo Date: 4/4/2022  Examination. CT CHEST W CONTRAST 4/4/2022 12:26 PM History: Right upper lobe collapse. DLP: 1949 mGycm.  The automated exposure control was utilized to minimize the patient's radiation exposure. CT scan of the chest is performed after intravenous contrast enhancement. The images are acquired in axial plane and subsequent reconstruction in coronal and sagittal planes. The comparison is made with the previous study dated 4/1/2022. The opacity in the right upper lobe paratracheal area represent a distended superior vena cava and moderately tortuous and displays brachiocephalic vessels. There is no evidence of right upper lobe collapse. The lungs are poorly distended. There are several groundglass opacity/nodule in the right apex, the largest one located anteriorly and laterally, image #34 in series 4, measuring 1.6 cm in AP dimension. There are atelectatic changes in the lower lungs bilaterally. This more pronounced on the right side. There are small area of consolidation involving the posterior segments of the lower lobes bilaterally adjacent to a small bibasal pleural effusion. There is moderate deviation of the trachea towards the right. The remaining tracheobronchial structures are normal and patent. The limited visualized soft tissues of the neck are unremarkable. The thyroid gland appear normal. Severe atheromatous changes of thoracic aorta are seen. No aneurysmal dilatation. Severe atheromatous changes of coronary arteries are seen. There is no evidence of mediastinal or hilar mass or lymphadenopathy. There is no axillary lymphadenopathy. Limited visualized liver and spleen are unremarkable. Incompletely visualized gallbladder show no gallstone. There is significant dilatation of the common bile duct and pancreatic duct which are incompletely evaluated in this study. However when compared with the previous study obtained 3 days ago these findings are not visualized? Janie Haji The study was unenhanced. The images reviewed in bone window show no acute bony abnormality or a bone lesion. Chronic degenerative changes of thoracic spine are seen.  There is subcutaneous fat infiltration of the lower chest and upper abdominal wall, more pronounced laterally which may represent edema due to fluid overload. 1. No finding to suggest collapse of the right upper lobe. Detail is given above. 2. Groundglass nodule/opacities predominantly in the right upper lobe may represent an inflammatory/infectious or an evolving neoplastic process. Further follow-up may be obtained. 3. Small bibasal pleural effusion. 4. Small areas of consolidation and atelectasis in the lower lobes bilaterally. 5. The limited visualized abdomen show significant dilatation of the common bile duct and pancreatic duct which were not noted in the previous study obtained 3 days ago which was done without contrast enhancement. The etiology and clinical significance is not certain. A follow-up CT scan of the abdomen after oral and intravenous contrast enhancement. Thin. Signed by Dr Mendez Henriquez    Result Date: 4/1/2022  CT cervical spine 4/1/2022 12:22 PM HISTORY: Fall TECHNIQUE: Axial images of the cervical spine were obtained without IV contrast. Coronal and sagittal reformatted images are reconstructed and reviewed. COMPARISON: None. DLP: 425 mGy cm Automated exposure control was utilized to minimize patient radiation dose. FINDINGS: Alignment of the cervical spine is appropriate. Moderate to advanced degenerative disc change. Diffuse uncinate process hypertrophy with mild facet osteoarthropathy. No acute cervical vertebral fracture. Incomplete fusion of posterior arch of C1, developmental finding. Mild to moderate canal stenosis at C5/6 with mild narrowing about below this level. Scattered moderate to severe foraminal stenosis. Moderate carotid bulb calcification. No apical lung nodule. 1. Moderate to advanced degenerative changes cervical spine with no acute cervical vertebral fracture or traumatic malalignment.  Signed by Dr Perez Juarez Additional Contrast? None    Result Date: 4/2/2022  EXAMINATION: CT pelvis without contrast 4/2/2022 HISTORY: Pubic pain post fall Dose: 807 mGycm. All CT scans are performed using dose optimization techniques as appropriate to the performed exam and include at least one of the following: Automated exposure control, adjustment of the mA and/or kV according to size, and the use of the iterative reconstruction technique. FINDINGS: Multiple contiguous axial images are obtained through the bony pelvis per protocol without venous contrast enhancement with reformatted images obtained in the sagittal coronal projections from the original data set. At the L4-L5 level there is broad-based bulging of disc as well as moderate facet and ligamentous hypertrophy. There is moderate central spinal stenosis as well as moderate bilateral neural foraminal narrowing. There is mild grade 1 anterolisthesis of L4 on L5 likely representing subluxation at the level of the facets. The SI joints are intact. No evidence of sacral fracture. The patient is status post bilateral total hip arthroplasty. This does obscure some of the anatomic detail secondary to streaking artifact. There are old fractures of the right superior and inferior pubic ramus. No acute fractures are present. The pubic symphysis is intact. A Saeed catheter is in place within the bladder. There is mild fecal impaction within the rectal vault. 1.. Old fractures of the right superior and inferior pubic rami. No additional fractures are present. The SI joints and pubic symphysis are intact with no evidence of diastases. 2. Facet and ligamentous hypertrophy as well as bulging of the disc contributes to central and foraminal stenosis at L4-L5. There is grade 1 anterolisthesis of L4 on L5 felt to represent subluxation at the level of the facets. 3. Constipation with increased stool throughout the colon including fecal impaction within the rectal vault.  Signed by Dr Benita Almazan [place]    US LIVER    Result Date: 4/5/2022  US LIVER 4/5/2022 8:29 AM HISTORY: Biliary duct dilatation COMPARISON: NONE TECHNIQUE: Multiple longitudinal and transverse realtime sonographic images of the right upper quadrant of the abdomen are obtained. FINDINGS:  Pancreas: Normal in size and echogenicity. Liver: Normal in size, echogenicity and echotexture. No focal lesion. A small amount of free fluid is noted within the perihepatic space. Hepatopedal flow is noted within the portal vein. Gallbladder: No intraluminal echoes, wall thickening, or pericholecystic fluid. Bile ducts: The CBD measures 1.0 cm in diameter. There is extrahepatic biliary ductal dilatation. There is a questionable filling defect within the common hepatic duct measuring approximately 13 x 12 x 5 mm in size. Follow-up with MR imaging to include MRCP would be helpful although the patient has great difficulty in remaining motionless on both CT imaging from earlier this month and today's ultrasound and may result in a poor quality study. . Right kidney: Normal in size, shape and contour. No mass, hydronephrosis or calculi. No perinephric fluid collection. Other: There is a small amount of free fluid within the perisplenic space. Small pleural effusions are present. .     1. Mild extrahepatic biliary dilatation is present. There is a question of a filling defect within the common hepatic duct near the mike hepatis. This may be artifactual in nature as the patient had difficulty in cooperating with the exam with some motion artifact. By CT imaging the more distal common duct is also dilated. MRCP may be helpful but given the patient's limitations may be of limited quality. 2. The gallbladder is unremarkable. No evidence of gallstones. A small amount of free fluid is noted within the perihepatic and perisplenic space. Small pleural effusions are present.  Signed by Dr Ana Tejada    XR CHEST PORTABLE    Result Date: 4/1/2022  XR CHEST PORTABLE 4/1/2022 12:21 PM HISTORY: Fatigue COMPARISON: 4/3/2019 CXR: A single frontal view of the chest is performed. Findings: There is volume loss of the right upper hemithorax with ipsilateral shifting of the mediastinal structures. Partial right upper lobe collapse considered. Left lung appears clear. Probable right lower lobe granuloma. Heart is upper limits of normal in size. No pleural effusion or pneumothorax. Degenerative change regional skeleton. 1. Right paratracheal upper lobe opacities with associated volume loss may relate to right upper lobe collapse. Follow-up imaging recommended. CT chest preferably with IV contrast could be performed for further assessment if clinically indicated. Signed by Dr Sherman Fang    Result Date: 4/6/2022  EXAMINATION: CT left hip without contrast 4/6/2022 HISTORY: Severe left hip pain after fall. Dose: 996 mGycm. All CT scans are performed using dose optimization techniques as appropriate to the performed exam and include at least one of the following: Automated exposure control, adjustment of the mA and/or kV according to size, and the use of the iterative reconstruction technique. Mert Lawson FINDINGS: Multiple contiguous axial image are obtained through the left hip per protocol without intravenous contrast enhancement with reformatted images obtained in the sagittal and coronal projections from the original data set. There is skin thickening and ecchymosis overlying the gluteus musculature at the level of the left hip suggesting rather extensive bruising. The patient has undergone previous left total hip arthroplasty. The acetabular cup appears to remain well seated. There is some lucency within the acetabular roof at the interface of the acetabular cup. This could represent changes of ALTR(adverse local tissue reaction) to the arthroplasty. I do not see evidence of a displaced bony fracture.  No convincing evidence of loosening of the prosthesis within the proximal femoral shaft. The rami are intact. 1.. The patient's undergone previous left total hip arthroplasty. The acetabular cup appears to remain well seated. There is some lucency associated with the acetabular roof at the interface with the acetabular cup which could represent changes of adverse local tissue reaction. 2. No evidence of acute displaced fracture. There is rather extensive stranding and ecchymosis overlying the lower left pelvis and left hip and overlying the gluteus musculature consistent with rather extensive bruising. Signed by Dr Dickson Aldridge MRCP    Result Date: 4/7/2022  EXAM: MRI abdomen without and with IV contrast, including 3-D MRCP sequences INDICATION: Further evaluation of potential biliary filling defect identified on recent ultrasound COMPARISON: Ultrasound 4/5/2022 FINDINGS: The study is degraded by patient motion. Liver: No hepatic steatosis or morphologic changes of cirrhosis. No suspicious focal liver lesion. Bile ducts: Intrahepatic and extra hepatic biliary ductal dilatation with gradual caliber tapering down to the ampulla. No obstructing stone or mass identified. Common bile duct measures 17 mm diameter. Gallbladder: No gallstones or gallbladder wall thickening. Pancreas: No pancreatic mass, cyst, or surrounding inflammatory change. Spleen: Not enlarged. No focal lesion. Adrenals: Normal. Kidneys: Tiny LEFT renal cyst. No solid enhancing renal mass. No hydronephrosis. Vasculature: Nonaneurysmal abdominal aorta. Patent portal vein. Other Findings: Distended colon with layering fluid. No ascites. No abdominal lymphadenopathy. No suspicious focal bone lesion. Small bilateral pleural effusions in the lung bases. 1.  Intrahepatic and extra hepatic biliary ductal dilatation with gradual caliber tapering down to the ampulla. No evidence of obstructing stone or mass.  Differential diagnosis includes biliary stricture although occult stone or neoplasm are also within the differential. Recommend correlation with laboratory analysis and consider ERCP for further evaluation. 2.  The study is degraded by patient motion. Signed by Dr Shepard Poster    VL DUP CAROTID BILATERAL    Result Date: 4/4/2022  Vascular Carotid Procedure  Demographics   Patient Name   Michelet Owens  Age                  67                 C   Patient Number [id number]           Gender               Female   Visit Number   [id number]        Interpreting         Lu Miguel MD                                  Physician   Date of Birth  1945       Referring Physician  Farshad Robbins   Accession      [id number]       [address], [address],  Number                                               RDMS  Procedure Type of Study:   Cerebral:Carotid, VL CAROTID BILATERAL. Indications for Study:Questionable CVA/TIA, Unsteady gait and Facial droop, left. Risk Factors   - The patient's risk factor(s) include: arterial hypertension.   - Current - Every day. Impression   There is mixed plaque visualized in the bilateral internal carotid  arteries. There is 50-69% stenosis in the right internal carotid artery. There is less than 50% stenosis of the left internal carotid artery. There is normal antegrade flow in the bilateral vertebral arteries. Signature   ----------------------------------------------------------------  Electronically signed by Lu Miguel MD(Interpreting  physician) on 04/04/2022 03:18 PM  ----------------------------------------------------------------  Blood Pressure:Right arm 121/97 mmHg. Velocities are measured in cm/s ; Diameters are measured in mm Carotid Right Measurements +------------+-------+-------+--------+-------+------------+---------------+ ! Location    ! PSV    ! EDV    ! Angle   ! RI     !%Stenosis   ! Tortuosity     ! +------------+-------+-------+--------+-------+------------+---------------+ ! Prox CCA    !60.3   !20.5   !60      !0.66   ! !               ! +------------+-------+-------+--------+-------+------------+---------------+ ! Mid CCA     !70.8   !19.9   !60      !0.72   !            !               ! +------------+-------+-------+--------+-------+------------+---------------+ ! Prox ICA    !203    !60.4   !60      !0.7    ! !               ! +------------+-------+-------+--------+-------+------------+---------------+ ! Mid ICA     !124    !35.1   ! 60      !0.72   !            !               ! +------------+-------+-------+--------+-------+------------+---------------+ ! Dist ICA    !81.2   !30.7   ! 60      !0.62   !            !               ! +------------+-------+-------+--------+-------+------------+---------------+ ! Prox ECA    !188    !20.9   !60      !0.89   !            !               ! +------------+-------+-------+--------+-------+------------+---------------+ ! Vertebral   !77.9   !16.5   !60      !0.79   !            !               ! +------------+-------+-------+--------+-------+------------+---------------+   - There is antegrade vertebral flow noted on the right side. - Additional Measurements:ICAPSV/CCAPSV 3.37. ICAEDV/CCAEDV 2.95. Carotid Left Measurements +------------+-------+-------+--------+-------+------------+---------------+ ! Location    ! PSV    ! EDV    ! Angle   ! RI     !%Stenosis   ! Tortuosity     ! +------------+-------+-------+--------+-------+------------+---------------+ ! Prox CCA    !120    !22     !60      !0.82   !            !               ! +------------+-------+-------+--------+-------+------------+---------------+ ! Mid CCA     !82.3   !28.5   !60      !0.65   !            !               ! +------------+-------+-------+--------+-------+------------+---------------+ ! Prox ICA    !74.6   !26.3   ! 60      !0.65   !            !               ! +------------+-------+-------+--------+-------+------------+---------------+ ! Mid ICA     !92.2   !34     !60      !0.63   !            ! ! +------------+-------+-------+--------+-------+------------+---------------+ ! Dist ICA    ! 88.9   !28.5   !60      !0.68   !            !               ! +------------+-------+-------+--------+-------+------------+---------------+ ! Prox ECA    !93.3   !16.5   !60      !0.82   !            !               ! +------------+-------+-------+--------+-------+------------+---------------+ ! Vertebral   !40.4   !18.6   ! 60      !0.54   !            !               ! +------------+-------+-------+--------+-------+------------+---------------+   - There is antegrade vertebral flow noted on the left side. - Additional Measurements:ICAPSV/CCAPSV 0.77. ICAEDV/CCAEDV 1.55. MRI BRAIN W WO CONTRAST    Result Date: 4/3/2022  MRI brain without and with contrast 4/3/2022 2:22 PM History: Facial droop Comparison: None. Technique: Multiplanar imaging of the brain was performed in a routine fashion before and after the intravenous injection of gadolinium contrast. Findings: Midline structures are nondisplaced. Ventricular system is normal. There is no significant mass effect or hydrocephalus. Basilar cisterns are preserved. There are scattered foci of T2 abnormality involving the periventricular and higher white matter tracts consistent with moderate small vessel ischemic disease. There is mild gliosis within the nely. There is a focus of low signal within the left thalamus near the posterior limb of the internal capsule on gradient imaging suggesting a site of previous hemorrhage or calcification. On diffusion-weighted imaging this is separable from the area of acute ischemic infarction. No additional sites of acute or chronic hemorrhage are demonstrated. . No abnormal extra axial fluid collections are noted. There is diffusion restriction within the left basal ganglia with involvement of the thalamus, posterior limb of the left internal capsule and putamen with involvement of the external capsule also demonstrated.  These are nonhemorrhagic infarcts. There is associated signal loss on ADC mapping imaging consistent with acute ischemic infarction. No additional sites of diffusion restriction are present. Barrie Founds Postcontrast imaging demonstrates a developmental venous anomaly which is parafalcine in location within the right parietal lobe extending into the region of the corpus callosum. No additional sites of abnormal enhancement are present. Proximal cervical spinal cord, brainstem, and cerebellum are unremarkable. Normal cerebrovascular flow voids are seen. Bilateral globes and orbits are normal in appearance. No abnormal signal is noted in the mastoid air cells or paranasal sinuses. Impression: 1. Diffusion restriction within the left thalamus and basal ganglia with associated ADC mapping abnormality consistent with an acute left-sided infarct. This is nonhemorrhagic. There is no associated mass effect or shift of the midline. 2. There is mild atrophy of the brain. Small vessel ischemic changes are noted involving the periventricular and higher white matter tracts. There is evidence of a remote small focus of hemorrhage within the left thalamus. No additional sites of blooming are present on gradient imaging. 3. DVA within the parafalcine right parietal lobe extending into the region of the corpus callosum. No additional sites of abnormal enhancement are present. Signed by Dr Jen Nunez 2-3 VW W PELVIS RIGHT    Result Date: 4/1/2022  History: Pain after fall Right hip: Frontal view the pelvis as well as frontal and crosstable lateral views of the right hip are obtained. The right prosthesis. No periprosthetic fracture. Old fractures of the right inferior pelvis involving right superior and inferior rami. Enthesophytes project from the right iliac spine. Chronic calcification along the right acetabular roof. Degenerative change of the lower lumbar spine. Vascular calcification.     1. Old fractures of the right inferior pelvis. Bilateral hip prostheses. No acute appearing bony injury identified. Signed by Dr Livia Alan and Plan: This is a 74 y. o. year old female with past medical history of hypertension, active ongoing tobacco use, paroxysmal atrial fibrillation on amiodarone and Eliquis (reduced compliance), moderate aortic stenosis, normal LV ejection fraction presenting with fall with probable acute left thalamic/basal ganglia infarct.     1. Remains in sinus rhythm. Heart rates in the low normal range. Continue current medications unchanged. 2.  PT to continue working with patient.     Susie Roman MD, MD 4/8/2022 9:30 AM

## 2022-04-09 LAB
ANION GAP SERPL CALCULATED.3IONS-SCNC: 13 MMOL/L (ref 7–19)
BUN BLDV-MCNC: 15 MG/DL (ref 8–23)
CALCIUM SERPL-MCNC: 8.9 MG/DL (ref 8.8–10.2)
CHLORIDE BLD-SCNC: 103 MMOL/L (ref 98–111)
CO2: 25 MMOL/L (ref 22–29)
CREAT SERPL-MCNC: 0.9 MG/DL (ref 0.5–0.9)
GFR AFRICAN AMERICAN: >59
GFR NON-AFRICAN AMERICAN: >60
GLUCOSE BLD-MCNC: 89 MG/DL (ref 74–109)
HCT VFR BLD CALC: 37.9 % (ref 37–47)
HEMOGLOBIN: 12 G/DL (ref 12–16)
MCH RBC QN AUTO: 30.4 PG (ref 27–31)
MCHC RBC AUTO-ENTMCNC: 31.7 G/DL (ref 33–37)
MCV RBC AUTO: 95.9 FL (ref 81–99)
PDW BLD-RTO: 13.6 % (ref 11.5–14.5)
PLATELET # BLD: 348 K/UL (ref 130–400)
PMV BLD AUTO: 9.7 FL (ref 9.4–12.3)
POTASSIUM SERPL-SCNC: 4.2 MMOL/L (ref 3.5–5)
RBC # BLD: 3.95 M/UL (ref 4.2–5.4)
SODIUM BLD-SCNC: 141 MMOL/L (ref 136–145)
WBC # BLD: 8.8 K/UL (ref 4.8–10.8)

## 2022-04-09 PROCEDURE — 99232 SBSQ HOSP IP/OBS MODERATE 35: CPT | Performed by: PSYCHIATRY & NEUROLOGY

## 2022-04-09 PROCEDURE — 6370000000 HC RX 637 (ALT 250 FOR IP): Performed by: HOSPITALIST

## 2022-04-09 PROCEDURE — 80048 BASIC METABOLIC PNL TOTAL CA: CPT

## 2022-04-09 PROCEDURE — 6370000000 HC RX 637 (ALT 250 FOR IP): Performed by: INTERNAL MEDICINE

## 2022-04-09 PROCEDURE — 99232 SBSQ HOSP IP/OBS MODERATE 35: CPT | Performed by: INTERNAL MEDICINE

## 2022-04-09 PROCEDURE — 85027 COMPLETE CBC AUTOMATED: CPT

## 2022-04-09 PROCEDURE — 2580000003 HC RX 258: Performed by: HOSPITALIST

## 2022-04-09 PROCEDURE — 1210000000 HC MED SURG R&B

## 2022-04-09 PROCEDURE — 36415 COLL VENOUS BLD VENIPUNCTURE: CPT

## 2022-04-09 RX ORDER — OXYCODONE HYDROCHLORIDE AND ACETAMINOPHEN 5; 325 MG/1; MG/1
1 TABLET ORAL ONCE
Status: COMPLETED | OUTPATIENT
Start: 2022-04-09 | End: 2022-04-09

## 2022-04-09 RX ORDER — OXYCODONE HYDROCHLORIDE AND ACETAMINOPHEN 5; 325 MG/1; MG/1
1 TABLET ORAL EVERY 4 HOURS PRN
Status: DISCONTINUED | OUTPATIENT
Start: 2022-04-09 | End: 2022-04-11 | Stop reason: HOSPADM

## 2022-04-09 RX ORDER — CYCLOBENZAPRINE HCL 10 MG
5 TABLET ORAL 3 TIMES DAILY PRN
Status: DISCONTINUED | OUTPATIENT
Start: 2022-04-09 | End: 2022-04-11 | Stop reason: HOSPADM

## 2022-04-09 RX ADMIN — NYSTATIN: 100000 CREAM TOPICAL at 08:55

## 2022-04-09 RX ADMIN — SODIUM CHLORIDE, PRESERVATIVE FREE 10 ML: 5 INJECTION INTRAVENOUS at 21:09

## 2022-04-09 RX ADMIN — ATORVASTATIN CALCIUM 10 MG: 10 TABLET, FILM COATED ORAL at 10:00

## 2022-04-09 RX ADMIN — HYDROCHLOROTHIAZIDE 25 MG: 25 TABLET ORAL at 10:00

## 2022-04-09 RX ADMIN — Medication 1 CAPSULE: at 10:00

## 2022-04-09 RX ADMIN — AMIODARONE HYDROCHLORIDE 200 MG: 200 TABLET ORAL at 21:09

## 2022-04-09 RX ADMIN — NYSTATIN 500000 UNITS: 100000 SUSPENSION ORAL at 10:00

## 2022-04-09 RX ADMIN — DILTIAZEM HYDROCHLORIDE 120 MG: 120 CAPSULE, COATED, EXTENDED RELEASE ORAL at 21:09

## 2022-04-09 RX ADMIN — DILTIAZEM HYDROCHLORIDE 120 MG: 120 CAPSULE, COATED, EXTENDED RELEASE ORAL at 10:00

## 2022-04-09 RX ADMIN — APIXABAN 5 MG: 5 TABLET, FILM COATED ORAL at 10:00

## 2022-04-09 RX ADMIN — NYSTATIN: 100000 CREAM TOPICAL at 21:14

## 2022-04-09 RX ADMIN — OXYCODONE HYDROCHLORIDE AND ACETAMINOPHEN 1 TABLET: 5; 325 TABLET ORAL at 19:49

## 2022-04-09 RX ADMIN — METOPROLOL SUCCINATE 25 MG: 25 TABLET, EXTENDED RELEASE ORAL at 21:09

## 2022-04-09 RX ADMIN — OXYCODONE HYDROCHLORIDE AND ACETAMINOPHEN 1 TABLET: 5; 325 TABLET ORAL at 03:44

## 2022-04-09 RX ADMIN — APIXABAN 5 MG: 5 TABLET, FILM COATED ORAL at 21:09

## 2022-04-09 RX ADMIN — SODIUM CHLORIDE, PRESERVATIVE FREE 10 ML: 5 INJECTION INTRAVENOUS at 17:18

## 2022-04-09 RX ADMIN — HYDROXYZINE HYDROCHLORIDE 25 MG: 25 TABLET ORAL at 21:09

## 2022-04-09 RX ADMIN — AMIODARONE HYDROCHLORIDE 200 MG: 200 TABLET ORAL at 10:00

## 2022-04-09 RX ADMIN — TRAMADOL HYDROCHLORIDE 50 MG: 50 TABLET, COATED ORAL at 02:39

## 2022-04-09 RX ADMIN — NYSTATIN 500000 UNITS: 100000 SUSPENSION ORAL at 21:08

## 2022-04-09 RX ADMIN — METOPROLOL SUCCINATE 25 MG: 25 TABLET, EXTENDED RELEASE ORAL at 10:00

## 2022-04-09 ASSESSMENT — PAIN DESCRIPTION - FREQUENCY: FREQUENCY: CONTINUOUS

## 2022-04-09 ASSESSMENT — PAIN SCALES - GENERAL
PAINLEVEL_OUTOF10: 9
PAINLEVEL_OUTOF10: 0
PAINLEVEL_OUTOF10: 8
PAINLEVEL_OUTOF10: 5
PAINLEVEL_OUTOF10: 9
PAINLEVEL_OUTOF10: 9

## 2022-04-09 ASSESSMENT — PAIN DESCRIPTION - PROGRESSION: CLINICAL_PROGRESSION: NOT CHANGED

## 2022-04-09 ASSESSMENT — PAIN DESCRIPTION - LOCATION: LOCATION: BACK

## 2022-04-09 ASSESSMENT — PAIN DESCRIPTION - DESCRIPTORS: DESCRIPTORS: ACHING;SORE

## 2022-04-09 ASSESSMENT — PAIN DESCRIPTION - PAIN TYPE: TYPE: CHRONIC PAIN

## 2022-04-09 ASSESSMENT — PAIN DESCRIPTION - ONSET: ONSET: ON-GOING

## 2022-04-09 ASSESSMENT — PAIN - FUNCTIONAL ASSESSMENT: PAIN_FUNCTIONAL_ASSESSMENT: PREVENTS OR INTERFERES SOME ACTIVE ACTIVITIES AND ADLS

## 2022-04-09 ASSESSMENT — PAIN DESCRIPTION - ORIENTATION: ORIENTATION: LOWER

## 2022-04-09 NOTE — PROGRESS NOTES
Patient:   Yadiel Peterson  MR#:    [id number]   Room:    2819/044-17   YOB: 1945  Date of Progress Note: 4/9/2022  Time of Note                           9:31 AM  Consulting Physician:   Melissa Mendes M.D. Attending Physician:  Sarah Fields, *     Chief complaint Slurred speech, facial droop    S:This 67 y.o. female  with a past medical history significant for atrial fibrillation on chronic anticoagulation with Eliquis, previous pelvic fracture and hypertension is seen for evaluation of facial droop and slurred speech. The patient has been on 4/1/2022 after being found down at home. Had fallen approximately 3 days prior to admission due to leg weakness. She was laying on the ground and was unable to get help. She does not recall why she fell. The neighbor tried to go over to the house that she had not heard from her and the patient was able to crawl to unlock the door but was unable to get up. The patient indicates she has a previous history of pelvic fracture in office take some Tylenol and tramadol at home with good pain relief. On admission her serum CK level was 7136. Her serum creatinine was 0.9 with a BUN of 41. Her CT of the head had some mild generalized volume loss with no acute changes noted. CT of the pelvis revealed old fracture of the right superior and inferior pubic rami. Patient is complaining of right pelvic pain. Patient denies any history of stroke. She indicates she had some dental work done and ever since then her face has been droop. She wears dentures and indicates her speech is slurred due to that and a dry mouth. Still pain in the right groin occasionally. Percocet does help.       REVIEW OF SYSTEMS:  Constitutional: No fevers No chills  Neck:No stiffness  Respiratory: No shortness of breath  Cardiovascular: No chest pain No palpitations  Gastrointestinal: No abdominal pain    Genitourinary: No Dysuria  Neurological: No headache, no confusion    Past Medical History:      Diagnosis Date    Chronic back pain     Hypertension     Inflammatory arthritis     Knee pain     Spastic colon        Past Surgical History:      Procedure Laterality Date    ABDOMEN SURGERY      APPENDECTOMY      BACK SURGERY      neck; bone out of hip to fuse neck    FINGER TRIGGER RELEASE Left 12/17/2020    LEFT MIDDLE TRIGGER FINGER RELEASE performed by Kayy Martinez MD at [facility] HAND SURGERY Left 2012    fx repair (3 total surgeries)    HAND SURGERY Left 12/17/2020    LEFT WRIST MASS EXCISION performed by Kayy Martinez MD at [facility] [facility] [address] Bilateral     HIP    MOUTH SURGERY      TOTAL KNEE ARTHROPLASTY Right 9/2/2021    RIGHT TOTAL KNEE ARTHROPLASTY performed by Linda Burt MD at [address] [place] [address]         Medications in Hospital:      Current Facility-Administered Medications:     oxyCODONE-acetaminophen (PERCOCET) 5-325 MG per tablet 1 tablet, 1 tablet, Oral, Q4H PRN, Juana Fields MD    hydroCHLOROthiazide (HYDRODIURIL) tablet 25 mg, 25 mg, Oral, Daily, Juana Fields MD, 25 mg at 04/08/22 1340    amiodarone (CORDARONE) tablet 200 mg, 200 mg, Oral, BID, Katina Kilpatrick MD, 200 mg at 04/08/22 2142    [START ON 4/10/2022] amiodarone (CORDARONE) tablet 200 mg, 200 mg, Oral, Daily, Katina Kilpatrick MD    metoprolol succinate (TOPROL XL) extended release tablet 25 mg, 25 mg, Oral, BID, Katina Kilpatrick MD, 25 mg at 04/08/22 2141    dilTIAZem (CARDIZEM CD) extended release capsule 120 mg, 120 mg, Oral, BID, Katina Kilpatrick MD, 120 mg at 04/08/22 2142    atorvastatin (LIPITOR) tablet 10 mg, 10 mg, Oral, Daily, Juana Fields MD, 10 mg at 04/08/22 1041    lactobacillus (CULTURELLE) capsule 1 capsule, 1 capsule, Oral, Daily, Juana Fields MD, 1 capsule at 04/07/22 1008    simethicone (MYLICON) chewable tablet 80 mg, 80 mg, Oral, 4x Daily PRN, KATI Harding - CNP, 80 mg at 04/08/22 1942    nystatin (MYCOSTATIN) 881105 UNIT/ML suspension 500,000 Units, 5 mL, Oral, 4x Daily, Stormy Ballard MD, 500,000 Units at 04/08/22 2141    potassium chloride (KLOR-CON M) extended release tablet 40 mEq, 40 mEq, Oral, PRN **OR** potassium bicarb-citric acid (EFFER-K) effervescent tablet 40 mEq, 40 mEq, Oral, PRN **OR** potassium chloride 10 mEq/100 mL IVPB (Peripheral Line), 10 mEq, IntraVENous, PRN, Marielena Dotsonal, APRN - CNP    magic butt cream, , Topical, Q4H PRN, Marielena Manjarrez APRN - CNP, Given at 04/02/22 2136    sodium chloride flush 0.9 % injection 5-40 mL, 5-40 mL, IntraVENous, 2 times per day, Stormy Ballard MD, 10 mL at 04/08/22 2142    sodium chloride flush 0.9 % injection 5-40 mL, 5-40 mL, IntraVENous, PRN, Stormy Ballard MD, 10 mL at 04/03/22 1657    0.9 % sodium chloride infusion, , IntraVENous, PRN, Stormy Ballard MD    ondansetron (ZOFRAN-ODT) disintegrating tablet 4 mg, 4 mg, Oral, Q8H PRN **OR** ondansetron (ZOFRAN) injection 4 mg, 4 mg, IntraVENous, Q6H PRN, Stormy Ballard MD, 4 mg at 04/08/22 1340    polyethylene glycol (GLYCOLAX) packet 17 g, 17 g, Oral, Daily PRN, Stormy Ballard MD    apixaban (ELIQUIS) tablet 5 mg, 5 mg, Oral, BID, Stormy Ballard MD, 5 mg at 04/08/22 2142    fluticasone (FLONASE) 50 MCG/ACT nasal spray 2 spray, 2 spray, Nasal, Daily, Stormy Ballard MD, 2 spray at 04/08/22 1043    hydrOXYzine (ATARAX) tablet 25 mg, 25 mg, Oral, Nightly, Stormy Ballard MD, 25 mg at 04/08/22 2142    traMADol (ULTRAM) tablet 50 mg, 50 mg, Oral, Q6H PRN, Stormy Ballard MD, 50 mg at 04/09/22 0239    vitamin D (ERGOCALCIFEROL) capsule 50,000 Units, 50,000 Units, Oral, Weekly, Stormy Ballard MD, 50,000 Units at 04/08/22 1041    nystatin (MYCOSTATIN) cream, , Topical, BID, Dhara KATI Sellers CNP, Given at 04/08/22 2143    Allergies:  Levofloxacin in d5w, Pyridium [phenazopyridine], Sulfamethoxazole-trimethoprim, Codeine, Hydrocodone-acetaminophen, Hydromorphone hcl, and Oxycodone    Social History:   TOBACCO:   reports that she has been smoking cigarettes. She has been smoking about 0.50 packs per day. She has never used smokeless tobacco.  ETOH:   reports no history of alcohol use. Family History:       Problem Relation Age of Onset    Other Other         Son DVT    Cancer Mother         colon     Heart Attack Mother     Heart Attack Father          PHYSICAL EXAM:  /63   Pulse 58   Temp 97.2 °F (36.2 °C)   Resp 16   Ht 5' 3\" (1.6 m)   Wt 145 lb (65.8 kg)   SpO2 90%   BMI 25.69 kg/m²     Constitutional - well developed, well nourished. Eyes - conjunctiva normal.   Ear, nose, throat - No scars, masses, or lesions over external nose or ears, no atrophy of tongue  Neck-symmetric, no masses noted, no jugular vein distension  Respiration- chest wall appears symmetric, good expansion,   normal effort without use of accessory muscles  Musculoskeletal - no significant wasting of muscles noted, no bony deformities  Extremities-no clubbing, cyanosis or edema  Skin - warm, dry, and intact. No rash, erythema, or pallor. Psychiatric - mood, affect, and behavior appear normal.      Neurological exam  Awake, alert, fluent oriented appropriate affect  Attention and concentration appear appropriate  Recent and remote memory appears unremarkable  Speech with dysarthria  No clear issues with language of fund of knowledge     Cranial Nerve Exam     CN III, IV,VI-EOMI, No nystagmus, conjugate eye movements, no ptosis    CN VII-right lower facial droop       Motor Exam  antigravity throughout upper and lower extremities bilaterally  Giveway more on the right      Tremors- no tremors in hands or head noted     Gait  Not tested     Nursing/pcp notes, imaging,labs and vitals reviewed.      PT,OT and/or speech notes reviewed    Lab Results   Component Value Date    WBC 8.8 04/09/2022    HGB 12.0 04/09/2022    HCT 37.9 04/09/2022    MCV 95.9 04/09/2022     04/09/2022     Lab Results   Component Value Date     04/09/2022    K 4.2 04/09/2022     04/09/2022    CO2 25 04/09/2022    BUN 15 04/09/2022    CREATININE 0.9 04/09/2022    GLUCOSE 89 04/09/2022    CALCIUM 8.9 04/09/2022    PROT 5.8 (L) 04/06/2022    LABALBU 3.4 (L) 04/06/2022    BILITOT 0.3 04/06/2022    ALKPHOS 90 04/06/2022    AST 35 (H) 04/06/2022    ALT 48 (H) 04/06/2022    LABGLOM >60 04/09/2022    GFRAA >59 04/09/2022     Lab Results   Component Value Date    INR 1.03 04/01/2022    INR 0.96 08/30/2021    PROTIME 13.7 04/01/2022    PROTIME 13.0 08/30/2021       MRI BRAIN W WO CONTRAST [[id number]]    Resulted: 04/03/22 1553    Updated: 04/03/22 1555    Narrative:     MRI brain without and with contrast 4/3/2022 2:22 PM   History: Facial droop   Comparison: None.     Technique: Multiplanar imaging of the brain was performed in a routine   fashion before and after the intravenous injection of gadolinium   contrast.   Findings:   Midline structures are nondisplaced. Ventricular system is normal.   There is no significant mass effect or hydrocephalus. Basilar cisterns   are preserved. There are scattered foci of T2 abnormality involving   the periventricular and higher white matter tracts consistent with   moderate small vessel ischemic disease. There is mild gliosis within   the nely. There is a focus of low signal within the left thalamus near   the posterior limb of the internal capsule on gradient imaging   suggesting a site of previous hemorrhage or calcification. On   diffusion-weighted imaging this is separable from the area of acute   ischemic infarction. No additional sites of acute or chronic   hemorrhage are demonstrated. . No abnormal extra axial fluid   collections are noted.  There is diffusion restriction within the left   basal ganglia with involvement of the thalamus, posterior limb of the   left internal capsule and putamen with involvement of the external   capsule also demonstrated. These are nonhemorrhagic infarcts. There is   associated signal loss on ADC mapping imaging consistent with acute   ischemic infarction. No additional sites of diffusion restriction are   present. Angella Isaac Postcontrast imaging demonstrates a developmental venous anomaly which   is parafalcine in location within the right parietal lobe extending   into the region of the corpus callosum. No additional sites of   abnormal enhancement are present. Proximal cervical spinal cord, brainstem, and cerebellum are   unremarkable. Normal cerebrovascular flow voids are seen. Bilateral   globes and orbits are normal in appearance. No abnormal signal is noted in the mastoid air cells or paranasal   sinuses. Impression:     Impression:   1. Diffusion restriction within the left thalamus and basal ganglia   with associated ADC mapping abnormality consistent with an acute   left-sided infarct. This is nonhemorrhagic. There is no associated   mass effect or shift of the midline. 2. There is mild atrophy of the brain. Small vessel ischemic changes   are noted involving the periventricular and higher white matter   tracts. There is evidence of a remote small focus of hemorrhage within   the left thalamus. No additional sites of blooming are present on   gradient imaging. 3. DVA within the parafalcine right parietal lobe extending into the   region of the corpus callosum. No additional sites of abnormal   enhancement are present.    Signed by Dr Tung Pagan     ECHO Complete 2D W Doppler Julio Cesar Joy [[id number]]    Collected: 04/03/22 1020    Updated: 04/04/22 0825    Narrative:     Transthoracic Echocardiography Report (TTE)      Demographics        Patient Name Chente Raymond Date of Study          04/03/2022        MRN           [id number]            Gender                 Female        Date of Birth 1945        Room Number            [id number]        Age           76 year(s)        Height:       63 inches         Referring Physician    Ravinder Benitez        Weight:       145 pounds        Sonographer            Mary Tolentino [facility]        BSA:          1.69 m^2          Interpreting Physician Jammie Rios MD        BMI:          25.69 kg/m^2       Procedure     Type of Study        TTE procedure:ECHO NO CONTRAST WITH DOP/COLR.       Study Location: Echo Lab   Technical Quality: Adequate visualization     Patient Status: Inpatient     Rhythm: Within normal limits BP: 121/92 mmHg     Indications:TIA.      Conclusions        Summary    Mitral valve leaflets are mildly thickened with preserved leaflet    mobility.    Mitral annular calcification is present.    Mild mitral regurgitation is present.    Mild aortic stenosis.    Bicuspid valve not excluded    Mean gradient 15 mm hg    Tricuspid valve is structurally normal.    Mild tricuspid regurgitation with estimated RVSP of 39 mm Hg.    Moderately dilated left atrium.    Normal left ventricular size with preserved LV function and an estimated    ejection fraction of approximately 55-60%.  Mild concentric left ventricular hypertrophy.    Grade II diastolic dysfunction    Mildly enlarged right ventricle cavity.  IVC dilated.        Signature        ----------------------------------------------------------------    Electronically signed by Jammie Rios MD(Interpreting    [id number]) on 04/04/2022 08:25 AM    ----------------------------------------------------------------            CT HIP RIGHT WO CONTRAST [[id number]]    Resulted: 04/08/22 1052    Updated: 04/08/22 1054    Narrative:     CT HIP RIGHT WO CONTRAST 4/8/2022 10:43 AM   HISTORY: Right groin pain   COMPARISON: CT scan dated 4/2/2022   DOSE LENGTH PRODUCT: 989 mGy cm   TECHNIQUE: Helical tomographic images of the right hip were obtained   without the use of intravenous contrast. Automated exposure control   was also utilized to decrease patient radiation dose. FINDINGS:   No acute fracture is identified. Unremarkable right total hip   arthroplasty. No hardware complication identified. There are old   healed right pubic rami fractures. Pubic symphysis is intact. 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 Advanced atheromatous   vascular calcification. Surrounding soft tissue structures are   unremarkable.       Impression:     1. No acute fracture or malalignment. 2. Old healed right pubic rami fractures. 3. Unremarkable right total hip arthroplasty. Signed by Dr Yevgeniy Hannon     CATHY Zavala [[id number]]    Resulted: 04/07/22 1353    Updated: 04/07/22 1355    Narrative:     EXAM: MRI abdomen without and with IV contrast, including 3-D MRCP   sequences   INDICATION: Further evaluation of potential biliary filling defect   identified on recent ultrasound   COMPARISON: Ultrasound 4/5/2022   FINDINGS:   The study is degraded by patient motion. Liver: No hepatic steatosis or morphologic changes of cirrhosis. No   suspicious focal liver lesion. Bile ducts: Intrahepatic and extra hepatic biliary ductal dilatation   with gradual caliber tapering down to the ampulla. No obstructing   stone or mass identified. Common bile duct measures 17 mm diameter. Gallbladder: No gallstones or gallbladder wall thickening. Pancreas: No pancreatic mass, cyst, or surrounding inflammatory   change. Spleen: Not enlarged. No focal lesion. Adrenals: Normal.   Kidneys: Tiny LEFT renal cyst. No solid enhancing renal mass. No   hydronephrosis. Vasculature: Nonaneurysmal abdominal aorta. Patent portal vein. Other Findings: Distended colon with layering fluid. No ascites. No   abdominal lymphadenopathy. No suspicious focal bone lesion. Small   bilateral pleural effusions in the lung bases. Impression:     1.  Intrahepatic and extra hepatic biliary ductal dilatation with   gradual caliber tapering down to the ampulla.  No evidence of   obstructing stone or mass. Differential diagnosis includes biliary   stricture although occult stone or neoplasm are also within the   differential. Recommend correlation with laboratory analysis and   consider ERCP for further evaluation. 2.  The study is degraded by patient motion.    Signed by Dr Soler Mouse: Previous medical records, medications were reviewed at today's visit    IMPRESSION:   Acute ischemic stroke-2 areas of ischemia- left basal ganglia and left thalamus presumed cardi embolic with history of atrial fibrillation and patient taking her Eliquis only once a day prior to admission-table examination    Facial droop, dysarthria, fall, weakness     Initial ExaminationGeneralized giveaway weakness worse in the right arm and right leg  Right lower facial droop with dysarthric speech    MRI of the brain-acute ischemia left basal ganglia and thalamus-personally reviewed  Carotid ultrasound-preliminarily <50% stenosis left internal carotid and asymptomatic 50-69% stenosis right internal carotid-medical management  2D echo-LV EF 55-60%-moderately dilated LA  On Eliquis now twice a day-no bleeding noted     GI-bowel regimen  Atrial fibrillation-Eliquis/amiodarone/metoprolol  Allergies-Flonase  Hypertension-on medications monitor-systolic blood pressure [id number]  Hypokalemia-on potassium-serum potassium normal  Rhabdomyolysis-IV fluids-CK trending down  Vitamin D deficiency-on vitamin D  Pain control-try Percocet as needed  DVT prophylaxis-Eliquis  PT/OT/speech     Too low functioning for rehab    MRI of the abdomen-intrahepatic and extrahepatic biliary ductal dilatation with gradual tapering down to the ampulla-differential includes biliary stricture  CT of the right hip-no acute fracture noted  Disposition pending     Continue supportive care as noted    Expected duration and frequency therapy: 180 minutes per day, 5 days per week    CALL WITH ANY QUESTIONS  [phone] CELL  Dr Marleen Atwood Ravinder

## 2022-04-09 NOTE — PROGRESS NOTES
Cardiology Progress Note Breanna Arana MD      Patient:  Raeann Courser  [id number]    Patient Active Problem List    Diagnosis Date Noted    Chronic pain disorder 04/04/2022     Priority: Low    Gastroesophageal reflux disease 04/04/2022     Priority: Low    Palliative care patient 04/04/2022     Priority: Low    Diastolic dysfunction 83/69/6631     Priority: Low    Facial droop 04/03/2022     Priority: Low    Dysarthria 04/03/2022     Priority: Low    History of pelvic fracture 04/03/2022     Priority: Low    Hip pain 04/03/2022     Priority: Low    Hypokalemia 04/03/2022     Priority: Low    Vitamin D deficiency 04/03/2022     Priority: Low    Gait abnormality 04/03/2022     Priority: Low    Rhabdomyolysis 04/01/2022     Priority: Low    Fall 04/01/2022     Priority: Low    HTN (hypertension) 04/01/2022     Priority: Low    Generalized weakness 04/01/2022     Priority: Low    Anticoagulated 04/01/2022     Priority: Low    Dehydration 04/01/2022     Priority: Low    Intertrigo 04/01/2022     Priority: Low    Paroxysmal atrial fibrillation ([address] 04/01/2022     Priority: Low    Transaminitis 04/01/2022     Priority: Low    Abnormal CXR 04/01/2022     Priority: Low    Osteoarthritis of right knee 09/02/2021     Priority: Low    Trigger middle finger of right hand 12/17/2020     Priority: Low    Mass of right wrist 12/17/2020     Priority: Low       Admit Date:  4/1/2022    Admission Problem List: Present on Admission:   Rhabdomyolysis   Fall   HTN (hypertension)   Generalized weakness   Anticoagulated   Dehydration   Intertrigo   Paroxysmal atrial fibrillation (HCC)   Transaminitis   Abnormal CXR   Facial droop   Dysarthria   History of pelvic fracture   Hip pain   Hypokalemia   Vitamin D deficiency   Gait abnormality   Palliative care patient   Diastolic dysfunction      Cardiac Specific Data:  Specialty Problems        Cardiology Problems    HTN (hypertension) Paroxysmal atrial fibrillation (HCC)            1.  Paroxysmal atrial fibrillation on Eliquis (reduced compliance) and amiodarone (29% burden on Zio). 2.  Moderate aortic stenosis (echo 9/2021 with mean gradient 27 mmHg), normal LV ejection fraction. 3.  Active ongoing tobacco use. 4.  Acute left thalamic/basal ganglia infarct, intermediate right ICA stenosis. Subjective:  Ms. Arlet Chambers complains of cramping in right hypochondrial area involving her right hip. No chest pain or shortness of breath. Remains in sinus rhythm. Objective:   BP (!) 143/59   Pulse 62   Temp 97.8 °F (36.6 °C) (Temporal)   Resp 18   Ht 5' 3\" (1.6 m)   Wt 145 lb (65.8 kg)   SpO2 95%   BMI 25.69 kg/m²       Intake/Output Summary (Last 24 hours) at 4/9/2022 1212  Last data filed at 4/9/2022 0313  Gross per 24 hour   Intake --   Output 550 ml   Net -550 ml       Prior to Admission medications    Medication Sig Start Date End Date Taking? Authorizing Provider   pantoprazole sodium (PROTONIX) 40 MG PACK packet Take 40 mg by mouth every morning (before breakfast)   Yes Historical Provider, MD   diphenhydrAMINE (BENADRYL) 50 MG capsule Take 50 mg by mouth nightly as needed for Itching   Yes Historical Provider, MD   simethicone (MYLICON) 80 MG chewable tablet Take 125 mg by mouth every 6 hours as needed for Flatulence   Yes Historical Provider, MD   DM-APAP-CPM (CORICIDIN HBP) -2 MG TABS Take 325 mg by mouth every 4-6 hours as needed   Yes Historical Provider, MD   dicyclomine (BENTYL) 10 MG capsule Take 10 mg by mouth three times daily    Historical Provider, MD   benzonatate (TESSALON) 200 MG capsule Take 200 mg by mouth 3 times daily    Historical Provider, MD   hydrOXYzine (ATARAX) 25 MG tablet Take 25 mg by mouth at bedtime 2/23/22   Historical Provider, MD   traMADol (ULTRAM) 50 MG tablet Take 50 mg by mouth 2 times daily.     Historical Provider, MD   apixaban (ELIQUIS) 5 MG TABS tablet Take 5 mg by mouth 2 times daily Historical Provider, MD   metoprolol succinate (TOPROL XL) 25 MG extended release tablet Take 1 tablet by mouth daily 11/4/21   KATI Hernández   vitamin D (ERGOCALCIFEROL) 1.25 MG (43026 UT) CAPS capsule Take 50,000 Units by mouth once a week    Historical Provider, MD   ondansetron (ZOFRAN) 4 MG tablet Take 1 tablet by mouth every 8 hours as needed for Nausea or Vomiting 9/2/21   Micaela Sheikh MD   lisinopril (PRINIVIL;ZESTRIL) 20 MG tablet Take 20 mg by mouth nightly Indications: High Blood Pressure Disorder    Historical Provider, MD   fluticasone (FLONASE) 50 MCG/ACT nasal spray 2 sprays by Nasal route daily     Historical Provider, MD   loratadine (CLARITIN) 10 MG capsule Take 10 mg by mouth daily    Historical Provider, MD   furosemide (LASIX) 20 MG tablet Take 1 tablet by mouth daily as needed (swelling)  Patient taking differently: Take 40 mg by mouth daily as needed (swelling)  5/6/20   Divina Pepper MD   celecoxib (CELEBREX) 200 MG capsule Take 1 capsule by mouth daily 6/19/18   Divina Pepper MD        hydroCHLOROthiazide  25 mg Oral Daily    amiodarone  200 mg Oral BID    [START ON 4/10/2022] amiodarone  200 mg Oral Daily    metoprolol succinate  25 mg Oral BID    dilTIAZem  120 mg Oral BID    atorvastatin  10 mg Oral Daily    lactobacillus  1 capsule Oral Daily    nystatin  5 mL Oral 4x Daily    sodium chloride flush  5-40 mL IntraVENous 2 times per day    apixaban  5 mg Oral BID    fluticasone  2 spray Nasal Daily    hydrOXYzine  25 mg Oral Nightly    vitamin D  50,000 Units Oral Weekly    nystatin   Topical BID       TELEMETRY: Sinus     Physical Exam:      Physical Exam  Constitutional:       General: She is not in acute distress. Appearance: She is not diaphoretic. HENT:      Mouth/Throat:      Pharynx: No oropharyngeal exudate. Eyes:      General: No scleral icterus. Right eye: No discharge. Left eye: No discharge.    Neck:      Thyroid: No thyromegaly. Vascular: No JVD. Cardiovascular:      Rate and Rhythm: Normal rate and regular rhythm. No extrasystoles are present. Heart sounds: Normal heart sounds, S1 normal and S2 normal. No murmur heard. No systolic murmur is present. No diastolic murmur is present. No friction rub. No gallop. No S3 or S4 sounds. Comments: No JVD  No edema    Pulmonary:      Effort: Pulmonary effort is normal. No respiratory distress. Breath sounds: Normal breath sounds. No wheezing or rales. Chest:      Chest wall: No tenderness. Abdominal:      General: Bowel sounds are normal. There is no distension. Palpations: Abdomen is soft. There is no mass. Tenderness: There is no abdominal tenderness. There is no guarding or rebound. Hernia: No hernia is present. Comments: Soft, no palpable tenderness  No palpable organomegaly   Musculoskeletal:         General: Normal range of motion. Skin:     General: Skin is warm. Coloration: Skin is not pale. Findings: No rash. Neurological:      Mental Status: She is alert and oriented to person, place, and time. Comments: Right side appears weaker than left side on clinical exam today both upper and lower extremities comparatively                 Lab Data:  CBC:   Recent Labs     04/07/22  0249 04/08/22  0351 04/09/22  0401   WBC 8.1 7.1 8.8   HGB 12.0 12.3 12.0   HCT 37.8 39.4 37.9   MCV 96.9 97.5 95.9    296 348     BMP:   Recent Labs     04/07/22  0249 04/08/22  0351 04/09/22  0401    140 141   K 3.8 4.4 4.2    106 103   CO2 23 24 25   BUN 14 22 15   CREATININE 0.8 0.9 0.9     LIVER PROFILE: No results for input(s): AST, ALT, LIPASE, BILIDIR, BILITOT, ALKPHOS in the last 72 hours. Invalid input(s): AMYLASE,  ALB  PT/INR: No results for input(s): PROTIME, INR in the last 72 hours. APTT: No results for input(s): APTT in the last 72 hours. BNP:  No results for input(s): BNP in the last 72 hours.   CK, CKMB, Troponin: @LABRCNT (CKTOTAL:3, CKMB:3, TROPONINI:3)@    IMAGING:  ECHO Complete 2D W Doppler W Color    Result Date: 4/4/2022  Transthoracic Echocardiography Report (TTE)  Demographics   Patient Name  Kelli Chaidez Date of Study          04/03/2022   MRN           [id number]            Gender                 Female   Date of Birth 1945        Room Number            [id number]   Age           67 year(s)   Height:       63 inches         Referring Physician    Monet Sosa   Weight:       145 pounds        Sonographer            Mary Tolentino RDCS   BSA:          1.69 m^2          Interpreting Physician Shivani Powers MD   BMI:          25.69 kg/m^2  Procedure Type of Study   TTE procedure:ECHO NO CONTRAST WITH DOP/COLR. Study Location: Echo Lab Technical Quality: Adequate visualization Patient Status: Inpatient Rhythm: Within normal limits BP: 121/92 mmHg Indications:TIA. Conclusions   Summary  Mitral valve leaflets are mildly thickened with preserved leaflet  mobility. Mitral annular calcification is present. Mild mitral regurgitation is present. Mild aortic stenosis. Bicuspid valve not excluded  Mean gradient 15 mm hg  Tricuspid valve is structurally normal.  Mild tricuspid regurgitation with estimated RVSP of 39 mm Hg. Moderately dilated left atrium. Normal left ventricular size with preserved LV function and an estimated  ejection fraction of approximately 55-60%. Mild concentric left ventricular hypertrophy. Grade II diastolic dysfunction  Mildly enlarged right ventricle cavity. IVC dilated. Signature   ----------------------------------------------------------------  Electronically signed by Shivani Powers MD(Interpreting  physician) on 04/04/2022 08:25 AM  ----------------------------------------------------------------   Findings   Mitral Valve  Mitral valve leaflets are mildly thickened with preserved leaflet  mobility. Mitral annular calcification is present.   Mild mitral regurgitation is present. Aortic Valve  Mild aortic stenosis. Bicuspid valve not excluded  Mean gradient 15 mm hg   Tricuspid Valve  Tricuspid valve is structurally normal.  Mild tricuspid regurgitation with estimated RVSP of 39 mm Hg. Pulmonic Valve  The pulmonic valve was not well visualized . Left Atrium  Moderately dilated left atrium. Left Ventricle  Normal left ventricular size with preserved LV function and an estimated  ejection fraction of approximately 55-60%. Mild concentric left ventricular hypertrophy. Grade II diastolic dysfunction   Right Atrium  Normal right atrial dimension with no evidence of thrombus or mass noted. Right Ventricle  Mildly enlarged right ventricle cavity. Pericardial Effusion  No evidence of significant pericardial effusion is noted. Pleural Effusion  No evidence of pleural effusion. Miscellaneous  IVC dilated. M-Mode Measurements (cm)   LVIDd: 4.61 cm                         LVIDs: 2.95 cm  IVSd: 1.33 cm  LVPWd: 1.33 cm                         AO Root Dimension: 3 cm  % Ejection Fraction: 61 %              LA: 3.7 cm                                         LVOT: 2 cm  Doppler Measurements:   AV Peak Velocity:264 cm/s            MV Peak E-Wave: 102 cm/s  AV Peak Gradient: 27.88 mmHg         MV Peak A-Wave: 90.4 cm/s  AV Mean Gradient: 15 mmHg            MV E/A Ratio: 1.13 %  AV Area (Continuity):1.14 cm^2       MV Peak Gradient: 4.16 mmHg  TR Velocity:300 cm/s                 MV P1/2t: 37 msec  TR Gradient:36 mmHg                  MVA by PHT5.95 cm^2  Estimated RAP:3 mmHg  RVSP:39 mmHg      CT ABDOMEN PELVIS WO CONTRAST Additional Contrast? None    Result Date: 4/1/2022  CT ABDOMEN PELVIS WO CONTRAST 4/1/2022 12:23 PM HISTORY: Fall COMPARISON: None DLP: 972 mGy cm TECHNIQUE: Noncontrast enhanced images of the abdomen and pelvis obtained without oral contrast. Automated exposure control was also utilized to decrease patient radiation dose. FINDINGS: Lung bases are clear. Coronary atheromatous calcification. LIVER: No focal liver lesion. BILIARY SYSTEM: The gallbladder is unremarkable. No intrahepatic or extrahepatic ductal dilatation. PANCREAS: No focal pancreatic lesion. SPLEEN: Unremarkable. KIDNEYS AND ADRENALS: Bilateral kidneys and adrenal glands are unremarkable. The ureters are decompressed and normal in appearance. RETROPERITONEUM: No mass, lymphadenopathy or hemorrhage. GI TRACT: Stomach and small bowel are unremarkable. Moderate colonic stool. OTHER: There is no mesenteric mass, lymphadenopathy or fluid collection. Unremarkable bilateral hip arthroplasties. Old healed right pubic bone fracture. Advanced lumbar spondylosis. No acute bony abnormality. PELVIS: No mass lesion, fluid collection or significant lymphadenopathy is seen in the pelvis. The urinary bladder is normal in appearance. 1. No acute process in the abdomen or pelvis. 2. Moderate colonic stool. Bowel loops are nondilated. 3. Unremarkable bilateral hip arthroplasties. Lumbar spondylosis. No acute bony normality. Signed by Dr Esther Napoles    XR ABDOMEN (KUB) (SINGLE AP VIEW)    Result Date: 4/8/2022  XR ABDOMEN (KUB) (SINGLE AP VIEW) 4/8/2022 3:09 PM History: Abdominal pain. 2 image KUB exam. Osteopenia. Degenerative lumbar spurring. Bilateral hip prostheses. Old right pubic rami fractures. Mildly prominent air-filled colon compatible with ileus. No point of obstruction is seen. No significant small bowel dilation. 1. Bowel gas pattern compatible with colonic ileus. Signed by Dr Brandy Friedman Contrast    Result Date: 4/2/2022  CT BRAIN without contrast 4/2/2022 12:38 PM HISTORY: New onset weakness yesterday COMPARISON: 4/1/2022 DLP: 632 mGy cm.  All CT scans are performed using dose optimization techniques as appropriate to the performed exam and include at least one of the following: Automated exposure control, adjustment of the mA and/or kV according to size, and the use of the iterative reconstruction technique. TECHNIQUE: Serial axial tomographic images of the brain were obtained without the use of intravenous contrast. FINDINGS: The midline structures are nondisplaced. There is mild cerebral and cerebellar volume loss, with an associated increase in the prominence of the ventricles and sulci. The basilar cisterns are normal in size and configuration. There is no evidence of intracranial hemorrhage or mass-effect. There is low attenuation in the periventricular white matter, consistent with chronic ischemic change. There are no abnormal extra-axial fluid collections. There is no evidence of tonsillar herniation. The included orbits and their contents are unremarkable. The visualized paranasal sinuses, mastoid air cells and middle ear cavities are clear. The visualized osseous structures and overlying soft tissues of the skull and face are intact. 1. Mild cerebral and cerebellar volume loss with chronic microvascular disease but no evidence of acute intracranial process. Signed by Dr Rowell Prom    Result Date: 4/1/2022  Examination. CT HEAD WO CONTRAST 4/1/2022 12:38 PM History: The patient fell. DLP: 1229 mGycm. The automated exposure control was utilized to minimize the patient's radiation exposure. The CT scan of the head is performed without intravenous contrast enhancement. The images are acquired in axial plane with subsequent reconstruction in coronal and sagittal planes. There is no previous study for comparison There is no evidence of an intracranial hemorrhage or hematoma. There is no evidence of a mass. No midline shift. Moderately dilated ventricles, basal cistern and the cortical sulci suggestive chronic volume loss/atrophy. The scattered areas of chronic white matter ischemia are seen in the centrum semiovale bilaterally. The gray-white matter differentiation is maintained. The images reviewed in bone window show no evidence of a skull fracture. Severe atheromatous changes of the intracranial internal carotid arteries bilaterally noted. 1. No acute intracranial abnormality. 2. No skull fracture. 3. Chronic ischemic and atrophic changes. Signed by Dr Vini Hagan Date: 4/4/2022  Examination. CT CHEST W CONTRAST 4/4/2022 12:26 PM History: Right upper lobe collapse. DLP: 1949 mGycm. The automated exposure control was utilized to minimize the patient's radiation exposure. CT scan of the chest is performed after intravenous contrast enhancement. The images are acquired in axial plane and subsequent reconstruction in coronal and sagittal planes. The comparison is made with the previous study dated 4/1/2022. The opacity in the right upper lobe paratracheal area represent a distended superior vena cava and moderately tortuous and displays brachiocephalic vessels. There is no evidence of right upper lobe collapse. The lungs are poorly distended. There are several groundglass opacity/nodule in the right apex, the largest one located anteriorly and laterally, image #34 in series 4, measuring 1.6 cm in AP dimension. There are atelectatic changes in the lower lungs bilaterally. This more pronounced on the right side. There are small area of consolidation involving the posterior segments of the lower lobes bilaterally adjacent to a small bibasal pleural effusion. There is moderate deviation of the trachea towards the right. The remaining tracheobronchial structures are normal and patent. The limited visualized soft tissues of the neck are unremarkable. The thyroid gland appear normal. Severe atheromatous changes of thoracic aorta are seen. No aneurysmal dilatation. Severe atheromatous changes of coronary arteries are seen. There is no evidence of mediastinal or hilar mass or lymphadenopathy. There is no axillary lymphadenopathy. Limited visualized liver and spleen are unremarkable.  Incompletely visualized gallbladder show no gallstone. There is significant dilatation of the common bile duct and pancreatic duct which are incompletely evaluated in this study. However when compared with the previous study obtained 3 days ago these findings are not visualized? Bello Bey The study was unenhanced. The images reviewed in bone window show no acute bony abnormality or a bone lesion. Chronic degenerative changes of thoracic spine are seen. There is subcutaneous fat infiltration of the lower chest and upper abdominal wall, more pronounced laterally which may represent edema due to fluid overload. 1. No finding to suggest collapse of the right upper lobe. Detail is given above. 2. Groundglass nodule/opacities predominantly in the right upper lobe may represent an inflammatory/infectious or an evolving neoplastic process. Further follow-up may be obtained. 3. Small bibasal pleural effusion. 4. Small areas of consolidation and atelectasis in the lower lobes bilaterally. 5. The limited visualized abdomen show significant dilatation of the common bile duct and pancreatic duct which were not noted in the previous study obtained 3 days ago which was done without contrast enhancement. The etiology and clinical significance is not certain. A follow-up CT scan of the abdomen after oral and intravenous contrast enhancement. Thin. Signed by Dr Oneida Pelaez    Result Date: 4/1/2022  CT cervical spine 4/1/2022 12:22 PM HISTORY: Fall TECHNIQUE: Axial images of the cervical spine were obtained without IV contrast. Coronal and sagittal reformatted images are reconstructed and reviewed. COMPARISON: None. DLP: 425 mGy cm Automated exposure control was utilized to minimize patient radiation dose. FINDINGS: Alignment of the cervical spine is appropriate. Moderate to advanced degenerative disc change. Diffuse uncinate process hypertrophy with mild facet osteoarthropathy. No acute cervical vertebral fracture.  Incomplete fusion of posterior arch of C1, developmental finding. Mild to moderate canal stenosis at C5/6 with mild narrowing about below this level. Scattered moderate to severe foraminal stenosis. Moderate carotid bulb calcification. No apical lung nodule. 1. Moderate to advanced degenerative changes cervical spine with no acute cervical vertebral fracture or traumatic malalignment. Signed by Dr Dionte Connolly Additional Contrast? None    Result Date: 4/2/2022  EXAMINATION: CT pelvis without contrast 4/2/2022 HISTORY: Pubic pain post fall Dose: 807 mGycm. All CT scans are performed using dose optimization techniques as appropriate to the performed exam and include at least one of the following: Automated exposure control, adjustment of the mA and/or kV according to size, and the use of the iterative reconstruction technique. FINDINGS: Multiple contiguous axial images are obtained through the bony pelvis per protocol without venous contrast enhancement with reformatted images obtained in the sagittal coronal projections from the original data set. At the L4-L5 level there is broad-based bulging of disc as well as moderate facet and ligamentous hypertrophy. There is moderate central spinal stenosis as well as moderate bilateral neural foraminal narrowing. There is mild grade 1 anterolisthesis of L4 on L5 likely representing subluxation at the level of the facets. The SI joints are intact. No evidence of sacral fracture. The patient is status post bilateral total hip arthroplasty. This does obscure some of the anatomic detail secondary to streaking artifact. There are old fractures of the right superior and inferior pubic ramus. No acute fractures are present. The pubic symphysis is intact. A Saeed catheter is in place within the bladder. There is mild fecal impaction within the rectal vault. 1.. Old fractures of the right superior and inferior pubic rami. No additional fractures are present.  The SI joints and pubic symphysis are intact with no evidence of diastases. 2. Facet and ligamentous hypertrophy as well as bulging of the disc contributes to central and foraminal stenosis at L4-L5. There is grade 1 anterolisthesis of L4 on L5 felt to represent subluxation at the level of the facets. 3. Constipation with increased stool throughout the colon including fecal impaction within the rectal vault. Signed by Dr Isak Kline LIVER    Result Date: 4/5/2022   LIVER 4/5/2022 8:29 AM HISTORY: Biliary duct dilatation COMPARISON: NONE TECHNIQUE: Multiple longitudinal and transverse realtime sonographic images of the right upper quadrant of the abdomen are obtained. FINDINGS:  Pancreas: Normal in size and echogenicity. Liver: Normal in size, echogenicity and echotexture. No focal lesion. A small amount of free fluid is noted within the perihepatic space. Hepatopedal flow is noted within the portal vein. Gallbladder: No intraluminal echoes, wall thickening, or pericholecystic fluid. Bile ducts: The CBD measures 1.0 cm in diameter. There is extrahepatic biliary ductal dilatation. There is a questionable filling defect within the common hepatic duct measuring approximately 13 x 12 x 5 mm in size. Follow-up with MR imaging to include MRCP would be helpful although the patient has great difficulty in remaining motionless on both CT imaging from earlier this month and today's ultrasound and may result in a poor quality study. . Right kidney: Normal in size, shape and contour. No mass, hydronephrosis or calculi. No perinephric fluid collection. Other: There is a small amount of free fluid within the perisplenic space. Small pleural effusions are present. .     1. Mild extrahepatic biliary dilatation is present. There is a question of a filling defect within the common hepatic duct near the mike hepatis. This may be artifactual in nature as the patient had difficulty in cooperating with the exam with some motion artifact.  By CT imaging the more distal common duct is also dilated. MRCP may be helpful but given the patient's limitations may be of limited quality. 2. The gallbladder is unremarkable. No evidence of gallstones. A small amount of free fluid is noted within the perihepatic and perisplenic space. Small pleural effusions are present. Signed by Dr Nilda Garcia    Result Date: 4/1/2022  XR CHEST PORTABLE 4/1/2022 12:21 PM HISTORY: Fatigue COMPARISON: 4/3/2019 CXR: A single frontal view of the chest is performed. Findings: There is volume loss of the right upper hemithorax with ipsilateral shifting of the mediastinal structures. Partial right upper lobe collapse considered. Left lung appears clear. Probable right lower lobe granuloma. Heart is upper limits of normal in size. No pleural effusion or pneumothorax. Degenerative change regional skeleton. 1. Right paratracheal upper lobe opacities with associated volume loss may relate to right upper lobe collapse. Follow-up imaging recommended. CT chest preferably with IV contrast could be performed for further assessment if clinically indicated. Signed by Dr Madison Duffy    Result Date: 4/6/2022  EXAMINATION: CT left hip without contrast 4/6/2022 HISTORY: Severe left hip pain after fall. Dose: 996 mGycm. All CT scans are performed using dose optimization techniques as appropriate to the performed exam and include at least one of the following: Automated exposure control, adjustment of the mA and/or kV according to size, and the use of the iterative reconstruction technique. Maik Ra FINDINGS: Multiple contiguous axial image are obtained through the left hip per protocol without intravenous contrast enhancement with reformatted images obtained in the sagittal and coronal projections from the original data set. There is skin thickening and ecchymosis overlying the gluteus musculature at the level of the left hip suggesting rather extensive bruising.  The patient has undergone previous left total hip arthroplasty. The acetabular cup appears to remain well seated. There is some lucency within the acetabular roof at the interface of the acetabular cup. This could represent changes of ALTR(adverse local tissue reaction) to the arthroplasty. I do not see evidence of a displaced bony fracture. No convincing evidence of loosening of the prosthesis within the proximal femoral shaft. The rami are intact. 1.. The patient's undergone previous left total hip arthroplasty. The acetabular cup appears to remain well seated. There is some lucency associated with the acetabular roof at the interface with the acetabular cup which could represent changes of adverse local tissue reaction. 2. No evidence of acute displaced fracture. There is rather extensive stranding and ecchymosis overlying the lower left pelvis and left hip and overlying the gluteus musculature consistent with rather extensive bruising. Signed by Dr Carlos Marshall    Result Date: 4/8/2022  CT HIP RIGHT WO CONTRAST 4/8/2022 10:43 AM HISTORY: Right groin pain COMPARISON: CT scan dated 4/2/2022 DOSE LENGTH PRODUCT: 989 mGy cm TECHNIQUE: Helical tomographic images of the right hip were obtained without the use of intravenous contrast. Automated exposure control was also utilized to decrease patient radiation dose. FINDINGS: No acute fracture is identified. Unremarkable right total hip arthroplasty. No hardware complication identified. There are old healed right pubic rami fractures. Pubic symphysis is intact. 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 Advanced atheromatous vascular calcification. Surrounding soft tissue structures are unremarkable. 1. No acute fracture or malalignment. 2. Old healed right pubic rami fractures. 3. Unremarkable right total hip arthroplasty.  Signed by Dr [facility]    Result Date: 4/7/2022  EXAM: MRI abdomen without and with IV contrast, including 3-D MRCP sequences INDICATION: Further evaluation of potential biliary filling defect identified on recent ultrasound COMPARISON: Ultrasound 4/5/2022 FINDINGS: The study is degraded by patient motion. Liver: No hepatic steatosis or morphologic changes of cirrhosis. No suspicious focal liver lesion. Bile ducts: Intrahepatic and extra hepatic biliary ductal dilatation with gradual caliber tapering down to the ampulla. No obstructing stone or mass identified. Common bile duct measures 17 mm diameter. Gallbladder: No gallstones or gallbladder wall thickening. Pancreas: No pancreatic mass, cyst, or surrounding inflammatory change. Spleen: Not enlarged. No focal lesion. Adrenals: Normal. Kidneys: Tiny LEFT renal cyst. No solid enhancing renal mass. No hydronephrosis. Vasculature: Nonaneurysmal abdominal aorta. Patent portal vein. Other Findings: Distended colon with layering fluid. No ascites. No abdominal lymphadenopathy. No suspicious focal bone lesion. Small bilateral pleural effusions in the lung bases. 1.  Intrahepatic and extra hepatic biliary ductal dilatation with gradual caliber tapering down to the ampulla. No evidence of obstructing stone or mass. Differential diagnosis includes biliary stricture although occult stone or neoplasm are also within the differential. Recommend correlation with laboratory analysis and consider ERCP for further evaluation. 2.  The study is degraded by patient motion.  Signed by Dr Monster TURNER CAROTID BILATERAL    Result Date: 4/4/2022  Vascular Carotid Procedure  Demographics   Patient Name   Brian Dowling  Age                  67                 C   Patient Number [id number]           Gender               Female   Visit Number   [id number]        Morena Miller MD                                  Physician   Date of Birth  1945       Referring Physician  Ary Montano   Accession      [id number] [address], [address],  Number                                               RDMS  Procedure Type of Study:   Cerebral:Carotid, VL CAROTID BILATERAL. Indications for Study:Questionable CVA/TIA, Unsteady gait and Facial droop, left. Risk Factors   - The patient's risk factor(s) include: arterial hypertension.   - Current - Every day. Impression   There is mixed plaque visualized in the bilateral internal carotid  arteries. There is 50-69% stenosis in the right internal carotid artery. There is less than 50% stenosis of the left internal carotid artery. There is normal antegrade flow in the bilateral vertebral arteries. Signature   ----------------------------------------------------------------  Electronically signed by Lu Miguel MD(Interpreting  physician) on 04/04/2022 03:18 PM  ----------------------------------------------------------------  Blood Pressure:Right arm 121/97 mmHg. Velocities are measured in cm/s ; Diameters are measured in mm Carotid Right Measurements +------------+-------+-------+--------+-------+------------+---------------+ ! Location    ! PSV    ! EDV    ! Angle   ! RI     !%Stenosis   ! Tortuosity     ! +------------+-------+-------+--------+-------+------------+---------------+ ! Prox CCA    !60.3   !20.5   !60      !0.66   !            !               ! +------------+-------+-------+--------+-------+------------+---------------+ ! Mid CCA     !70.8   !19.9   !60      !0.72   !            !               ! +------------+-------+-------+--------+-------+------------+---------------+ ! Prox ICA    !203    !60.4   !60      !0.7    ! !               ! +------------+-------+-------+--------+-------+------------+---------------+ ! Mid ICA     !124    !35.1   ! 60      !0.72   !            !               ! +------------+-------+-------+--------+-------+------------+---------------+ ! Dist ICA    !81.2   !30.7   ! 60      !0.62   !            !               ! +------------+-------+-------+--------+-------+------------+---------------+ ! Prox ECA    !188    !20.9   !60      !0.89   !            !               ! +------------+-------+-------+--------+-------+------------+---------------+ ! Vertebral   !77.9   !16.5   !60      !0.79   !            !               ! +------------+-------+-------+--------+-------+------------+---------------+   - There is antegrade vertebral flow noted on the right side. - Additional Measurements:ICAPSV/CCAPSV 3.37. ICAEDV/CCAEDV 2.95. Carotid Left Measurements +------------+-------+-------+--------+-------+------------+---------------+ ! Location    ! PSV    ! EDV    ! Angle   ! RI     !%Stenosis   ! Tortuosity     ! +------------+-------+-------+--------+-------+------------+---------------+ ! Prox CCA    !120    !22     !60      !0.82   !            !               ! +------------+-------+-------+--------+-------+------------+---------------+ ! Mid CCA     !82.3   !28.5   !60      !0.65   !            !               ! +------------+-------+-------+--------+-------+------------+---------------+ ! Prox ICA    !74.6   !26.3   ! 60      !0.65   !            !               ! +------------+-------+-------+--------+-------+------------+---------------+ ! Mid ICA     !92.2   !34     !60      !0.63   !            !               ! +------------+-------+-------+--------+-------+------------+---------------+ ! Dist ICA    ! 88.9   !28.5   !60      !0.68   !            !               ! +------------+-------+-------+--------+-------+------------+---------------+ ! Prox ECA    !93.3   !16.5   !60      !0.82   !            !               ! +------------+-------+-------+--------+-------+------------+---------------+ ! Vertebral   !40.4   !18.6   ! 60      !0.54   !            !               ! +------------+-------+-------+--------+-------+------------+---------------+   - There is antegrade vertebral flow noted on the left side.    - Additional Measurements:ICAPSV/CCAPSV 0.77. ICAEDV/CCAEDV 1.55. MRI BRAIN W WO CONTRAST    Result Date: 4/3/2022  MRI brain without and with contrast 4/3/2022 2:22 PM History: Facial droop Comparison: None. Technique: Multiplanar imaging of the brain was performed in a routine fashion before and after the intravenous injection of gadolinium contrast. Findings: Midline structures are nondisplaced. Ventricular system is normal. There is no significant mass effect or hydrocephalus. Basilar cisterns are preserved. There are scattered foci of T2 abnormality involving the periventricular and higher white matter tracts consistent with moderate small vessel ischemic disease. There is mild gliosis within the nely. There is a focus of low signal within the left thalamus near the posterior limb of the internal capsule on gradient imaging suggesting a site of previous hemorrhage or calcification. On diffusion-weighted imaging this is separable from the area of acute ischemic infarction. No additional sites of acute or chronic hemorrhage are demonstrated. . No abnormal extra axial fluid collections are noted. There is diffusion restriction within the left basal ganglia with involvement of the thalamus, posterior limb of the left internal capsule and putamen with involvement of the external capsule also demonstrated. These are nonhemorrhagic infarcts. There is associated signal loss on ADC mapping imaging consistent with acute ischemic infarction. No additional sites of diffusion restriction are present. Julissa Lila Postcontrast imaging demonstrates a developmental venous anomaly which is parafalcine in location within the right parietal lobe extending into the region of the corpus callosum. No additional sites of abnormal enhancement are present. Proximal cervical spinal cord, brainstem, and cerebellum are unremarkable. Normal cerebrovascular flow voids are seen. Bilateral globes and orbits are normal in appearance.  No abnormal signal is noted in the mastoid air cells or paranasal sinuses. Impression: 1. Diffusion restriction within the left thalamus and basal ganglia with associated ADC mapping abnormality consistent with an acute left-sided infarct. This is nonhemorrhagic. There is no associated mass effect or shift of the midline. 2. There is mild atrophy of the brain. Small vessel ischemic changes are noted involving the periventricular and higher white matter tracts. There is evidence of a remote small focus of hemorrhage within the left thalamus. No additional sites of blooming are present on gradient imaging. 3. DVA within the parafalcine right parietal lobe extending into the region of the corpus callosum. No additional sites of abnormal enhancement are present. Signed by Dr Addison Nguyen 2-3 VW W PELVIS RIGHT    Result Date: 4/1/2022  History: Pain after fall Right hip: Frontal view the pelvis as well as frontal and crosstable lateral views of the right hip are obtained. The right prosthesis. No periprosthetic fracture. Old fractures of the right inferior pelvis involving right superior and inferior rami. Enthesophytes project from the right iliac spine. Chronic calcification along the right acetabular roof. Degenerative change of the lower lumbar spine. Vascular calcification. 1. Old fractures of the right inferior pelvis. Bilateral hip prostheses. No acute appearing bony injury identified. Signed by Dr Carolyn Brian and Plan: This is a 74 y. o. year old female with past medical history of hypertension, active ongoing tobacco use, paroxysmal atrial fibrillation on amiodarone and Eliquis (reduced compliance), moderate aortic stenosis, normal LV ejection fraction presenting with fall with probable acute left thalamic/basal ganglia infarct with right-sided weakness and right facial droop.     1. Noticeable right-sided weakness comparatively to left. Remains on Eliquis and in sinus rhythm.   2.  Right hip CT unremarkable. Evidence of colonic ileus. Continues to complain of right lower quadrant cramping type symptoms. Did move bowels yesterday apparently.       Christopher Espitia MD, MD 4/9/2022 12:12 PM

## 2022-04-09 NOTE — PROGRESS NOTES
[facility] MEDICINE  - PROGRESS NOTE    Admit Date: 4/1/2022         CC: fall    Subjective: pain in right groin with muscle cramps, no abd pain, no N/V/C, no dysuria, no chest pain, no dyspnea    Objective: mild distress due to pain     Diet: ADULT DIET; Regular  Pain is: Moderate  Nausea:None  Bowel Movement/Flatus yes    Data:   Scheduled Meds: Reviewed  Current Facility-Administered Medications   Medication Dose Route Frequency Provider Last Rate Last Admin    oxyCODONE-acetaminophen (PERCOCET) 5-325 MG per tablet 1 tablet  1 tablet Oral Q4H PRN Jareth Tamayo MD        cyclobenzaprine (FLEXERIL) tablet 5 mg  5 mg Oral TID PRN Jareth Tamayo MD        hydroCHLOROthiazide (HYDRODIURIL) tablet 25 mg  25 mg Oral Daily Jareth Tamayo MD   25 mg at 04/09/22 1000    amiodarone (CORDARONE) tablet 200 mg  200 mg Oral BID Leonor Fox MD   200 mg at 04/09/22 1000    [START ON 4/10/2022] amiodarone (CORDARONE) tablet 200 mg  200 mg Oral Daily Leonor Fox MD        metoprolol succinate (TOPROL XL) extended release tablet 25 mg  25 mg Oral BID Leonor Fox MD   25 mg at 04/09/22 1000    dilTIAZem (CARDIZEM CD) extended release capsule 120 mg  120 mg Oral BID Leonor Fox MD   120 mg at 04/09/22 1000    atorvastatin (LIPITOR) tablet 10 mg  10 mg Oral Daily Jareth Tamayo MD   10 mg at 04/09/22 1000    lactobacillus (CULTURELLE) capsule 1 capsule  1 capsule Oral Daily Jareth Tamayo MD   1 capsule at 04/09/22 1000    simethicone (MYLICON) chewable tablet 80 mg  80 mg Oral 4x Daily PRN KATI Camara CNP   80 mg at 04/08/22 1942    nystatin (MYCOSTATIN) 943655 UNIT/ML suspension 500,000 Units  5 mL Oral 4x Daily Jareth Tamayo MD   500,000 Units at 04/09/22 1000    potassium chloride (KLOR-CON M) extended release tablet 40 mEq  40 mEq Oral PRN KATI Camara CNP        Or    potassium bicarb-citric acid (EFFER-K) effervescent tablet 40 mEq  40 mEq Oral PRN KATI Medina CNP        Or    potassium chloride 10 mEq/100 mL IVPB (Peripheral Line)  10 mEq IntraVENous PRN KATI Medina CNP        magic butt cream   Topical Q4H PRN KATI Medina CNP   Given at 04/02/22 2136    sodium chloride flush 0.9 % injection 5-40 mL  5-40 mL IntraVENous 2 times per day Desirae Saldivar MD   10 mL at 04/08/22 2142    sodium chloride flush 0.9 % injection 5-40 mL  5-40 mL IntraVENous PRN Desirae Saldivar MD   10 mL at 04/03/22 1657    0.9 % sodium chloride infusion   IntraVENous PRN Desirae Saldivar MD        ondansetron (ZOFRAN-ODT) disintegrating tablet 4 mg  4 mg Oral Q8H PRN Desirae Saldivar MD        Or    ondansetron [facility]) injection 4 mg  4 mg IntraVENous Q6H PRN Desirae Saldivar MD   4 mg at 04/08/22 1340    polyethylene glycol (GLYCOLAX) packet 17 g  17 g Oral Daily PRN Desirae Saldivar MD        apixaban (ELIQUIS) tablet 5 mg  5 mg Oral BID Desirae Saldivar MD   5 mg at 04/09/22 1000    fluticasone (FLONASE) 50 MCG/ACT nasal spray 2 spray  2 spray Nasal Daily Desirae Saldivar MD   2 spray at 04/08/22 1043    hydrOXYzine (ATARAX) tablet 25 mg  25 mg Oral Nightly Desirae Saldivar MD   25 mg at 04/08/22 2142    vitamin D (ERGOCALCIFEROL) capsule 50,000 Units  50,000 Units Oral Weekly Desirae Saldivar MD   50,000 Units at 04/08/22 1041    nystatin (MYCOSTATIN) cream   Topical BID KATI Hong CNP   Given at 04/08/22 2142     DVT Prophylaxis: eliquis    Continuous Infusions:   sodium chloride         Intake/Output Summary (Last 24 hours) at 4/9/2022 1229  Last data filed at 4/9/2022 0313  Gross per 24 hour   Intake --   Output 550 ml   Net -550 ml     CBC:   Recent Labs     04/07/22  0249 04/08/22  0351 04/09/22  0401   WBC 8.1 7.1 8.8   HGB 12.0 12.3 12.0    296 348     BMP:  Recent Labs 04/07/22  0249 04/08/22  0351 04/09/22  0401    140 141   K 3.8 4.4 4.2    106 103   CO2 23 24 25   BUN 14 22 15   CREATININE 0.8 0.9 0.9   GLUCOSE 96 93 89     ABGs: No results found for: PHART, PO2ART, RNT3CEC  INR: No results for input(s): INR in the last 72 hours. Objective:   Vitals: BP (!) 143/59   Pulse 62   Temp 97.8 °F (36.6 °C) (Temporal)   Resp 18   Ht 5' 3\" (1.6 m)   Wt 145 lb (65.8 kg)   SpO2 95%   BMI 25.69 kg/m²   General appearance: alert, appears stated age and cooperative  Skin: Skin color, texture, turgor normal.   HEENT: Head: Normocephalic, no lesions, without obvious abnormality.   Neck: no adenopathy, no carotid bruit, no JVD and supple, symmetrical, trachea midline  Lungs: clear to auscultation bilaterally  Heart: regular rate and rhythm, S1, S2 normal, no murmur, click, rub or gallop  Abdomen: soft low abdominal tenderness with distention, bs present, no rebound  Extremities: extremities normal, atraumatic, no cyanosis or edema  Lymphatic: No significant lymph node enlargement papable  Neurologic: Mental status: Alert, oriented, thought content appropriate, facial droop         Assessment & Plan:    · Traumatic rhabdomyolysis- resolved   · Metabolic acidosis- resolved    · Hypokalemia - resolved   · 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- follow OP  · Constipation - treated  · Groundglass nodule/opacities predominantly in the right upper lobe   may represent an inflammatory/infectious or an evolving neoplastic process- pulmonary evaluated - follow up OP  · Severe deconditioning with fall - PT/OT- needs SNF  · Acute CVA with Facial droop-- neurology evaluated   · Significant dilatation of the common bile duct and pancreatic duct -per ERCP MRCP recommended- patient is asymptotic- follow up as OP with Dr Hussein Monsivais in 2 weeks  · PAF -amiodarone, Cardizem, metoprolol- treatment per cardiology  · Carotid stenosis- Lipitor, eliquis - follow up OP   · Vit d def- on replacement · History of pelvic fx  · Mild abd distention - KUB shows ileus- no obstruction- pt has BM  · Right groin pain - ct neg- pain control with percocet prn and flexeril prn for muscle spasm                      See orders   Disposition: cleared for DC, awaiting SNF placement     Gina Clayton MD 15-Oct-2018

## 2022-04-09 NOTE — PLAN OF CARE
Patient had extreme pain throughout the night. Dilaudid and Percocet had been discontinued. One time dose of Percocet 5-325 given.

## 2022-04-10 LAB
ANION GAP SERPL CALCULATED.3IONS-SCNC: 10 MMOL/L (ref 7–19)
BUN BLDV-MCNC: 16 MG/DL (ref 8–23)
CALCIUM SERPL-MCNC: 9 MG/DL (ref 8.8–10.2)
CHLORIDE BLD-SCNC: 102 MMOL/L (ref 98–111)
CO2: 28 MMOL/L (ref 22–29)
CREAT SERPL-MCNC: 0.9 MG/DL (ref 0.5–0.9)
GFR AFRICAN AMERICAN: >59
GFR NON-AFRICAN AMERICAN: >60
GLUCOSE BLD-MCNC: 100 MG/DL (ref 74–109)
HCT VFR BLD CALC: 38.6 % (ref 37–47)
HEMOGLOBIN: 12.2 G/DL (ref 12–16)
MCH RBC QN AUTO: 30.6 PG (ref 27–31)
MCHC RBC AUTO-ENTMCNC: 31.6 G/DL (ref 33–37)
MCV RBC AUTO: 96.7 FL (ref 81–99)
PDW BLD-RTO: 13.7 % (ref 11.5–14.5)
PLATELET # BLD: 382 K/UL (ref 130–400)
PMV BLD AUTO: 10.2 FL (ref 9.4–12.3)
POTASSIUM SERPL-SCNC: 4.4 MMOL/L (ref 3.5–5)
RBC # BLD: 3.99 M/UL (ref 4.2–5.4)
SODIUM BLD-SCNC: 140 MMOL/L (ref 136–145)
WBC # BLD: 7.2 K/UL (ref 4.8–10.8)

## 2022-04-10 PROCEDURE — 36415 COLL VENOUS BLD VENIPUNCTURE: CPT

## 2022-04-10 PROCEDURE — 2580000003 HC RX 258: Performed by: HOSPITALIST

## 2022-04-10 PROCEDURE — 6370000000 HC RX 637 (ALT 250 FOR IP): Performed by: INTERNAL MEDICINE

## 2022-04-10 PROCEDURE — 80048 BASIC METABOLIC PNL TOTAL CA: CPT

## 2022-04-10 PROCEDURE — 1210000000 HC MED SURG R&B

## 2022-04-10 PROCEDURE — 99232 SBSQ HOSP IP/OBS MODERATE 35: CPT | Performed by: PSYCHIATRY & NEUROLOGY

## 2022-04-10 PROCEDURE — 6370000000 HC RX 637 (ALT 250 FOR IP): Performed by: HOSPITALIST

## 2022-04-10 PROCEDURE — 85027 COMPLETE CBC AUTOMATED: CPT

## 2022-04-10 RX ADMIN — SODIUM CHLORIDE, PRESERVATIVE FREE 10 ML: 5 INJECTION INTRAVENOUS at 20:52

## 2022-04-10 RX ADMIN — ATORVASTATIN CALCIUM 10 MG: 10 TABLET, FILM COATED ORAL at 08:34

## 2022-04-10 RX ADMIN — SODIUM CHLORIDE, PRESERVATIVE FREE 10 ML: 5 INJECTION INTRAVENOUS at 08:34

## 2022-04-10 RX ADMIN — AMIODARONE HYDROCHLORIDE 200 MG: 200 TABLET ORAL at 08:34

## 2022-04-10 RX ADMIN — FLUTICASONE PROPIONATE 2 SPRAY: 50 SPRAY, METERED NASAL at 08:36

## 2022-04-10 RX ADMIN — Medication 1 CAPSULE: at 08:34

## 2022-04-10 RX ADMIN — NYSTATIN 500000 UNITS: 100000 SUSPENSION ORAL at 20:52

## 2022-04-10 RX ADMIN — APIXABAN 5 MG: 5 TABLET, FILM COATED ORAL at 20:52

## 2022-04-10 RX ADMIN — APIXABAN 5 MG: 5 TABLET, FILM COATED ORAL at 08:34

## 2022-04-10 RX ADMIN — DILTIAZEM HYDROCHLORIDE 120 MG: 120 CAPSULE, COATED, EXTENDED RELEASE ORAL at 20:52

## 2022-04-10 RX ADMIN — OXYCODONE HYDROCHLORIDE AND ACETAMINOPHEN 1 TABLET: 5; 325 TABLET ORAL at 08:33

## 2022-04-10 RX ADMIN — OXYCODONE HYDROCHLORIDE AND ACETAMINOPHEN 1 TABLET: 5; 325 TABLET ORAL at 03:43

## 2022-04-10 RX ADMIN — Medication: at 20:59

## 2022-04-10 RX ADMIN — HYDROXYZINE HYDROCHLORIDE 25 MG: 25 TABLET ORAL at 20:52

## 2022-04-10 RX ADMIN — HYDROCHLOROTHIAZIDE 25 MG: 25 TABLET ORAL at 08:34

## 2022-04-10 RX ADMIN — CYCLOBENZAPRINE 5 MG: 10 TABLET, FILM COATED ORAL at 20:53

## 2022-04-10 RX ADMIN — DILTIAZEM HYDROCHLORIDE 120 MG: 120 CAPSULE, COATED, EXTENDED RELEASE ORAL at 08:34

## 2022-04-10 RX ADMIN — METOPROLOL SUCCINATE 25 MG: 25 TABLET, EXTENDED RELEASE ORAL at 20:52

## 2022-04-10 RX ADMIN — NYSTATIN: 100000 CREAM TOPICAL at 20:53

## 2022-04-10 RX ADMIN — NYSTATIN: 100000 CREAM TOPICAL at 08:36

## 2022-04-10 RX ADMIN — METOPROLOL SUCCINATE 25 MG: 25 TABLET, EXTENDED RELEASE ORAL at 08:34

## 2022-04-10 RX ADMIN — NYSTATIN 500000 UNITS: 100000 SUSPENSION ORAL at 14:33

## 2022-04-10 RX ADMIN — OXYCODONE HYDROCHLORIDE AND ACETAMINOPHEN 1 TABLET: 5; 325 TABLET ORAL at 20:52

## 2022-04-10 RX ADMIN — NYSTATIN 500000 UNITS: 100000 SUSPENSION ORAL at 08:34

## 2022-04-10 ASSESSMENT — PAIN DESCRIPTION - ONSET
ONSET: ON-GOING
ONSET: ON-GOING

## 2022-04-10 ASSESSMENT — PAIN DESCRIPTION - LOCATION
LOCATION: BACK
LOCATION: BACK

## 2022-04-10 ASSESSMENT — PAIN SCALES - GENERAL
PAINLEVEL_OUTOF10: 0
PAINLEVEL_OUTOF10: 0
PAINLEVEL_OUTOF10: 7
PAINLEVEL_OUTOF10: 7
PAINLEVEL_OUTOF10: 5
PAINLEVEL_OUTOF10: 7
PAINLEVEL_OUTOF10: 3

## 2022-04-10 ASSESSMENT — PAIN DESCRIPTION - PROGRESSION
CLINICAL_PROGRESSION: NOT CHANGED
CLINICAL_PROGRESSION: NOT CHANGED

## 2022-04-10 ASSESSMENT — PAIN DESCRIPTION - PAIN TYPE
TYPE: CHRONIC PAIN
TYPE: CHRONIC PAIN

## 2022-04-10 ASSESSMENT — PAIN DESCRIPTION - DESCRIPTORS
DESCRIPTORS: ACHING;SORE
DESCRIPTORS: ACHING;SORE

## 2022-04-10 ASSESSMENT — PAIN DESCRIPTION - FREQUENCY
FREQUENCY: CONTINUOUS
FREQUENCY: CONTINUOUS

## 2022-04-10 ASSESSMENT — PAIN DESCRIPTION - ORIENTATION
ORIENTATION: LOWER
ORIENTATION: LOWER

## 2022-04-10 ASSESSMENT — PAIN - FUNCTIONAL ASSESSMENT
PAIN_FUNCTIONAL_ASSESSMENT: PREVENTS OR INTERFERES SOME ACTIVE ACTIVITIES AND ADLS
PAIN_FUNCTIONAL_ASSESSMENT: PREVENTS OR INTERFERES SOME ACTIVE ACTIVITIES AND ADLS

## 2022-04-10 NOTE — PLAN OF CARE
Problem: Skin Integrity:  Goal: Will show no infection signs and symptoms  Description: Will show no infection signs and symptoms  Outcome: Ongoing  Goal: Absence of new skin breakdown  Description: Absence of new skin breakdown  Outcome: Ongoing     Problem: Falls - Risk of:  Goal: Will remain free from falls  Description: Will remain free from falls  Outcome: Ongoing  Goal: Absence of physical injury  Description: Absence of physical injury  Outcome: Ongoing     Problem: Pain:  Goal: Pain level will decrease  Description: Pain level will decrease  Outcome: Ongoing  Goal: Control of acute pain  Description: Control of acute pain  Outcome: Ongoing  Goal: Control of chronic pain  Description: Control of chronic pain  Outcome: Ongoing     Problem: Fluid Volume:  Goal: Ability to achieve a balanced intake and output will improve  Description: Ability to achieve a balanced intake and output will improve  Outcome: Ongoing     Problem: Physical Regulation:  Goal: Ability to maintain clinical measurements within normal limits will improve  Description: Ability to maintain clinical measurements within normal limits will improve  Outcome: Ongoing  Goal: Will show no signs and symptoms of electrolyte imbalance  Description: Will show no signs and symptoms of electrolyte imbalance  Outcome: Ongoing

## 2022-04-10 NOTE — PROGRESS NOTES
Patient:   Love Aquino  MR#:    [id number]   Room:    2111/833-57   YOB: 1945  Date of Progress Note: 4/10/2022  Time of Note                           10:18 AM  Consulting Physician:   Thea Kelley M.D. Attending Physician:  Jessica Kendrick, *     Chief complaint Slurred speech, facial droop    S:This 67 y.o. female  with a past medical history significant for atrial fibrillation on chronic anticoagulation with Eliquis, previous pelvic fracture and hypertension is seen for evaluation of facial droop and slurred speech. The patient has been on 4/1/2022 after being found down at home. Had fallen approximately 3 days prior to admission due to leg weakness. She was laying on the ground and was unable to get help. She does not recall why she fell. The neighbor tried to go over to the house that she had not heard from her and the patient was able to crawl to unlock the door but was unable to get up. The patient indicates she has a previous history of pelvic fracture in office take some Tylenol and tramadol at home with good pain relief. On admission her serum CK level was 7136. Her serum creatinine was 0.9 with a BUN of 41. Her CT of the head had some mild generalized volume loss with no acute changes noted. CT of the pelvis revealed old fracture of the right superior and inferior pubic rami. Patient is complaining of right pelvic pain. Patient denies any history of stroke. She indicates she had some dental work done and ever since then her face has been droop. She wears dentures and indicates her speech is slurred due to that and a dry mouth. Still pain in the right groin occasionally. Percocet does help. No new issues overnight. Feels okay.       REVIEW OF SYSTEMS:  Constitutional: No fevers No chills  Neck:No stiffness  Respiratory: No shortness of breath  Cardiovascular: No chest pain No palpitations  Gastrointestinal: No abdominal pain    Genitourinary: No Dysuria  Neurological: No headache, no confusion    Past Medical History:      Diagnosis Date    Chronic back pain     Hypertension     Inflammatory arthritis     Knee pain     Spastic colon        Past Surgical History:      Procedure Laterality Date    ABDOMEN SURGERY      APPENDECTOMY      BACK SURGERY      neck; bone out of hip to fuse neck    FINGER TRIGGER RELEASE Left 12/17/2020    LEFT MIDDLE TRIGGER FINGER RELEASE performed by Ling Quigley MD at [address] [place] [address] HAND SURGERY Left 2012    fx repair (3 total surgeries)    HAND SURGERY Left 12/17/2020    LEFT WRIST MASS EXCISION performed by Ling Quigley MD at [facility] [address] Bilateral     HIP    MOUTH SURGERY      TOTAL KNEE ARTHROPLASTY Right 9/2/2021    RIGHT TOTAL KNEE ARTHROPLASTY performed by Josh Mart MD at [address] [place] [address]         Medications in Hospital:      Current Facility-Administered Medications:     oxyCODONE-acetaminophen (PERCOCET) 5-325 MG per tablet 1 tablet, 1 tablet, Oral, Q4H PRN, Burgess Lexie MD, 1 tablet at 04/10/22 1278    cyclobenzaprine (FLEXERIL) tablet 5 mg, 5 mg, Oral, TID PRN, Burgess Lexie MD    hydroCHLOROthiazide (HYDRODIURIL) tablet 25 mg, 25 mg, Oral, Daily, Burgess Lexie MD, 25 mg at 04/10/22 0834    amiodarone (CORDARONE) tablet 200 mg, 200 mg, Oral, Daily, Valentin Garvin MD, 200 mg at 04/10/22 0834    metoprolol succinate (TOPROL XL) extended release tablet 25 mg, 25 mg, Oral, BID, Valentin Garvin MD, 25 mg at 04/10/22 0834    dilTIAZem (CARDIZEM CD) extended release capsule 120 mg, 120 mg, Oral, BID, Valentin Garvin MD, 120 mg at 04/10/22 0834    atorvastatin (LIPITOR) tablet 10 mg, 10 mg, Oral, Daily, Burgess Lexie MD, 10 mg at 04/10/22 0834    lactobacillus (CULTURELLE) capsule 1 capsule, 1 capsule, Oral, Daily, Burgess Lexie MD, 1 capsule at 04/10/22 0834    simethicone (MYLICON) chewable tablet 80 mg, 80 mg, Oral, 4x Daily PRN, KATI Altamirano CNP, 80 mg at 04/08/22 1942    nystatin (MYCOSTATIN) 345722 UNIT/ML suspension 500,000 Units, 5 mL, Oral, 4x Daily, Cyndie Hernández MD, 500,000 Units at 04/10/22 0834    potassium chloride (KLOR-CON M) extended release tablet 40 mEq, 40 mEq, Oral, PRN **OR** potassium bicarb-citric acid (EFFER-K) effervescent tablet 40 mEq, 40 mEq, Oral, PRN **OR** potassium chloride 10 mEq/100 mL IVPB (Peripheral Line), 10 mEq, IntraVENous, PRN, KATI Altamirano - CNP    magic butt cream, , Topical, Q4H PRN, KATI Altamirano CNP, Given at 04/02/22 2136    sodium chloride flush 0.9 % injection 5-40 mL, 5-40 mL, IntraVENous, 2 times per day, Cyndie Hernández MD, 10 mL at 04/10/22 0834    sodium chloride flush 0.9 % injection 5-40 mL, 5-40 mL, IntraVENous, PRN, Cyndie Hernández MD, 10 mL at 04/03/22 1657    0.9 % sodium chloride infusion, , IntraVENous, PRN, Cyndie Hernández MD    ondansetron (ZOFRAN-ODT) disintegrating tablet 4 mg, 4 mg, Oral, Q8H PRN **OR** ondansetron (ZOFRAN) injection 4 mg, 4 mg, IntraVENous, Q6H PRN, Cyndie Hernández MD, 4 mg at 04/08/22 1340    polyethylene glycol (GLYCOLAX) packet 17 g, 17 g, Oral, Daily PRN, Cyndie Hernández MD    apixaban (ELIQUIS) tablet 5 mg, 5 mg, Oral, BID, Cyndie Hernández MD, 5 mg at 04/10/22 0834    fluticasone (FLONASE) 50 MCG/ACT nasal spray 2 spray, 2 spray, Nasal, Daily, Cyndie Hernández MD, 2 spray at 04/10/22 0836    hydrOXYzine (ATARAX) tablet 25 mg, 25 mg, Oral, Nightly, Cyndie Hernández MD, 25 mg at 04/09/22 2109    vitamin D (ERGOCALCIFEROL) capsule 50,000 Units, 50,000 Units, Oral, Weekly, Cyndie Hernández MD, 50,000 Units at 04/08/22 1041    nystatin (MYCOSTATIN) cream, , Topical, BID, Trisha Webster, APRN - CNP, Given at 04/10/22 7643    Allergies:  Levofloxacin in d5w, Pyridium [phenazopyridine], Sulfamethoxazole-trimethoprim, Codeine, Hydrocodone-acetaminophen, Hydromorphone hcl, and Oxycodone    Social History:   TOBACCO:   reports that she has been smoking cigarettes. She has been smoking about 0.50 packs per day. She has never used smokeless tobacco.  ETOH:   reports no history of alcohol use. Family History:       Problem Relation Age of Onset    Other Other         Son DVT    Cancer Mother         colon     Heart Attack Mother     Heart Attack Father          PHYSICAL EXAM:  /62   Pulse 52   Temp 97.5 °F (36.4 °C) (Temporal)   Resp 18   Ht 5' 3\" (1.6 m)   Wt 145 lb (65.8 kg)   SpO2 93%   BMI 25.69 kg/m²     Constitutional - well developed, well nourished. Eyes - conjunctiva normal.   Ear, nose, throat - No scars, masses, or lesions over external nose or ears, no atrophy of tongue  Neck-symmetric, no masses noted, no jugular vein distension  Respiration- chest wall appears symmetric, good expansion,   normal effort without use of accessory muscles  Musculoskeletal - no significant wasting of muscles noted, no bony deformities  Extremities-no clubbing, cyanosis or edema  Skin - warm, dry, and intact. No rash, erythema, or pallor. Psychiatric - mood, affect, and behavior appear normal.      Neurological exam  Awake, alert, fluent oriented appropriate affect  Attention and concentration appear appropriate  Recent and remote memory appears unremarkable  Speech with dysarthria  No clear issues with language of fund of knowledge     Cranial Nerve Exam     CN III, IV,VI-EOMI, No nystagmus, conjugate eye movements, no ptosis    CN VII-right lower facial droop       Motor Exam  antigravity throughout upper and lower extremities bilaterally  Giveway more on the right      Tremors- no tremors in hands or head noted     Gait  Not tested     Nursing/pcp notes, imaging,labs and vitals reviewed.      PT,OT and/or speech notes reviewed    Lab Results   Component Value Date    WBC 7.2 04/10/2022    HGB 12.2 04/10/2022    HCT 38.6 04/10/2022    MCV 96.7 04/10/2022     04/10/2022     Lab Results   Component Value Date     04/10/2022    K 4.4 04/10/2022     04/10/2022    CO2 28 04/10/2022    BUN 16 04/10/2022    CREATININE 0.9 04/10/2022    GLUCOSE 100 04/10/2022    CALCIUM 9.0 04/10/2022    PROT 5.8 (L) 04/06/2022    LABALBU 3.4 (L) 04/06/2022    BILITOT 0.3 04/06/2022    ALKPHOS 90 04/06/2022    AST 35 (H) 04/06/2022    ALT 48 (H) 04/06/2022    LABGLOM >60 04/10/2022    GFRAA >59 04/10/2022     Lab Results   Component Value Date    INR 1.03 04/01/2022    INR 0.96 08/30/2021    PROTIME 13.7 04/01/2022    PROTIME 13.0 08/30/2021       MRI BRAIN W WO CONTRAST [[id number]]    Resulted: 04/03/22 1553    Updated: 04/03/22 1555    Narrative:     MRI brain without and with contrast 4/3/2022 2:22 PM   History: Facial droop   Comparison: None.     Technique: Multiplanar imaging of the brain was performed in a routine   fashion before and after the intravenous injection of gadolinium   contrast.   Findings:   Midline structures are nondisplaced. Ventricular system is normal.   There is no significant mass effect or hydrocephalus. Basilar cisterns   are preserved. There are scattered foci of T2 abnormality involving   the periventricular and higher white matter tracts consistent with   moderate small vessel ischemic disease. There is mild gliosis within   the nely. There is a focus of low signal within the left thalamus near   the posterior limb of the internal capsule on gradient imaging   suggesting a site of previous hemorrhage or calcification. On   diffusion-weighted imaging this is separable from the area of acute   ischemic infarction. No additional sites of acute or chronic   hemorrhage are demonstrated. . No abnormal extra axial fluid   collections are noted.  There is diffusion restriction within the left   basal ganglia with involvement of the thalamus, posterior limb of the   left internal capsule and putamen with involvement of the external   capsule also demonstrated. These are nonhemorrhagic infarcts. There is   associated signal loss on ADC mapping imaging consistent with acute   ischemic infarction. No additional sites of diffusion restriction are   present. Clenton Reas Postcontrast imaging demonstrates a developmental venous anomaly which   is parafalcine in location within the right parietal lobe extending   into the region of the corpus callosum. No additional sites of   abnormal enhancement are present. Proximal cervical spinal cord, brainstem, and cerebellum are   unremarkable. Normal cerebrovascular flow voids are seen. Bilateral   globes and orbits are normal in appearance. No abnormal signal is noted in the mastoid air cells or paranasal   sinuses. Impression:     Impression:   1. Diffusion restriction within the left thalamus and basal ganglia   with associated ADC mapping abnormality consistent with an acute   left-sided infarct. This is nonhemorrhagic. There is no associated   mass effect or shift of the midline. 2. There is mild atrophy of the brain. Small vessel ischemic changes   are noted involving the periventricular and higher white matter   tracts. There is evidence of a remote small focus of hemorrhage within   the left thalamus. No additional sites of blooming are present on   gradient imaging. 3. DVA within the parafalcine right parietal lobe extending into the   region of the corpus callosum. No additional sites of abnormal   enhancement are present.    Signed by Dr Liv Rojas     ECHO Complete 2D W Doppler W Color [[id number]]    Collected: 04/03/22 1020    Updated: 04/04/22 0825    Narrative:     Transthoracic Echocardiography Report (TTE)      Demographics        Patient Name Jh Cruz Date of Study          04/03/2022        MRN           [id number]            Gender                 Female        Date of Birth 1945        Room Number            [id number]        Age           76 year(s)      Height:       63 inches         Referring Physician    Ravinder Benitez        Weight:       145 pounds        Sonographer            Mary Tolentino [facility]        BSA:          1.69 m^2          Interpreting Physician Frank Peterson MD        BMI:          25.69 kg/m^2       Procedure     Type of Study        TTE procedure:ECHO NO CONTRAST WITH DOP/COLR.       Study Location: Echo Lab   Technical Quality: Adequate visualization     Patient Status: Inpatient     Rhythm: Within normal limits BP: 121/92 mmHg     Indications:TIA.      Conclusions        Summary    Mitral valve leaflets are mildly thickened with preserved leaflet    mobility.    Mitral annular calcification is present.    Mild mitral regurgitation is present.    Mild aortic stenosis.    Bicuspid valve not excluded    Mean gradient 15 mm hg    Tricuspid valve is structurally normal.    Mild tricuspid regurgitation with estimated RVSP of 39 mm Hg.    Moderately dilated left atrium.    Normal left ventricular size with preserved LV function and an estimated    ejection fraction of approximately 55-60%.  Mild concentric left ventricular hypertrophy.    Grade II diastolic dysfunction    Mildly enlarged right ventricle cavity.  IVC dilated.        Signature        ----------------------------------------------------------------    Electronically signed by Frank Peterson MD(Interpreting    [id number]) on 04/04/2022 08:25 AM    ----------------------------------------------------------------            CT HIP RIGHT WO CONTRAST [[id number]]    Resulted: 04/08/22 1052    Updated: 04/08/22 1054    Narrative:     CT HIP RIGHT WO CONTRAST 4/8/2022 10:43 AM   HISTORY: Right groin pain   COMPARISON: CT scan dated 4/2/2022   DOSE LENGTH PRODUCT: 989 mGy cm   TECHNIQUE: Helical tomographic images of the right hip were obtained   without the use of intravenous contrast. Automated exposure control   was also utilized to decrease patient radiation dose.    FINDINGS:   No acute fracture is identified. Unremarkable right total hip   arthroplasty. No hardware complication identified. There are old   healed right pubic rami fractures. Pubic symphysis is intact. 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 Advanced atheromatous   vascular calcification. Surrounding soft tissue structures are   unremarkable.       Impression:     1. No acute fracture or malalignment. 2. Old healed right pubic rami fractures. 3. Unremarkable right total hip arthroplasty. Signed by Dr Isaac Singer     MRI Cuca Santana [[id number]]    Resulted: 04/07/22 1353    Updated: 04/07/22 1355    Narrative:     EXAM: MRI abdomen without and with IV contrast, including 3-D MRCP   sequences   INDICATION: Further evaluation of potential biliary filling defect   identified on recent ultrasound   COMPARISON: Ultrasound 4/5/2022   FINDINGS:   The study is degraded by patient motion. Liver: No hepatic steatosis or morphologic changes of cirrhosis. No   suspicious focal liver lesion. Bile ducts: Intrahepatic and extra hepatic biliary ductal dilatation   with gradual caliber tapering down to the ampulla. No obstructing   stone or mass identified. Common bile duct measures 17 mm diameter. Gallbladder: No gallstones or gallbladder wall thickening. Pancreas: No pancreatic mass, cyst, or surrounding inflammatory   change. Spleen: Not enlarged. No focal lesion. Adrenals: Normal.   Kidneys: Tiny LEFT renal cyst. No solid enhancing renal mass. No   hydronephrosis. Vasculature: Nonaneurysmal abdominal aorta. Patent portal vein. Other Findings: Distended colon with layering fluid. No ascites. No   abdominal lymphadenopathy. No suspicious focal bone lesion. Small   bilateral pleural effusions in the lung bases. Impression:     1.  Intrahepatic and extra hepatic biliary ductal dilatation with   gradual caliber tapering down to the ampulla. No evidence of   obstructing stone or mass. Differential diagnosis includes biliary   stricture although occult stone or neoplasm are also within the   differential. Recommend correlation with laboratory analysis and   consider ERCP for further evaluation. 2.  The study is degraded by patient motion.    Signed by Dr Lamont Ganser: Previous medical records, medications were reviewed at today's visit    IMPRESSION:   Acute ischemic stroke-2 areas of ischemia- left basal ganglia and left thalamus presumed cardi embolic with history of atrial fibrillation and patient taking her Eliquis only once a day prior to admission-table examination    Facial droop, dysarthria, fall, weakness     Initial ExaminationGeneralized giveaway weakness worse in the right arm and right leg  Right lower facial droop with dysarthric speech    MRI of the brain-acute ischemia left basal ganglia and thalamus-personally reviewed  Carotid ultrasound-preliminarily <50% stenosis left internal carotid and asymptomatic 50-69% stenosis right internal carotid-medical management  2D echo-LV EF 55-60%-moderately dilated LA  On Eliquis now twice a day-no bleeding noted     GI-bowel regimen  Atrial fibrillation-Eliquis/amiodarone/metoprolol  Allergies-Flonase  Hypertension-on medications monitor-systolic blood pressure [phone]  Hypokalemia-on potassium-serum potassium normal  Rhabdomyolysis-IV fluids-CK trending down  Vitamin D deficiency-on vitamin D  Pain control-try Percocet as needed  DVT prophylaxis-Eliquis  PT/OT/speech     Too low functioning for rehab    MRI of the abdomen-intrahepatic and extrahepatic biliary ductal dilatation with gradual tapering down to the ampulla-differential includes biliary stricture  CT of the right hip-no acute fracture noted  Disposition pending     Continue present care    Expected duration and frequency therapy: 180 minutes per day, 5 days per week    CALL WITH ANY QUESTIONS  [phone] CELL  Dr Hurtado Poag

## 2022-04-10 NOTE — PROGRESS NOTES
[facility] MEDICINE  - PROGRESS NOTE    Admit Date: 4/1/2022         CC: fall, pelvic pain    Subjective: pain much better, no abd pain, no N/V/C, no dysuria, no chest pain, no dyspnea    Objective:  No distress, no complaints     Diet: ADULT DIET;  Regular  Pain is: none   Nausea:None  Bowel Movement/Flatus yes    Data:   Scheduled Meds: Reviewed  Current Facility-Administered Medications   Medication Dose Route Frequency Provider Last Rate Last Admin    oxyCODONE-acetaminophen (PERCOCET) 5-325 MG per tablet 1 tablet  1 tablet Oral Q4H PRN Xander Gottlieb MD   1 tablet at 04/10/22 9399    cyclobenzaprine (FLEXERIL) tablet 5 mg  5 mg Oral TID PRN Xander Gottlieb MD        hydroCHLOROthiazide (HYDRODIURIL) tablet 25 mg  25 mg Oral Daily Xander Gottlieb MD   25 mg at 04/10/22 6495    amiodarone (CORDARONE) tablet 200 mg  200 mg Oral Daily Richard Sheehan MD   200 mg at 04/10/22 4931    metoprolol succinate (TOPROL XL) extended release tablet 25 mg  25 mg Oral BID Richard Sheehan MD   25 mg at 04/10/22 0834    dilTIAZem (CARDIZEM CD) extended release capsule 120 mg  120 mg Oral BID Richard Sheehan MD   120 mg at 04/10/22 0834    atorvastatin (LIPITOR) tablet 10 mg  10 mg Oral Daily Xander Gottlieb MD   10 mg at 04/10/22 0834    lactobacillus (CULTURELLE) capsule 1 capsule  1 capsule Oral Daily Xander Gottlieb MD   1 capsule at 04/10/22 0834    simethicone (MYLICON) chewable tablet 80 mg  80 mg Oral 4x Daily PRN KATI Hart CNP   80 mg at 04/08/22 1942    nystatin (MYCOSTATIN) 838489 UNIT/ML suspension 500,000 Units  5 mL Oral 4x Daily Xander Gottlieb MD   500,000 Units at 04/10/22 0834    potassium chloride (KLOR-CON M) extended release tablet 40 mEq  40 mEq Oral PRN KATI Hart CNP        Or    potassium bicarb-citric acid (EFFER-K) effervescent tablet 40 mEq  40 mEq Oral PRN KATI Hart - CNP        Or    potassium chloride 10 mEq/100 mL IVPB (Peripheral Line)  10 mEq IntraVENous PRN KATI Hawley - CNP        magic butt cream   Topical Q4H PRN KATI Hawley CNP   Given at 04/02/22 2136    sodium chloride flush 0.9 % injection 5-40 mL  5-40 mL IntraVENous 2 times per day Macarena Berg MD   10 mL at 04/10/22 0834    sodium chloride flush 0.9 % injection 5-40 mL  5-40 mL IntraVENous PRN Macarena Berg MD   10 mL at 04/03/22 1657    0.9 % sodium chloride infusion   IntraVENous PRN Macarena Berg MD        ondansetron (ZOFRAN-ODT) disintegrating tablet 4 mg  4 mg Oral Q8H PRN Macarena Berg MD        Or    ondansetron [facility]) injection 4 mg  4 mg IntraVENous Q6H PRN Macarena Berg MD   4 mg at 04/08/22 1340    polyethylene glycol (GLYCOLAX) packet 17 g  17 g Oral Daily PRN Macarena Berg MD        apixaban (ELIQUIS) tablet 5 mg  5 mg Oral BID Macarena Berg MD   5 mg at 04/10/22 0834    fluticasone (FLONASE) 50 MCG/ACT nasal spray 2 spray  2 spray Nasal Daily Macarena Berg MD   2 spray at 04/10/22 3198    hydrOXYzine (ATARAX) tablet 25 mg  25 mg Oral Nightly Macarena Berg MD   25 mg at 04/09/22 2109    vitamin D (ERGOCALCIFEROL) capsule 50,000 Units  50,000 Units Oral Weekly Macarena Berg MD   50,000 Units at 04/08/22 1041    nystatin (MYCOSTATIN) cream   Topical BID KATI Boyd CNP   Given at 04/10/22 0836     DVT Prophylaxis: eliquis    Continuous Infusions:   sodium chloride         Intake/Output Summary (Last 24 hours) at 4/10/2022 1213  Last data filed at 4/10/2022 0615  Gross per 24 hour   Intake 440 ml   Output 1850 ml   Net -1410 ml     CBC:   Recent Labs     04/08/22  0351 04/09/22  0401 04/10/22  0342   WBC 7.1 8.8 7.2   HGB 12.3 12.0 12.2    348 382     BMP:  Recent Labs     04/08/22  0351 04/09/22  0401 04/10/22  0342    141 140   K 4.4 4.2 4.4  103 102   CO2 24 25 28   BUN 22 15 16   CREATININE 0.9 0.9 0.9   GLUCOSE 93 89 100     ABGs: No results found for: PHART, PO2ART, FAH0RXJ  INR: No results for input(s): INR in the last 72 hours. Objective:   Vitals: /62   Pulse 51   Temp 97.7 °F (36.5 °C) (Temporal)   Resp 16   Ht 5' 3\" (1.6 m)   Wt 145 lb (65.8 kg)   SpO2 95%   BMI 25.69 kg/m²   General appearance: alert, appears stated age and cooperative  Skin: Skin color, texture, turgor normal.   HEENT: Head: Normocephalic, no lesions, without obvious abnormality.   Neck: no adenopathy, no carotid bruit, no JVD and supple, symmetrical, trachea midline  Lungs: clear to auscultation bilaterally  Heart: regular rate and rhythm, S1, S2 normal, no murmur, click, rub or gallop  Abdomen: soft low abdominal tenderness with distention, bs present, no rebound  Extremities: extremities normal, atraumatic, no cyanosis or edema  Lymphatic: No significant lymph node enlargement papable  Neurologic: Mental status: Alert, oriented, thought content appropriate, facial droop         Assessment & Plan:    · Traumatic rhabdomyolysis- resolved   · Metabolic acidosis- resolved    · Hypokalemia - resolved   · 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- follow OP  · Constipation - treated  · Groundglass nodule/opacities predominantly in the right upper lobe   may represent an inflammatory/infectious or an evolving neoplastic process- pulmonary evaluated - follow up OP  · Severe deconditioning with fall - PT/OT- needs SNF  · Acute CVA with Facial droop-- neurology evaluated   · Significant dilatation of the common bile duct and pancreatic duct -per ERCP MRCP recommended- patient is asymptotic- follow up as OP with Dr Lyndsay Ceja in 2 weeks  · PAF -amiodarone, Cardizem, metoprolol- treatment per cardiology  · Carotid stenosis- Lipitor, eliquis - follow up OP   · Vit d def- on replacement   · History of pelvic fx  · Mild abd distention - KUB shows ileus- no obstruction- pt has BM  · Right groin pain - ct neg                   See orders   Disposition: cleared for DC, awaiting SNF placement     Mehreen Bello MD

## 2022-04-11 VITALS
RESPIRATION RATE: 18 BRPM | WEIGHT: 145 LBS | OXYGEN SATURATION: 94 % | SYSTOLIC BLOOD PRESSURE: 120 MMHG | DIASTOLIC BLOOD PRESSURE: 53 MMHG | HEIGHT: 63 IN | TEMPERATURE: 99.1 F | HEART RATE: 56 BPM | BODY MASS INDEX: 25.69 KG/M2

## 2022-04-11 LAB — SARS-COV-2, NAAT: NOT DETECTED

## 2022-04-11 PROCEDURE — 97530 THERAPEUTIC ACTIVITIES: CPT

## 2022-04-11 PROCEDURE — 99232 SBSQ HOSP IP/OBS MODERATE 35: CPT | Performed by: PSYCHIATRY & NEUROLOGY

## 2022-04-11 PROCEDURE — 6370000000 HC RX 637 (ALT 250 FOR IP): Performed by: INTERNAL MEDICINE

## 2022-04-11 PROCEDURE — 2580000003 HC RX 258: Performed by: HOSPITALIST

## 2022-04-11 PROCEDURE — 87635 SARS-COV-2 COVID-19 AMP PRB: CPT

## 2022-04-11 PROCEDURE — 97535 SELF CARE MNGMENT TRAINING: CPT

## 2022-04-11 PROCEDURE — 6370000000 HC RX 637 (ALT 250 FOR IP): Performed by: HOSPITALIST

## 2022-04-11 RX ORDER — AMIODARONE HYDROCHLORIDE 200 MG/1
200 TABLET ORAL DAILY
Qty: 30 TABLET | Refills: 0 | DISCHARGE
Start: 2022-04-12 | End: 2022-05-31

## 2022-04-11 RX ORDER — NYSTATIN 100000 U/G
CREAM TOPICAL
Qty: 1 EACH | Refills: 0 | DISCHARGE
Start: 2022-04-11 | End: 2022-08-03

## 2022-04-11 RX ORDER — CYCLOBENZAPRINE HCL 5 MG
5 TABLET ORAL 2 TIMES DAILY PRN
Qty: 20 TABLET | Refills: 0 | DISCHARGE
Start: 2022-04-11 | End: 2022-04-21

## 2022-04-11 RX ORDER — POLYETHYLENE GLYCOL 3350 17 G/17G
17 POWDER, FOR SOLUTION ORAL DAILY
Qty: 527 G | Refills: 1 | DISCHARGE
Start: 2022-04-11 | End: 2022-05-11

## 2022-04-11 RX ORDER — DILTIAZEM HYDROCHLORIDE 120 MG/1
120 CAPSULE, COATED, EXTENDED RELEASE ORAL 2 TIMES DAILY
Qty: 60 CAPSULE | Refills: 0 | DISCHARGE
Start: 2022-04-11 | End: 2022-08-03

## 2022-04-11 RX ORDER — METOPROLOL SUCCINATE 25 MG/1
25 TABLET, EXTENDED RELEASE ORAL 2 TIMES DAILY
Qty: 60 TABLET | Refills: 0 | DISCHARGE
Start: 2022-04-11 | End: 2022-05-31

## 2022-04-11 RX ORDER — LACTOBACILLUS RHAMNOSUS GG 10B CELL
1 CAPSULE ORAL DAILY
Qty: 30 CAPSULE | Refills: 0 | DISCHARGE
Start: 2022-04-12

## 2022-04-11 RX ORDER — HYDROCHLOROTHIAZIDE 25 MG/1
25 TABLET ORAL DAILY
Qty: 30 TABLET | Refills: 0 | DISCHARGE
Start: 2022-04-12 | End: 2022-05-31

## 2022-04-11 RX ORDER — ATORVASTATIN CALCIUM 20 MG/1
10 TABLET, FILM COATED ORAL DAILY
Qty: 15 TABLET | Refills: 0 | DISCHARGE
Start: 2022-04-12 | End: 2022-05-03

## 2022-04-11 RX ORDER — OXYCODONE HYDROCHLORIDE AND ACETAMINOPHEN 5; 325 MG/1; MG/1
1 TABLET ORAL EVERY 4 HOURS PRN
Qty: 18 TABLET | Refills: 0 | Status: SHIPPED | OUTPATIENT
Start: 2022-04-11 | End: 2022-04-14

## 2022-04-11 RX ADMIN — OXYCODONE HYDROCHLORIDE AND ACETAMINOPHEN 1 TABLET: 5; 325 TABLET ORAL at 02:32

## 2022-04-11 RX ADMIN — SODIUM CHLORIDE, PRESERVATIVE FREE 10 ML: 5 INJECTION INTRAVENOUS at 08:08

## 2022-04-11 RX ADMIN — METOPROLOL SUCCINATE 25 MG: 25 TABLET, EXTENDED RELEASE ORAL at 08:09

## 2022-04-11 RX ADMIN — APIXABAN 5 MG: 5 TABLET, FILM COATED ORAL at 08:08

## 2022-04-11 RX ADMIN — HYDROCHLOROTHIAZIDE 25 MG: 25 TABLET ORAL at 08:08

## 2022-04-11 RX ADMIN — FLUTICASONE PROPIONATE 2 SPRAY: 50 SPRAY, METERED NASAL at 08:06

## 2022-04-11 RX ADMIN — NYSTATIN 500000 UNITS: 100000 SUSPENSION ORAL at 08:07

## 2022-04-11 RX ADMIN — DILTIAZEM HYDROCHLORIDE 120 MG: 120 CAPSULE, COATED, EXTENDED RELEASE ORAL at 08:08

## 2022-04-11 RX ADMIN — ATORVASTATIN CALCIUM 10 MG: 10 TABLET, FILM COATED ORAL at 08:08

## 2022-04-11 RX ADMIN — AMIODARONE HYDROCHLORIDE 200 MG: 200 TABLET ORAL at 08:09

## 2022-04-11 RX ADMIN — NYSTATIN: 100000 CREAM TOPICAL at 08:13

## 2022-04-11 RX ADMIN — Medication 1 CAPSULE: at 08:08

## 2022-04-11 RX ADMIN — OXYCODONE HYDROCHLORIDE AND ACETAMINOPHEN 1 TABLET: 5; 325 TABLET ORAL at 14:39

## 2022-04-11 ASSESSMENT — PAIN SCALES - GENERAL
PAINLEVEL_OUTOF10: 6
PAINLEVEL_OUTOF10: 6
PAINLEVEL_OUTOF10: 9

## 2022-04-11 ASSESSMENT — PAIN DESCRIPTION - ONSET: ONSET: ON-GOING

## 2022-04-11 ASSESSMENT — PAIN DESCRIPTION - LOCATION: LOCATION: GENERALIZED

## 2022-04-11 ASSESSMENT — PAIN DESCRIPTION - FREQUENCY: FREQUENCY: CONTINUOUS

## 2022-04-11 ASSESSMENT — PAIN DESCRIPTION - DESCRIPTORS: DESCRIPTORS: ACHING;SORE

## 2022-04-11 ASSESSMENT — PAIN DESCRIPTION - PAIN TYPE: TYPE: CHRONIC PAIN

## 2022-04-11 ASSESSMENT — PAIN DESCRIPTION - PROGRESSION: CLINICAL_PROGRESSION: NOT CHANGED

## 2022-04-11 ASSESSMENT — PAIN - FUNCTIONAL ASSESSMENT: PAIN_FUNCTIONAL_ASSESSMENT: PREVENTS OR INTERFERES SOME ACTIVE ACTIVITIES AND ADLS

## 2022-04-11 NOTE — PROGRESS NOTES
Occupational Therapy  Facility/Department: [facility] 4 ONCOLOGY UNIT  Daily Treatment Note  NAME: Cheryl Balderrama  : 1945  MRN: [id number]    Date of Service: 2022    Discharge Recommendations:  Patient would benefit from continued therapy after discharge       Assessment   Performance deficits / Impairments: Decreased functional mobility ; Decreased ADL status; Decreased strength;Decreased safe awareness;Decreased endurance;Decreased balance;Decreased posture;Decreased coordination  Assessment: Pt tolerated sitting EOB and standing 1x at bedside to take 1 step toward HOB. Pt tolerated tx well and continues to benefit from skilled OT services. Treatment Diagnosis: Rhabdomyolosis, Left side infarct  Prognosis: Good  REQUIRES OT FOLLOW UP: Yes  Activity Tolerance  Activity Tolerance: Patient Tolerated treatment well;Patient limited by fatigue  Safety Devices  Safety Devices in place: Yes  Type of devices: Bed alarm in place;Call light within reach; Left in bed         Patient Diagnosis(es): The primary encounter diagnosis was Traumatic rhabdomyolysis, initial encounter ([facility] [address] Diagnoses of Abnormal chest x-ray and History of pelvic fracture were also pertinent to this visit. has a past medical history of Chronic back pain, Hypertension, Inflammatory arthritis, Knee pain, and Spastic colon. has a past surgical history that includes Hand surgery (Left, ); Appendectomy; Abdomen surgery; Tubal ligation; Mouth surgery; Finger trigger release (Left, 2020); Hand surgery (Left, 2020); joint replacement (Bilateral); back surgery; and Total knee arthroplasty (Right, 2021).     Restrictions  Restrictions/Precautions  Restrictions/Precautions: Fall Risk,Contact Precautions  Subjective   General  Chart Reviewed: Yes  Patient assessed for rehabilitation services?: Yes  Response to previous treatment: Patient with no complaints from previous session  Family / Caregiver Present: No  Subjective  Subjective: Pt in bed upon arrival for therapy. pt agreeable to participate. Pain Assessment  Pain Assessment: 0-10  Pain Level: 6  Pain Type: Chronic pain  Pain Location: Generalized  Pain Descriptors: Aching; Sore  Pain Frequency: Continuous  Pain Onset: On-going  Clinical Progression: Not changed  Functional Pain Assessment: Prevents or interferes some active activities and ADLs  Non-Pharmaceutical Pain Intervention(s): Ambulation/Increased Activity;Repositioned;Rest;Therapeutic presence; Therapeutic touch  Response to Pain Intervention: Patient Satisfied  Vital Signs  Patient Currently in Pain: Yes   Orientation     Objective    ADL  Grooming: Contact guard assistance        Balance  Sitting Balance: Minimal assistance  Standing Balance: Moderate assistance  Bed mobility  Supine to Sit: Moderate assistance  Sit to Supine: Moderate assistance  Scooting: Moderate assistance  Transfers  Sit to stand: Moderate assistance  Stand to sit: Moderate assistance  Transfer Comments: Stood with bettie bedrails to sidestep up to [facility].                                                            Plan   Plan  Times per week: 3-5  Times per day: Daily  Goals  Short term goals  Time Frame for Short term goals: 1-2 weeks  Short term goal 1: Perform transfers with moderate assist  Short term goal 2: Perform shirt dressing with supervision  Short term goal 3: Perform LB dressing with moderate assist  Short term goal 4: Perform bed mobility with min A  Short term goal 5: Perform toileting with moderate assist  Long term goals  Time Frame for Long term goals : Upgrade as tolerated       Therapy Time   Individual Concurrent Group Co-treatment   Time In           Time Out           Minutes              Florie Oppenheim, OTA  Electronically signed by Florie Oppenheim, OTA on 4/11/2022 at 4:30 PM

## 2022-04-11 NOTE — DISCHARGE SUMMARY
Physician Discharge Summary     Patient ID:  Aakash Breen  [id number]  64 y.o.  1945    Admit date: 4/1/2022    Discharge date and time: 4/11/2022    Admitting Physician: Eryn Cisneros MD     Discharge Physician: Eryn Cisneros MD     Discharge Diagnoses:     · Traumatic rhabdomyolysis- resolved   · Metabolic acidosis- resolved    · Hypokalemia - resolved - check BMP in 3 days  · Groundglass nodule/opacities predominantly in the right upper lobe   may represent an inflammatory/infectious or an evolving neoplastic process- pulmonary evaluated - follow up OP  · Severe deconditioning with fall - PT/OT- needs SNF  · Acute CVA with facial droop-- neurology evaluated   · Significant dilatation of the common bile duct and pancreatic duct -per MRCP ERCP recommended- patient is asymptotic- follow up as OP with Dr Danielle Pastor in 2 weeks  · PAF -amiodarone, Cardizem, metoprolol, eliquis  · Carotid stenosis- lipitor, eliquis - follow up OP   · Vit d def- on replacement   · History of pelvic fx- pain control   · Right and left groin pain - ct neg  · Facet and ligamentous hypertrophy as well as bulging of the disc   contributes to central and foraminal stenosis at L4-L5. There is grade   1 anterolisthesis of L4 on L5 felt to represent subluxation at the   level of the facets- follow up OP    Discharged Condition: stable    Indication for Admission: fall/rhabdomyolysis     Hospital Course:     66 yo female presented after a fall at home. She had generalized weakness. She lives alone. She was diagnosed with traumatic rhabdomyolysis and treated with IVF. CT head was neg. MRI brain showed acute stroke. Diffusion restriction within the left thalamus and basal ganglia   with associated ADC mapping abnormality. Neurology evaluated. Patient has history of afib and was found in afib with RVR, cardiology evaluated and adjusted medications. CT chest showed possible neoplasm- pulmonary evaluated and recommended OP follow up. MRCP showed significant dilatation of the common bile duct and pancreatic duct. ERCP recommended as OP- patient is asymptotic- follow up with Dr Elfida Mcardle in 2 weeks. Currently labs WNL. VS WNL  Patient voices no complaints. She is cleared by all specialists for DC to SNF in stable condition for PT/OT. Consults: cardiology, pulmonary/intensive care, GI and neurology    Significant Diagnostic Studies:     XR ABDOMEN (KUB) (SINGLE AP VIEW) [[id number]] Resulted: 04/08/22 1410      Order Status: Completed Updated: 04/08/22 1412     Narrative:       XR ABDOMEN (KUB) (SINGLE AP VIEW)   4/8/2022 3:09 PM   History: Abdominal pain. 2 image KUB exam.   Osteopenia. Degenerative lumbar spurring. Bilateral hip prostheses. Old right pubic rami fractures. Mildly prominent air-filled colon compatible with ileus. No point of obstruction is seen. No significant small bowel dilation.     Impression:       1. Bowel gas pattern compatible with colonic ileus. Signed by Dr Harrison Adams [[id number]] Resulted: 04/08/22 1052     Order Status: Completed Updated: 04/08/22 1054     Narrative:       CT HIP RIGHT WO CONTRAST 4/8/2022 10:43 AM   HISTORY: Right groin pain   COMPARISON: CT scan dated 4/2/2022   DOSE LENGTH PRODUCT: 989 mGy cm   TECHNIQUE: Helical tomographic images of the right hip were obtained   without the use of intravenous contrast. Automated exposure control   was also utilized to decrease patient radiation dose. FINDINGS:   No acute fracture is identified. Unremarkable right total hip   arthroplasty. No hardware complication identified. There are old   healed right pubic rami fractures. Pubic symphysis is intact. 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 Advanced atheromatous   vascular calcification. Surrounding soft tissue structures are   unremarkable.         Impression:       1. No acute fracture or malalignment.    2. Old healed right pubic rami fractures. 3. Unremarkable right total hip arthroplasty. Signed by Dr Kiara Nicholson [[id number]] Resulted: 04/07/22 1353     Order Status: Completed Updated: 04/07/22 1351     Narrative:       EXAM: MRI abdomen without and with IV contrast, including 3-D MRCP   sequences   INDICATION: Further evaluation of potential biliary filling defect   identified on recent ultrasound   COMPARISON: Ultrasound 4/5/2022   FINDINGS:   The study is degraded by patient motion. Liver: No hepatic steatosis or morphologic changes of cirrhosis. No   suspicious focal liver lesion. Bile ducts: Intrahepatic and extra hepatic biliary ductal dilatation   with gradual caliber tapering down to the ampulla. No obstructing   stone or mass identified. Common bile duct measures 17 mm diameter. Gallbladder: No gallstones or gallbladder wall thickening. Pancreas: No pancreatic mass, cyst, or surrounding inflammatory   change. Spleen: Not enlarged. No focal lesion. Adrenals: Normal.   Kidneys: Tiny LEFT renal cyst. No solid enhancing renal mass. No   hydronephrosis. Vasculature: Nonaneurysmal abdominal aorta. Patent portal vein. Other Findings: Distended colon with layering fluid. No ascites. No   abdominal lymphadenopathy. No suspicious focal bone lesion. Small   bilateral pleural effusions in the lung bases.     Impression:       1.  Intrahepatic and extra hepatic biliary ductal dilatation with   gradual caliber tapering down to the ampulla. No evidence of   obstructing stone or mass. Differential diagnosis includes biliary   stricture although occult stone or neoplasm are also within the   differential. Recommend correlation with laboratory analysis and   consider ERCP for further evaluation. 2.  The study is degraded by patient motion.    Signed by Dr Oscar Kline [[id number]] Resulted: 04/06/22 1412     Order Status: Completed Updated: 04/06/22 1412     Narrative:       EXAMINATION: CT left hip without contrast 4/6/2022   HISTORY: Severe left hip pain after fall. Dose: 996 mGycm. All CT scans are performed using dose optimization   techniques as appropriate to the performed exam and include at least   one of the following: Automated exposure control, adjustment of the mA   and/or kV according to size, and the use of the iterative   reconstruction technique. Arlen Dye FINDINGS: Multiple contiguous axial image are obtained through the   left hip per protocol without intravenous contrast enhancement with   reformatted images obtained in the sagittal and coronal projections   from the original data set. There is skin thickening and ecchymosis overlying the gluteus   musculature at the level of the left hip suggesting rather extensive   bruising. The patient has undergone previous left total hip   arthroplasty. The acetabular cup appears to remain well seated. There   is some lucency within the acetabular roof at the interface of the   acetabular cup. This could represent changes of ALTR(adverse local   tissue reaction) to the arthroplasty. I do not see evidence of a   displaced bony fracture. No convincing evidence of loosening of the   prosthesis within the proximal femoral shaft. The rami are intact.     Impression:       1. . The patient's undergone previous left total hip arthroplasty. The   acetabular cup appears to remain well seated. There is some lucency   associated with the acetabular roof at the interface with the   acetabular cup which could represent changes of adverse local tissue   reaction. 2. No evidence of acute displaced fracture. There is rather extensive   stranding and ecchymosis overlying the lower left pelvis and left hip   and overlying the gluteus musculature consistent with rather extensive   bruising.    Signed by Dr Sudha Sotelo [[id number]] Updated: 04/06/22 1233     Order Status: [place] LIVER [[id number]] Resulted: 04/05/22 0947     Order Status: Completed Updated: 04/05/22 0949     Narrative:       US LIVER 4/5/2022 8:29 AM   HISTORY: Biliary duct dilatation   COMPARISON: NONE   TECHNIQUE: Multiple longitudinal and transverse realtime sonographic   images of the right upper quadrant of the abdomen are obtained. FINDINGS:     Pancreas: Normal in size and echogenicity. Liver: Normal in size, echogenicity and echotexture. No focal lesion. A small amount of free fluid is noted within the perihepatic space. Hepatopedal flow is noted within the portal vein. Gallbladder: No intraluminal echoes, wall thickening, or   pericholecystic fluid. Bile ducts: The CBD measures 1.0 cm in diameter. There is extrahepatic   biliary ductal dilatation. There is a questionable filling defect   within the common hepatic duct measuring approximately 13 x 12 x 5 mm   in size. Follow-up with MR imaging to include MRCP would be helpful   although the patient has great difficulty in remaining motionless on   both CT imaging from earlier this month and today's ultrasound and may   result in a poor quality study. .   Right kidney: Normal in size, shape and contour. No mass,   hydronephrosis or calculi. No perinephric fluid collection.     Other: There is a small amount of free fluid within the perisplenic   space. Small pleural effusions are present. .      Impression:       1. Mild extrahepatic biliary dilatation is present. There is a   question of a filling defect within the common hepatic duct near the   mike hepatis. This may be artifactual in nature as the patient had   difficulty in cooperating with the exam with some motion artifact. By   CT imaging the more distal common duct is also dilated. MRCP may be   helpful but given the patient's limitations may be of limited quality. 2. The gallbladder is unremarkable. No evidence of gallstones.  A small   amount of free fluid is noted within the perihepatic and perisplenic   space. Small pleural effusions are present. Signed by Dr Sugey Minor [[id number]]      Order Status: Canceled      VL DUP CAROTID BILATERAL [[id number]] Collected: 04/03/22 1425     Order Status: Completed Updated: 04/04/22 4374     Narrative:       Vascular Carotid Procedure      Demographics        Patient Name   [facility]                  76                   C        Patient Number [id number]           Gender               Female        Visit Number   [id number]        Interpreting         Neftali Bridges MD                                    Physician        Date of Birth  1945       Referring Physician Veronica Barreto        Accession      [id number]       Sonographer         [place] Wang RVT,    Number                                               RDMS       Procedure   Type of Study:        Cerebral:Carotid, VL CAROTID BILATERAL.       Indications for Study:Questionable CVA/TIA, Unsteady gait and Facial droop,   left. Risk Factors       - The patient's risk factor(s) include: arterial hypertension.       - Current - Every day.      Impression        There is mixed plaque visualized in the bilateral internal carotid    arteries.   Glendy Collar is 50-69% stenosis in the right internal carotid artery.    There is less than 50% stenosis of the left internal carotid artery.    There is normal antegrade flow in the bilateral vertebral arteries.        Signature        ----------------------------------------------------------------    Electronically signed by Neftali Bridges MD(Interpreting    physician) on 04/04/2022 03:18 PM    ----------------------------------------------------------------       Blood Pressure:Right arm 121/97 mmHg. Velocities are measured in cm/s ; Diameters are measured in mm     Carotid Right Measurements   +------------+-------+-------+--------+-------+------------+---------------+   ! Location    !PSV    !EDV    ! Angle   !RI     !%Stenosis   !Tortuosity     !   +------------+-------+-------+--------+-------+------------+---------------+   ! Prox CCA    !60.3   !20.5   !60      !0.66   !            !               !   +------------+-------+-------+--------+-------+------------+---------------+   ! Mid CCA     !70.8   !19.9   !60      !0.72   !            !               !   +------------+-------+-------+--------+-------+------------+---------------+   ! Prox ICA    !203    !60.4   !60      !0.7    !            !               !   +------------+-------+-------+--------+-------+------------+---------------+   ! Mid ICA     !124    !35.1   !60      !0.72   !            !               !   +------------+-------+-------+--------+-------+------------+---------------+   ! Dist ICA    !81.2   !30.7   !60      !0.62   !            !               !   +------------+-------+-------+--------+-------+------------+---------------+   ! Prox ECA    !188    !20.9   !60      !0.89   !            !               !   +------------+-------+-------+--------+-------+------------+---------------+   ! Vertebral   !77.9   !16.5   !60      !0.79   !            !               !   +------------+-------+-------+--------+-------+------------+---------------+       - There is antegrade vertebral flow noted on the right side.       - Additional Measurements:ICAPSV/CCAPSV 3.37. ICAEDV/CCAEDV 2.95. Carotid Left Measurements   +------------+-------+-------+--------+-------+------------+---------------+   ! Location    !PSV    !EDV    ! Angle   !RI     !%Stenosis   ! Tortuosity     !   +------------+-------+-------+--------+-------+------------+---------------+   ! Prox CCA    !120    !22     !60      !0.82   !            !               !   +------------+-------+-------+--------+-------+------------+---------------+   ! Mid CCA     !82. 3   !28.5   !60      !0.65   !            !               !   +------------+-------+-------+--------+-------+------------+---------------+   ! Prox ICA    !74.6   !26.3   !60      !0.65   !            !               !   +------------+-------+-------+--------+-------+------------+---------------+   ! Mid ICA     !92.2   !34     !60      !0.63   !            !               !   +------------+-------+-------+--------+-------+------------+---------------+   ! Dist ICA   [id number]. [id number]   !28.5   !60      !0.68   !            !               !   +------------+-------+-------+--------+-------+------------+---------------+   ! Prox ECA    !93.3   !16.5   !60      !0.82   !            !               !   +------------+-------+-------+--------+-------+------------+---------------+   ! Vertebral   !40.4   !18.6   !60      !0.54   !            !               !   +------------+-------+-------+--------+-------+------------+---------------+       - There is antegrade vertebral flow noted on the left side.       - Additional Measurements:ICAPSV/CCAPSV 0.77. ICAEDV/CCAEDV 1.55.     CT CHEST W CONTRAST [[id number]] Resulted: 04/04/22 1509     Order Status: Completed Updated: 04/04/22 1511     Narrative:       Examination. CT CHEST W CONTRAST 4/4/2022 12:26 PM   History: Right upper lobe collapse. DLP: 1949 mGycm. The automated exposure control was utilized to   minimize the patient's radiation exposure. CT scan of the chest is performed after intravenous contrast   enhancement. The images are acquired in axial plane and subsequent   reconstruction in coronal and sagittal planes. The comparison is made with the previous study dated 4/1/2022. The opacity in the right upper lobe paratracheal area represent a   distended superior vena cava and moderately tortuous and displays   brachiocephalic vessels. There is no evidence of right upper lobe   collapse. The lungs are poorly distended. There are several groundglass opacity/nodule in the right apex, the   largest one located anteriorly and laterally, image #34 in series 4,   measuring 1.6 cm in AP dimension.    There are atelectatic changes in the lower lungs bilaterally. This   more pronounced on the right side. There are small area of consolidation involving the posterior segments   of the lower lobes bilaterally adjacent to a small bibasal pleural   effusion. There is moderate deviation of the trachea towards the right. The   remaining tracheobronchial structures are normal and patent. The limited visualized soft tissues of the neck are unremarkable. The   thyroid gland appear normal.   Severe atheromatous changes of thoracic aorta are seen. No aneurysmal   dilatation. Severe atheromatous changes of coronary arteries are seen. There is no evidence of mediastinal or hilar mass or lymphadenopathy. There is no axillary lymphadenopathy. Limited visualized liver and spleen are unremarkable. Incompletely   visualized gallbladder show no gallstone. There is significant   dilatation of the common bile duct and pancreatic duct which are   incompletely evaluated in this study. However when compared with the   previous study obtained 3 days ago these findings are not visualized? Danny Calvert The study was unenhanced. The images reviewed in bone window show no acute bony abnormality or a   bone lesion. Chronic degenerative changes of thoracic spine are seen. There is subcutaneous fat infiltration of the lower chest and upper   abdominal wall, more pronounced laterally which may represent edema   due to fluid overload.     Impression:       1. No finding to suggest collapse of the right upper lobe. Detail is   given above. 2. Groundglass nodule/opacities predominantly in the right upper lobe   may represent an inflammatory/infectious or an evolving neoplastic   process. Further follow-up may be obtained. 3. Small bibasal pleural effusion. 4. Small areas of consolidation and atelectasis in the lower lobes   bilaterally.    5. The limited visualized abdomen show significant dilatation of the   common bile duct and pancreatic duct which were not noted in the previous study obtained 3 days ago which was done without contrast   enhancement. The etiology and clinical significance is not certain. A   follow-up CT scan of the abdomen after oral and intravenous contrast   enhancement. Thin. Signed by Dr Perry De La Cruz [[id number]] Updated: 04/04/22 1227     Order Status: Canceled      MRI BRAIN W 222 THERAVECTYS [[id number]] Resulted: 04/03/22 1553     Order Status: Completed Updated: 04/03/22 1555     Narrative:       MRI brain without and with contrast 4/3/2022 2:22 PM   History: Facial droop   Comparison: None.     Technique: Multiplanar imaging of the brain was performed in a routine   fashion before and after the intravenous injection of gadolinium   contrast.   Findings:   Midline structures are nondisplaced. Ventricular system is normal.   There is no significant mass effect or hydrocephalus. Basilar cisterns   are preserved. There are scattered foci of T2 abnormality involving   the periventricular and higher white matter tracts consistent with   moderate small vessel ischemic disease. There is mild gliosis within   the nely. There is a focus of low signal within the left thalamus near   the posterior limb of the internal capsule on gradient imaging   suggesting a site of previous hemorrhage or calcification. On   diffusion-weighted imaging this is separable from the area of acute   ischemic infarction. No additional sites of acute or chronic   hemorrhage are demonstrated. . No abnormal extra axial fluid   collections are noted. There is diffusion restriction within the left   basal ganglia with involvement of the thalamus, posterior limb of the   left internal capsule and putamen with involvement of the external   capsule also demonstrated. These are nonhemorrhagic infarcts. There is   associated signal loss on ADC mapping imaging consistent with acute   ischemic infarction. No additional sites of diffusion restriction are   present. Bessie Soto Postcontrast imaging demonstrates a developmental venous anomaly which   is parafalcine in location within the right parietal lobe extending   into the region of the corpus callosum. No additional sites of   abnormal enhancement are present. Proximal cervical spinal cord, brainstem, and cerebellum are   unremarkable. Normal cerebrovascular flow voids are seen. Bilateral   globes and orbits are normal in appearance. No abnormal signal is noted in the mastoid air cells or paranasal   sinuses.      Impression:       Impression:   1. Diffusion restriction within the left thalamus and basal ganglia   with associated ADC mapping abnormality consistent with an acute   left-sided infarct. This is nonhemorrhagic. There is no associated   mass effect or shift of the midline. 2. There is mild atrophy of the brain. Small vessel ischemic changes   are noted involving the periventricular and higher white matter   tracts. There is evidence of a remote small focus of hemorrhage within   the left thalamus. No additional sites of blooming are present on   gradient imaging. 3. DVA within the parafalcine right parietal lobe extending into the   region of the corpus callosum. No additional sites of abnormal   enhancement are present. Signed by Dr Sarah Contreras 2-3 VW W PELVIS LEFT [[id number]]      Order Status: Canceled      US CAROTID ARTERY BILATERAL [[id number]]      Order Status: Canceled      CT CHEST WO CONTRAST [[id number]]      Order Status: Canceled      CT PELVIS WO CONTRAST Additional Contrast? None [[id number]] Resulted: 04/02/22 1300     Order Status: Completed Updated: 04/02/22 1302     Narrative:       EXAMINATION: CT pelvis without contrast 4/2/2022   HISTORY: Pubic pain post fall   Dose: 807 mGycm.  All CT scans are performed using dose optimization   techniques as appropriate to the performed exam and include at least   one of the following: Automated exposure control, adjustment of the mA and/or kV according to size, and the use of the iterative   reconstruction technique. FINDINGS: Multiple contiguous axial images are obtained through the   bony pelvis per protocol without venous contrast enhancement with   reformatted images obtained in the sagittal coronal projections from   the original data set. At the L4-L5 level there is broad-based bulging of disc as well as   moderate facet and ligamentous hypertrophy. There is moderate central   spinal stenosis as well as moderate bilateral neural foraminal   narrowing. There is mild grade 1 anterolisthesis of L4 on L5 likely   representing subluxation at the level of the facets. The SI joints are intact. No evidence of sacral fracture. The patient is status post bilateral total hip arthroplasty. This does   obscure some of the anatomic detail secondary to streaking artifact. There are old fractures of the right superior and inferior pubic   ramus. No acute fractures are present. The pubic symphysis is intact. A Saeed catheter is in place within the bladder. There is mild fecal   impaction within the rectal vault.     Impression:       1. . Old fractures of the right superior and inferior pubic rami. No   additional fractures are present. The SI joints and pubic symphysis   are intact with no evidence of diastases. 2. Facet and ligamentous hypertrophy as well as bulging of the disc   contributes to central and foraminal stenosis at L4-L5. There is grade   1 anterolisthesis of L4 on L5 felt to represent subluxation at the   level of the facets. 3. Constipation with increased stool throughout the colon including   fecal impaction within the rectal vault. Signed by Dr Alecia Maldonado [[id number]] Resulted: 04/02/22 1254     Order Status: Completed Updated: 04/02/22 1257     Narrative:       CT BRAIN without contrast 4/2/2022 12:38 PM   HISTORY: New onset weakness yesterday   COMPARISON: 4/1/2022   DLP: 632 mGy cm.  All CT scans are performed using dose optimization   techniques as appropriate to the performed exam and include at least   one of the following: Automated exposure control, adjustment of the mA   and/or kV according to size, and the use of the iterative   reconstruction technique. TECHNIQUE: Serial axial tomographic images of the brain were obtained   without the use of intravenous contrast.   FINDINGS:   The midline structures are nondisplaced. There is mild cerebral and   cerebellar volume loss, with an associated increase in the prominence   of the ventricles and sulci. The basilar cisterns are normal in size   and configuration. There is no evidence of intracranial hemorrhage or   mass-effect. There is low attenuation in the periventricular white   matter, consistent with chronic ischemic change. There are no abnormal   extra-axial fluid collections. There is no evidence of tonsillar   herniation. The included orbits and their contents are unremarkable. The   visualized paranasal sinuses, mastoid air cells and middle ear   cavities are clear. The visualized osseous structures and overlying   soft tissues of the skull and face are intact.      Impression:       1. Mild cerebral and cerebellar volume loss with chronic microvascular   disease but no evidence of acute intracranial process. Signed by Dr Gris Urbina 2-3 VW W PELVIS LEFT [[id number]]      Order Status: Canceled      MRI BRAIN WO CONTRAST [[id number]]      Order Status: Canceled      CT ABDOMEN PELVIS WO CONTRAST Additional Contrast? None [[id number]] Resulted: 04/01/22 1259     Order Status: Completed Updated: 04/01/22 1301     Narrative:       CT ABDOMEN PELVIS WO CONTRAST 4/1/2022 12:23 PM   HISTORY: Fall   COMPARISON: None   DLP: 972 mGy cm   TECHNIQUE: Noncontrast enhanced images of the abdomen and pelvis   obtained without oral contrast. Automated exposure control was also   utilized to decrease patient radiation dose.    FINDINGS: Lung bases are clear. Coronary atheromatous calcification. LIVER: No focal liver lesion. BILIARY SYSTEM: The gallbladder is unremarkable. No intrahepatic or   extrahepatic ductal dilatation. PANCREAS: No focal pancreatic lesion. SPLEEN: Unremarkable. KIDNEYS AND ADRENALS: Bilateral kidneys and adrenal glands are   unremarkable.  The ureters are decompressed and normal in appearance. RETROPERITONEUM: No mass, lymphadenopathy or hemorrhage. GI TRACT: Stomach and small bowel are unremarkable. Moderate colonic   stool. OTHER: There is no mesenteric mass, lymphadenopathy or fluid   collection. Unremarkable bilateral hip arthroplasties. Old healed   right pubic bone fracture. Advanced lumbar spondylosis. No acute bony   abnormality. PELVIS: No mass lesion, fluid collection or significant   lymphadenopathy is seen in the pelvis. The urinary bladder is normal   in appearance.     Impression:       1. No acute process in the abdomen or pelvis. 2. Moderate colonic stool. Bowel loops are nondilated. 3. Unremarkable bilateral hip arthroplasties. Lumbar spondylosis. No   acute bony normality. Signed by Dr Liz Benavides [[id number]] Resulted: 04/01/22 1254     Order Status: Completed Updated: 04/01/22 1257     Narrative:       CT cervical spine 4/1/2022 12:22 PM   HISTORY: Fall   TECHNIQUE: Axial images of the cervical spine were obtained without IV   contrast. Coronal and sagittal reformatted images are reconstructed   and reviewed. COMPARISON: None. DLP: 425 mGy cm Automated exposure control was utilized to minimize   patient radiation dose. FINDINGS:   Alignment of the cervical spine is appropriate. Moderate to advanced   degenerative disc change. Diffuse uncinate process hypertrophy with   mild facet osteoarthropathy. No acute cervical vertebral fracture. Incomplete fusion of posterior arch of C1, developmental finding.  Mild   to moderate canal stenosis at C5/6 with mild narrowing about below   this level. Scattered moderate to severe foraminal stenosis. Moderate carotid bulb calcification. No apical lung nodule.     Impression:       1. Moderate to advanced degenerative changes cervical spine with no   acute cervical vertebral fracture or traumatic malalignment. Signed by Dr Rory Scales     XR HIP 2-3 VW W PELVIS RIGHT [[id number]] Resulted: 04/01/22 1252     Order Status: Completed Updated: 04/01/22 1254     Narrative:       History: Pain after fall   Right hip: Frontal view the pelvis as well as frontal and crosstable   lateral views of the right hip are obtained. The right prosthesis. No periprosthetic fracture. Old fractures of the   right inferior pelvis involving right superior and inferior rami. Enthesophytes project from the right iliac spine. Chronic   calcification along the right acetabular roof. Degenerative change of   the lower lumbar spine. Vascular calcification.     Impression:       1. Old fractures of the right inferior pelvis. Bilateral hip   prostheses. No acute appearing bony injury identified. Signed by Dr Rory Scales     XR CHEST Benay  [[id number]] Resulted: 04/01/22 1250     Order Status: Completed Updated: 04/01/22 1252     Narrative:       XR CHEST PORTABLE 4/1/2022 12:21 PM   HISTORY: Fatigue   COMPARISON: 4/3/2019   CXR: A single frontal view of the chest is performed. Findings: There is volume loss of the right upper hemithorax with ipsilateral   shifting of the mediastinal structures. Partial right upper lobe   collapse considered. Left lung appears clear. Probable right lower   lobe granuloma. Heart is upper limits of normal in size. No pleural   effusion or pneumothorax. Degenerative change regional skeleton.     Impression:       1. Right paratracheal upper lobe opacities with associated volume loss   may relate to right upper lobe collapse. Follow-up imaging   recommended.  CT chest preferably with IV contrast could be performed   for further assessment if clinically indicated. Signed by Dr Felipe Velazquez [[id number]] Resulted: 04/01/22 1249     Order Status: Completed Updated: 04/01/22 1251     Narrative:       Examination. CT HEAD WO CONTRAST 4/1/2022 12:38 PM   History: The patient fell. DLP: 1229 mGycm. The automated exposure control was utilized to   minimize the patient's radiation exposure. The CT scan of the head is performed without intravenous contrast   enhancement. The images are acquired in axial plane with subsequent   reconstruction in coronal and sagittal planes. There is no previous study for comparison   There is no evidence of an intracranial hemorrhage or hematoma. There is no evidence of a mass. No midline shift. Moderately dilated ventricles, basal cistern and the cortical sulci   suggestive chronic volume loss/atrophy. The scattered areas of chronic white matter ischemia are seen in the   centrum semiovale bilaterally. The gray-white matter differentiation   is maintained. The images reviewed in bone window show no evidence of a skull   fracture. Severe atheromatous changes of the intracranial internal   carotid arteries bilaterally noted.     Impression:       1. No acute intracranial abnormality. 2. No skull fracture. 3. Chronic ischemic and atrophic changes.    Signed by Dr Roma Quintero     XR ACUTE ABD SERIES CHEST 1 VW [[id number]] Updated: 04/01/22 1221     Order Status: Canceled              Discharge Exam:  BP (!) 120/53   Pulse 56   Temp 99.1 °F (37.3 °C) (Temporal)   Resp 18   Ht 5' 3\" (1.6 m)   Wt 145 lb (65.8 kg)   SpO2 94%   BMI 25.69 kg/m²     General Appearance:    Alert, cooperative, no distress, appears stated age   Head:    Normocephalic, without obvious abnormality, atraumatic           Nose:   Nares normal, septum midline, mucosa normal, no drainage    or sinus tenderness       Neck:   Supple, symmetrical, trachea midline, no adenopathy;     thyroid:  no enlargement/tenderness/nodules; no carotid    bruit or JVD       Lungs:     Clear to auscultation bilaterally, respirations unlabored        Heart:    Regular rate and rhythm, S1 and S2 normal, no murmur, rub   or gallop       Abdomen:     Soft, non-tender, bowel sounds active all four quadrants,     no masses, no organomegaly           Extremities:   Extremities normal, atraumatic, no cyanosis or edema   Pulses:   2+ and symmetric all extremities   Skin:   Skin color, texture, turgor normal, no rashes or lesions   Lymph nodes:   Cervical, supraclavicular, and axillary nodes normal           Discharge Medications:         Medication List      START taking these medications    amiodarone 200 MG tablet  Commonly known as: CORDARONE  Take 1 tablet by mouth daily  Start taking on: April 12, 2022     atorvastatin 20 MG tablet  Commonly known as: LIPITOR  Take 0.5 tablets by mouth daily  Start taking on: April 12, 2022     cyclobenzaprine 5 MG tablet  Commonly known as: FLEXERIL  Take 1 tablet by mouth 2 times daily as needed for Muscle spasms     dilTIAZem 120 MG extended release capsule  Commonly known as: CARDIZEM CD  Take 1 capsule by mouth 2 times daily     hydroCHLOROthiazide 25 MG tablet  Commonly known as: HYDRODIURIL  Take 1 tablet by mouth daily  Start taking on: April 12, 2022     lactobacillus Caps capsule  Take 1 capsule by mouth daily  Start taking on: April 12, 2022     nystatin 746880 UNIT/GM cream  Commonly known as: MYCOSTATIN  Apply topically 2 times daily. nystatin 896295 UNIT/ML suspension  Commonly known as: MYCOSTATIN  Take 5 mLs by mouth 4 times daily for 5 days     oxyCODONE-acetaminophen 5-325 MG per tablet  Commonly known as: PERCOCET  Take 1 tablet by mouth every 4 hours as needed for Pain for up to 3 days.      polyethylene glycol 17 g packet  Commonly known as: GLYCOLAX  Take 17 g by mouth daily        CHANGE how you take these medications    metoprolol succinate 25 MG extended release tablet  Commonly known as:  Toprol XL  Take 1 tablet by mouth 2 times daily  What changed: when to take this        CONTINUE taking these medications    celecoxib 200 MG capsule  Commonly known as: CELEBREX  Take 1 capsule by mouth daily     dicyclomine 10 MG capsule  Commonly known as: BENTYL     diphenhydrAMINE 50 MG capsule  Commonly known as: BENADRYL     Eliquis 5 MG Tabs tablet  Generic drug: apixaban     fluticasone 50 MCG/ACT nasal spray  Commonly known as: FLONASE     hydrOXYzine 25 MG tablet  Commonly known as: ATARAX     ondansetron 4 MG tablet  Commonly known as: ZOFRAN  Take 1 tablet by mouth every 8 hours as needed for Nausea or Vomiting     pantoprazole sodium 40 MG Pack packet  Commonly known as: PROTONIX     simethicone 80 MG chewable tablet  Commonly known as: MYLICON     vitamin D 7.47 MG (91531 UT) Caps capsule  Commonly known as: ERGOCALCIFEROL        STOP taking these medications    benzonatate 200 MG capsule  Commonly known as: TESSALON     Coricidin HBP -2 MG Tabs  Generic drug: DM-APAP-CPM     furosemide 20 MG tablet  Commonly known as: LASIX     lisinopril 20 MG tablet  Commonly known as: PRINIVIL;ZESTRIL     loratadine 10 MG capsule  Commonly known as: CLARITIN     traMADol 50 MG tablet  Commonly known as: ULTRAM           Where to Get Your Medications      You can get these medications from any pharmacy    Bring a paper prescription for each of these medications  · oxyCODONE-acetaminophen 5-325 MG per tablet     Information about where to get these medications is not yet available    Ask your nurse or doctor about these medications  · amiodarone 200 MG tablet  · atorvastatin 20 MG tablet  · cyclobenzaprine 5 MG tablet  · dilTIAZem 120 MG extended release capsule  · hydroCHLOROthiazide 25 MG tablet  · lactobacillus Caps capsule  · metoprolol succinate 25 MG extended release tablet  · nystatin 175336 UNIT/GM cream  · nystatin 648112 UNIT/ML suspension  · polyethylene glycol 17 g packet           Discharge Instructions: Follow up with Dee Romberg, APRN - CNP in 7 day, with Dr Shiv Holman (cardiology) in 2 weeks, with Dr Ilsa Chester (GI) in 2 weeks, with Dr Lavern Raphael (pulmonary) in 2 weeks, with Dr Opal Feldman (neurology) as needed. Take medications as directed. Resume activity as tolerated. Diet: ADULT DIET;  Regular; Low Fat/Low Chol/High Fiber/EBER; No Caffeine     Disposition: Patient is medically stable and will be discharged to SNF    DC time 38 min

## 2022-04-11 NOTE — CARE COORDINATION
Dc to [address] pending neg COVID test - pre-cert approved 8/13  [facility] (formerly [address] [place] [address])   [phone] P  [phone] F  [phone] Referral fax number for   Electronically signed by Enedelia Jean on 4/11/2022 at 8:27 AM

## 2022-04-11 NOTE — PROGRESS NOTES
Patient:   Roxana Peterson  MR#:    [id number]   Room:    1499/158-16   YOB: 1945  Date of Progress Note: 4/11/2022  Time of Note                           8:06 AM  Consulting Physician:   Victor Manuel Cerda M.D. Attending Physician:  Juana Fields, *     Chief complaint Slurred speech, facial droop    S:This 67 y.o. female  with a past medical history significant for atrial fibrillation on chronic anticoagulation with Eliquis, previous pelvic fracture and hypertension is seen for evaluation of facial droop and slurred speech. The patient has been on 4/1/2022 after being found down at home. Had fallen approximately 3 days prior to admission due to leg weakness. She was laying on the ground and was unable to get help. She does not recall why she fell. The neighbor tried to go over to the house that she had not heard from her and the patient was able to crawl to unlock the door but was unable to get up. The patient indicates she has a previous history of pelvic fracture in office take some Tylenol and tramadol at home with good pain relief. On admission her serum CK level was 7136. Her serum creatinine was 0.9 with a BUN of 41. Her CT of the head had some mild generalized volume loss with no acute changes noted. CT of the pelvis revealed old fracture of the right superior and inferior pubic rami. Patient is complaining of right pelvic pain. Patient denies any history of stroke. She indicates she had some dental work done and ever since then her face has been droop. She wears dentures and indicates her speech is slurred due to that and a dry mouth. Still pain in the right groin occasionally. Percocet does help. Feels okay this morning.       REVIEW OF SYSTEMS:  Constitutional: No fevers No chills  Neck:No stiffness  Respiratory: No shortness of breath  Cardiovascular: No chest pain No palpitations  Gastrointestinal: No abdominal pain    Genitourinary: No Dysuria  Neurological: No headache, no confusion    Past Medical History:      Diagnosis Date    Chronic back pain     Hypertension     Inflammatory arthritis     Knee pain     Spastic colon        Past Surgical History:      Procedure Laterality Date    ABDOMEN SURGERY      APPENDECTOMY      BACK SURGERY      neck; bone out of hip to fuse neck    FINGER TRIGGER RELEASE Left 12/17/2020    LEFT MIDDLE TRIGGER FINGER RELEASE performed by Chas Schmidt MD at [address] [place] [address] HAND SURGERY Left 2012    fx repair (3 total surgeries)    HAND SURGERY Left 12/17/2020    LEFT WRIST MASS EXCISION performed by Chas Schmidt MD at [address] Bilateral     HIP    MOUTH SURGERY      TOTAL KNEE ARTHROPLASTY Right 9/2/2021    RIGHT TOTAL KNEE ARTHROPLASTY performed by Nayely So MD at [address] [place] [address]         Medications in Hospital:      Current Facility-Administered Medications:     oxyCODONE-acetaminophen (PERCOCET) 5-325 MG per tablet 1 tablet, 1 tablet, Oral, Q4H PRN, Josey Mclaughlin MD, 1 tablet at 04/11/22 0232    cyclobenzaprine (FLEXERIL) tablet 5 mg, 5 mg, Oral, TID PRN, Josey Mclaughlin MD, 5 mg at 04/10/22 2053    hydroCHLOROthiazide (HYDRODIURIL) tablet 25 mg, 25 mg, Oral, Daily, Josey Mclaughlin MD, 25 mg at 04/10/22 1841    amiodarone (CORDARONE) tablet 200 mg, 200 mg, Oral, Daily, Huseyin Lacey MD, 200 mg at 04/10/22 0834    metoprolol succinate (TOPROL XL) extended release tablet 25 mg, 25 mg, Oral, BID, Huseyin Lacey MD, 25 mg at 04/10/22 2052    dilTIAZem (CARDIZEM CD) extended release capsule 120 mg, 120 mg, Oral, BID, Huseyin Lacey MD, 120 mg at 04/10/22 2052    atorvastatin (LIPITOR) tablet 10 mg, 10 mg, Oral, Daily, Josey Mclaughlin MD, 10 mg at 04/10/22 0834    lactobacillus (CULTURELLE) capsule 1 capsule, 1 capsule, Oral, Daily, Josey Mclaughlin MD, 1 capsule at 04/10/22 0834    simethicone (MYLICON) chewable tablet 80 mg, 80 mg, Oral, 4x Daily PRN, KATI Tian CNP, 80 mg at 04/08/22 1942    nystatin (MYCOSTATIN) 685687 UNIT/ML suspension 500,000 Units, 5 mL, Oral, 4x Daily, Martín Wesley MD, 500,000 Units at 04/10/22 2052    potassium chloride (KLOR-CON M) extended release tablet 40 mEq, 40 mEq, Oral, PRN **OR** potassium bicarb-citric acid (EFFER-K) effervescent tablet 40 mEq, 40 mEq, Oral, PRN **OR** potassium chloride 10 mEq/100 mL IVPB (Peripheral Line), 10 mEq, IntraVENous, PRN, KATI Tian CNP    magic butt cream, , Topical, Q4H PRN, KATI Tian CNP, Given at 04/10/22 2059    sodium chloride flush 0.9 % injection 5-40 mL, 5-40 mL, IntraVENous, 2 times per day, Martín Wesley MD, 10 mL at 04/10/22 2052    sodium chloride flush 0.9 % injection 5-40 mL, 5-40 mL, IntraVENous, PRN, Martín Wesley MD, 10 mL at 04/03/22 1657    0.9 % sodium chloride infusion, , IntraVENous, PRN, Martín Wesley MD    ondansetron (ZOFRAN-ODT) disintegrating tablet 4 mg, 4 mg, Oral, Q8H PRN **OR** ondansetron (ZOFRAN) injection 4 mg, 4 mg, IntraVENous, Q6H PRN, Martín Wesley MD, 4 mg at 04/08/22 1340    polyethylene glycol (GLYCOLAX) packet 17 g, 17 g, Oral, Daily PRN, Martín Wesley MD    apixaban (ELIQUIS) tablet 5 mg, 5 mg, Oral, BID, Martín Wesley MD, 5 mg at 04/10/22 2052    fluticasone (FLONASE) 50 MCG/ACT nasal spray 2 spray, 2 spray, Nasal, Daily, Martín Wesley MD, 2 spray at 04/10/22 0836    hydrOXYzine (ATARAX) tablet 25 mg, 25 mg, Oral, Nightly, Martín Wesley MD, 25 mg at 04/10/22 2052    vitamin D (ERGOCALCIFEROL) capsule 50,000 Units, 50,000 Units, Oral, Weekly, Martín Wesley MD, 50,000 Units at 04/08/22 1041    nystatin (MYCOSTATIN) cream, , Topical, BID, KATI Iraheta CNP, Given at 04/10/22 2053    Allergies:  Levofloxacin in d5w, Pyridium [phenazopyridine], Sulfamethoxazole-trimethoprim, Codeine, Hydrocodone-acetaminophen, Hydromorphone hcl, and Oxycodone    Social History:   TOBACCO:   reports that she has been smoking cigarettes. She has been smoking about 0.50 packs per day. She has never used smokeless tobacco.  ETOH:   reports no history of alcohol use. Family History:       Problem Relation Age of Onset    Other Other         Son DVT    Cancer Mother         colon     Heart Attack Mother     Heart Attack Father          PHYSICAL EXAM:  /66   Pulse 52   Temp 97.9 °F (36.6 °C) (Temporal)   Resp 18   Ht 5' 3\" (1.6 m)   Wt 145 lb (65.8 kg)   SpO2 92%   BMI 25.69 kg/m²     Constitutional - well developed, well nourished. Eyes - conjunctiva normal.   Ear, nose, throat - No scars, masses, or lesions over external nose or ears, no atrophy of tongue  Neck-symmetric, no masses noted, no jugular vein distension  Respiration- chest wall appears symmetric, good expansion,   normal effort without use of accessory muscles  Musculoskeletal - no significant wasting of muscles noted, no bony deformities  Extremities-no clubbing, cyanosis or edema  Skin - warm, dry, and intact. No rash, erythema, or pallor. Psychiatric - mood, affect, and behavior appear normal.      Neurological exam  Awake, alert, fluent oriented appropriate affect  Attention and concentration appear appropriate  Recent and remote memory appears unremarkable  Speech with dysarthria  No clear issues with language of fund of knowledge     Cranial Nerve Exam     CN III, IV,VI-EOMI, No nystagmus, conjugate eye movements, no ptosis    CN VII-right lower facial droop       Motor Exam  antigravity throughout upper and lower extremities bilaterally  Giveway more on the right      Tremors- no tremors in hands or head noted     Gait  Not tested     Nursing/pcp notes, imaging,labs and vitals reviewed.      PT,OT and/or speech notes reviewed    Lab Results   Component Value Date    WBC 7.2 04/10/2022    HGB 12.2 04/10/2022    HCT 38.6 04/10/2022    MCV 96.7 04/10/2022     04/10/2022     Lab Results   Component Value Date     04/10/2022    K 4.4 04/10/2022     04/10/2022    CO2 28 04/10/2022    BUN 16 04/10/2022    CREATININE 0.9 04/10/2022    GLUCOSE 100 04/10/2022    CALCIUM 9.0 04/10/2022    PROT 5.8 (L) 04/06/2022    LABALBU 3.4 (L) 04/06/2022    BILITOT 0.3 04/06/2022    ALKPHOS 90 04/06/2022    AST 35 (H) 04/06/2022    ALT 48 (H) 04/06/2022    LABGLOM >60 04/10/2022    GFRAA >59 04/10/2022     Lab Results   Component Value Date    INR 1.03 04/01/2022    INR 0.96 08/30/2021    PROTIME 13.7 04/01/2022    PROTIME 13.0 08/30/2021       MRI BRAIN W WO CONTRAST [[id number]]    Resulted: 04/03/22 1553    Updated: 04/03/22 1555    Narrative:     MRI brain without and with contrast 4/3/2022 2:22 PM   History: Facial droop   Comparison: None.     Technique: Multiplanar imaging of the brain was performed in a routine   fashion before and after the intravenous injection of gadolinium   contrast.   Findings:   Midline structures are nondisplaced. Ventricular system is normal.   There is no significant mass effect or hydrocephalus. Basilar cisterns   are preserved. There are scattered foci of T2 abnormality involving   the periventricular and higher white matter tracts consistent with   moderate small vessel ischemic disease. There is mild gliosis within   the nely. There is a focus of low signal within the left thalamus near   the posterior limb of the internal capsule on gradient imaging   suggesting a site of previous hemorrhage or calcification. On   diffusion-weighted imaging this is separable from the area of acute   ischemic infarction. No additional sites of acute or chronic   hemorrhage are demonstrated. . No abnormal extra axial fluid   collections are noted.  There is diffusion restriction within the left   basal ganglia with involvement of the thalamus, posterior limb of the   left internal capsule and putamen with involvement of the external   capsule also demonstrated. These are nonhemorrhagic infarcts. There is   associated signal loss on ADC mapping imaging consistent with acute   ischemic infarction. No additional sites of diffusion restriction are   present. [place] Postcontrast imaging demonstrates a developmental venous anomaly which   is parafalcine in location within the right parietal lobe extending   into the region of the corpus callosum. No additional sites of   abnormal enhancement are present. Proximal cervical spinal cord, brainstem, and cerebellum are   unremarkable. Normal cerebrovascular flow voids are seen. Bilateral   globes and orbits are normal in appearance. No abnormal signal is noted in the mastoid air cells or paranasal   sinuses. Impression:     Impression:   1. Diffusion restriction within the left thalamus and basal ganglia   with associated ADC mapping abnormality consistent with an acute   left-sided infarct. This is nonhemorrhagic. There is no associated   mass effect or shift of the midline. 2. There is mild atrophy of the brain. Small vessel ischemic changes   are noted involving the periventricular and higher white matter   tracts. There is evidence of a remote small focus of hemorrhage within   the left thalamus. No additional sites of blooming are present on   gradient imaging. 3. DVA within the parafalcine right parietal lobe extending into the   region of the corpus callosum. No additional sites of abnormal   enhancement are present.    Signed by Dr Caitlin Lombardi     ECHO Complete 2D W Doppler W Color [[id number]]    Collected: 04/03/22 1020    Updated: 04/04/22 0825    Narrative:     Transthoracic Echocardiography Report (TTE)      Demographics        Patient Name Beatrice Mcarthur Date of Study          04/03/2022        MRN           [id number]            Gender                 Female        Date of Birth 1945        Room Number            [id number]        Age           76 year(s)      Height:       63 inches         Referring Physician    Ravinder Benitez        Weight:       145 pounds        Sonographer            Mary Tolentino [facility]        BSA:          1.69 m^2          Interpreting Physician Shivani Powers MD        BMI:          25.69 kg/m^2       Procedure     Type of Study        TTE procedure:ECHO NO CONTRAST WITH DOP/COLR.       Study Location: Echo Lab   Technical Quality: Adequate visualization     Patient Status: Inpatient     Rhythm: Within normal limits BP: 121/92 mmHg     Indications:TIA.      Conclusions        Summary    Mitral valve leaflets are mildly thickened with preserved leaflet    mobility.    Mitral annular calcification is present.    Mild mitral regurgitation is present.    Mild aortic stenosis.    Bicuspid valve not excluded    Mean gradient 15 mm hg    Tricuspid valve is structurally normal.    Mild tricuspid regurgitation with estimated RVSP of 39 mm Hg.    Moderately dilated left atrium.    Normal left ventricular size with preserved LV function and an estimated    ejection fraction of approximately 55-60%.  Mild concentric left ventricular hypertrophy.    Grade II diastolic dysfunction    Mildly enlarged right ventricle cavity.  IVC dilated.        Signature        ----------------------------------------------------------------    Electronically signed by Shivani Powers MD(Interpreting    [email or web address]) on 04/04/2022 08:25 AM    ----------------------------------------------------------------            CT HIP RIGHT WO CONTRAST [[id number]]    Resulted: 04/08/22 1052    Updated: 04/08/22 1054    Narrative:     CT HIP RIGHT WO CONTRAST 4/8/2022 10:43 AM   HISTORY: Right groin pain   COMPARISON: CT scan dated 4/2/2022   DOSE LENGTH PRODUCT: 989 mGy cm   TECHNIQUE: Helical tomographic images of the right hip were obtained   without the use of intravenous contrast. Automated exposure control   was also utilized to decrease patient radiation dose.    FINDINGS:   No acute fracture is identified. Unremarkable right total hip   arthroplasty. No hardware complication identified. There are old   healed right pubic rami fractures. Pubic symphysis is intact. 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 Advanced atheromatous   vascular calcification. Surrounding soft tissue structures are   unremarkable.       Impression:     1. No acute fracture or malalignment. 2. Old healed right pubic rami fractures. 3. Unremarkable right total hip arthroplasty. Signed by Dr Filomena Kelly     CATHY Ledesma Dus [[id number]]    Resulted: 04/07/22 1353    Updated: 04/07/22 1355    Narrative:     EXAM: MRI abdomen without and with IV contrast, including 3-D MRCP   sequences   INDICATION: Further evaluation of potential biliary filling defect   identified on recent ultrasound   COMPARISON: Ultrasound 4/5/2022   FINDINGS:   The study is degraded by patient motion. Liver: No hepatic steatosis or morphologic changes of cirrhosis. No   suspicious focal liver lesion. Bile ducts: Intrahepatic and extra hepatic biliary ductal dilatation   with gradual caliber tapering down to the ampulla. No obstructing   stone or mass identified. Common bile duct measures 17 mm diameter. Gallbladder: No gallstones or gallbladder wall thickening. Pancreas: No pancreatic mass, cyst, or surrounding inflammatory   change. Spleen: Not enlarged. No focal lesion. Adrenals: Normal.   Kidneys: Tiny LEFT renal cyst. No solid enhancing renal mass. No   hydronephrosis. Vasculature: Nonaneurysmal abdominal aorta. Patent portal vein. Other Findings: Distended colon with layering fluid. No ascites. No   abdominal lymphadenopathy. No suspicious focal bone lesion. Small   bilateral pleural effusions in the lung bases. Impression:     1.  Intrahepatic and extra hepatic biliary ductal dilatation with   gradual caliber tapering down to the ampulla. No evidence of   obstructing stone or mass. Differential diagnosis includes biliary   stricture although occult stone or neoplasm are also within the   differential. Recommend correlation with laboratory analysis and   consider ERCP for further evaluation. 2.  The study is degraded by patient motion.    Signed by Dr Dedra Javier: Previous medical records, medications were reviewed at today's visit    IMPRESSION:   Acute ischemic stroke-2 areas of ischemia- left basal ganglia and left thalamus presumed cardi embolic with history of atrial fibrillation and patient taking her Eliquis only once a day prior to admission-table examination    Facial droop, dysarthria, fall, weakness     Initial ExaminationGeneralized giveaway weakness worse in the right arm and right leg  Right lower facial droop with dysarthric speech    MRI of the brain-acute ischemia left basal ganglia and thalamus-personally reviewed  Carotid ultrasound-preliminarily <50% stenosis left internal carotid and asymptomatic 50-69% stenosis right internal carotid-medical management  2D echo-LV EF 55-60%-moderately dilated LA  On Eliquis now twice a day-no bleeding noted     GI-bowel regimen  Atrial fibrillation-Eliquis/amiodarone/metoprolol  Allergies-Flonase  Hypertension-on medications monitor-systolic blood pressure 898-105  Hypokalemia-on potassium-serum potassium normal  Rhabdomyolysis-IV fluids-CK trending down  Vitamin D deficiency-on vitamin D  Pain control-try Percocet as needed  DVT prophylaxis-Eliquis  PT/OT/speech     Too low functioning for rehab    MRI of the abdomen-intrahepatic and extrahepatic biliary ductal dilatation with gradual tapering down to the ampulla-differential includes biliary stricture  CT of the right hip-no acute fracture noted  Disposition pending     Continue current care    Okay to DC from neuro standpoint  Follow-up as needed    Expected duration and frequency therapy: 180 minutes per day, 5 days per week    CALL WITH ANY QUESTIONS  [phone] CELL  Dr Ahuja Fent

## 2022-04-11 NOTE — PROGRESS NOTES
Nutrition Assessment     Type and Reason for Visit: RD Nutrition Re-Screen/LOS    Nutrition Assessment:  Aware planned d/c today, 4/11 to SNF. Po intake adequate, most meals >75% this admission since diet advance and wt hx stable. Will continue to monitor while inpatient. Current Nutrition Therapies:    ADULT DIET; Regular    Anthropometric Measures:  · Height: 5' 3\" (160 cm)  · Current Body Wt: 145 lb (65.8 kg)   · BMI: 25.7    Nutrition Interventions:   Food and/or Nutrient Delivery:  Continue Current Diet     Goals:  Po intake remains adequate.        Nutrition Monitoring and Evaluation:   Food/Nutrient Intake Outcomes:  Food and Nutrient Intake  Physical Signs/Symptoms Outcomes:  Biochemical Data,Nutrition Focused Physical Findings,Weight     Discharge Planning:    Continue current diet     Electronically signed by Lacho Michaels MS, RD, LD on 4/11/22 at 10:42 AM CDT    Contact: [phone]

## 2022-04-11 NOTE — ACP (ADVANCE CARE PLANNING)
Advance Care Planning     Advance Care Planning Activator (Inpatient)  Conversation Note      Date of ACP Conversation: 4/11/2022     [employer] with: Pt during previous visit and completed an AD/LW. ACP Activator: Sachin Dunne      Current Designated Health Care Decision Maker:     Primary Decision Maker: Marifer Weiner - Niece/Nephew - [phone]    Secondary Decision Maker: Viraj Beck - Other - [phone]    Secondary Decision Maker: Kojo Onofre - Niece/Nephew - [phone]      Care Preferences    Ventilation: \"If you were in your present state of health and suddenly became very ill and were unable to breathe on your own, what would your preference be about the use of a ventilator (breathing machine) if it were available to you? \"      Would the patient desire the use of ventilator (breathing machine)?: Yes    \"If your health worsens and it becomes clear that your chance of recovery is unlikely, what would your preference be about the use of a ventilator (breathing machine) if it were available to you? \"     Would the patient desire the use of ventilator (breathing machine)?: Yes      Resuscitation  \"CPR works best to restart the heart when there is a sudden event, like a heart attack, in someone who is otherwise healthy. Unfortunately, CPR does not typically restart the heart for people who have serious health conditions or who are very sick. \"    \"In the event your heart stopped as a result of an underlying serious health condition, would you want attempts to be made to restart your heart (answer \"yes\" for attempt to resuscitate) or would you prefer a natural death (answer \"no\" for do not attempt to resuscitate)? \" Yes       [] Yes   [x] No   Educated Patient / Roz Pires regarding differences between Advance Directives and portable DNR orders.         Conversation Outcomes:  [x] ACP discussion completed  [] Existing advance directive reviewed with patient; no changes to patient's previously recorded wishes  [x] New Advance Directive completed      Electronically signed by Marisa Tapia on 4/11/2022 at 2:08 PM

## 2022-04-14 ENCOUNTER — TELEPHONE (OUTPATIENT)
Dept: PULMONOLOGY | Age: 77
End: 2022-04-14

## 2022-04-15 ENCOUNTER — TELEPHONE (OUTPATIENT)
Dept: GASTROENTEROLOGY | Age: 77
End: 2022-04-15

## 2022-04-15 DIAGNOSIS — R93.5 ABNORMAL MRI OF ABDOMEN: ICD-10-CM

## 2022-04-15 DIAGNOSIS — K83.8 DILATED CBD, ACQUIRED: Primary | ICD-10-CM

## 2022-04-15 NOTE — TELEPHONE ENCOUNTER
Garland Mcwilliams with [facility] called to schedule a hospital followup. Please be advised that the best time to call her to accommodate their needs is Anytime. Thank you.

## 2022-04-15 NOTE — TELEPHONE ENCOUNTER
· Significant dilatation of the common bile duct and pancreatic duct -per MRCP ERCP recommended- patient is asymptotic- follow up as OP with Dr Barbara Nunez in 2 weeks    Will pt need an apt first?

## 2022-04-15 NOTE — TELEPHONE ENCOUNTER
Discussed w/Dr Briseyda Herzog. Pt apt made, called and talked to Pham Crow. She will have CMP prior @ [facility].

## 2022-04-18 ENCOUNTER — LAB REQUISITION (OUTPATIENT)
Dept: LAB | Facility: HOSPITAL | Age: 77
End: 2022-04-18

## 2022-04-18 DIAGNOSIS — Z00.00 ENCOUNTER FOR GENERAL ADULT MEDICAL EXAMINATION WITHOUT ABNORMAL FINDINGS: ICD-10-CM

## 2022-04-18 LAB
ALBUMIN SERPL-MCNC: 3.5 G/DL (ref 3.5–5.2)
ALBUMIN/GLOB SERPL: 2.2 G/DL
ALP SERPL-CCNC: 96 U/L (ref 39–117)
ALT SERPL W P-5'-P-CCNC: 20 U/L (ref 1–33)
ANION GAP SERPL CALCULATED.3IONS-SCNC: 11 MMOL/L (ref 5–15)
AST SERPL-CCNC: 20 U/L (ref 1–32)
BILIRUB SERPL-MCNC: 0.2 MG/DL (ref 0–1.2)
BUN SERPL-MCNC: 32 MG/DL (ref 8–23)
BUN/CREAT SERPL: 23.7 (ref 7–25)
CALCIUM SPEC-SCNC: 9.4 MG/DL (ref 8.6–10.5)
CHLORIDE SERPL-SCNC: 102 MMOL/L (ref 98–107)
CO2 SERPL-SCNC: 26 MMOL/L (ref 22–29)
CREAT SERPL-MCNC: 1.35 MG/DL (ref 0.57–1)
EGFRCR SERPLBLD CKD-EPI 2021: 40.6 ML/MIN/1.73
GLOBULIN UR ELPH-MCNC: 1.6 GM/DL
GLUCOSE SERPL-MCNC: 82 MG/DL (ref 65–99)
POTASSIUM SERPL-SCNC: 5.1 MMOL/L (ref 3.5–5.2)
PROT SERPL-MCNC: 5.1 G/DL (ref 6–8.5)
SODIUM SERPL-SCNC: 139 MMOL/L (ref 136–145)

## 2022-04-18 PROCEDURE — 80053 COMPREHEN METABOLIC PANEL: CPT

## 2022-04-18 PROCEDURE — 36415 COLL VENOUS BLD VENIPUNCTURE: CPT

## 2022-04-20 ENCOUNTER — LAB REQUISITION (OUTPATIENT)
Dept: LAB | Facility: HOSPITAL | Age: 77
End: 2022-04-20

## 2022-04-20 DIAGNOSIS — Z00.00 ENCOUNTER FOR GENERAL ADULT MEDICAL EXAMINATION WITHOUT ABNORMAL FINDINGS: ICD-10-CM

## 2022-04-20 LAB
ANION GAP SERPL CALCULATED.3IONS-SCNC: 12 MMOL/L (ref 5–15)
BUN SERPL-MCNC: 32 MG/DL (ref 8–23)
BUN/CREAT SERPL: 22.7 (ref 7–25)
CALCIUM SPEC-SCNC: 9.6 MG/DL (ref 8.6–10.5)
CHLORIDE SERPL-SCNC: 102 MMOL/L (ref 98–107)
CO2 SERPL-SCNC: 27 MMOL/L (ref 22–29)
CREAT SERPL-MCNC: 1.41 MG/DL (ref 0.57–1)
EGFRCR SERPLBLD CKD-EPI 2021: 38.5 ML/MIN/1.73
GLUCOSE SERPL-MCNC: 95 MG/DL (ref 65–99)
POTASSIUM SERPL-SCNC: 4.6 MMOL/L (ref 3.5–5.2)
SODIUM SERPL-SCNC: 141 MMOL/L (ref 136–145)

## 2022-04-20 PROCEDURE — 80048 BASIC METABOLIC PNL TOTAL CA: CPT

## 2022-04-20 PROCEDURE — 36415 COLL VENOUS BLD VENIPUNCTURE: CPT

## 2022-04-21 ENCOUNTER — LAB REQUISITION (OUTPATIENT)
Dept: LAB | Facility: HOSPITAL | Age: 77
End: 2022-04-21

## 2022-04-21 DIAGNOSIS — Z00.00 ENCOUNTER FOR GENERAL ADULT MEDICAL EXAMINATION WITHOUT ABNORMAL FINDINGS: ICD-10-CM

## 2022-04-21 LAB
ANION GAP SERPL CALCULATED.3IONS-SCNC: 10 MMOL/L (ref 5–15)
BUN SERPL-MCNC: 27 MG/DL (ref 8–23)
BUN/CREAT SERPL: 23.5 (ref 7–25)
CALCIUM SPEC-SCNC: 8.9 MG/DL (ref 8.6–10.5)
CHLORIDE SERPL-SCNC: 106 MMOL/L (ref 98–107)
CO2 SERPL-SCNC: 25 MMOL/L (ref 22–29)
CREAT SERPL-MCNC: 1.15 MG/DL (ref 0.57–1)
EGFRCR SERPLBLD CKD-EPI 2021: 49.2 ML/MIN/1.73
GLUCOSE SERPL-MCNC: 88 MG/DL (ref 65–99)
POTASSIUM SERPL-SCNC: 4 MMOL/L (ref 3.5–5.2)
SODIUM SERPL-SCNC: 141 MMOL/L (ref 136–145)

## 2022-04-21 PROCEDURE — 80048 BASIC METABOLIC PNL TOTAL CA: CPT

## 2022-04-22 ENCOUNTER — LAB REQUISITION (OUTPATIENT)
Dept: LAB | Facility: HOSPITAL | Age: 77
End: 2022-04-22

## 2022-04-22 DIAGNOSIS — Z00.00 ENCOUNTER FOR GENERAL ADULT MEDICAL EXAMINATION WITHOUT ABNORMAL FINDINGS: ICD-10-CM

## 2022-04-22 LAB
ANION GAP SERPL CALCULATED.3IONS-SCNC: 12 MMOL/L (ref 5–15)
BUN SERPL-MCNC: 19 MG/DL (ref 8–23)
BUN/CREAT SERPL: 18.1 (ref 7–25)
CALCIUM SPEC-SCNC: 9.2 MG/DL (ref 8.6–10.5)
CHLORIDE SERPL-SCNC: 104 MMOL/L (ref 98–107)
CO2 SERPL-SCNC: 26 MMOL/L (ref 22–29)
CREAT SERPL-MCNC: 1.05 MG/DL (ref 0.57–1)
EGFRCR SERPLBLD CKD-EPI 2021: 54.8 ML/MIN/1.73
GLUCOSE SERPL-MCNC: 103 MG/DL (ref 65–99)
POTASSIUM SERPL-SCNC: 4.1 MMOL/L (ref 3.5–5.2)
SODIUM SERPL-SCNC: 142 MMOL/L (ref 136–145)

## 2022-04-22 PROCEDURE — 80048 BASIC METABOLIC PNL TOTAL CA: CPT

## 2022-04-22 PROCEDURE — 36415 COLL VENOUS BLD VENIPUNCTURE: CPT

## 2022-04-25 ENCOUNTER — TELEPHONE (OUTPATIENT)
Dept: GASTROENTEROLOGY | Age: 77
End: 2022-04-25

## 2022-04-25 NOTE — TELEPHONE ENCOUNTER
Arden Dietrich called to cancel her new [facility] follow up appointment on 4/26/22 with Dr Rock Fearing. Anthony Cassi did not wish to reschedule at this time. Patient stated that there is nothing wrong her abdomen and did not need this appointment. Anthony Ye says she will call back if she decides to reschedule. Thank you.

## 2022-04-27 ENCOUNTER — LAB REQUISITION (OUTPATIENT)
Dept: LAB | Facility: HOSPITAL | Age: 77
End: 2022-04-27

## 2022-04-27 DIAGNOSIS — Z00.00 ENCOUNTER FOR GENERAL ADULT MEDICAL EXAMINATION WITHOUT ABNORMAL FINDINGS: ICD-10-CM

## 2022-04-27 LAB
ALBUMIN SERPL-MCNC: 3.8 G/DL (ref 3.5–5.2)
ALBUMIN SERPL-MCNC: NORMAL G/DL
ALBUMIN/GLOB SERPL: 1.7 G/DL
ALBUMIN/GLOB SERPL: NORMAL {RATIO}
ALP SERPL-CCNC: 114 U/L (ref 39–117)
ALP SERPL-CCNC: 89 U/L (ref 39–117)
ALT SERPL W P-5'-P-CCNC: 18 U/L (ref 1–33)
ALT SERPL W P-5'-P-CCNC: 6 U/L (ref 1–33)
ANION GAP SERPL CALCULATED.3IONS-SCNC: 12 MMOL/L (ref 5–15)
ANION GAP SERPL CALCULATED.3IONS-SCNC: NORMAL MMOL/L
AST SERPL-CCNC: 20 U/L (ref 1–32)
AST SERPL-CCNC: 23 U/L (ref 1–32)
BILIRUB SERPL-MCNC: 0.3 MG/DL (ref 0–1.2)
BILIRUB SERPL-MCNC: 0.8 MG/DL (ref 0–1.2)
BUN SERPL-MCNC: 20 MG/DL (ref 8–23)
BUN SERPL-MCNC: NORMAL MG/DL
BUN/CREAT SERPL: 14.6 (ref 7–25)
BUN/CREAT SERPL: NORMAL
CALCIUM SPEC-SCNC: 9.5 MG/DL (ref 8.6–10.5)
CALCIUM SPEC-SCNC: NORMAL MMOL/L
CHLORIDE SERPL-SCNC: 102 MMOL/L (ref 98–107)
CHLORIDE SERPL-SCNC: NORMAL MMOL/L
CO2 SERPL-SCNC: 23 MMOL/L (ref 22–29)
CO2 SERPL-SCNC: NORMAL MMOL/L
CREAT SERPL-MCNC: 1.37 MG/DL (ref 0.57–1)
CREAT SERPL-MCNC: NORMAL MG/DL
EGFRCR SERPLBLD CKD-EPI 2021: 39.9 ML/MIN/1.73
EGFRCR SERPLBLD CKD-EPI 2021: NORMAL ML/MIN/{1.73_M2}
GLOBULIN UR ELPH-MCNC: 2.2 GM/DL
GLOBULIN UR ELPH-MCNC: NORMAL MG/DL
GLUCOSE SERPL-MCNC: 95 MG/DL (ref 65–99)
GLUCOSE SERPL-MCNC: NORMAL MG/DL
POTASSIUM SERPL-SCNC: 4.6 MMOL/L (ref 3.5–5.2)
POTASSIUM SERPL-SCNC: NORMAL MMOL/L
PROT SERPL-MCNC: 6 G/DL (ref 6–8.5)
PROT SERPL-MCNC: NORMAL G/DL
SODIUM SERPL-SCNC: 137 MMOL/L (ref 136–145)
SODIUM SERPL-SCNC: NORMAL MMOL/L

## 2022-04-27 PROCEDURE — 36415 COLL VENOUS BLD VENIPUNCTURE: CPT

## 2022-04-27 PROCEDURE — 80053 COMPREHEN METABOLIC PANEL: CPT | Performed by: NURSE PRACTITIONER

## 2022-04-27 PROCEDURE — 80053 COMPREHEN METABOLIC PANEL: CPT

## 2022-04-28 ENCOUNTER — TELEPHONE (OUTPATIENT)
Dept: GASTROENTEROLOGY | Age: 77
End: 2022-04-28

## 2022-04-28 NOTE — TELEPHONE ENCOUNTER
I discussed with Dr Werner Lott and contacted pt. She will get a CMP and we will look at it and determine how soon she needs to be seen. Pt said she will go Monday.

## 2022-04-28 NOTE — TELEPHONE ENCOUNTER
Pt had a NP apt with Dr Desean Mello on 4/26/22 for \"NP; HOSP F/U (OK PER DR Denis Sanchez), ABNORMAL MRI, [facility] CM\" but the patient cx'ed it - cancellation reason was, \"Patient stated that there is nothing wrong with her abdomen and she did not need this appointment at this time. \"     Today, 4/28/22, the pt came into the office to be checked in. She had two women with her and I spoke to one of those women. I explained that there is no apt today, but pt did have an apt on 4/26/22, but the pt canceled it. The women said she must have been confused. The three women talked amongst themselves. It seems as though while the pt was at [address] [place] [address], they told the pt that she had an apt with us today (which was the wrong day anyway) and the patient was confused why she was referred to us, so that's why she canceled the apt. The women asked when Dr Desean Mello next available is and I told them that he booked out until July because he is only here on Tuesday afternoons. They seemed upset about making an apt in July and wanted to see if they could be seen sooner. I told them I would send a message to Dr Desean Mello nurse. I verified with the woman that this patient is no longer at [address] [place] [address]. She is now at home. Her phone number is [phone].

## 2022-05-01 PROBLEM — E86.0 DEHYDRATION: Status: RESOLVED | Noted: 2022-04-01 | Resolved: 2022-05-01
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
## 2022-05-03 ENCOUNTER — OFFICE VISIT (OUTPATIENT)
Dept: CARDIOLOGY CLINIC | Age: 77
End: 2022-05-03
Payer: MEDICARE

## 2022-05-03 ENCOUNTER — TELEPHONE (OUTPATIENT)
Dept: CARDIOLOGY CLINIC | Age: 77
End: 2022-05-03

## 2022-05-03 VITALS
DIASTOLIC BLOOD PRESSURE: 82 MMHG | HEART RATE: 64 BPM | SYSTOLIC BLOOD PRESSURE: 170 MMHG | OXYGEN SATURATION: 98 % | BODY MASS INDEX: 24.98 KG/M2 | HEIGHT: 63 IN | WEIGHT: 141 LBS

## 2022-05-03 DIAGNOSIS — I51.89 DIASTOLIC DYSFUNCTION: ICD-10-CM

## 2022-05-03 DIAGNOSIS — Z87.39 HISTORY OF RHABDOMYOLYSIS: ICD-10-CM

## 2022-05-03 DIAGNOSIS — E78.2 MIXED HYPERLIPIDEMIA: ICD-10-CM

## 2022-05-03 DIAGNOSIS — Z79.899 ON AMIODARONE THERAPY: ICD-10-CM

## 2022-05-03 DIAGNOSIS — Z86.73 HISTORY OF CVA (CEREBROVASCULAR ACCIDENT): ICD-10-CM

## 2022-05-03 DIAGNOSIS — Z79.01 CHRONIC ANTICOAGULATION: ICD-10-CM

## 2022-05-03 DIAGNOSIS — I48.0 PAROXYSMAL ATRIAL FIBRILLATION (HCC): Primary | ICD-10-CM

## 2022-05-03 DIAGNOSIS — I10 HYPERTENSION, UNSPECIFIED TYPE: ICD-10-CM

## 2022-05-03 PROCEDURE — 99214 OFFICE O/P EST MOD 30 MIN: CPT | Performed by: NURSE PRACTITIONER

## 2022-05-03 RX ORDER — OXYCODONE HYDROCHLORIDE AND ACETAMINOPHEN 325; 5 MG/5ML; MG/5ML
SOLUTION ORAL EVERY 4 HOURS PRN
COMMUNITY
End: 2022-08-03

## 2022-05-03 RX ORDER — CYCLOBENZAPRINE HCL 5 MG
TABLET ORAL
COMMUNITY
Start: 2022-04-26 | End: 2022-08-03

## 2022-05-03 RX ORDER — ATORVASTATIN CALCIUM 10 MG/1
TABLET, FILM COATED ORAL
COMMUNITY
Start: 2022-04-26 | End: 2022-07-06

## 2022-05-03 NOTE — PROGRESS NOTES
[facility], [place]. [address], [address], [address] [facility]  [phone] office  [phone] fax      OFFICE VISIT:  5/3/2022    Renny Shaw - : 1945  Reason For Visit:  Joe Hill is a 67 y.o. female who is here for Follow-Up from Hospital (1 month hospital follow up post stroke, patient states she has missed some of the Eliquis. ) and Atrial Fibrillation    History:   Diagnosis Orders   1. Paroxysmal atrial fibrillation (HCC)     2. Hypertension, unspecified type     3. Diastolic dysfunction     4. Mixed hyperlipidemia     5. On amiodarone therapy     6. Chronic anticoagulation      Eliquis   7. History of CVA (cerebrovascular accident)  Gonzalez Quintero MD, Neurology, [place]   8. History of rhabdomyolysis      traumatic     The patient presents today for cardiology hospital follow up. She presented to [facility] ED on 22 after tripping and falling at home. She reports not being found for a few days. Subsequently, the patient  was diagnosed with traumatic rhabdomyolysis and treated with IVF. A head CT was initially negative. An MRI of the brain showed an acute CVA with diffusion restriction in left thalamus and basal ganglia with associated ADC mapping abnormality. Neurology was consulted at that time. The patient has a hx of PAF. She was found to be in AF with RVR. Cardiology was consulted and medication were adjusted. A CT of the chest showed a possible neoplasm. Pulmonology was consulted and further pulmonary evaluation was recommended on an outpatient basis. MRCP showed significant dilatation of the common bile duct and pancreatic duct. An ERCP was recommended on an outpatient basis as patient was felt to be asymptomatic. Plans were to follow up with Dr Katy Victoria in 2 weeks. The patient currently has no follow in Epic with gastroenterology. The patient as discharged to SNF and is now home with home health.   She reports feeling much better. The patient has some left sided weakness which has improved. She has drooping of the left mouth corner reported as better. He speech is not slurred but reports some dysphagia. The patient stopped smoking after this incident. BP is elevated today. The patient reports no chest pain. Danielle Bower denies exertional chest pain, shortness of breath, orthopnea, paroxysmal nocturnal dyspnea, syncope, presyncope, sensed arrhythmia,  and fatigue. The patient denies numbness or weakness to suggest cerebrovascular accident or transient ischemic attack. + improving left sided weakness. + dysphagia. + mild bilateral ankle edema.      Yannick Pearson has the following history as recorded in [facility]:  Patient Active Problem List   Diagnosis Code    Trigger middle finger of right hand M65.331    Mass of right wrist R22.31    Osteoarthritis of right knee M17.11    Rhabdomyolysis M62.82    HTN (hypertension) I10    Generalized weakness R53.1    Anticoagulated Z79.01    Intertrigo L30.4    Paroxysmal atrial fibrillation (HCC) I48.0    Transaminitis R74.01    Abnormal CXR R93.89    Facial droop R29.810    Dysarthria R47.1    History of pelvic fracture Z87.81    Hip pain M25.559    Hypokalemia E87.6    Vitamin D deficiency E55.9    Gait abnormality R26.9    Chronic pain disorder G89.4    Gastroesophageal reflux disease K21.9    Palliative care patient L94.4    Diastolic dysfunction X77.00     Past Medical History:   Diagnosis Date    Abnormal CXR     Anticoagulated     Chronic back pain     Diastolic dysfunction     Dysarthria     Facial droop     Gait abnormality     Hypertension     Hypokalemia     Inflammatory arthritis     Intertrigo     Knee pain     Paroxysmal atrial fibrillation (HCC)     Spastic colon     Transaminitis     Vitamin D deficiency      Past Surgical History:   Procedure Laterality Date    ABDOMEN SURGERY      APPENDECTOMY  BACK SURGERY      neck; bone out of hip to fuse neck    COLONOSCOPY  04/12/2011    Dr Alfred Coronado Left 12/17/2020    LEFT MIDDLE TRIGGER FINGER RELEASE performed by Linette Fuller MD at [address] [place] [address] HAND SURGERY Left 2012    fx repair (3 total surgeries)    HAND SURGERY Left 12/17/2020    LEFT WRIST MASS EXCISION performed by Linette Fuller MD at [place] [facility] [address] Bilateral     HIP    MOUTH SURGERY      TOTAL KNEE ARTHROPLASTY Right 09/02/2021    RIGHT TOTAL KNEE ARTHROPLASTY performed by Jakub Herring MD at [facility]       Family History   Problem Relation Age of Onset    Other Other         Son DVT    Cancer Mother         colon     Heart Attack Mother     Heart Attack Father      Social History     Tobacco Use    Smoking status: Former Smoker     Packs/day: 0.50     Types: Cigarettes    Smokeless tobacco: Never Used   Substance Use Topics    Alcohol use: No      Current Outpatient Medications   Medication Sig Dispense Refill    atorvastatin (LIPITOR) 10 MG tablet TAKE 1 TABLET BY MOUTH DAILY      cyclobenzaprine (FLEXERIL) 5 MG tablet TAKE 1 TABLET BY MOUTH TWICE A DAY AS NEEDED FOR MUSCLE SPASMS      oxyCODONE-acetaminophen (ROXICET) 5-325 MG/5ML solution Take by mouth every 4 hours as needed for Pain.  amiodarone (CORDARONE) 200 MG tablet Take 1 tablet by mouth daily 30 tablet 0    lactobacillus (CULTURELLE) CAPS capsule Take 1 capsule by mouth daily 30 capsule 0    metoprolol succinate (TOPROL XL) 25 MG extended release tablet Take 1 tablet by mouth 2 times daily 60 tablet 0    dilTIAZem (CARDIZEM CD) 120 MG extended release capsule Take 1 capsule by mouth 2 times daily 60 capsule 0    nystatin (MYCOSTATIN) 311918 UNIT/GM cream Apply topically 2 times daily.  1 each 0    hydroCHLOROthiazide (HYDRODIURIL) 25 MG tablet Take 1 tablet by mouth daily 30 tablet 0    polyethylene glycol (GLYCOLAX) 17 g packet Take 17 g by mouth daily 527 g 1    pantoprazole sodium (PROTONIX) 40 MG PACK packet Take 40 mg by mouth every morning (before breakfast)      diphenhydrAMINE (BENADRYL) 50 MG capsule Take 50 mg by mouth nightly as needed for Itching      simethicone (MYLICON) 80 MG chewable tablet Take 125 mg by mouth every 6 hours as needed for Flatulence      dicyclomine (BENTYL) 10 MG capsule Take 10 mg by mouth as needed       hydrOXYzine (ATARAX) 25 MG tablet Take 25 mg by mouth at bedtime      apixaban (ELIQUIS) 5 MG TABS tablet Take 5 mg by mouth 2 times daily      vitamin D (ERGOCALCIFEROL) 1.25 MG (34770 UT) CAPS capsule Take 50,000 Units by mouth once a week      ondansetron (ZOFRAN) 4 MG tablet Take 1 tablet by mouth every 8 hours as needed for Nausea or Vomiting 30 tablet 0    fluticasone (FLONASE) 50 MCG/ACT nasal spray 2 sprays by Nasal route daily       celecoxib (CELEBREX) 200 MG capsule Take 1 capsule by mouth daily 30 capsule 2     No current facility-administered medications for this visit. Allergies: Levofloxacin in d5w, Pyridium [phenazopyridine], Sulfamethoxazole-trimethoprim, Codeine, Hydrocodone-acetaminophen, Hydromorphone hcl, and Oxycodone    Review of Systems  Constitutional - no appetite change, or unexpected weight change. No fever, chills or diaphoresis. No significant change in activity level or new onset of fatigue. HEENT - no significant rhinorrhea or epistaxis. No tinnitus or significant hearing loss. Eyes - no sudden vision change or amaurosis. No corneal arcus, xantholasma, subconjunctival hemorrhage or discharge. Respiratory - no significant wheezing, stridor, apnea or cough. No dyspnea on exertion or shortness of air. Cardiovascular - no exertional chest pain to suggest myocardial ischemia. No orthopnea or PND. No sensation of sustained arrythmia. No occurrence of slow heart rate. No palpitations. No claudication. Gastrointestinal - no abdominal swelling or pain.  No blood in stool. No severe constipation, diarrhea, nausea, or vomiting. Genitourinary - no dysuria, frequency, or urgency. No flank pain or hematuria. Musculoskeletal - no back pain or myalgia. Transported via wheelchair. Extremities - no clubbing or cyanosis. + mild bilateral ankle edema. Skin - no color change or rash. No pallor. No new surgical incision. Neurologic - no seizures, presyncope or syncope. No significant dizziness. + mild but improving left sided weakness s/p CVA. Some residual drooping of left mouth corner and dysphagia. Hematologic - no easy bruising or excessive bleeding. Psychiatric - no severe anxiety or insomnia. No confusion. All other review of systems are negative. Objective  Vital Signs - BP (!) 170/96   Pulse 64   Ht 5' 3\" (1.6 m)   Wt 141 lb (64 kg)   SpO2 98%   BMI 24.98 kg/m²   General - Andrez Smyth is alert, cooperative, and pleasant. Well groomed. No acute distress. Body habitus - Body mass index is 24.98 kg/m². HEENT - Head is normocephalic. No circumoral cyanosis. Dentition is normal.  EYES -   Lids normal without ptosis. No discharge, edema or subconjunctival hemorrhage. Neck - Symmetrical without apparent mass or lymphadenopathy. Respiratory - Normal respiratory effort without use of accessory muscles. Ausculatation reveals vesicular breath sounds without crackles, wheezes, rub or rhonchi. Cardiovascular - No jugular venous distention. Auscultation reveals regular rate and rhythm. No audible clicks, gallop or rub. No murmur. No lower extremity varicosities. No carotid bruits. Abdominal -  No visible distention, mass or pulsations. Extremities - No clubbing or cyanosis. No statis dermatitis or ulcers. + non pitting mild bilateral ankle edema. Musculoskeletal -   No Osler's nodes. No kyphosis or scoliosis. Transported via wheelchair. Skin -  Warm and dry; no rash or pallor. No new surgical wound.   Neurological - No focal neurological deficits. Thought processes coherent. No apparent tremor. Oriented to person, place and time. Psychiatric -  Appropriate affect and mood. Data reviewed:  4/4/22 CT chest  Impression   1. No finding to suggest collapse of the right upper lobe. Detail is   given above. 2. Groundglass nodule/opacities predominantly in the right upper lobe   may represent an inflammatory/infectious or an evolving neoplastic   process. Further follow-up may be obtained. 3. Small bibasal pleural effusion. 4. Small areas of consolidation and atelectasis in the lower lobes   bilaterally. 5. The limited visualized abdomen show significant dilatation of the   common bile duct and pancreatic duct which were not noted in the   previous study obtained 3 days ago which was done without contrast   enhancement. The etiology and clinical significance is not certain. A   follow-up CT scan of the abdomen after oral and intravenous contrast   enhancement. Thin. Signed by Dr Juan Luis Bhatti     4/3/22 carotid ultrasound  There is mixed plaque visualized in the bilateral internal carotid arteries. There is 50-69% stenosis in the right internal carotid artery. There is less than 50% stenosis of the left internal carotid artery. There is normal antegrade flow in the bilateral vertebral arteries. Electronically signed by Neftali Bridges MD(Interpreting physician) on 04/04/2022 03:18 PM    4/3/22 MRI brain  Impression:    1. Diffusion restriction within the left thalamus and basal ganglia   with associated ADC mapping abnormality consistent with an acute   left-sided infarct. This is nonhemorrhagic. There is no associated   mass effect or shift of the midline. 2. There is mild atrophy of the brain. Small vessel ischemic changes   are noted involving the periventricular and higher white matter   tracts. There is evidence of a remote small focus of hemorrhage within   the left thalamus.  No additional sites of blooming are present on   gradient imaging. 3. DVA within the parafalcine right parietal lobe extending into the   region of the corpus callosum. No additional sites of abnormal   enhancement are present. Signed by Dr Guille Vargas     4/1/22 echo  Mitral valve leaflets are mildly thickened with preserved leaflet   mobility. Mitral annular calcification is present. Mild mitral regurgitation is present. Mild aortic stenosis. Bicuspid valve not excluded   Mean gradient 15 mm hg   Tricuspid valve is structurally normal.   Mild tricuspid regurgitation with estimated RVSP of 39 mm Hg. Moderately dilated left atrium. Normal left ventricular size with preserved LV function and an estimated   ejection fraction of approximately 55-60%. Mild concentric left ventricular hypertrophy. Grade II diastolic dysfunction   Mildly enlarged right ventricle cavity. IVC dilated.    Electronically signed by Katina Remy MD(Interpreting   physician) on 04/04/2022 08:25 AM    Lab Results   Component Value Date    WBC 7.2 04/10/2022    HGB 12.2 04/10/2022    HCT 38.6 04/10/2022    MCV 96.7 04/10/2022     04/10/2022     Lab Results   Component Value Date     04/10/2022    K 4.4 04/10/2022     04/10/2022    CO2 28 04/10/2022    BUN 16 04/10/2022    CREATININE 0.9 04/10/2022    GLUCOSE 100 04/10/2022    CALCIUM 9.0 04/10/2022    PROT 5.8 (L) 04/06/2022    LABALBU 3.4 (L) 04/06/2022    BILITOT 0.3 04/06/2022    ALKPHOS 90 04/06/2022    AST 35 (H) 04/06/2022    ALT 48 (H) 04/06/2022    LABGLOM >60 04/10/2022    GFRAA >59 04/10/2022     Lab Results   Component Value Date    CKTOTAL 179 04/06/2022    TROPONINI 0.03 04/03/2022       BP Readings from Last 3 Encounters:   05/03/22 (!) 170/96   04/11/22 (!) 120/53   03/23/22 130/82    Pulse Readings from Last 3 Encounters:   05/03/22 64   04/11/22 56   03/23/22 57        Wt Readings from Last 3 Encounters:   05/03/22 141 lb (64 kg)   04/01/22 145 lb (65.8 kg)   03/23/22 145 lb (65.8 kg)     Assessment/Plan:   Diagnosis Orders   1. Paroxysmal atrial fibrillation (HCC)     2. Hypertension, unspecified type     3. Diastolic dysfunction     4. Mixed hyperlipidemia     5. On amiodarone therapy     6. Chronic anticoagulation      Eliquis   7. History of CVA (cerebrovascular accident)  Adolfo Lino MD, Neurology, [place]   8. History of rhabdomyolysis      traumatic     PAF - continues on amiodarone. No acute decline in respiratory function. Continues on Eliquis with no bleeding problems. HTN - BP elevated today. Current anti- hypertensive regimen to inlcude Toprol XL and Cardizem. Patient will monitor  BP at home and keep a log. Goal less than 130/80. DD - mild ankle edema noted today. Patient reports no shortness of breath. May be due to being more sedentary. She takes HCTZ as needed. Advised to take next 3 days, too elevated legs and reduce sodium intake. Hx of recent CVA - seems to be improving with some residual dysphagia. Referred to neurology. She continues HH. Plan to notify them of need for additional therapy. Hyperlipidemia - monitored and managed by PCP. Plan to have home health check on GI referral.    Patient is compliant with medication regimen. Previous cardiac history and records reviewed. Continue current medical management for cardiac related condition. Continue other current medications as directed. Continue to follow up with primary care provider for non cardiac medical problems. If your primary care provider is outside of [place] [facility] please request that your labs be faxed to this office at [phone]. BP goal 130/80 or less. Call the office with any problems, questions or concerns at [phone]. Cardiology follow up as scheduled in [address] appointments. Phone visit scheduled in 2 weeks regarding BP management. Educational included in patient instructions. Heart health.       KATI Berger

## 2022-05-03 NOTE — PATIENT INSTRUCTIONS
New instructions for today:      Amiodarone (Cordarone or Pacerone)    You were prescribed this medication for a heart rhythm problem. It is important that you take the medication exactly as prescribed. Like all medications, amiodarone can have side effects. The most common are fatigue, nausea, loss of appetite and constipation. It can affect the eyes, thyroid, liver and lungs. Because of the potential side effects to these organs, it is important that you have regular tests to detect problems early. The following is a list of the required tests that you will need to have every 6 months while taking this medication. 1.  Blood work:  A complete metabolic profile (CMP) and thyroid levels will be       drawn every 6 months. You can have this blood work drawn at the [facility] outpatient lab or at a facility close to you. You will be           contacted of the results. 2.  Chest X-ray:  You will need to have a chest x-ray every 6 months to evaluate       for any lung changes. 3.  Pulmonary Function Study: This test is to evaluate your lung and breathing       status. You will be asked to breath into a tube several times to check for any       changes in your lung function. You will need to do this every 6 months. 4.  Eye exam: You will need to have your eyes examined by your doctor every       year. You will need to tell your eye doctor that you take amiodarone. If you are on coumadin, your blood tests will need to be monitored more closely when starting on amiodarone. Coumadin can make amiodarone stronger in your bloodstream, which can cause the blood to become to thin. Therefore, the dose of coumadin may need to be reduced. It is best that you follow with your cardiologist or nurse practitioner every 6 months when taking this medication. Patient Instructions:  Continue current medications as prescribed.    Always keep a current medication list. Bring your medications to every office visit. Continue to follow up with primary care provider for non cardiac medical problems. Call the office with any problems, questions or concerns at [phone]. If you have been asked to keep a blood pressure log, do so for 2 weeks. Call the office to report readings to the triage nurse at [phone]. Follow up with cardiologist as scheduled. The following educational material has been included in this after visit summary for your review: Life simple 7. Heart health. Life simple 7  1) Manage blood pressure - high blood pressure is a major risk factor for heart disease and stroke. Keeping blood pressure in health range reduces strain on your heart, arteries and kidneys. Blood pressure goal is less than 130/80. 2) Control cholesterol - contributes to plaque, which can clog arteries and lead to heart disease and stroke. When you control your cholesterol you are giving your arteries their best chance to remain clear. It is recommended that you get cholesterol lab work done once a year. 3) Reduce blood sugar - most of the food we eat is turning into glucose or blood sugar that our body uses for energy. Over time, high levels of blood sugar can damage your heart, kidneys, eyes and nerves. 4) Get active - living an active life is one of the most rewarding gifts you can give yourself and those you love. Simply put, daily physical activity increases your length and quality of life. Strive to exercise 15 minutes most days of the week. 5)  Eat better - A healthy diet is one of your best weapons for fighting cardiovascular disease. When you eat a heart healthy diet, you improve your chances for feeling good and staying healthy for life. 6)  Lose weight - when you shed extra fat an unnecessary pounds, you reduce the burden on your hear, lungs, blood vessels and skeleton.   You give yourself the gift of active living, you lower your blood pressure and help yourself feel better. 7) Stop smoking - cigarette smokers have a higher risk of developing cardiovascular disease. If  You smoke, quitting is the best thing you can do for your health. Check American Heart Association on line for more information on Life's Simple 7 and tips for healthy living. A Healthy Heart: Care Instructions  Your Care Instructions     Coronary artery disease, also called heart disease, occurs when a substance called plaque builds up in the vessels that supply oxygen-rich blood to your heart muscle. This can narrow the blood vessels and reduce blood flow. A heart attack happens when blood flow is completely blocked. A high-fat diet, smoking, and other factors increase the risk of heart disease. Your doctor has found that you have a chance of having heart disease. You can do lots of things to keep your heart healthy. It may not be easy, but you can change your diet, exercise more, and quit smoking. These steps really work to lower your chance of heart disease. Follow-up care is a key part of your treatment and safety. Be sure to make and go to all appointments, and call your doctor if you are having problems. It's also a good idea to know your test results and keep a list of the medicines you take. How can you care for yourself at home? Diet  · Use less salt when you cook and eat. This helps lower your blood pressure. Taste food before salting. Add only a little salt when you think you need it. With time, your taste buds will adjust to less salt. · Eat fewer snack items, fast foods, canned soups, and other high-salt, high-fat, processed foods. · Read food labels and try to avoid saturated and trans fats. They increase your risk of heart disease by raising cholesterol levels. · Limit the amount of solid fat-butter, margarine, and shortening-you eat. Use olive, peanut, or canola oil when you cook. Bake, broil, and steam foods instead of frying them.   · Eat a variety of fruit and vegetables every day. Dark green, deep orange, red, or yellow fruits and vegetables are especially good for you. Examples include spinach, carrots, peaches, and berries. · Foods high in fiber can reduce your cholesterol and provide important vitamins and minerals. High-fiber foods include whole-grain cereals and breads, oatmeal, beans, brown rice, citrus fruits, and apples. · Eat lean proteins. Heart-healthy proteins include seafood, lean meats and poultry, eggs, beans, peas, nuts, seeds, and soy products. · Limit drinks and foods with added sugar. These include candy, desserts, and soda pop. Lifestyle changes  · If your doctor recommends it, get more exercise. Walking is a good choice. Bit by bit, increase the amount you walk every day. Try for at least 30 minutes on most days of the week. You also may want to swim, bike, or do other activities. · Do not smoke. If you need help quitting, talk to your doctor about stop-smoking programs and medicines. These can increase your chances of quitting for good. Quitting smoking may be the most important step you can take to protect your heart. It is never too late to quit. · Limit alcohol to 2 drinks a day for men and 1 drink a day for women. Too much alcohol can cause health problems. · Manage other health problems such as diabetes, high blood pressure, and high cholesterol. If you think you may have a problem with alcohol or drug use, talk to your doctor. Medicines  · Take your medicines exactly as prescribed. Call your doctor if you think you are having a problem with your medicine. · If your doctor recommends aspirin, take the amount directed each day. Make sure you take aspirin and not another kind of pain reliever, such as acetaminophen (Tylenol). When should you call for help? [id number] if you have symptoms of a heart attack. These may include:  · Chest pain or pressure, or a strange feeling in the chest.  · Sweating. · Shortness of breath.   · Pain, pressure, or a strange feeling in the back, neck, jaw, or upper belly or in one or both shoulders or arms. · Lightheadedness or sudden weakness. · A fast or irregular heartbeat. After you call 911, the  may tell you to chew 1 adult-strength or 2 to 4 low-dose aspirin. Wait for an ambulance. Do not try to drive yourself. Watch closely for changes in your health, and be sure to contact your doctor if you have any problems. Where can you learn more? Go to https://[email or web address].[email or web address] org and sign in to your [employer] account. Enter C535 in the PROnoise box to learn more about \"A Healthy Heart: Care Instructions. \"     If you do not have an account, please click on the \"Sign Up Now\" link. Current as of: December 16, 2019               Content Version: 12.5  © 6371-3658 Healthwise, Incorporated. Care instructions adapted under license by [facility] ([facility]). If you have questions about a medical condition or this instruction, always ask your healthcare professional. [address] any warranty or liability for your use of this information.

## 2022-05-03 NOTE — TELEPHONE ENCOUNTER
Patient has already been referred to GI and cancelled her appt as she felt like she did not need it, they are trying to get her rescheduled but they did not have availability until July. They are having patient do blood work and will  determine how soon she needs seen after that. Gary Jurist URIBE    4/28/22 2:36 PM  Note  Pt had a NP apt with Dr Ada Kearney on 4/26/22 for \"NP; HOSP F/U (OK PER DR Baljeet Greer), ABNORMAL MRI, CMP PRIOR (THEY WILL DO @ [facility]) CM\" but the patient cx'ed it - cancellation reason was, \"Patient stated that there is nothing wrong with her abdomen and she did not need this appointment at this time. \"      Today, 4/28/22, the pt came into the office to be checked in. She had two women with her and I spoke to one of those women. I explained that there is no apt today, but pt did have an apt on 4/26/22, but the pt canceled it. The women said she must have been confused. The three women talked amongst themselves. It seems as though while the pt was at [facility], they told the pt that she had an apt with us today (which was the wrong day anyway) and the patient was confused why she was referred to us, so that's why she canceled the apt. The women asked when Dr Ada Kearney next available is and I told them that he booked out until July because he is only here on Tuesday afternoons. They seemed upset about making an apt in July and wanted to see if they could be seen sooner. I told them I would send a message to Dr Ada Kearney nurse. I verified with the woman that this patient is no longer at [facility]. She is now at home. Her phone number is [phone]. Marlene ZAMUDIO     [facility]    4/28/22 2:59 PM  Note  I discussed with Dr Ada Kearney and contacted pt. She will get a CMP and we will look at it and determine how soon she needs to be seen. Pt said she will go Monday.

## 2022-05-11 NOTE — TELEPHONE ENCOUNTER
I called and checked on patient because I didn't see where she had completed the CMP yet. She was having a hard time talking and there was confusion as to where I was calling from. She did realize who I was at the end of the conversation. She said she is using [facility] and gave me the number [phone]. I called and confirmed she is one of their patients. They said I could fax the CMP order to them and they would do it. I have faxed it to [phone]. Pt is aware, Dr Tiffanie Rooney will need to look at the [facility] and decide what to do from there.

## 2022-05-13 NOTE — TELEPHONE ENCOUNTER
Per Dr Roderick Busch today:    pls call patient and see about scheduling EUS +/- ERCP - non urgent     re: dilated biliary tree, normal LFTs

## 2022-05-20 ENCOUNTER — SCHEDULED TELEPHONE ENCOUNTER (OUTPATIENT)
Dept: CARDIOLOGY CLINIC | Age: 77
End: 2022-05-20
Payer: MEDICARE

## 2022-05-20 DIAGNOSIS — I48.0 PAROXYSMAL ATRIAL FIBRILLATION (HCC): Primary | ICD-10-CM

## 2022-05-20 DIAGNOSIS — I35.0 MODERATE AORTIC STENOSIS: ICD-10-CM

## 2022-05-20 DIAGNOSIS — I51.89 DIASTOLIC DYSFUNCTION: ICD-10-CM

## 2022-05-20 DIAGNOSIS — Z79.01 CHRONIC ANTICOAGULATION: ICD-10-CM

## 2022-05-20 PROCEDURE — 99442 PR PHYS/QHP TELEPHONE EVALUATION 11-20 MIN: CPT | Performed by: CLINICAL NURSE SPECIALIST

## 2022-05-20 NOTE — PROGRESS NOTES
Lv Schumacher is a 67 y.o. female evaluated via telephone on 5/20/2022. Consent:  She and/or health care decision maker is aware that that she may receive a bill for this telephone service, depending on her insurance coverage, and has provided verbal consent to proceed: Yes      Documentation:  I communicated with the patient and/or health care decision maker about her blood pressure. Details of this discussion including any medical advice provided:       I affirm this is a Patient Initiated Episode with an Established Patient who has not had a related appointment within my department in the past 7 days or scheduled within the next 24 hours. Total Time: minutes: 11-20 minutes    Note: not billable if this call serves to triage the patient into an appointment for the relevant concern      KATI Rosenthal     Lv Schumacher is a 67 y.o. female with the following history as recorded in [facility]:  Has history of paroxysmal atrial fibrillation with beta-blocker and anticoagulation with Eliquis, hypertension, moderate aortic stenosis and diastolic dysfunction. Seen in the office in March doing well with no changes. Unfortunately in April she suffered a fall at home and was not found for a few days. Had traumatic rhabdomyolysis and treated with IV fluids. Found to have acute CVA on MRI. In A. fib with RVR. Medications were adjusted. While hospitalized and evaluated CTA chest showed possible lung neoplasm and significant dilatation of the common bile duct and pancreatic duct. Outpatient follow-ups with pulmonology and GI recommended  She was initially discharged to SNF but then home with home health  Patient had hospital follow-up 5/3/2022 with elevated blood pressure and some mild peripheral edema. Recommended taking her HCTZ for a few days and monitoring blood pressure.     Telephone visit today to follow-up on blood pressure  That she has been keeping a blood pressure log for the last couple weeks. /78 this am with heart rate 60  She read over her blood pressure readings will last week or so with occasional elevation up to 150 but mostly [id number]  He states he will occasionally go up if she gets anxious. She is scheduled for an endoscopy on 6/1/2022. She was told to stop her Eliquis a week prior to procedure. She is concerned about this does not want to have another stroke. Explained she will need to hold Eliquis 2 days prior to procedure. Can resume thereafter    Otherwise she is doing well with no significant complaints.   She is not noticed any arrhythmia, shortness of breath or chest pain        Patient Active Problem List    Diagnosis Date Noted    Chronic pain disorder 04/04/2022    Gastroesophageal reflux disease 04/04/2022    Palliative care patient 14/44/5440    Diastolic dysfunction 80/93/4783    Facial droop 04/03/2022    Dysarthria 04/03/2022    History of pelvic fracture 04/03/2022    Hip pain 04/03/2022    Hypokalemia 04/03/2022    Vitamin D deficiency 04/03/2022    Gait abnormality 04/03/2022    Rhabdomyolysis 04/01/2022    HTN (hypertension) 04/01/2022    Generalized weakness 04/01/2022    Anticoagulated 04/01/2022    Intertrigo 04/01/2022    Paroxysmal atrial fibrillation ([facility] [address] 04/01/2022    Transaminitis 04/01/2022    Abnormal CXR 04/01/2022    Osteoarthritis of right knee 09/02/2021    Trigger middle finger of right hand 12/17/2020    Mass of right wrist 12/17/2020     Current Outpatient Medications   Medication Sig Dispense Refill    atorvastatin (LIPITOR) 10 MG tablet TAKE 1 TABLET BY MOUTH DAILY      cyclobenzaprine (FLEXERIL) 5 MG tablet TAKE 1 TABLET BY MOUTH TWICE A DAY AS NEEDED FOR MUSCLE SPASMS      amiodarone (CORDARONE) 200 MG tablet Take 1 tablet by mouth daily 30 tablet 0    lactobacillus (CULTURELLE) CAPS capsule Take 1 capsule by mouth daily 30 capsule 0    metoprolol succinate (TOPROL XL) 25 MG extended release tablet Take 1 tablet by mouth 2 times daily 60 tablet 0    dilTIAZem (CARDIZEM CD) 120 MG extended release capsule Take 1 capsule by mouth 2 times daily 60 capsule 0    nystatin (MYCOSTATIN) 000416 UNIT/GM cream Apply topically 2 times daily. 1 each 0    hydroCHLOROthiazide (HYDRODIURIL) 25 MG tablet Take 1 tablet by mouth daily 30 tablet 0    diphenhydrAMINE (BENADRYL) 50 MG capsule Take 50 mg by mouth nightly as needed for Itching      dicyclomine (BENTYL) 10 MG capsule Take 10 mg by mouth as needed       hydrOXYzine (ATARAX) 25 MG tablet Take 25 mg by mouth at bedtime      apixaban (ELIQUIS) 5 MG TABS tablet Take 5 mg by mouth 2 times daily      vitamin D (ERGOCALCIFEROL) 1.25 MG (27674 UT) CAPS capsule Take 50,000 Units by mouth once a week      ondansetron (ZOFRAN) 4 MG tablet Take 1 tablet by mouth every 8 hours as needed for Nausea or Vomiting 30 tablet 0    fluticasone (FLONASE) 50 MCG/ACT nasal spray 2 sprays by Nasal route daily       celecoxib (CELEBREX) 200 MG capsule Take 1 capsule by mouth daily 30 capsule 2    oxyCODONE-acetaminophen (ROXICET) 5-325 MG/5ML solution Take by mouth every 4 hours as needed for Pain. (Patient not taking: Reported on 5/20/2022)      pantoprazole sodium (PROTONIX) 40 MG PACK packet Take 40 mg by mouth every morning (before breakfast) (Patient not taking: Reported on 5/20/2022)      simethicone (MYLICON) 80 MG chewable tablet Take 125 mg by mouth every 6 hours as needed for Flatulence (Patient not taking: Reported on 5/20/2022)       No current facility-administered medications for this visit.      Allergies: Levofloxacin in d5w, Pyridium [phenazopyridine], Sulfamethoxazole-trimethoprim, Codeine, Hydrocodone-acetaminophen, Hydromorphone hcl, and Oxycodone  Past Medical History:   Diagnosis Date    Abnormal CXR     Anticoagulated     Chronic back pain     Diastolic dysfunction     Dysarthria     Facial droop     Gait abnormality     Hypertension     Hypokalemia     Inflammatory arthritis     Intertrigo     Knee pain     Paroxysmal atrial fibrillation (HCC)     Spastic colon     Transaminitis     Vitamin D deficiency      Past Surgical History:   Procedure Laterality Date    ABDOMEN SURGERY      APPENDECTOMY      BACK SURGERY      neck; bone out of hip to fuse neck    COLONOSCOPY  04/12/2011    Dr Vini Grey Left 12/17/2020    LEFT MIDDLE TRIGGER FINGER RELEASE performed by Maryanne Betancourt MD at [address] [place] [address] HAND SURGERY Left 2012    fx repair (3 total surgeries)    HAND SURGERY Left 12/17/2020    LEFT WRIST MASS EXCISION performed by Maryanne Betancourt MD at [address] Bilateral     HIP    MOUTH SURGERY      TOTAL KNEE ARTHROPLASTY Right 09/02/2021    RIGHT TOTAL KNEE ARTHROPLASTY performed by Dani Law MD at [address] [place] [address]       Family History   Problem Relation Age of Onset    Other Other         Son DVT    Cancer Mother         colon     Heart Attack Mother     Heart Attack Father      Social History     Tobacco Use    Smoking status: Former Smoker     Packs/day: 0.50     Types: Cigarettes    Smokeless tobacco: Never Used   Substance Use Topics    Alcohol use: No        Diagnosis Orders   1. Paroxysmal atrial fibrillation (HCC)     2. Diastolic dysfunction     3. Moderate aortic stenosis     4. Chronic anticoagulation     Noticed any recurrent arrhythmia. Remains on amiodarone. She is anticoagulated on Eliquis with no abnormal bleeding. She has upcoming endoscopy. Okay to hold Eliquis 2 days prior to procedure    Blood pressure readings have been well controlled at home with the exception of an occasional elevation when she gets anxious     Viewed hospital records as well as last office visit note. Has follow-up with neurology in a couple weeks    2D echo 4/4/2022   Mitral valve leaflets are mildly thickened with preserved leaflet   mobility.    Mitral annular calcification is present. Mild mitral regurgitation is present. Mild aortic stenosis. Bicuspid valve not excluded   Mean gradient 15 mm hg   Tricuspid valve is structurally normal.   Mild tricuspid regurgitation with estimated RVSP of 39 mm Hg. Moderately dilated left atrium. Normal left ventricular size with preserved LV function and an estimated   ejection fraction of approximately 55-60%. Mild concentric left ventricular hypertrophy. Grade II diastolic dysfunction   Mildly enlarged right ventricle cavity. IVC dilated. -----------------------------------   Electronically signed by Lynsey Palomino MD(Interpreting   physician) on 04/04/2022 08:25 AM   ----------------------------------------------------------------      15 Minutes were spent speaking with patient and discussing plan of care. Keep her regular scheduled appointment in October someone from the  will call and set up appointment.     Electronically signed, KATI Poe

## 2022-05-24 ENCOUNTER — TELEPHONE (OUTPATIENT)
Dept: GASTROENTEROLOGY | Age: 77
End: 2022-05-24

## 2022-05-24 NOTE — TELEPHONE ENCOUNTER
Patient: Tommy Hernandez    YOB: 1945      Clearance was received on May 24, 2022. for Endoscopy / Colonoscopy scheduled for: 6/1/22    Patient may discontinue the use of PLAVIX  for 2 days prior to the procedure.     IS Lovenox required:  NO    PATIENT NOTIFIED ON:  5/24/22      Clearance scanned into media

## 2022-05-31 RX ORDER — METOPROLOL SUCCINATE 25 MG/1
TABLET, EXTENDED RELEASE ORAL
Qty: 30 TABLET | Refills: 5 | Status: SHIPPED | OUTPATIENT
Start: 2022-05-31

## 2022-05-31 RX ORDER — HYDROCHLOROTHIAZIDE 25 MG/1
TABLET ORAL
Qty: 30 TABLET | Refills: 3 | Status: SHIPPED | OUTPATIENT
Start: 2022-05-31 | End: 2022-08-18

## 2022-05-31 RX ORDER — AMIODARONE HYDROCHLORIDE 200 MG/1
TABLET ORAL
Qty: 30 TABLET | Refills: 3 | Status: SHIPPED | OUTPATIENT
Start: 2022-05-31 | End: 2022-08-03

## 2022-06-01 ENCOUNTER — ANESTHESIA EVENT (OUTPATIENT)
Dept: ENDOSCOPY | Age: 77
End: 2022-06-01
Payer: MEDICARE

## 2022-06-01 ENCOUNTER — ANESTHESIA (OUTPATIENT)
Dept: ENDOSCOPY | Age: 77
End: 2022-06-01
Payer: MEDICARE

## 2022-06-01 ENCOUNTER — HOSPITAL ENCOUNTER (OUTPATIENT)
Age: 77
Setting detail: OUTPATIENT SURGERY
Discharge: HOME | End: 2022-06-01
Attending: INTERNAL MEDICINE | Admitting: INTERNAL MEDICINE
Payer: MEDICARE

## 2022-06-01 VITALS
DIASTOLIC BLOOD PRESSURE: 83 MMHG | RESPIRATION RATE: 18 BRPM | OXYGEN SATURATION: 95 % | TEMPERATURE: 98.1 F | HEIGHT: 63 IN | BODY MASS INDEX: 23.92 KG/M2 | SYSTOLIC BLOOD PRESSURE: 159 MMHG | HEART RATE: 72 BPM | WEIGHT: 135 LBS

## 2022-06-01 PROCEDURE — 2580000003 HC RX 258: Performed by: INTERNAL MEDICINE

## 2022-06-01 PROCEDURE — 3700000001 HC ADD 15 MINUTES (ANESTHESIA): Performed by: INTERNAL MEDICINE

## 2022-06-01 PROCEDURE — 43259 EGD US EXAM DUODENUM/JEJUNUM: CPT | Performed by: INTERNAL MEDICINE

## 2022-06-01 PROCEDURE — 3700000000 HC ANESTHESIA ATTENDED CARE: Performed by: INTERNAL MEDICINE

## 2022-06-01 PROCEDURE — 2709999900 HC NON-CHARGEABLE SUPPLY: Performed by: INTERNAL MEDICINE

## 2022-06-01 PROCEDURE — 7100000011 HC PHASE II RECOVERY - ADDTL 15 MIN: Performed by: INTERNAL MEDICINE

## 2022-06-01 PROCEDURE — 3609018800 HC ERCP DX COLLECTION SPECIMEN BRUSHING/WASHING: Performed by: INTERNAL MEDICINE

## 2022-06-01 PROCEDURE — 7100000010 HC PHASE II RECOVERY - FIRST 15 MIN: Performed by: INTERNAL MEDICINE

## 2022-06-01 PROCEDURE — 6360000002 HC RX W HCPCS: Performed by: NURSE ANESTHETIST, CERTIFIED REGISTERED

## 2022-06-01 PROCEDURE — 3609018500 HC EGD US SCOPE W/ADJACENT STRUCTURES: Performed by: INTERNAL MEDICINE

## 2022-06-01 PROCEDURE — 2500000003 HC RX 250 WO HCPCS: Performed by: NURSE ANESTHETIST, CERTIFIED REGISTERED

## 2022-06-01 RX ORDER — LIDOCAINE HYDROCHLORIDE 10 MG/ML
INJECTION, SOLUTION INFILTRATION; PERINEURAL PRN
Status: DISCONTINUED | OUTPATIENT
Start: 2022-06-01 | End: 2022-06-01 | Stop reason: SDUPTHER

## 2022-06-01 RX ORDER — SODIUM CHLORIDE, SODIUM LACTATE, POTASSIUM CHLORIDE, CALCIUM CHLORIDE 600; 310; 30; 20 MG/100ML; MG/100ML; MG/100ML; MG/100ML
INJECTION, SOLUTION INTRAVENOUS CONTINUOUS
Status: DISCONTINUED | OUTPATIENT
Start: 2022-06-01 | End: 2022-06-01 | Stop reason: HOSPADM

## 2022-06-01 RX ORDER — PROPOFOL 10 MG/ML
INJECTION, EMULSION INTRAVENOUS CONTINUOUS PRN
Status: DISCONTINUED | OUTPATIENT
Start: 2022-06-01 | End: 2022-06-01 | Stop reason: SDUPTHER

## 2022-06-01 RX ADMIN — LIDOCAINE HYDROCHLORIDE 40 MG: 10 INJECTION, SOLUTION INFILTRATION; PERINEURAL at 12:04

## 2022-06-01 RX ADMIN — PROPOFOL 120 MCG/KG/MIN: 10 INJECTION, EMULSION INTRAVENOUS at 12:04

## 2022-06-01 RX ADMIN — SODIUM CHLORIDE, POTASSIUM CHLORIDE, SODIUM LACTATE AND CALCIUM CHLORIDE: 600; 310; 30; 20 INJECTION, SOLUTION INTRAVENOUS at 11:17

## 2022-06-01 ASSESSMENT — PAIN - FUNCTIONAL ASSESSMENT: PAIN_FUNCTIONAL_ASSESSMENT: 0-10

## 2022-06-01 NOTE — OP NOTE
Referring/Primary Care Provider: KATI Crandall CNP, Herma Balloon MD    Date of Procedure: 06/01/22    Procedure:   1. EGD with Endoscopic Ultrasound     Indications:   1. Biliary dilation of unclear etiology, normal LFTs,     Anesthesia:  Sedation was administered by anesthesia who monitored the patient during the procedure. Procedure:   After reviewing the patient's chart, H&P, medications, obtaining informed consent, and discussing risks benefits and alternatives to the procedure the patient was placed in the left lateral decubitus position. A oblique viewing Olympus 140 Linear EUS scope was lubricated and inserted through the mouth into the oropharynx. Under indirect visualization, the upper esophagus was intubated. The scope was advanced to the level of the third portion of duodenum with limited views of the esophageal mucosa. Findings and maneuvers are listed in impression below. The patient tolerated the procedure well. There were no immediate complications. Findings:   Endoscopic Finding:   - endoscopic evaluation of the upper GI tract was normal   - ampulla appeared normal endoscopically. Endosonographic Findings:  - The celiac axis and associated vascular structures was identified and examined. No concerning or malignant lymphadenopathy was identified.     - Limited views of the left lobe of the liver revealed no biliary dilation, no liver mass. - The EUS scope was advanced to the duodenal bulb. No mass or stones appreciated. The CBD was mildly dilated to 10mm at the level of the ampulla and the MPD was mildly dilated at 4.5mm. No focal ampullary mass seen. - Pancreas: no pancreatic mass noted. The MPD improved to normal caliber throughout the body/tail of the pancreas. Estimated Blood Loss: minimal    IMPRESSION:  - Noted mild dilation to the CBD and MPD without focal obstructing lesion/stone.      RECOMMENDATIONS:   - Continue current treatment  - At the current time, I would not recommend ERCP unless patient develops more classic biliary symptoms with associated abnormal LFT and/or pancreatitis     The results were discussed with the patient and family. A copy of the images obtained were given to the patient.      Yi Armas MD  06/01/22  12:22 PM

## 2022-06-01 NOTE — ANESTHESIA POSTPROCEDURE EVALUATION
Department of Anesthesiology  Postprocedure Note    Patient: Tor Shea  MRN: [id number]  [place]: 1945  Date of evaluation: 6/1/2022  Time:  12:34 PM     Procedure Summary     Date: 06/01/22 Room / Location: [address]    Anesthesia Start: 1150 Anesthesia Stop:     Procedures:       EGD ESOPHAGOGASTRODUODENOSCOPY ULTRASOUND (N/A )      ERCP ENDOSCOPIC RETROGRADE CHOLANGIOPANCREATOGRAPHY (N/A ) Diagnosis:       Dilated cbd, acquired      ([address] / NORMAL LFT'S)    Surgeons: Yi Armas MD Responsible Provider: KATI Lopez CRNA    Anesthesia Type: general ASA Status: 3          Anesthesia Type: No value filed. Tiera Phase I:      Tiera Phase II: Tiera Score: 9    Last vitals: Reviewed and per EMR flowsheets.        Anesthesia Post Evaluation    Patient location during evaluation: bedside  Patient participation: complete - patient participated  Level of consciousness: sleepy but conscious  Pain score: 0  Airway patency: patent  Nausea & Vomiting: no nausea and no vomiting  Complications: no  Cardiovascular status: hemodynamically stable and blood pressure returned to baseline  Respiratory status: acceptable and nasal cannula  Hydration status: stable

## 2022-06-01 NOTE — ANESTHESIA PRE PROCEDURE
Department of Anesthesiology  Preprocedure Note       Name:  Amari Dominique   Age:  67 y.o.  :  1945                                          MRN:  [id number]         Date:  2022      Surgeon: Juan Swan):  Giovanny Garza MD    Procedure: Procedure(s):  EGD ESOPHAGOGASTRODUODENOSCOPY ULTRASOUND  ERCP ENDOSCOPIC RETROGRADE CHOLANGIOPANCREATOGRAPHY    Medications prior to admission:   Prior to Admission medications    Medication Sig Start Date End Date Taking? Authorizing Provider   amiodarone (CORDARONE) 200 MG tablet TAKE 1 TABLET BY MOUTH DAILY FOR AFIB 22   KATI Evans   hydroCHLOROthiazide (HYDRODIURIL) 25 MG tablet TAKE 1 TABLET BY MOUTH DAILY FOR HYPERTENTION 22   KATI Evans   metoprolol succinate (TOPROL XL) 25 MG extended release tablet TAKE 1 TABLET BY MOUTH DAILY *STOP CARDIZEM* 22   KATI Menjivar - NP   atorvastatin (LIPITOR) 10 MG tablet TAKE 1 TABLET BY MOUTH DAILY 22   Historical Provider, MD   cyclobenzaprine (FLEXERIL) 5 MG tablet TAKE 1 TABLET BY MOUTH TWICE A DAY AS NEEDED FOR MUSCLE SPASMS 22   Historical Provider, MD   oxyCODONE-acetaminophen (ROXICET) 5-325 MG/5ML solution Take by mouth every 4 hours as needed for Pain. Patient not taking: Reported on 2022    Historical Provider, MD   lactobacillus (CULTURELLE) CAPS capsule Take 1 capsule by mouth daily 22   Naveen Tafoya MD   dilTIAZem (CARDIZEM CD) 120 MG extended release capsule Take 1 capsule by mouth 2 times daily 22  Naveen Tafoya MD   nystatin (MYCOSTATIN) 875270 UNIT/GM cream Apply topically 2 times daily.  22   Naveen Tafoya MD   pantoprazole sodium (PROTONIX) 40 MG PACK packet Take 40 mg by mouth every morning (before breakfast)  Patient not taking: Reported on 2022    Historical Provider, MD   diphenhydrAMINE (BENADRYL) 50 MG capsule Take 50 mg by mouth nightly as needed for Itching    Historical Provider, MD simethicone (MYLICON) 80 MG chewable tablet Take 125 mg by mouth every 6 hours as needed for Flatulence  Patient not taking: Reported on 5/20/2022    Historical Provider, MD   dicyclomine (BENTYL) 10 MG capsule Take 10 mg by mouth as needed     Historical Provider, MD   hydrOXYzine (ATARAX) 25 MG tablet Take 25 mg by mouth at bedtime 2/23/22   Historical Provider, MD   apixaban (ELIQUIS) 5 MG TABS tablet Take 5 mg by mouth 2 times daily    Historical Provider, MD   vitamin D (ERGOCALCIFEROL) 1.25 MG (32237 UT) CAPS capsule Take 50,000 Units by mouth once a week    Historical Provider, MD   ondansetron (ZOFRAN) 4 MG tablet Take 1 tablet by mouth every 8 hours as needed for Nausea or Vomiting 9/2/21   Linda Burt MD   fluticasone [facility]) 50 MCG/ACT nasal spray 2 sprays by Nasal route daily     Historical Provider, MD   celecoxib (CELEBREX) 200 MG capsule Take 1 capsule by mouth daily 6/19/18   Kandis Funez MD       Current medications:    No current facility-administered medications for this encounter. Current Outpatient Medications   Medication Sig Dispense Refill    amiodarone (CORDARONE) 200 MG tablet TAKE 1 TABLET BY MOUTH DAILY FOR AFIB 30 tablet 3    hydroCHLOROthiazide (HYDRODIURIL) 25 MG tablet TAKE 1 TABLET BY MOUTH DAILY FOR HYPERTENTION 30 tablet 3    metoprolol succinate (TOPROL XL) 25 MG extended release tablet TAKE 1 TABLET BY MOUTH DAILY *STOP CARDIZEM* 30 tablet 5    atorvastatin (LIPITOR) 10 MG tablet TAKE 1 TABLET BY MOUTH DAILY      cyclobenzaprine (FLEXERIL) 5 MG tablet TAKE 1 TABLET BY MOUTH TWICE A DAY AS NEEDED FOR MUSCLE SPASMS      oxyCODONE-acetaminophen (ROXICET) 5-325 MG/5ML solution Take by mouth every 4 hours as needed for Pain.  (Patient not taking: Reported on 5/20/2022)      lactobacillus (CULTURELLE) CAPS capsule Take 1 capsule by mouth daily 30 capsule 0    dilTIAZem (CARDIZEM CD) 120 MG extended release capsule Take 1 capsule by mouth 2 times daily 60 capsule 0    nystatin (MYCOSTATIN) 098748 UNIT/GM cream Apply topically 2 times daily. 1 each 0    pantoprazole sodium (PROTONIX) 40 MG PACK packet Take 40 mg by mouth every morning (before breakfast) (Patient not taking: Reported on 5/20/2022)      diphenhydrAMINE (BENADRYL) 50 MG capsule Take 50 mg by mouth nightly as needed for Itching      simethicone (MYLICON) 80 MG chewable tablet Take 125 mg by mouth every 6 hours as needed for Flatulence (Patient not taking: Reported on 5/20/2022)      dicyclomine (BENTYL) 10 MG capsule Take 10 mg by mouth as needed       hydrOXYzine (ATARAX) 25 MG tablet Take 25 mg by mouth at bedtime      apixaban (ELIQUIS) 5 MG TABS tablet Take 5 mg by mouth 2 times daily      vitamin D (ERGOCALCIFEROL) 1.25 MG (99050 UT) CAPS capsule Take 50,000 Units by mouth once a week      ondansetron (ZOFRAN) 4 MG tablet Take 1 tablet by mouth every 8 hours as needed for Nausea or Vomiting 30 tablet 0    fluticasone (FLONASE) 50 MCG/ACT nasal spray 2 sprays by Nasal route daily       celecoxib (CELEBREX) 200 MG capsule Take 1 capsule by mouth daily 30 capsule 2       Allergies:     Allergies   Allergen Reactions    Levofloxacin In D5w Rash     Itchy rash    Pyridium [Phenazopyridine] Nausea And Vomiting    Sulfamethoxazole-Trimethoprim Nausea And Vomiting    Codeine Nausea And Vomiting    Hydrocodone-Acetaminophen Nausea And Vomiting    Hydromorphone Hcl Nausea And Vomiting    Oxycodone Nausea And Vomiting       Problem List:    Patient Active Problem List   Diagnosis Code    Trigger middle finger of right hand M65.331    Mass of right wrist R22.31    Osteoarthritis of right knee M17.11    Rhabdomyolysis M62.82    HTN (hypertension) I10    Generalized weakness R53.1    Anticoagulated Z79.01    Intertrigo L30.4    Paroxysmal atrial fibrillation (HCC) I48.0    Transaminitis R74.01    Abnormal CXR R93.89    Facial droop R29.810    Dysarthria R47.1    History of pelvic BMI:   Wt Readings from Last 3 Encounters:   05/03/22 141 lb (64 kg)   04/01/22 145 lb (65.8 kg)   03/23/22 145 lb (65.8 kg)     There is no height or weight on file to calculate BMI.    CBC:   Lab Results   Component Value Date    WBC 7.2 04/10/2022    RBC 3.99 04/10/2022    HGB 12.2 04/10/2022    HCT 38.6 04/10/2022    MCV 96.7 04/10/2022    RDW 13.7 04/10/2022     04/10/2022       CMP:   Lab Results   Component Value Date     04/10/2022    K 4.4 04/10/2022     04/10/2022    CO2 28 04/10/2022    BUN 16 04/10/2022    CREATININE 0.9 04/10/2022    GFRAA >59 04/10/2022    LABGLOM >60 04/10/2022    GLUCOSE 100 04/10/2022    PROT 5.8 04/06/2022    CALCIUM 9.0 04/10/2022    BILITOT 0.3 04/06/2022    ALKPHOS 90 04/06/2022    AST 35 04/06/2022    ALT 48 04/06/2022       POC Tests: No results for input(s): POCGLU, POCNA, POCK, POCCL, POCBUN, POCHEMO, POCHCT in the last 72 hours.     Coags:   Lab Results   Component Value Date    PROTIME 13.7 04/01/2022    INR 1.03 04/01/2022    APTT 25.4 08/30/2021       HCG (If Applicable): No results found for: PREGTESTUR, PREGSERUM, HCG, HCGQUANT     ABGs: No results found for: PHART, PO2ART, ZEP5JGB, SJD2UBG, BEART, M6IKONDT     Type & Screen (If Applicable):  No results found for: LABABO, LABRH    Drug/Infectious Status (If Applicable):  No results found for: HIV, HEPCAB    COVID-19 Screening (If Applicable):   Lab Results   Component Value Date    COVID19 Not Detected 04/11/2022    COVID19 Not Detected 08/30/2021           Anesthesia Evaluation  Patient summary reviewed no history of anesthetic complications:   Airway: Mallampati: II  TM distance: >3 FB   Neck ROM: full  Mouth opening: < 3 FB   Dental: normal exam         Pulmonary:normal exam                              ROS comment: Former smoker   Cardiovascular:    (+) hypertension:, dysrhythmias: atrial fibrillation,          Beta Blocker:  Not on Beta Blocker      ROS comment:    Summary   Mitral valve leaflets are mildly thickened with preserved leaflet   mobility. Mitral annular calcification is present. Mild mitral regurgitation is present. Mild aortic stenosis. Bicuspid valve not excluded   Mean gradient 15 mm hg   Tricuspid valve is structurally normal.   Mild tricuspid regurgitation with estimated RVSP of 39 mm Hg. Moderately dilated left atrium. Normal left ventricular size with preserved LV function and an estimated   ejection fraction of approximately 55-60%. Mild concentric left ventricular hypertrophy. Grade II diastolic dysfunction   Mildly enlarged right ventricle cavity. IVC dilated. Signature      ----------------------------------------------------------------   Electronically signed by Frank Peterson MD(Interpreting   physician) on 04/04/2022 08:25 AM     Neuro/Psych:                ROS comment: Chronic narc use GI/Hepatic/Renal:   (+) GERD: no interval change,          ROS comment: Abnormal imagining - intra/extra hepatic biliary dilation . Endo/Other:    (+) blood dyscrasia (Eliquis): anticoagulation therapy, arthritis: OA., .                 Abdominal:             Vascular: negative vascular ROS. Other Findings:           Anesthesia Plan      general     ASA 3       Induction: intravenous. Anesthetic plan and risks discussed with patient.                         Harsh Coats, APRN - CRNA   6/1/2022

## 2022-06-03 ENCOUNTER — OFFICE VISIT (OUTPATIENT)
Dept: NEUROLOGY | Age: 77
End: 2022-06-03
Payer: MEDICARE

## 2022-06-03 VITALS
DIASTOLIC BLOOD PRESSURE: 81 MMHG | WEIGHT: 135 LBS | HEART RATE: 67 BPM | RESPIRATION RATE: 16 BRPM | SYSTOLIC BLOOD PRESSURE: 118 MMHG | BODY MASS INDEX: 23.92 KG/M2 | HEIGHT: 63 IN

## 2022-06-03 DIAGNOSIS — M25.559 HIP PAIN: ICD-10-CM

## 2022-06-03 DIAGNOSIS — I10 HYPERTENSION, UNSPECIFIED TYPE: ICD-10-CM

## 2022-06-03 DIAGNOSIS — I48.0 PAROXYSMAL ATRIAL FIBRILLATION (HCC): ICD-10-CM

## 2022-06-03 DIAGNOSIS — Z86.73 HISTORY OF STROKE: Primary | ICD-10-CM

## 2022-06-03 DIAGNOSIS — R53.1 WEAKNESS: ICD-10-CM

## 2022-06-03 DIAGNOSIS — R47.1 DYSARTHRIA: ICD-10-CM

## 2022-06-03 DIAGNOSIS — Z87.81 HISTORY OF PELVIC FRACTURE: ICD-10-CM

## 2022-06-03 DIAGNOSIS — I51.89 DIASTOLIC DYSFUNCTION: ICD-10-CM

## 2022-06-03 DIAGNOSIS — R26.9 GAIT ABNORMALITY: ICD-10-CM

## 2022-06-03 DIAGNOSIS — K21.9 GASTROESOPHAGEAL REFLUX DISEASE, UNSPECIFIED WHETHER ESOPHAGITIS PRESENT: ICD-10-CM

## 2022-06-03 DIAGNOSIS — R29.810 FACIAL DROOP: ICD-10-CM

## 2022-06-03 PROCEDURE — 99214 OFFICE O/P EST MOD 30 MIN: CPT | Performed by: PSYCHIATRY & NEUROLOGY

## 2022-06-03 PROCEDURE — 1123F ACP DISCUSS/DSCN MKR DOCD: CPT | Performed by: PSYCHIATRY & NEUROLOGY

## 2022-06-03 RX ORDER — CHOLECALCIFEROL (VITAMIN D3) 10 MCG
1 TABLET ORAL
COMMUNITY

## 2022-06-03 NOTE — PROGRESS NOTES
Chief Complaint   Patient presents with    New Patient     having problems with speech after CVA she does have [facility] speech coming in. Tamara Wiley is a 67y.o. year old female was seen in the hospital for acute ischemic stroke April 2022. The patient had a past medical history significant for atrial fibrillation on chronic anticoagulation with Eliquis, previous pelvic fracture and hypertension. She was seen for evaluation of facial droop and slurred speech.  The patient has been admitted to the hospital on 4/1/2022 after being found down at home. [facility] fallen approximately 3 days prior to admission due to leg weakness.  She was laying on the ground and was unable to get help.  She does not recall why she fell. Nghia Ruiz neighbor tried to go over to the house that she had not heard from her and the patient was able to crawl to unlock the door but was unable to get up.  The patient indicates she has a previous history of pelvic fracture in office take some Tylenol and tramadol at home with good pain relief.  On admission her serum CK level was 7136.  Her serum creatinine was 0.9 with a BUN of 41.  Her CT of the head had some mild generalized volume loss with no acute changes noted.  CT of the pelvis revealed old fracture of the right superior and inferior pubic rami.  Patient was complaining of right pelvic pain.  Patient denied any history of stroke.  The patient was found to have acute ischemic stroke with 2 areas of acute ischemia in the left basal ganglia and left thalamus which are presumed to be cardioembolic. She has a history of atrial fibrillation but was only taking Eliquis once a day prior to admission. Her carotid ultrasound revealed less than 50% stenosis in the left internal carotid and 50 to 69% in the right internal carotid. Her 2D echo cardiogram revealed left ventricular ejection fraction of 55 to 60% and moderately dilated left atrium. Her Eliquis was changed to twice a day.   She was discharged to skilled nursing. She follows up today. Overall she is doing better. Still getting speech therapy at home but other therapies have stopped.     Active Ambulatory Problems     Diagnosis Date Noted    Trigger middle finger of right hand 12/17/2020    Mass of right wrist 12/17/2020    Osteoarthritis of right knee 09/02/2021    Rhabdomyolysis 04/01/2022    HTN (hypertension) 04/01/2022    Generalized weakness 04/01/2022    Anticoagulated 04/01/2022    Intertrigo 04/01/2022    Paroxysmal atrial fibrillation ([address] 04/01/2022    Transaminitis 04/01/2022    Abnormal CXR 04/01/2022    Facial droop 04/03/2022    Dysarthria 04/03/2022    History of pelvic fracture 04/03/2022    Hip pain 04/03/2022    Hypokalemia 04/03/2022    Vitamin D deficiency 04/03/2022    Gait abnormality 04/03/2022    Chronic pain disorder 04/04/2022    Gastroesophageal reflux disease 04/04/2022    Palliative care patient 14/13/4761    Diastolic dysfunction 13/58/7083     Resolved Ambulatory Problems     Diagnosis Date Noted    Fall 04/01/2022    Dehydration 04/01/2022     Past Medical History:   Diagnosis Date    Chronic back pain     Hypertension     Inflammatory arthritis     Knee pain     Spastic colon        Past Surgical History:   Procedure Laterality Date    ABDOMEN SURGERY      APPENDECTOMY      BACK SURGERY      neck; bone out of hip to fuse neck    COLONOSCOPY  04/12/2011    Dr Christine Avalos ERCP N/A 06/01/2022    NOT PERFORMED    FINGER TRIGGER RELEASE Left 12/17/2020    LEFT MIDDLE TRIGGER FINGER RELEASE performed by Maryanne Betancourt MD at [address] [place] [address] HAND SURGERY Left 2012    fx repair (3 total surgeries)    HAND SURGERY Left 12/17/2020    LEFT WRIST MASS EXCISION performed by Maryanne Betancourt MD at [address] Bilateral     HIP    MOUTH SURGERY      TOTAL KNEE ARTHROPLASTY Right 09/02/2021    RIGHT TOTAL KNEE ARTHROPLASTY performed by Dani Law [facility] at [facility] OR    TUBAL LIGATION      UPPER GASTROINTESTINAL ENDOSCOPY N/A 06/01/2022    Dr MELY Gallardo-w/EUS-Noted mild dilation to the CBD and MPD without focal obstructing lesion/stone, ERCP not needed       Family History   Problem Relation Age of Onset    Other Other         Son DVT    Cancer Mother         colon     Heart Attack Mother     Heart Attack Father        Allergies   Allergen Reactions    Levofloxacin In D5w Rash     Itchy rash    Pyridium [Phenazopyridine] Nausea And Vomiting    Sulfamethoxazole-Trimethoprim Nausea And Vomiting    Codeine Nausea And Vomiting    Hydrocodone-Acetaminophen Nausea And Vomiting    Hydromorphone Hcl Nausea And Vomiting    Oxycodone Nausea And Vomiting       Social History     Socioeconomic History    Marital status:      Spouse name: Not on file    Number of children: Not on file    Years of education: Not on file    Highest education level: Not on file   Occupational History    Not on file   Tobacco Use    Smoking status: Former Smoker     Packs/day: 0.50     Types: Cigarettes    Smokeless tobacco: Never Used   Vaping Use    Vaping Use: Never used   Substance and Sexual Activity    Alcohol use: No    Drug use: No    Sexual activity: Never   Other Topics Concern    Not on file   Social History Narrative    Not on file     Social Determinants of Health     Financial Resource Strain:     Difficulty of Paying Living Expenses: Not on file   Food Insecurity:     Worried About [address] in the Last Year: Not on file    Mari of Food in the Last Year: Not on file   Transportation Needs:     Lack of Transportation (Medical): Not on file    Lack of Transportation (Non-Medical):  Not on file   Physical Activity:     Days of Exercise per Week: Not on file    Minutes of Exercise per Session: Not on file   Stress:     Feeling of Stress : Not on file   Social Connections:     Frequency of Communication with Friends and Family: Not on file  Frequency of Social Gatherings with Friends and Family: Not on file    Attends Restoration Services: Not on file    Active Member of Clubs or Organizations: Not on file    Attends Club or Organization Meetings: Not on file    Marital Status: Not on file   Intimate Partner Violence:     Fear of Current or Ex-Partner: Not on file    Emotionally Abused: Not on file    Physically Abused: Not on file    Sexually Abused: Not on file   Housing Stability:     Unable to Pay for Housing in the Last Year: Not on file    Number of [place] in the Last Year: Not on file    Unstable Housing in the Last Year: Not on file       Review of Systems    Constitutional - No fever or chills. No diaphoresis or significant fatigue. HENT -  No tinnitus or significant hearing loss. Eyes - no sudden vision change or eye pain  Respiratory - no significant shortness of breath or cough  Cardiovascular - no chest pain No palpitations or significant leg swelling  Gastrointestinal - no abdominal swelling or pain. Genitourinary - No difficulty urinating, dysuria  Musculoskeletal - no back pain or myalgia. Skin - no color change or rash  Neurologic - No seizures. No lateralizing weakness. Hematologic - no easy bruising or excessive bleeding. Psychiatric - no severe anxiety or nervousness. All other review of systems are negative.       Current Outpatient Medications   Medication Sig Dispense Refill    b complex-C-folic acid (NEPHROCAPS) 1 MG capsule Take 1 capsule by mouth      amiodarone (CORDARONE) 200 MG tablet TAKE 1 TABLET BY MOUTH DAILY FOR AFIB 30 tablet 3    hydroCHLOROthiazide (HYDRODIURIL) 25 MG tablet TAKE 1 TABLET BY MOUTH DAILY FOR HYPERTENTION 30 tablet 3    metoprolol succinate (TOPROL XL) 25 MG extended release tablet TAKE 1 TABLET BY MOUTH DAILY *STOP CARDIZEM* 30 tablet 5    atorvastatin (LIPITOR) 10 MG tablet TAKE 1 TABLET BY MOUTH DAILY      cyclobenzaprine (FLEXERIL) 5 MG tablet TAKE 1 TABLET BY MOUTH TWICE A DAY AS NEEDED FOR MUSCLE SPASMS      lactobacillus (CULTURELLE) CAPS capsule Take 1 capsule by mouth daily 30 capsule 0    simethicone (MYLICON) 80 MG chewable tablet Take 125 mg by mouth every 6 hours as needed for Flatulence       dicyclomine (BENTYL) 10 MG capsule Take 10 mg by mouth as needed       hydrOXYzine (ATARAX) 25 MG tablet Take 25 mg by mouth at bedtime      apixaban (ELIQUIS) 5 MG TABS tablet Take 5 mg by mouth 2 times daily      vitamin D (ERGOCALCIFEROL) 1.25 MG (95906 UT) CAPS capsule Take 50,000 Units by mouth once a week      ondansetron (ZOFRAN) 4 MG tablet Take 1 tablet by mouth every 8 hours as needed for Nausea or Vomiting 30 tablet 0    fluticasone (FLONASE) 50 MCG/ACT nasal spray 2 sprays by Nasal route daily       celecoxib (CELEBREX) 200 MG capsule Take 1 capsule by mouth daily 30 capsule 2    oxyCODONE-acetaminophen (ROXICET) 5-325 MG/5ML solution Take by mouth every 4 hours as needed for Pain. (Patient not taking: Reported on 5/20/2022)      dilTIAZem (CARDIZEM CD) 120 MG extended release capsule Take 1 capsule by mouth 2 times daily (Patient not taking: Reported on 6/3/2022) 60 capsule 0    nystatin (MYCOSTATIN) 853266 UNIT/GM cream Apply topically 2 times daily. (Patient not taking: Reported on 6/3/2022) 1 each 0    pantoprazole sodium (PROTONIX) 40 MG PACK packet Take 40 mg by mouth every morning (before breakfast) (Patient not taking: Reported on 5/20/2022)      diphenhydrAMINE (BENADRYL) 50 MG capsule Take 50 mg by mouth nightly as needed for Itching (Patient not taking: Reported on 6/3/2022)       No current facility-administered medications for this visit. /81   Pulse 67   Resp 16   Ht 5' 3\" (1.6 m)   Wt 135 lb (61.2 kg)   BMI 23.91 kg/m²     Constitutional - well developed, well nourished.     Eyes - conjunctiva normal.  Ear, nose, throat - No scars, masses, or lesions over external nose or ears, no atrophy of tongue  Neck-symmetric, no masses noted, no jugular vein distension  Respiration- chest wall appears symmetric, good expansion,   normal effort without use of accessory muscles  Musculoskeletal - no significant wasting of muscles noted, no bony deformities  Extremities-no clubbing, cyanosis or edema  Skin - warm, dry, and intact. No rash, erythema, or pallor. Psychiatric - mood, affect, and behavior appear normal.      Neurological exam  Awake, alert, fluent oriented  appropriate affect  Attention and concentration appear appropriate  Recent and remote memory appears unremarkable  Speech normal without dysarthria  No clear issues with language of fund of knowledge    Cranial Nerve Exam     CN III, IV,VI-EOMI, No nystagmus, conjugate eye movements, no ptosis  CN VII-no facial assymetry        Motor Exam  antigravity throughout upper and lower extremities bilaterally  Some more giveaway weakness on the right    Tremors- no tremors in hands or head noted    Gait  In a wheelchair    Lab Results   Component Value Date    [id number] 940 04/01/2022     Lab Results   Component Value Date    WBC 7.2 04/10/2022    HGB 12.2 04/10/2022    HCT 38.6 04/10/2022    MCV 96.7 04/10/2022     04/10/2022     Lab Results   Component Value Date     04/10/2022    K 4.4 04/10/2022     04/10/2022    CO2 28 04/10/2022    BUN 16 04/10/2022    CREATININE 0.9 04/10/2022    GLUCOSE 100 04/10/2022    CALCIUM 9.0 04/10/2022    PROT 5.8 (L) 04/06/2022    LABALBU 3.4 (L) 04/06/2022    BILITOT 0.3 04/06/2022    ALKPHOS 90 04/06/2022    AST 35 (H) 04/06/2022    ALT 48 (H) 04/06/2022    LABGLOM >60 04/10/2022    GFRAA >59 04/10/2022           Assessment    ICD-10-CM    1. History of stroke  Z86.73 Ambulatory referral to Physical Therapy     Ambulatory referral to Speech Therapy     Amb External Referral To Occupational Therapy   2.  Weakness  R53.1 Ambulatory referral to Physical Therapy     Ambulatory referral to Speech Therapy     Amb External Referral To Occupational Therapy   3. Facial droop  R29.810 Ambulatory referral to Physical Therapy     Ambulatory referral to Speech Therapy     Amb External Referral To Occupational Therapy   4. Dysarthria  R47.1 Ambulatory referral to Physical Therapy     Ambulatory referral to Speech Therapy     Amb External Referral To Occupational Therapy   5. History of pelvic fracture  Z87.81    6. Hip pain  M25.559    7. Gait abnormality  R26.9    8. Paroxysmal atrial fibrillation (HCC)  I48.0    9. Hypertension, unspecified type  I10    10. Diastolic dysfunction  I10.45    11.  Gastroesophageal reflux disease, unspecified whether esophagitis present  K21.9      Acute ischemic stroke-2 areas of ischemia- left basal ganglia and left thalamus presumed cardi embolic with history of atrial fibrillation and patient taking her Eliquis only once a day prior to admission-table examination     Facial droop, dysarthria, fall, weakness     Initial Examination in hospital-Generalized giveaway weakness worse in the right arm and right leg  Right lower facial droop with dysarthric speech     MRI of the brain-acute ischemia left basal ganglia and thalamus-personally reviewed  Carotid ultrasound-preliminarily <50% stenosis left internal carotid and asymptomatic 50-69% stenosis right internal carotid-medical management  2D echo-LV EF 55-60%-moderately dilated LA  On Eliquis now twice a day-no bleeding noted     GI-bowel regimen  Atrial fibrillation-Eliquis/amiodarone/metoprolol  Allergies-Flonase  Hypertension-on medications monitor-systolic blood pressure   Vitamin D deficiency-on vitamin D  Pain control-try Percocet as needed  DVT prophylaxis-Eliquis       MRI of the abdomen-intrahepatic and extrahepatic biliary ductal dilatation with gradual tapering down to the ampulla-differential includes biliary stricture  CT of the right hip-no acute fracture noted    At this time given referral for outpatient PT/OT/speech  Follow up PRN       Plan    Orders

## 2022-06-10 NOTE — H&P
Patient Name: Amari Dominique  : 1945  MRN: [id number]  DATE: 06/10/22    Allergies: Allergies   Allergen Reactions    Levofloxacin In D5w Rash     Itchy rash    Pyridium [Phenazopyridine] Nausea And Vomiting    Sulfamethoxazole-Trimethoprim Nausea And Vomiting    Codeine Nausea And Vomiting    Hydrocodone-Acetaminophen Nausea And Vomiting    Hydromorphone Hcl Nausea And Vomiting    Oxycodone Nausea And Vomiting        ENDOSCOPY  History and Physical    Procedure:    [] Diagnostic Colonoscopy       [] Screening Colonoscopy  [x] EGD      [] ERCP      [x] EUS       [] Other    [x] Previous office notes/History and Physical reviewed from the patients chart. Please see EMR for further details of HPI. I have examined the patient's status immediately prior to the procedure and:      Indications/HPI:    []Abdominal Pain   []Barretts  []Screening/Surveillance   []History of Polyps  []Dysphagia            [] +Cologard/DNA testing  [x]Abnormal Imaging              []EOE Hx              [] Family Hx of CRC/Polyps  []Anemia                            []Food Impaction       []Recent Poor Prep  []GI Bleed             []Lymphadenopathy  []History of Polyps  []Change in bowel habits []Heartburn/Reflux  []Cancer- GI/Lung  []Chest Pain - Non Cardiac []Heme (+) Stool []Ulcers  []Constipation  []Hemoptysis  []Incontinence    []Diarrhea  []Hypoxemia  []Rectal Bleed (BRBPR)  []Nausea/Vomiting   [] Varices  []Crohns/Colitis  []Pancreatic Cyst   [] Cirrhosis   []Pancreatitis    []Abnormal MRCP  []Elevated LFT [] Stent Removal, Previous ERCP  []Other:     Anesthesia:   [x] MAC [] Moderate Sedation   [] General   [] None     ROS: 12 pt Review of Symptoms was negative unless mentioned above    Medications:   Prior to Admission medications    Medication Sig Start Date End Date Taking?  Authorizing Provider   b complex-C-folic acid (NEPHROCAPS) 1 MG capsule Take 1 capsule by mouth    Historical Provider, MD   amiodarone (CORDARONE) 200 MG tablet TAKE 1 TABLET BY MOUTH DAILY FOR AFIB 5/31/22   KATI Alvarez   hydroCHLOROthiazide (HYDRODIURIL) 25 MG tablet TAKE 1 TABLET BY MOUTH DAILY FOR HYPERTENTION 5/31/22   KATI Alvarez   metoprolol succinate (TOPROL XL) 25 MG extended release tablet TAKE 1 TABLET BY MOUTH DAILY *STOP CARDIZEM* 5/31/22   KATI Nunez - NP   atorvastatin (LIPITOR) 10 MG tablet TAKE 1 TABLET BY MOUTH DAILY 4/26/22   Historical Provider, MD   cyclobenzaprine (FLEXERIL) 5 MG tablet TAKE 1 TABLET BY MOUTH TWICE A DAY AS NEEDED FOR MUSCLE SPASMS 4/26/22   Historical Provider, MD   oxyCODONE-acetaminophen (ROXICET) 5-325 MG/5ML solution Take by mouth every 4 hours as needed for Pain. Patient not taking: Reported on 5/20/2022    Historical Provider, MD   lactobacillus (CULTURELLE) CAPS capsule Take 1 capsule by mouth daily 4/12/22   Mehreen Bello MD   dilTIAZem (CARDIZEM CD) 120 MG extended release capsule Take 1 capsule by mouth 2 times daily  Patient not taking: Reported on 6/3/2022 4/11/22 5/20/22  Mehreen Bello MD   nystatin (MYCOSTATIN) 349050 UNIT/GM cream Apply topically 2 times daily.   Patient not taking: Reported on 6/3/2022 4/11/22   Mehreen Bello MD   pantoprazole sodium (PROTONIX) 40 MG PACK packet Take 40 mg by mouth every morning (before breakfast)  Patient not taking: Reported on 5/20/2022    Historical Provider, MD   diphenhydrAMINE (BENADRYL) 50 MG capsule Take 50 mg by mouth nightly as needed for Itching  Patient not taking: Reported on 6/3/2022    Historical Provider, MD   simethicone (MYLICON) 80 MG chewable tablet Take 125 mg by mouth every 6 hours as needed for Flatulence     Historical Provider, MD   dicyclomine (BENTYL) 10 MG capsule Take 10 mg by mouth as needed     Historical Provider, MD   hydrOXYzine (ATARAX) 25 MG tablet Take 25 mg by mouth at bedtime 2/23/22   Historical Provider, MD   apixaban (ELIQUIS) 5 MG TABS tablet Take 5 mg by mouth 2 times daily    Historical Provider, MD   vitamin D (ERGOCALCIFEROL) 1.25 MG (92494 UT) CAPS capsule Take 50,000 Units by mouth once a week    Historical Provider, MD   ondansetron (ZOFRAN) 4 MG tablet Take 1 tablet by mouth every 8 hours as needed for Nausea or Vomiting 9/2/21   Murray Chaves MD   fluticasone [facility]) 50 MCG/ACT nasal spray 2 sprays by Nasal route daily     Historical Provider, MD   celecoxib (CELEBREX) 200 MG capsule Take 1 capsule by mouth daily 6/19/18   Rere Barnes MD       Past Medical History:  Past Medical History:   Diagnosis Date    Abnormal CXR     Anticoagulated     Chronic back pain     Diastolic dysfunction     Dysarthria     Facial droop     Gait abnormality     Hypertension     Hypokalemia     Inflammatory arthritis     Intertrigo     Knee pain     Paroxysmal atrial fibrillation (HCC)     Spastic colon     Transaminitis     Vitamin D deficiency        Past Surgical History:  Past Surgical History:   Procedure Laterality Date    ABDOMINAL SURGERY      APPENDECTOMY      BACK SURGERY      neck; bone out of hip to fuse neck    COLONOSCOPY  04/12/2011    Dr Clovis Mcfarland ERCP N/A 06/01/2022    NOT PERFORMED    HAND SURGERY Left 2012    fx repair (3 total surgeries)    HAND SURGERY Left 12/17/2020    LEFT WRIST MASS EXCISION performed by Lita Cross MD at [address] Bilateral     HIP    [address] Right 09/02/2021    RIGHT TOTAL KNEE ARTHROPLASTY performed by Murray Chaves MD at [address] Left 12/17/2020    LEFT MIDDLE TRIGGER FINGER RELEASE performed by Lita Cross MD at [facility] ENDOSCOPY N/A 06/01/2022    Dr MELY Gallardo-w/EUS-Noted mild dilation to the CBD and MPD without focal obstructing lesion/stone, ERCP not needed       Social History:  Social History     Tobacco Use    Smoking status: Former Smoker     Packs/day: 0.50     Types: Cigarettes    Smokeless tobacco: Never Used   Vaping Use    Vaping Use: Never used   Substance Use Topics    Alcohol use: No    Drug use: No       Vital Signs:   Vitals:    06/01/22 1251   BP: (!) 159/83   Pulse: 72   Resp: 18   Temp:    SpO2: 95%        Physical Exam:  Cardiac:  [x]WNL  []Comments:  Pulmonary:  [x]WNL   []Comments:  Neuro/Mental Status:  [x]WNL  []Comments:  Abdominal:  [x]WNL    []Comments:  Other:   []WNL  []Comments:    Informed Consent:  The risks and benefits of the procedure have been discussed with either the patient or if they cannot consent, their representative. Assessment:  Patient examined and appropriate for planned sedation and procedure. Plan:  Proceed with planned sedation and procedure as above.          Sabas Granados MD

## 2022-07-06 RX ORDER — APIXABAN 5 MG/1
TABLET, FILM COATED ORAL
Qty: 60 TABLET | Refills: 5 | Status: SHIPPED | OUTPATIENT
Start: 2022-07-06

## 2022-07-06 RX ORDER — ATORVASTATIN CALCIUM 10 MG/1
TABLET, FILM COATED ORAL
Qty: 30 TABLET | Refills: 0 | Status: SHIPPED | OUTPATIENT
Start: 2022-07-06

## 2022-07-11 ENCOUNTER — TELEPHONE (OUTPATIENT)
Dept: NEUROLOGY | Age: 77
End: 2022-07-11

## 2022-07-11 DIAGNOSIS — R47.02 DYSPHASIA: Primary | ICD-10-CM

## 2022-07-11 NOTE — TELEPHONE ENCOUNTER
Won't take a verbal. Stated they have to have a written order from the doctor. I verified which study they wanted and faxed it to them.

## 2022-07-11 NOTE — TELEPHONE ENCOUNTER
Jose Elias Roy from [facility] [address] called and is asking for a swallow study for speech therapy for pt.        [phone]

## 2022-08-01 ENCOUNTER — TELEPHONE (OUTPATIENT)
Dept: CARDIOLOGY CLINIC | Age: 77
End: 2022-08-01

## 2022-08-03 ENCOUNTER — OFFICE VISIT (OUTPATIENT)
Dept: CARDIOLOGY CLINIC | Age: 77
End: 2022-08-03
Payer: MEDICARE

## 2022-08-03 VITALS
WEIGHT: 141 LBS | HEART RATE: 77 BPM | SYSTOLIC BLOOD PRESSURE: 134 MMHG | HEIGHT: 63 IN | BODY MASS INDEX: 24.98 KG/M2 | DIASTOLIC BLOOD PRESSURE: 78 MMHG

## 2022-08-03 DIAGNOSIS — I48.0 PAROXYSMAL ATRIAL FIBRILLATION (HCC): Primary | ICD-10-CM

## 2022-08-03 DIAGNOSIS — Z79.899 ON AMIODARONE THERAPY: ICD-10-CM

## 2022-08-03 DIAGNOSIS — I48.0 PAROXYSMAL ATRIAL FIBRILLATION (HCC): ICD-10-CM

## 2022-08-03 DIAGNOSIS — R53.1 WEAKNESS: ICD-10-CM

## 2022-08-03 DIAGNOSIS — E78.2 MIXED HYPERLIPIDEMIA: ICD-10-CM

## 2022-08-03 DIAGNOSIS — I10 HYPERTENSION, UNSPECIFIED TYPE: ICD-10-CM

## 2022-08-03 DIAGNOSIS — Z79.01 CHRONIC ANTICOAGULATION: ICD-10-CM

## 2022-08-03 LAB
ALBUMIN SERPL-MCNC: 4.3 G/DL (ref 3.5–5.2)
ALP BLD-CCNC: 96 U/L (ref 35–104)
ALT SERPL-CCNC: 23 U/L (ref 5–33)
ANION GAP SERPL CALCULATED.3IONS-SCNC: 14 MMOL/L (ref 7–19)
AST SERPL-CCNC: 27 U/L (ref 5–32)
BASOPHILS ABSOLUTE: 0 K/UL (ref 0–0.2)
BASOPHILS RELATIVE PERCENT: 0.4 % (ref 0–1)
BILIRUB SERPL-MCNC: 0.3 MG/DL (ref 0.2–1.2)
BUN BLDV-MCNC: 25 MG/DL (ref 8–23)
CALCIUM SERPL-MCNC: 9.6 MG/DL (ref 8.8–10.2)
CHLORIDE BLD-SCNC: 103 MMOL/L (ref 98–111)
CO2: 23 MMOL/L (ref 22–29)
CREAT SERPL-MCNC: 1.6 MG/DL (ref 0.5–0.9)
EOSINOPHILS ABSOLUTE: 0 K/UL (ref 0–0.6)
EOSINOPHILS RELATIVE PERCENT: 0.4 % (ref 0–5)
GFR AFRICAN AMERICAN: 38
GFR NON-AFRICAN AMERICAN: 31
GLUCOSE BLD-MCNC: 93 MG/DL (ref 74–109)
HCT VFR BLD CALC: 43.9 % (ref 37–47)
HEMOGLOBIN: 14 G/DL (ref 12–16)
IMMATURE GRANULOCYTES #: 0 K/UL
LYMPHOCYTES ABSOLUTE: 1.4 K/UL (ref 1.1–4.5)
LYMPHOCYTES RELATIVE PERCENT: 20.9 % (ref 20–40)
MCH RBC QN AUTO: 30.5 PG (ref 27–31)
MCHC RBC AUTO-ENTMCNC: 31.9 G/DL (ref 33–37)
MCV RBC AUTO: 95.6 FL (ref 81–99)
MONOCYTES ABSOLUTE: 0.6 K/UL (ref 0–0.9)
MONOCYTES RELATIVE PERCENT: 8.2 % (ref 0–10)
NEUTROPHILS ABSOLUTE: 4.7 K/UL (ref 1.5–7.5)
NEUTROPHILS RELATIVE PERCENT: 70 % (ref 50–65)
PDW BLD-RTO: 13.3 % (ref 11.5–14.5)
PLATELET # BLD: 300 K/UL (ref 130–400)
PMV BLD AUTO: 10.3 FL (ref 9.4–12.3)
POTASSIUM SERPL-SCNC: 4.8 MMOL/L (ref 3.5–5)
RBC # BLD: 4.59 M/UL (ref 4.2–5.4)
SODIUM BLD-SCNC: 140 MMOL/L (ref 136–145)
T4 FREE: 1.63 NG/DL (ref 0.93–1.7)
TOTAL PROTEIN: 6.8 G/DL (ref 6.6–8.7)
TSH SERPL DL<=0.05 MIU/L-ACNC: 1.03 UIU/ML (ref 0.27–4.2)
WBC # BLD: 6.7 K/UL (ref 4.8–10.8)

## 2022-08-03 PROCEDURE — 93000 ELECTROCARDIOGRAM COMPLETE: CPT | Performed by: NURSE PRACTITIONER

## 2022-08-03 PROCEDURE — 99214 OFFICE O/P EST MOD 30 MIN: CPT | Performed by: NURSE PRACTITIONER

## 2022-08-03 PROCEDURE — 1123F ACP DISCUSS/DSCN MKR DOCD: CPT | Performed by: NURSE PRACTITIONER

## 2022-08-03 RX ORDER — FUROSEMIDE 40 MG/1
40 TABLET ORAL PRN
COMMUNITY

## 2022-08-03 RX ORDER — LISINOPRIL 20 MG/1
20 TABLET ORAL DAILY
COMMUNITY

## 2022-08-03 RX ORDER — TRAMADOL HYDROCHLORIDE 50 MG/1
50 TABLET ORAL EVERY 6 HOURS PRN
COMMUNITY

## 2022-08-03 RX ORDER — AMIODARONE HYDROCHLORIDE 200 MG/1
100 TABLET ORAL DAILY
Qty: 1 TABLET | Refills: 0
Start: 2022-08-03 | End: 2022-10-24

## 2022-08-03 NOTE — PROGRESS NOTES
Facial droop R29.810    Dysarthria R47.1    History of pelvic fracture Z87.81    Hip pain M25.559    Hypokalemia E87.6    Vitamin D deficiency E55.9    Gait abnormality R26.9    Chronic pain disorder G89.4    Gastroesophageal reflux disease K21.9    Palliative care patient Q30.8    Diastolic dysfunction R20.62     Past Medical History:   Diagnosis Date    Abnormal CXR     Anticoagulated     Chronic back pain     Diastolic dysfunction     Dysarthria     Facial droop     Gait abnormality     Hypertension     Hypokalemia     Inflammatory arthritis     Intertrigo     Knee pain     Paroxysmal atrial fibrillation (HCC)     Spastic colon     Transaminitis     Vitamin D deficiency      Past Surgical History:   Procedure Laterality Date    ABDOMEN SURGERY      APPENDECTOMY      BACK SURGERY      neck; bone out of hip to fuse neck    COLONOSCOPY  04/12/2011    Dr Maxine Oseguera    ERCP N/A 06/01/2022    NOT PERFORMED    FINGER TRIGGER RELEASE Left 12/17/2020    LEFT MIDDLE TRIGGER FINGER RELEASE performed by Dayami Grimm MD at [facility]    HAND SURGERY Left 2012    fx repair (3 total surgeries)    HAND SURGERY Left 12/17/2020    LEFT WRIST MASS EXCISION performed by Dayami Grimm MD at [address] [place] [address] Bilateral     HIP    [address] [place] [address] Right 09/02/2021    RIGHT TOTAL KNEE ARTHROPLASTY performed by Eloise Macdonald MD at [address] [facility] ENDOSCOPY N/A 06/01/2022    Dr MELY Gallardo-w/EUS-Noted mild dilation to the CBD and MPD without focal obstructing lesion/stone, ERCP not needed     Family History   Problem Relation Age of Onset    Other Other         Son DVT    Cancer Mother         colon     Heart Attack Mother     Heart Attack Father      Social History     Tobacco Use    Smoking status: Former     Packs/day: 0.50     Types: Cigarettes    Smokeless tobacco: Never   Substance Use Topics    Alcohol use: No      Current Outpatient Medications   Medication Sig Dispense Refill    lisinopril (PRINIVIL;ZESTRIL) 20 MG tablet Take 20 mg by mouth in the morning. traMADol (ULTRAM) 50 MG tablet Take 50 mg by mouth every 6 hours as needed for Pain. furosemide (LASIX) 40 MG tablet Take 40 mg by mouth as needed      ELIQUIS 5 MG TABS tablet TAKE 1 TABLET BY MOUTH 2 TIMES DAILY 60 tablet 5    atorvastatin (LIPITOR) 10 MG tablet TAKE 1 TABLET BY MOUTH DAILY 30 tablet 0    b complex-C-folic acid (NEPHROCAPS) 1 MG capsule Take 1 capsule by mouth      amiodarone (CORDARONE) 200 MG tablet TAKE 1 TABLET BY MOUTH DAILY FOR AFIB 30 tablet 3    hydroCHLOROthiazide (HYDRODIURIL) 25 MG tablet TAKE 1 TABLET BY MOUTH DAILY FOR HYPERTENTION 30 tablet 3    metoprolol succinate (TOPROL XL) 25 MG extended release tablet TAKE 1 TABLET BY MOUTH DAILY *STOP CARDIZEM* 30 tablet 5    lactobacillus (CULTURELLE) CAPS capsule Take 1 capsule by mouth daily 30 capsule 0    pantoprazole sodium (PROTONIX) 40 MG PACK packet Take 40 mg by mouth every morning (before breakfast)      simethicone (MYLICON) 80 MG chewable tablet Take 125 mg by mouth every 6 hours as needed for Flatulence       vitamin D (ERGOCALCIFEROL) 1.25 MG (24745 UT) CAPS capsule Take 50,000 Units by mouth once a week      fluticasone (FLONASE) 50 MCG/ACT nasal spray 2 sprays by Nasal route daily       oxyCODONE-acetaminophen (ROXICET) 5-325 MG/5ML solution Take by mouth every 4 hours as needed for Pain.      dilTIAZem (CARDIZEM CD) 120 MG extended release capsule Take 1 capsule by mouth 2 times daily 60 capsule 0    celecoxib (CELEBREX) 200 MG capsule Take 1 capsule by mouth daily 30 capsule 2     No current facility-administered medications for this visit.        Allergies: Levofloxacin in d5w, Pyridium [phenazopyridine], Sulfamethoxazole-trimethoprim, Penicillins, Codeine, Hydrocodone-acetaminophen, Hydromorphone hcl, and Oxycodone    Review of Systems  Constitutional - no appetite change, or unexpected weight change. No fever, chills or diaphoresis. No significant change in activity level or new onset of fatigue. HEENT - no significant rhinorrhea or epistaxis. No tinnitus or significant hearing loss. Eyes - no sudden vision change or amaurosis. No corneal arcus, xantholasma, subconjunctival hemorrhage or discharge. Respiratory - no significant wheezing, stridor, apnea or cough. No dyspnea on exertion or shortness of air. Cardiovascular - no exertional chest pain to suggest myocardial ischemia. No orthopnea or PND. No sensation of sustained arrythmia. No occurrence of slow heart rate. No palpitations. No claudication. Gastrointestinal - no abdominal swelling or pain. No blood in stool. No severe constipation, diarrhea, nausea, or vomiting. Genitourinary - no dysuria, frequency, or urgency. No flank pain or hematuria. Musculoskeletal - no back pain or myalgia.  + leg weakness as well as generalized weakness. Reports gait very difficult due to leg weakness. Transported today via wheelchair. Extremities - no clubbing, cyanosis or extremity edema. Skin - no color change or rash. No pallor. No new surgical incision. Neurologic - no speech difficulty, facial asymmetry or lateralizing weakness. No seizures, presyncope or syncope. No significant dizziness. Hematologic - no easy bruising or excessive bleeding. Psychiatric - no severe anxiety or insomnia. No confusion. All other review of systems are negative. Objective  Vital Signs - /78   Pulse 77   Ht 5' 3\" (1.6 m)   Wt 141 lb (64 kg)   BMI 24.98 kg/m²   General - Carey Smyth is alert, cooperative, and pleasant. Well groomed. No acute distress. Body habitus - Body mass index is 24.98 kg/m². HEENT - Head is normocephalic. No circumoral cyanosis. Dentition is normal.  EYES -   Lids normal without ptosis. No discharge, edema or subconjunctival hemorrhage. Neck - Symmetrical without apparent mass or lymphadenopathy. Respiratory - Normal respiratory effort without use of accessory muscles. Ausculatation reveals vesicular breath sounds without crackles, wheezes, rub or rhonchi. Cardiovascular - No jugular venous distention. Auscultation reveals regular rate and rhythm. No audible clicks, gallop or rub. No murmur. No lower extremity varicosities. No carotid bruits. Abdominal -  No visible distention, mass or pulsations. Extremities - No clubbing or cyanosis. No statis dermatitis or ulcers. No edema. Musculoskeletal -   No Osler's nodes. No kyphosis or scoliosis. Transported via wheelchair. Skin -  Warm and dry; no rash or pallor. No new surgical wound. Neurological - No focal neurological deficits. Thought processes coherent. No apparent tremor. Oriented to person, place and time. Psychiatric -  Appropriate affect and mood. Data reviewed:  4/1/22 echo   Mitral valve leaflets are mildly thickened with preserved leaflet  mobility. Mitral annular calcification is present. Mild mitral regurgitation is present. Mild aortic stenosis. Bicuspid valve not excluded   Mean gradient 15 mm hg   Tricuspid valve is structurally normal.   Mild tricuspid regurgitation with estimated RVSP of 39 mm Hg. Moderately dilated left atrium. Normal left ventricular size with preserved LV function and an estimated   ejection fraction of approximately 55-60%. Mild concentric left ventricular hypertrophy. Grade II diastolic dysfunction   Mildly enlarged right ventricle cavity. IVC dilated. Electronically signed by Candis Villalba MD(Interpreting   physician) on 04/04/2022 08:25 AM    [employer] cardiac monitor  Dates of recording 9/3/2021 through 9/17/2021   Summary impressions:   1. Sinus rhythm minimal heart rate 51 average 80 maximal 106   2. Atrial fibrillation 29% burden longest duration 1 day 7 hours heart rate varying between  average 134   3. No episodes of ventricular tachycardia were recorded   4. No pauses greater than 3.0 seconds were recorded   5. No episodes of complete or [place] 2 atrioventricular block were recorded   6. No episodes of supraventricular tachycardia were recorded   7. 1 triggered event 0 diary events rhythm during those   recordings with sinus rhythm and PACs   8. Frequent PACs 11.5% otherwise rare ectopy    EKG today reviewed: NSR 77 bpm; no acute ischemic changes or ectopy. BP Readings from Last 3 Encounters:   08/03/22 134/78   06/03/22 118/81   06/01/22 (!) 159/83    Pulse Readings from Last 3 Encounters:   08/03/22 77   06/03/22 67   06/01/22 72        Wt Readings from Last 3 Encounters:   08/03/22 141 lb (64 kg)   06/03/22 135 lb (61.2 kg)   06/01/22 135 lb (61.2 kg)     Assessment/Plan:   Diagnosis Orders   1. Paroxysmal atrial fibrillation (HCC)  EKG 12 lead      2. Chronic anticoagulation        3. On amiodarone therapy        4. Hypertension, unspecified type        5. Mixed hyperlipidemia          [id number] Score: 6  Disclaimer: Risk Score calculation is dependent on accuracy of patient problem list and past encounter diagnosis. Stable CV status without overt heart failure, sensed arrhythmia or angina. Generalized weakness -particularly leg weakness. Check labs today. Reducing amiodarone dose. Place Lipitor on hold and reassess after 2 weeks. PAF - no recurrent atrial fibrillation symptoms. The patient feels amiodarone is causing generalized weakness. Reduce dose to amiodarone 200 mg 1/2 tab daily. Continue Eliquis. No bleeding issues. HTN - normotensive on current regimen. BP  today 134/78. Continue same. Hyperlipidemia - monitored and managed by PCP. Continue same. Patient is compliant with medication regimen. Previous cardiac history and records reviewed. Continue current medical management for cardiac related condition. Continue other current medications as directed.   Continue to follow up with primary care provider for non cardiac medical problems. If your primary care provider is outside of the [facility], please request that your labs be faxed to this office at [phone]. BP goal 130/80 or less. Call the office with any problems, questions or concerns at [phone]. Cardiology follow up as scheduled in [address] appointments. Educational included in patient instructions. Heart health.       Rohan Yao, APRN

## 2022-08-03 NOTE — PATIENT INSTRUCTIONS
New instructions for today:  Start taking amiodarone 200 mg 1/2 tab daily. Place Lipitor (atorvastatin) cholesterol pill on hold. Patient Instructions:  Continue current medications as prescribed. Always keep a current medication list. Bring your medications to every office visit. Continue to follow up with primary care provider for non cardiac medical problems. Call the office with any problems, questions or concerns at [phone]. If you have been asked to keep a blood pressure log, do so for 2 weeks. Call the office to report readings to the triage nurse at [phone]. Follow up with cardiologist as scheduled. The following educational material has been included in this after visit summary for your review: Life SentinelOne 7. Heart health. Life simple 7  1) Manage blood pressure - high blood pressure is a major risk factor for heart disease and stroke. Keeping blood pressure in health range reduces strain on your heart, arteries and kidneys. Blood pressure goal is less than 130/80. 2) Control cholesterol - contributes to plaque, which can clog arteries and lead to heart disease and stroke. When you control your cholesterol you are giving your arteries their best chance to remain clear. It is recommended that you get cholesterol lab work done once a year. 3) Reduce blood sugar - most of the food we eat is turning into glucose or blood sugar that our body uses for energy. Over time, high levels of blood sugar can damage your heart, kidneys, eyes and nerves. 4) Get active - living an active life is one of the most rewarding gifts you can give yourself and those you love. Simply put, daily physical activity increases your length and quality of life. Strive to exercise 15 minutes most days of the week. 5)  Eat better - A healthy diet is one of your best weapons for fighting cardiovascular disease.   When you eat a heart healthy diet, you improve your chances for feeling good and staying healthy for life. 6)  Lose weight - when you shed extra fat an unnecessary pounds, you reduce the burden on your hear, lungs, blood vessels and skeleton. You give yourself the gift of active living, you lower your blood pressure and help yourself feel better. 7) Stop smoking - cigarette smokers have a higher risk of developing cardiovascular disease. If  You smoke, quitting is the best thing you can do for your health. Check American Heart Association on line for more information on Life's Simple 7 and tips for healthy living. A Healthy Heart: Care Instructions  Your Care Instructions     Coronary artery disease, also called heart disease, occurs when a substance called plaque builds up in the vessels that supply oxygen-rich blood to your heart muscle. This can narrow the blood vessels and reduce blood flow. A heart attack happens when blood flow is completely blocked. A high-fat diet, smoking, and other factors increase the risk of heart disease. Your doctor has found that you have a chance of having heart disease. You can do lots of things to keep your heart healthy. It may not be easy, but you can change your diet, exercise more, and quit smoking. These steps really work to lower your chance of heart disease. Follow-up care is a key part of your treatment and safety. Be sure to make and go to all appointments, and call your doctor if you are having problems. It's also a good idea to know your test results and keep a list of the medicines you take. How can you care for yourself at home? Diet  Use less salt when you cook and eat. This helps lower your blood pressure. Taste food before salting. Add only a little salt when you think you need it. With time, your taste buds will adjust to less salt. Eat fewer snack items, fast foods, canned soups, and other high-salt, high-fat, processed foods. Read food labels and try to avoid saturated and trans fats.  They increase your risk of heart disease by raising cholesterol levels. Limit the amount of solid fat-butter, margarine, and shortening-you eat. Use olive, peanut, or canola oil when you cook. Bake, broil, and steam foods instead of frying them. Eat a variety of fruit and vegetables every day. Dark green, deep orange, red, or yellow fruits and vegetables are especially good for you. Examples include spinach, carrots, peaches, and berries. Foods high in fiber can reduce your cholesterol and provide important vitamins and minerals. High-fiber foods include whole-grain cereals and breads, oatmeal, beans, brown rice, citrus fruits, and apples. Eat lean proteins. Heart-healthy proteins include seafood, lean meats and poultry, eggs, beans, peas, nuts, seeds, and soy products. Limit drinks and foods with added sugar. These include candy, desserts, and soda pop. Lifestyle changes  If your doctor recommends it, get more exercise. Walking is a good choice. Bit by bit, increase the amount you walk every day. Try for at least 30 minutes on most days of the week. You also may want to swim, bike, or do other activities. Do not smoke. If you need help quitting, talk to your doctor about stop-smoking programs and medicines. These can increase your chances of quitting for good. Quitting smoking may be the most important step you can take to protect your heart. It is never too late to quit. Limit alcohol to 2 drinks a day for men and 1 drink a day for women. Too much alcohol can cause health problems. Manage other health problems such as diabetes, high blood pressure, and high cholesterol. If you think you may have a problem with alcohol or drug use, talk to your doctor. Medicines  Take your medicines exactly as prescribed. Call your doctor if you think you are having a problem with your medicine. If your doctor recommends aspirin, take the amount directed each day. Make sure you take aspirin and not another kind of pain reliever, such as acetaminophen (Tylenol).   When should you call for help? [id number] if you have symptoms of a heart attack. These may include:  Chest pain or pressure, or a strange feeling in the chest.  Sweating. Shortness of breath. Pain, pressure, or a strange feeling in the back, neck, jaw, or upper belly or in one or both shoulders or arms. Lightheadedness or sudden weakness. A fast or irregular heartbeat. After you call 911, the  may tell you to chew 1 adult-strength or 2 to 4 low-dose aspirin. Wait for an ambulance. Do not try to drive yourself. Watch closely for changes in your health, and be sure to contact your doctor if you have any problems. Where can you learn more? Go to https://vChatter.[employer]. org and sign in to your [employer] account. Enter K419 in the [employer] box to learn more about \"A Healthy Heart: Care Instructions. \"     If you do not have an account, please click on the \"Sign Up Now\" link. Current as of: December 16, 2019               Content Version: 12.5  © 7824-7054 Healthwise, Incorporated. Care instructions adapted under license by [facility] ([facility]). If you have questions about a medical condition or this instruction, always ask your healthcare professional. [address] any warranty or liability for your use of this information.

## 2022-08-04 DIAGNOSIS — Z79.899 ON AMIODARONE THERAPY: Primary | ICD-10-CM

## 2022-08-04 DIAGNOSIS — R53.1 WEAKNESS: ICD-10-CM

## 2022-08-04 DIAGNOSIS — I48.0 PAROXYSMAL ATRIAL FIBRILLATION (HCC): ICD-10-CM

## 2022-08-12 ENCOUNTER — OFFICE VISIT (OUTPATIENT)
Age: 77
End: 2022-08-12
Payer: MEDICARE

## 2022-08-12 VITALS
WEIGHT: 140.6 LBS | BODY MASS INDEX: 24.91 KG/M2 | TEMPERATURE: 97.1 F | HEART RATE: 64 BPM | DIASTOLIC BLOOD PRESSURE: 74 MMHG | HEIGHT: 63 IN | SYSTOLIC BLOOD PRESSURE: 140 MMHG | OXYGEN SATURATION: 97 %

## 2022-08-12 DIAGNOSIS — I48.0 PAROXYSMAL ATRIAL FIBRILLATION (HCC): ICD-10-CM

## 2022-08-12 DIAGNOSIS — R53.1 WEAKNESS: ICD-10-CM

## 2022-08-12 DIAGNOSIS — Z79.899 ON AMIODARONE THERAPY: ICD-10-CM

## 2022-08-12 DIAGNOSIS — J01.00 ACUTE NON-RECURRENT MAXILLARY SINUSITIS: Primary | ICD-10-CM

## 2022-08-12 LAB
ANION GAP SERPL CALCULATED.3IONS-SCNC: 12 MMOL/L (ref 7–19)
BUN BLDV-MCNC: 28 MG/DL (ref 8–23)
CALCIUM SERPL-MCNC: 9.6 MG/DL (ref 8.8–10.2)
CHLORIDE BLD-SCNC: 102 MMOL/L (ref 98–111)
CO2: 25 MMOL/L (ref 22–29)
CREAT SERPL-MCNC: 1.4 MG/DL (ref 0.5–0.9)
GFR AFRICAN AMERICAN: 44
GFR NON-AFRICAN AMERICAN: 36
GLUCOSE BLD-MCNC: 97 MG/DL (ref 74–109)
POTASSIUM SERPL-SCNC: 4.4 MMOL/L (ref 3.5–5)
SODIUM BLD-SCNC: 139 MMOL/L (ref 136–145)

## 2022-08-12 PROCEDURE — 1123F ACP DISCUSS/DSCN MKR DOCD: CPT | Performed by: PHYSICIAN ASSISTANT

## 2022-08-12 PROCEDURE — 99213 OFFICE O/P EST LOW 20 MIN: CPT | Performed by: PHYSICIAN ASSISTANT

## 2022-08-12 RX ORDER — FLUTICASONE PROPIONATE 50 MCG
2 SPRAY, SUSPENSION (ML) NASAL DAILY
Qty: 16 G | Refills: 0 | Status: SHIPPED | OUTPATIENT
Start: 2022-08-12

## 2022-08-12 RX ORDER — CEFDINIR 300 MG/1
300 CAPSULE ORAL 2 TIMES DAILY
Qty: 20 CAPSULE | Refills: 0 | Status: SHIPPED | OUTPATIENT
Start: 2022-08-12 | End: 2022-08-22

## 2022-08-12 ASSESSMENT — ENCOUNTER SYMPTOMS
SINUS COMPLAINT: 1
ALLERGIC/IMMUNOLOGIC NEGATIVE: 1
DIARRHEA: 0
EYE PAIN: 0
SORE THROAT: 0
NAUSEA: 0
SHORTNESS OF BREATH: 0
COUGH: 0
SINUS PRESSURE: 1
ABDOMINAL PAIN: 0
VOMITING: 0
SINUS PAIN: 0

## 2022-08-12 NOTE — PATIENT INSTRUCTIONS
Complete full course of antibiotics and use Flonase as directed. If you have any shortness of breath, fever, chills, chest pain or other concerning symptoms please present to the nearest ED. Please contact your cardiologist and PCP to inform them of your current illness and to obtain follow up visits. Patient and caregiver verbalize understanding and agrees with treatment plan.

## 2022-08-12 NOTE — PROGRESS NOTES
[id number]  [address] [facility] [address] [address]  Dept: [phone]  Dept Fax: [phone]  Loc: [phone]    Brandon Grant is a 65 y.o. female who presents today for her medical conditions/complaints as noted below. Brandon Grant is complaining of Head Congestion    HPI:   Sinus Problem  This is a new problem. The current episode started 1 to 4 weeks ago (almost 2 weeks). The problem has been gradually worsening since onset. Associated symptoms include chills, congestion, ear pain and sinus pressure. Pertinent negatives include no coughing, headaches, shortness of breath or sore throat. The treatment provided no relief.        Past Medical History:   Diagnosis Date    Abnormal CXR     Anticoagulated     Chronic back pain     Diastolic dysfunction     Dysarthria     Facial droop     Gait abnormality     Hypertension     Hypokalemia     Inflammatory arthritis     Intertrigo     Knee pain     Paroxysmal atrial fibrillation (HCC)     Spastic colon     Transaminitis     Vitamin D deficiency        Past Surgical History:   Procedure Laterality Date    ABDOMEN SURGERY      APPENDECTOMY      BACK SURGERY      neck; bone out of hip to fuse neck    COLONOSCOPY  04/12/2011    Dr Nadir Hare    ERCP N/A 06/01/2022    NOT PERFORMED    FINGER TRIGGER RELEASE Left 12/17/2020    LEFT MIDDLE TRIGGER FINGER RELEASE performed by Tricia Nash MD at [facility]    HAND SURGERY Left 2012    fx repair (3 total surgeries)    HAND SURGERY Left 12/17/2020    LEFT WRIST MASS EXCISION performed by Tricia Nash MD at [place] [address] Right 09/02/2021    RIGHT TOTAL KNEE ARTHROPLASTY performed by Lis Oneal MD at [address] [place] [address] ENDOSCOPY N/A 06/01/2022    Dr MELY Gallardo-w/EUS-Noted mild dilation to the CBD and MPD without focal obstructing lesion/stone, ERCP not needed       Family History   Problem Relation Age of Onset    Other Other         Son DVT    Cancer Mother         colon     Heart Attack Mother     Heart Attack Father        Social History     Tobacco Use    Smoking status: Former     Packs/day: 0.50     Types: Cigarettes    Smokeless tobacco: Never   Substance Use Topics    Alcohol use: No        Current Outpatient Medications   Medication Sig Dispense Refill    cefdinir (OMNICEF) 300 MG capsule Take 1 capsule by mouth in the morning and 1 capsule before bedtime. Do all this for 10 days. 20 capsule 0    fluticasone (FLONASE) 50 MCG/ACT nasal spray 2 sprays by Each Nostril route in the morning. 16 g 0    lisinopril (PRINIVIL;ZESTRIL) 20 MG tablet Take 20 mg by mouth in the morning. traMADol (ULTRAM) 50 MG tablet Take 50 mg by mouth every 6 hours as needed for Pain. furosemide (LASIX) 40 MG tablet Take 40 mg by mouth as needed      amiodarone (CORDARONE) 200 MG tablet Take 0.5 tablets by mouth in the morning.  1 tablet 0    ELIQUIS 5 MG TABS tablet TAKE 1 TABLET BY MOUTH 2 TIMES DAILY 60 tablet 5    atorvastatin (LIPITOR) 10 MG tablet TAKE 1 TABLET BY MOUTH DAILY 30 tablet 0    b complex-C-folic acid (NEPHROCAPS) 1 MG capsule Take 1 capsule by mouth      hydroCHLOROthiazide (HYDRODIURIL) 25 MG tablet TAKE 1 TABLET BY MOUTH DAILY FOR HYPERTENTION 30 tablet 3    metoprolol succinate (TOPROL XL) 25 MG extended release tablet TAKE 1 TABLET BY MOUTH DAILY *STOP CARDIZEM* 30 tablet 5    lactobacillus (CULTURELLE) CAPS capsule Take 1 capsule by mouth daily 30 capsule 0    pantoprazole sodium (PROTONIX) 40 MG PACK packet Take 40 mg by mouth every morning (before breakfast)      simethicone (MYLICON) 80 MG chewable tablet Take 125 mg by mouth every 6 hours as needed for Flatulence       vitamin D (ERGOCALCIFEROL) 1.25 MG (94654 UT) CAPS capsule Take 50,000 Units by mouth once a week       No current facility-administered medications for this visit. Allergies   Allergen Reactions    Levofloxacin In D5w Rash     Itchy rash    Pyridium [Phenazopyridine] Nausea And Vomiting    Sulfamethoxazole-Trimethoprim Nausea And Vomiting    Penicillins Hives    Codeine Nausea And Vomiting    Hydrocodone-Acetaminophen Nausea And Vomiting    Hydromorphone Hcl Nausea And Vomiting    Oxycodone Nausea And Vomiting       Health Maintenance   Topic Date Due    Annual Wellness Visit (AWV)  Never done    Lipids  Never done    Depression Screen  Never done    Hepatitis C screen  Never done    DTaP/Tdap/Td vaccine (1 - Tdap) Never done    Shingles vaccine (1 of 2) Never done    DEXA (modify frequency per FRAX score)  Never done    Breast cancer screen  Never done    COVID-19 Vaccine (3 - Booster for Moderna series) 09/05/2021    Flu vaccine (1) 09/01/2022    Pneumococcal 65+ years Vaccine  Completed    Hepatitis A vaccine  Aged Out    Hepatitis B vaccine  Aged Out    Hib vaccine  Aged Out    Meningococcal (ACWY) vaccine  Aged Out       Subjective:   Review of Systems   Constitutional:  Positive for chills. Negative for fatigue and fever. HENT:  Positive for congestion, ear pain and sinus pressure. Negative for postnasal drip, sinus pain and sore throat. Eyes:  Negative for pain and visual disturbance. Respiratory:  Negative for cough and shortness of breath. Cardiovascular:  Negative for chest pain. Gastrointestinal:  Negative for abdominal pain, diarrhea, nausea and vomiting. Endocrine: Negative for cold intolerance and heat intolerance. Genitourinary:  Negative for frequency, hematuria and urgency. Musculoskeletal:  Negative for myalgias. Skin:  Negative for rash. Allergic/Immunologic: Negative. Neurological:  Negative for syncope, weakness, light-headedness and headaches. Hematological: Negative. Psychiatric/Behavioral: Negative. Objective    Physical Exam  Vitals and nursing note reviewed.    Constitutional: General: She is not in acute distress. Appearance: Normal appearance. HENT:      Head: Normocephalic and atraumatic. Right Ear: Tympanic membrane, ear canal and external ear normal.      Left Ear: Tympanic membrane, ear canal and external ear normal.      Nose: Congestion present. Comments: Pt tender to maxillary sinus      Mouth/Throat:      Mouth: Mucous membranes are moist.      Pharynx: Oropharynx is clear. Eyes:      Extraocular Movements: Extraocular movements intact. Conjunctiva/sclera: Conjunctivae normal.   Cardiovascular:      Rate and Rhythm: Normal rate and regular rhythm. Pulses: Normal pulses. Heart sounds: Normal heart sounds. Pulmonary:      Effort: Pulmonary effort is normal. No respiratory distress. Breath sounds: Normal breath sounds. No stridor. No wheezing or rhonchi. Abdominal:      General: Abdomen is flat. Bowel sounds are normal. There is no distension. Palpations: Abdomen is soft. Tenderness: There is no abdominal tenderness. Musculoskeletal:         General: Normal range of motion. Cervical back: Normal range of motion and neck supple. No tenderness. Skin:     General: Skin is warm and dry. Findings: No erythema. Neurological:      General: No focal deficit present. Mental Status: She is alert and oriented to person, place, and time. Psychiatric:         Mood and Affect: Mood normal.         Behavior: Behavior normal.       BP (!) 140/74   Pulse 64   Temp 97.1 °F (36.2 °C)   Ht 5' 3\" (1.6 m)   Wt 140 lb 9.6 oz (63.8 kg)   SpO2 97%   BMI 24.91 kg/m²     Assessment         Diagnosis Orders   1. Acute non-recurrent maxillary sinusitis  cefdinir (OMNICEF) 300 MG capsule    fluticasone (FLONASE) 50 MCG/ACT nasal spray          Plan   Complete full course of antibiotics and use Flonase as directed.   If you have any shortness of breath, fever, chills, chest pain or other concerning symptoms please present to the nearest ED. Please contact your cardiologist and PCP to inform them of your current illness and to obtain follow up visits. Patient and caregiver verbalize understanding and agrees with treatment plan. No orders of the defined types were placed in this encounter. No results found for this visit on 22. Orders Placed This Encounter   Medications    cefdinir (OMNICEF) 300 MG capsule     Sig: Take 1 capsule by mouth in the morning and 1 capsule before bedtime. Do all this for 10 days. Dispense:  20 capsule     Refill:  0    fluticasone (FLONASE) 50 MCG/ACT nasal spray     Si sprays by Each Nostril route in the morning. Dispense:  16 g     Refill:  0      New Prescriptions    CEFDINIR (OMNICEF) 300 MG CAPSULE    Take 1 capsule by mouth in the morning and 1 capsule before bedtime. Do all this for 10 days. FLUTICASONE (FLONASE) 50 MCG/ACT NASAL SPRAY    2 sprays by Each Nostril route in the morning. Return if symptoms worsen or fail to improve. Discussed use, benefits, and side effects of any prescribed medications. All patient questions were answered. Patient voiced understanding of care plan. Patient was given educational materials - see patient instructions below. Patient Instructions   Complete full course of antibiotics and use Flonase as directed. If you have any shortness of breath, fever, chills, chest pain or other concerning symptoms please present to the nearest ED. Please contact your cardiologist and PCP to inform them of your current illness and to obtain follow up visits. Patient and caregiver verbalize understanding and agrees with treatment plan.       Electronically signed by Last Montoya PA-C on 2022 at 2:05 PM

## 2022-08-16 DIAGNOSIS — I51.89 DIASTOLIC DYSFUNCTION: Primary | ICD-10-CM

## 2022-08-16 NOTE — RESULT ENCOUNTER NOTE
Called the patient and gave her results of BMP, she understood all I said and BMP put in epic for a recheck in 3 weeks.

## 2022-08-18 ENCOUNTER — SCHEDULED TELEPHONE ENCOUNTER (OUTPATIENT)
Dept: CARDIOLOGY CLINIC | Age: 77
End: 2022-08-18
Payer: MEDICARE

## 2022-08-18 DIAGNOSIS — I35.0 MODERATE AORTIC STENOSIS: ICD-10-CM

## 2022-08-18 DIAGNOSIS — Z79.01 CHRONIC ANTICOAGULATION: ICD-10-CM

## 2022-08-18 DIAGNOSIS — E78.2 MIXED HYPERLIPIDEMIA: ICD-10-CM

## 2022-08-18 DIAGNOSIS — I51.89 DIASTOLIC DYSFUNCTION: ICD-10-CM

## 2022-08-18 DIAGNOSIS — R79.89 ELEVATED SERUM CREATININE: ICD-10-CM

## 2022-08-18 DIAGNOSIS — I48.0 PAROXYSMAL ATRIAL FIBRILLATION (HCC): Primary | ICD-10-CM

## 2022-08-18 PROCEDURE — 99441 PR PHYS/QHP TELEPHONE EVALUATION 5-10 MIN: CPT | Performed by: NURSE PRACTITIONER

## 2022-08-18 NOTE — PATIENT INSTRUCTIONS
New instructions for today:  Eliquis can increase your risk of bleeding. If you notice blood in urine or stool, bleeding gums, excessive bruising or cough productive of bloody sputum, notify the office. Information on this blood thinner has been included in your after visit summary. Patient Instructions:  Continue current medications as prescribed. Always keep a current medication list. Bring your medications to every office visit. Continue to follow up with primary care provider for non cardiac medical problems. Call the office with any problems, questions or concerns at [phone]. If you have been asked to keep a blood pressure log, do so for 2 weeks. Call the office to report readings to the triage nurse at [phone]. Follow up with cardiologist as scheduled. The following educational material has been included in this after visit summary for your review: Life simple 7. Heart health. Life simple 7  1) Manage blood pressure - high blood pressure is a major risk factor for heart disease and stroke. Keeping blood pressure in health range reduces strain on your heart, arteries and kidneys. Blood pressure goal is less than 130/80. 2) Control cholesterol - contributes to plaque, which can clog arteries and lead to heart disease and stroke. When you control your cholesterol you are giving your arteries their best chance to remain clear. It is recommended that you get cholesterol lab work done once a year. 3) Reduce blood sugar - most of the food we eat is turning into glucose or blood sugar that our body uses for energy. Over time, high levels of blood sugar can damage your heart, kidneys, eyes and nerves. 4) Get active - living an active life is one of the most rewarding gifts you can give yourself and those you love. Simply put, daily physical activity increases your length and quality of life. Strive to exercise 15 minutes most days of the week.   5)  Eat better - A healthy diet is one of your best weapons for fighting cardiovascular disease. When you eat a heart healthy diet, you improve your chances for feeling good and staying healthy for life. 6)  Lose weight - when you shed extra fat an unnecessary pounds, you reduce the burden on your hear, lungs, blood vessels and skeleton. You give yourself the gift of active living, you lower your blood pressure and help yourself feel better. 7) Stop smoking - cigarette smokers have a higher risk of developing cardiovascular disease. If  You smoke, quitting is the best thing you can do for your health. Check American Heart Association on line for more information on Life's Simple 7 and tips for healthy living. A Healthy Heart: Care Instructions  Your Care Instructions     Coronary artery disease, also called heart disease, occurs when a substance called plaque builds up in the vessels that supply oxygen-rich blood to your heart muscle. This can narrow the blood vessels and reduce blood flow. A heart attack happens when blood flow is completely blocked. A high-fat diet, smoking, and other factors increase the risk of heart disease. Your doctor has found that you have a chance of having heart disease. You can do lots of things to keep your heart healthy. It may not be easy, but you can change your diet, exercise more, and quit smoking. These steps really work to lower your chance of heart disease. Follow-up care is a key part of your treatment and safety. Be sure to make and go to all appointments, and call your doctor if you are having problems. It's also a good idea to know your test results and keep a list of the medicines you take. How can you care for yourself at home? Diet  Use less salt when you cook and eat. This helps lower your blood pressure. Taste food before salting. Add only a little salt when you think you need it. With time, your taste buds will adjust to less salt.   Eat fewer snack items, fast foods, canned soups, and other high-salt, high-fat, processed foods. Read food labels and try to avoid saturated and trans fats. They increase your risk of heart disease by raising cholesterol levels. Limit the amount of solid fat-butter, margarine, and shortening-you eat. Use olive, peanut, or canola oil when you cook. Bake, broil, and steam foods instead of frying them. Eat a variety of fruit and vegetables every day. Dark green, deep orange, red, or yellow fruits and vegetables are especially good for you. Examples include spinach, carrots, peaches, and berries. Foods high in fiber can reduce your cholesterol and provide important vitamins and minerals. High-fiber foods include whole-grain cereals and breads, oatmeal, beans, brown rice, citrus fruits, and apples. Eat lean proteins. Heart-healthy proteins include seafood, lean meats and poultry, eggs, beans, peas, nuts, seeds, and soy products. Limit drinks and foods with added sugar. These include candy, desserts, and soda pop. Lifestyle changes  If your doctor recommends it, get more exercise. Walking is a good choice. Bit by bit, increase the amount you walk every day. Try for at least 30 minutes on most days of the week. You also may want to swim, bike, or do other activities. Do not smoke. If you need help quitting, talk to your doctor about stop-smoking programs and medicines. These can increase your chances of quitting for good. Quitting smoking may be the most important step you can take to protect your heart. It is never too late to quit. Limit alcohol to 2 drinks a day for men and 1 drink a day for women. Too much alcohol can cause health problems. Manage other health problems such as diabetes, high blood pressure, and high cholesterol. If you think you may have a problem with alcohol or drug use, talk to your doctor. Medicines  Take your medicines exactly as prescribed. Call your doctor if you think you are having a problem with your medicine.   If your doctor recommends aspirin, take the amount directed each day. Make sure you take aspirin and not another kind of pain reliever, such as acetaminophen (Tylenol). When should you call for help? [id number] if you have symptoms of a heart attack. These may include:  Chest pain or pressure, or a strange feeling in the chest.  Sweating. Shortness of breath. Pain, pressure, or a strange feeling in the back, neck, jaw, or upper belly or in one or both shoulders or arms. Lightheadedness or sudden weakness. A fast or irregular heartbeat. After you call 911, the  may tell you to chew 1 adult-strength or 2 to 4 low-dose aspirin. Wait for an ambulance. Do not try to drive yourself. Watch closely for changes in your health, and be sure to contact your doctor if you have any problems. Where can you learn more? Go to https://[employer]. org and sign in to your "[employer]" account. Enter H755 in the [employer] box to learn more about \"A Healthy Heart: Care Instructions. \"     If you do not have an account, please click on the \"Sign Up Now\" link. Current as of: December 16, 2019               Content Version: 12.5  © 1906-2885 Healthwise, Incorporated. Care instructions adapted under license by [facility] ([facility]). If you have questions about a medical condition or this instruction, always ask your healthcare professional. [place] [address] any warranty or liability for your use of this information.

## 2022-08-18 NOTE — RESULT ENCOUNTER NOTE
Results were reviewed with patient on phone visit today. Creatinine down from 1.6 to 1.4  Recheck BMP in 2 weeks.   tlm

## 2022-08-18 NOTE — PROGRESS NOTES
Errol Archuleta is a 65 y.o. female evaluated via telephone on 8/18/2022. Consent:  She and/or health care decision maker is aware that that she may receive a bill for this telephone service, depending on her insurance coverage, and has provided verbal consent to proceed: Yes    Documentation:  I communicated with the patient and/or health care decision maker about PAF. Details of this discussion including any medical advice provided: heart health. I affirm this is a Patient Initiated Episode with an Established Patient who has not had a related appointment within my department in the past 7 days or scheduled within the next 24 hours. Total Time: minutes: 5-10 minutes    Note: not billable if this call serves to triage the patient into an appointment for the relevant concern    KATI Estevez      8/18/2022    Audio Patient Encouter(During CDTOP-04 public health emergency)  The telephone encourter was conducted with patient in their residence from [address] with AKTI Cha; assistance by Reuben Darden MA.    HPI:  Errol Archuleta     Diagnosis Orders   1. Paroxysmal atrial fibrillation (HCC)        2. Elevated serum creatinine  Basic Metabolic Panel      3. Diastolic dysfunction        4. Chronic anticoagulation        5. Mixed hyperlipidemia        6. Moderate aortic stenosis          The patient presents today for audio evaluation. The patient feels some better after reducing amiodarone dose. Balance issues seem to be better. The patient denies symptoms to suggest myocardial ischemia, heart failure or arrhythmia. The patient's PCP monitors cholesterol. The patient reports recently going to [facility] and is currently taking an antibiotic for sinusitis. SUBJECTIVE:  Meghan Harper denies exertional chest pain, orthopnea, paroxysmal nocturnal dyspnea, syncope, presyncope, sensed arrhythmia and edema.   The patient denies numbness or weakness to suggest cerebrovascular accident or transient ischemic attack. + fatigue and imbalance. + stable AVILEZ. Review of Systems    Prior to Visit Medications    Medication Sig Taking? Authorizing Provider   cefdinir (OMNICEF) 300 MG capsule Take 1 capsule by mouth in the morning and 1 capsule before bedtime. Do all this for 10 days. Yes Triston Lemon PA-C   fluticasone (FLONASE) 50 MCG/ACT nasal spray 2 sprays by Each Nostril route in the morning. Yes Triston Lemon PA-C   lisinopril (PRINIVIL;ZESTRIL) 20 MG tablet Take 20 mg by mouth in the morning. Yes Historical Provider, MD   traMADol (ULTRAM) 50 MG tablet Take 50 mg by mouth every 6 hours as needed for Pain. Yes Historical Provider, MD   furosemide (LASIX) 40 MG tablet Take 40 mg by mouth as needed Yes Historical Provider, MD   amiodarone (CORDARONE) 200 MG tablet Take 0.5 tablets by mouth in the morning.  Yes KATI Dior   ELIQUIS 5 MG TABS tablet TAKE 1 TABLET BY MOUTH 2 TIMES DAILY Yes KATI Constantino NP   b complex-C-folic acid (NEPHROCAPS) 1 MG capsule Take 1 capsule by mouth Yes Historical Provider, MD   metoprolol succinate (TOPROL XL) 25 MG extended release tablet TAKE 1 TABLET BY MOUTH DAILY *STOP CARDIZEM* Yes KATI Constantino NP   lactobacillus (CULTURELLE) CAPS capsule Take 1 capsule by mouth daily Yes Isaiah Barr MD   pantoprazole sodium (PROTONIX) 40 MG PACK packet Take 40 mg by mouth every morning (before breakfast) Yes Historical Provider, MD   simethicone (MYLICON) 80 MG chewable tablet Take 125 mg by mouth every 6 hours as needed for Flatulence  Yes Historical Provider, MD   vitamin D (ERGOCALCIFEROL) 1.25 MG (40885 UT) CAPS capsule Take 50,000 Units by mouth once a week Yes Historical Provider, MD   atorvastatin (LIPITOR) 10 MG tablet TAKE 1 TABLET BY MOUTH DAILY  Patient not taking: Reported on 8/18/2022  KATI Constantino NP       Social History     Tobacco Use    Smoking status: Former     Packs/day: 0.50     Types: Cigarettes    Smokeless tobacco: Never   Vaping Use    Vaping Use: Never used   Substance Use Topics    Alcohol use: No    Drug use: No        REVIEW OF SYSTEMS:  Constitutional - no appetite change, or unexpected weight change. No fever, chills or diaphoresis. + fatigue and imbalance. HEENT - no significant rhinorrhea or epistaxis. No tinnitus or significant hearing loss. Eyes - no sudden vision change or amaurosis. No corneal arcus, xantholasma, subconjunctival hemorrhage or discharge. Respiratory - no significant wheezing, stridor, apnea or cough. + stable AVILEZ. Cardiovascular - no exertional chest pain to suggest myocardial ischemia. No orthopnea or PND. No sensation of sustained arrythmia. No occurrence of slow heart rate. No palpitations. No claudication. Gastrointestinal - no abdominal swelling or pain. No blood in stool. No severe constipation, diarrhea, nausea, or vomiting. Genitourinary - no dysuria, frequency, or urgency. No flank pain or hematuria. Musculoskeletal - no back pain or myalgia. No problems with gait. Extremities - no clubbing, cyanosis or extremity edema. Skin - no color change or rash. No pallor. No new surgical incision. Neurologic - no speech difficulty, facial asymmetry or lateralizing weakness. No seizures, presyncope or syncope. No significant dizziness. Hematologic - no easy bruising or excessive bleeding. Psychiatric - no severe anxiety or insomnia. No confusion. All other review of systems are negative. DATA REVIEWED:  4/1/22 echo   Mitral valve leaflets are mildly thickened with preserved leaflet  mobility. Mitral annular calcification is present. Mild mitral regurgitation is present. Mild aortic stenosis. Bicuspid valve not excluded   Mean gradient 15 mm hg   Tricuspid valve is structurally normal.   Mild tricuspid regurgitation with estimated RVSP of 39 mm Hg. Moderately dilated left atrium.    Normal left ventricular size with preserved LV function and an estimated  ejection fraction of approximately 55-60%. Mild concentric left ventricular hypertrophy. Grade II diastolic dysfunction   Mildly enlarged right ventricle cavity. IVC dilated. Electronically signed by Alyssia Mullins MD(Interpreting   physician) on 04/04/2022 08:25 AM    Lab Results   Component Value Date     08/12/2022    K 4.4 08/12/2022     08/12/2022    CO2 25 08/12/2022    BUN 28 (H) 08/12/2022    CREATININE 1.4 (H) 08/12/2022    GLUCOSE 97 08/12/2022    CALCIUM 9.6 08/12/2022    PROT 6.8 08/03/2022    LABALBU 4.3 08/03/2022    BILITOT 0.3 08/03/2022    ALKPHOS 96 08/03/2022    AST 27 08/03/2022    ALT 23 08/03/2022    LABGLOM 36 (A) 08/12/2022    GFRAA 44 (L) 08/12/2022       Lab Results   Component Value Date    WBC 6.7 08/03/2022    HGB 14.0 08/03/2022    HCT 43.9 08/03/2022    MCV 95.6 08/03/2022     08/03/2022     ASSESSMENT/PlAN:   Diagnosis Orders   1. Paroxysmal atrial fibrillation (HCC)        2. Elevated serum creatinine  Basic Metabolic Panel      3. Diastolic dysfunction        4. Chronic anticoagulation        5. Mixed hyperlipidemia        6. Moderate aortic stenosis          [id number] Score: 6  Disclaimer: Risk Score calculation is dependent on accuracy of patient problem list and past encounter diagnosis. Stable CV status without overt heart failure, sensed arrhythmia or angina. Generalized weakness  and imbalance -   Seems to be better after reducing dose of amiodarone. Patient also is being treated for sinusitis. Place Lipitor on hold and reassess after 2 weeks. PAF - no recurrent atrial fibrillation symptoms on  Reduced dose of amiodarone to 200 mg 1/2 tab daily. Continue Eliquis. No bleeding issues. HTN - normotensive on current regimen. Continue current regimen. Hyperlipidemia - monitored and managed by PCP. Continue same. Elevated serum creatinine - check BMP.       Patient is compliant with medication regimen. Continue current medical management for cardiac condition. Continue current medications as prescribed. BP goal is 130/80 or less. If your primary care provider is outside of the [facility], please request your labs be faxed to this office at [phone]. Continue to follow up with primary care provider for non cardiac medical problems. Call the office with any problems, questions or concerns at [phone]. Follow up with cardiologist as scheduled in [address] The following educational material has been included in this after visit summary for patient review:  Heart health. Guerline Stafford is a 65 y.o. female being evaluated by a telephone encounter to address concerns as mentioned above. A caregiver was present when appropriate. Due to this being a TeleHealth encounter (During [facility]- public [facility] emergency). Pursuant to the emergency declaration under the [address] [place] [address], [address] [place] [address] authority and the [employer] and [employer] Act, this Virtual Visit was conducted with patient's (and/or legal guardian's) consent, to reduce the patient's risk of exposure to COVID-19 and provide necessary medical care. The patient (and/or legal guardian) has also been advised to contact this office for worsening conditions or problems, and seek emergency medical treatment and/or call 911 if deemed necessary. Services were provided through a telephone discussion virtually to substitute for in-person clinic visit. Patient and provider were located at their individual homes. --KATI Canseco on 8/18/2022 at 10:28 AM    An electronic signature was used to authenticate this note.

## 2022-09-02 DIAGNOSIS — I51.89 DIASTOLIC DYSFUNCTION: ICD-10-CM

## 2022-09-02 DIAGNOSIS — R79.89 ELEVATED SERUM CREATININE: ICD-10-CM

## 2022-09-02 LAB
ANION GAP SERPL CALCULATED.3IONS-SCNC: 13 MMOL/L (ref 7–19)
BUN BLDV-MCNC: 26 MG/DL (ref 8–23)
CALCIUM SERPL-MCNC: 9.3 MG/DL (ref 8.8–10.2)
CHLORIDE BLD-SCNC: 103 MMOL/L (ref 98–111)
CO2: 25 MMOL/L (ref 22–29)
CREAT SERPL-MCNC: 1.4 MG/DL (ref 0.5–0.9)
GFR AFRICAN AMERICAN: 44
GFR NON-AFRICAN AMERICAN: 36
GLUCOSE BLD-MCNC: 95 MG/DL (ref 74–109)
POTASSIUM SERPL-SCNC: 4.2 MMOL/L (ref 3.5–5)
SODIUM BLD-SCNC: 141 MMOL/L (ref 136–145)

## 2022-09-08 DIAGNOSIS — R79.89 ELEVATED SERUM CREATININE: Primary | ICD-10-CM

## 2022-10-24 RX ORDER — AMIODARONE HYDROCHLORIDE 200 MG/1
TABLET ORAL
Qty: 45 TABLET | Refills: 1 | Status: SHIPPED | OUTPATIENT
Start: 2022-10-24

## 2022-11-30 RX ORDER — METOPROLOL SUCCINATE 25 MG/1
TABLET, EXTENDED RELEASE ORAL
Qty: 30 TABLET | Refills: 5 | Status: SHIPPED | OUTPATIENT
Start: 2022-11-30

## 2023-01-30 ENCOUNTER — TELEPHONE (OUTPATIENT)
Dept: CARDIOLOGY CLINIC | Age: 78
End: 2023-01-30

## 2023-01-30 NOTE — TELEPHONE ENCOUNTER
Pt called stating her BP has been high and her PCP gave her clonidine and it seems to be helping. 144/65   Pt states she is weak and having nausea last week and took over the counter meds and thinks that made her BP go up. PT will monitor and call us back if it gets high again.

## 2023-02-06 RX ORDER — AMIODARONE HYDROCHLORIDE 200 MG/1
TABLET ORAL
Qty: 45 TABLET | Refills: 1 | Status: SHIPPED | OUTPATIENT
Start: 2023-02-06

## 2023-02-21 RX ORDER — APIXABAN 5 MG/1
TABLET, FILM COATED ORAL
Qty: 60 TABLET | Refills: 5 | Status: SHIPPED | OUTPATIENT
Start: 2023-02-21

## 2023-04-03 LAB
25(OH)D3 SERPL-MCNC: 69.4 NG/ML
ALBUMIN SERPL-MCNC: 4.1 G/DL (ref 3.5–5.2)
ALP SERPL-CCNC: 99 U/L (ref 35–104)
ALT SERPL-CCNC: 17 U/L (ref 5–33)
ANION GAP SERPL CALCULATED.3IONS-SCNC: 12 MMOL/L (ref 7–19)
AST SERPL-CCNC: 21 U/L (ref 5–32)
BASOPHILS # BLD: 0 K/UL (ref 0–0.2)
BASOPHILS NFR BLD: 0.1 % (ref 0–1)
BILIRUB SERPL-MCNC: 0.3 MG/DL (ref 0.2–1.2)
BILIRUB UR QL STRIP: NEGATIVE
BUN SERPL-MCNC: 20 MG/DL (ref 8–23)
CALCIUM SERPL-MCNC: 9.5 MG/DL (ref 8.8–10.2)
CHLORIDE SERPL-SCNC: 103 MMOL/L (ref 98–111)
CLARITY UR: CLEAR
CO2 SERPL-SCNC: 26 MMOL/L (ref 22–29)
COLOR UR: YELLOW
CREAT SERPL-MCNC: 1.1 MG/DL (ref 0.5–0.9)
CREAT UR-MCNC: 32.4 MG/DL (ref 4.2–622)
EOSINOPHIL # BLD: 0.1 K/UL (ref 0–0.6)
EOSINOPHIL NFR BLD: 0.9 % (ref 0–5)
ERYTHROCYTE [DISTWIDTH] IN BLOOD BY AUTOMATED COUNT: 13.9 % (ref 11.5–14.5)
GLUCOSE SERPL-MCNC: 91 MG/DL (ref 74–109)
GLUCOSE UR STRIP.AUTO-MCNC: NEGATIVE MG/DL
HCT VFR BLD AUTO: 44.9 % (ref 37–47)
HGB BLD-MCNC: 14.7 G/DL (ref 12–16)
HGB UR STRIP.AUTO-MCNC: NEGATIVE MG/L
IMM GRANULOCYTES # BLD: 0 K/UL
KETONES UR STRIP.AUTO-MCNC: NEGATIVE MG/DL
LEUKOCYTE ESTERASE UR QL STRIP.AUTO: NEGATIVE
LYMPHOCYTES # BLD: 1.5 K/UL (ref 1.1–4.5)
LYMPHOCYTES NFR BLD: 22.2 % (ref 20–40)
MAGNESIUM SERPL-MCNC: 2.1 MG/DL (ref 1.6–2.4)
MCH RBC QN AUTO: 31.1 PG (ref 27–31)
MCHC RBC AUTO-ENTMCNC: 32.7 G/DL (ref 33–37)
MCV RBC AUTO: 95.1 FL (ref 81–99)
MONOCYTES # BLD: 0.5 K/UL (ref 0–0.9)
MONOCYTES NFR BLD: 7.4 % (ref 0–10)
NEUTROPHILS # BLD: 4.7 K/UL (ref 1.5–7.5)
NEUTS SEG NFR BLD: 69.3 % (ref 50–65)
NITRITE UR QL STRIP.AUTO: NEGATIVE
PH UR STRIP.AUTO: 7 [PH] (ref 5–8)
PHOSPHATE SERPL-MCNC: 3 MG/DL (ref 2.5–4.5)
PLATELET # BLD AUTO: 278 K/UL (ref 130–400)
PMV BLD AUTO: 10.7 FL (ref 9.4–12.3)
POTASSIUM SERPL-SCNC: 4.4 MMOL/L (ref 3.5–5)
PROT SERPL-MCNC: 7 G/DL (ref 6.6–8.7)
PROT UR STRIP.AUTO-MCNC: NEGATIVE MG/DL
PROT UR-MCNC: 6 MG/DL (ref 15–45)
PTH-INTACT SERPL-MCNC: 39.3 PG/ML (ref 15–65)
RBC # BLD AUTO: 4.72 M/UL (ref 4.2–5.4)
SODIUM SERPL-SCNC: 141 MMOL/L (ref 136–145)
SP GR UR STRIP.AUTO: 1.01 (ref 1–1.03)
URATE SERPL-MCNC: 3.8 MG/DL (ref 2.4–5.7)
UROBILINOGEN UR STRIP.AUTO-MCNC: 0.2 E.U./DL
WBC # BLD AUTO: 6.8 K/UL (ref 4.8–10.8)

## 2023-05-01 LAB
25(OH)D3 SERPL-MCNC: 64.4 NG/ML
ALBUMIN SERPL-MCNC: 4.1 G/DL (ref 3.5–5.2)
ALP SERPL-CCNC: 87 U/L (ref 35–104)
ALT SERPL-CCNC: 16 U/L (ref 5–33)
ANION GAP SERPL CALCULATED.3IONS-SCNC: 13 MMOL/L (ref 7–19)
AST SERPL-CCNC: 21 U/L (ref 5–32)
BASOPHILS # BLD: 0 K/UL (ref 0–0.2)
BASOPHILS NFR BLD: 0.4 % (ref 0–1)
BILIRUB SERPL-MCNC: 0.3 MG/DL (ref 0.2–1.2)
BILIRUB UR QL STRIP: NEGATIVE
BUN SERPL-MCNC: 22 MG/DL (ref 8–23)
CALCIUM SERPL-MCNC: 9.6 MG/DL (ref 8.8–10.2)
CHLORIDE SERPL-SCNC: 103 MMOL/L (ref 98–111)
CLARITY UR: CLEAR
CO2 SERPL-SCNC: 25 MMOL/L (ref 22–29)
COLOR UR: YELLOW
CREAT SERPL-MCNC: 1.2 MG/DL (ref 0.5–0.9)
CREAT UR-MCNC: 38.5 MG/DL (ref 4.2–622)
EOSINOPHIL # BLD: 0.1 K/UL (ref 0–0.6)
EOSINOPHIL NFR BLD: 1.3 % (ref 0–5)
ERYTHROCYTE [DISTWIDTH] IN BLOOD BY AUTOMATED COUNT: 13.8 % (ref 11.5–14.5)
GLUCOSE SERPL-MCNC: 109 MG/DL (ref 74–109)
GLUCOSE UR STRIP.AUTO-MCNC: NEGATIVE MG/DL
HCT VFR BLD AUTO: 44.7 % (ref 37–47)
HGB BLD-MCNC: 14.5 G/DL (ref 12–16)
HGB UR STRIP.AUTO-MCNC: NEGATIVE MG/L
IMM GRANULOCYTES # BLD: 0 K/UL
KETONES UR STRIP.AUTO-MCNC: NEGATIVE MG/DL
LEUKOCYTE ESTERASE UR QL STRIP.AUTO: NEGATIVE
LYMPHOCYTES # BLD: 1.3 K/UL (ref 1.1–4.5)
LYMPHOCYTES NFR BLD: 19.5 % (ref 20–40)
MAGNESIUM SERPL-MCNC: 2.2 MG/DL (ref 1.6–2.4)
MCH RBC QN AUTO: 30.8 PG (ref 27–31)
MCHC RBC AUTO-ENTMCNC: 32.4 G/DL (ref 33–37)
MCV RBC AUTO: 94.9 FL (ref 81–99)
MONOCYTES # BLD: 0.5 K/UL (ref 0–0.9)
MONOCYTES NFR BLD: 7.5 % (ref 0–10)
NEUTROPHILS # BLD: 4.8 K/UL (ref 1.5–7.5)
NEUTS SEG NFR BLD: 70.9 % (ref 50–65)
NITRITE UR QL STRIP.AUTO: NEGATIVE
PH UR STRIP.AUTO: 6 [PH] (ref 5–8)
PLATELET # BLD AUTO: 300 K/UL (ref 130–400)
PMV BLD AUTO: 10.3 FL (ref 9.4–12.3)
POTASSIUM SERPL-SCNC: 4.2 MMOL/L (ref 3.5–5)
PROT SERPL-MCNC: 7.1 G/DL (ref 6.6–8.7)
PROT UR STRIP.AUTO-MCNC: NEGATIVE MG/DL
PROT UR-MCNC: 6 MG/DL (ref 15–45)
PTH-INTACT SERPL-MCNC: 37.9 PG/ML (ref 15–65)
RBC # BLD AUTO: 4.71 M/UL (ref 4.2–5.4)
SODIUM SERPL-SCNC: 141 MMOL/L (ref 136–145)
SP GR UR STRIP.AUTO: 1.01 (ref 1–1.03)
URATE SERPL-MCNC: 4.6 MG/DL (ref 2.4–5.7)
UROBILINOGEN UR STRIP.AUTO-MCNC: 0.2 E.U./DL
WBC # BLD AUTO: 6.8 K/UL (ref 4.8–10.8)

## 2023-05-10 RX ORDER — AMIODARONE HYDROCHLORIDE 200 MG/1
TABLET ORAL
Qty: 45 TABLET | Refills: 3 | Status: SHIPPED | OUTPATIENT
Start: 2023-05-10

## 2023-05-22 RX ORDER — PANTOPRAZOLE SODIUM 40 MG/1
40 TABLET, DELAYED RELEASE ORAL
Qty: 30 TABLET | Refills: 9 | OUTPATIENT
Start: 2023-05-22

## 2023-05-30 RX ORDER — PANTOPRAZOLE SODIUM 40 MG/1
40 GRANULE, DELAYED RELEASE ORAL
Qty: 90 EACH | Refills: 3 | Status: SHIPPED | OUTPATIENT
Start: 2023-05-30

## 2023-06-20 RX ORDER — METOPROLOL SUCCINATE 25 MG/1
TABLET, EXTENDED RELEASE ORAL
Qty: 30 TABLET | Refills: 1 | Status: SHIPPED | OUTPATIENT
Start: 2023-06-20

## 2023-07-18 ENCOUNTER — TELEPHONE (OUTPATIENT)
Dept: CARDIOLOGY CLINIC | Age: 78
End: 2023-07-18

## 2023-07-25 ENCOUNTER — OFFICE VISIT (OUTPATIENT)
Dept: CARDIOLOGY CLINIC | Age: 78
End: 2023-07-25
Payer: MEDICARE

## 2023-07-25 VITALS
SYSTOLIC BLOOD PRESSURE: 162 MMHG | DIASTOLIC BLOOD PRESSURE: 88 MMHG | BODY MASS INDEX: 24.27 KG/M2 | OXYGEN SATURATION: 95 % | HEIGHT: 63 IN | HEART RATE: 60 BPM | WEIGHT: 137 LBS

## 2023-07-25 DIAGNOSIS — I51.89 DIASTOLIC DYSFUNCTION: ICD-10-CM

## 2023-07-25 DIAGNOSIS — Z79.899 ON AMIODARONE THERAPY: ICD-10-CM

## 2023-07-25 DIAGNOSIS — I10 HYPERTENSION, UNSPECIFIED TYPE: ICD-10-CM

## 2023-07-25 DIAGNOSIS — I48.0 PAROXYSMAL ATRIAL FIBRILLATION (HCC): Primary | ICD-10-CM

## 2023-07-25 DIAGNOSIS — I65.29 ASYMPTOMATIC CAROTID ARTERY STENOSIS, UNSPECIFIED LATERALITY: ICD-10-CM

## 2023-07-25 DIAGNOSIS — Z79.01 CHRONIC ANTICOAGULATION: ICD-10-CM

## 2023-07-25 DIAGNOSIS — I35.0 MODERATE AORTIC STENOSIS: ICD-10-CM

## 2023-07-25 DIAGNOSIS — Z86.73 HISTORY OF CVA (CEREBROVASCULAR ACCIDENT): ICD-10-CM

## 2023-07-25 DIAGNOSIS — E78.2 MIXED HYPERLIPIDEMIA: ICD-10-CM

## 2023-07-25 PROCEDURE — 3074F SYST BP LT 130 MM HG: CPT | Performed by: NURSE PRACTITIONER

## 2023-07-25 PROCEDURE — 1123F ACP DISCUSS/DSCN MKR DOCD: CPT | Performed by: NURSE PRACTITIONER

## 2023-07-25 PROCEDURE — 99214 OFFICE O/P EST MOD 30 MIN: CPT | Performed by: NURSE PRACTITIONER

## 2023-07-25 PROCEDURE — 3078F DIAST BP <80 MM HG: CPT | Performed by: NURSE PRACTITIONER

## 2023-07-25 RX ORDER — SPIRONOLACTONE 25 MG/1
12.5 TABLET ORAL NIGHTLY
COMMUNITY

## 2023-07-25 RX ORDER — LISINOPRIL 20 MG/1
TABLET ORAL
Qty: 135 TABLET | Refills: 3 | Status: SHIPPED | OUTPATIENT
Start: 2023-07-25

## 2023-07-25 RX ORDER — EZETIMIBE 10 MG/1
10 TABLET ORAL DAILY
COMMUNITY

## 2023-07-25 NOTE — PATIENT INSTRUCTIONS
New instructions for today:  Get amiodarone labs done - orders in the computer. Start taking Lisinopril 20 mg 1 tab AM and 1/2 tab PM.    Start back taking Eliquis twice daily. Monitor your blood pressure twice daily and keep a log. Your blood pressure goal is 130/80 or less. We will discuss in 2 weeks by either scheduled telephone encounter or patient call back. Office phone number [phone]. Carotid Ultrasound   -  No prep. Takes approximately 30 minutes. [place] at the [facility] located on the first floor of [address] [place] [address] through hospital main entrance and turn immediately to your left. Date/Time:     Carotid ultrasound is a safe, painless procedure that uses sound waves to examine the structure and function of the carotid arteries in the neck. You have two carotid arteries, one on each side of the neck, which deliver blood from the heart to the brain. Carotid ultrasound can reveal whether an artery has any blockage and how well blood flows through the artery. Carotid ultrasound is usually used to screen for blockages that indicate an increased risk of stroke. Results from a carotid ultrasound can help your doctor determine what kind of treatment you may need to lower your risk. If you need to change your appointment, please call outpatient scheduling at [phone]. [place] at the Outpatient Registration located on the first floor of the [facility]. Patient's contact number:  [phone] (home)     Pulmonary Function Test       Your doctor can get a great deal of information about your lungs and lung function by doing a series of tests called pulmonary function testing.  These tests can tell your doctor what quantity of air you breathe with each breath, how efficiently you move air in and out of your lungs, and how well your lungs are delivering oxygen to your bloodstream.    Restrain from using an

## 2023-07-25 NOTE — PROGRESS NOTES
08/12/22 (!) 140/74   08/03/22 134/78    Pulse Readings from Last 3 Encounters:   07/25/23 60   08/12/22 64   08/03/22 77        Wt Readings from Last 3 Encounters:   07/25/23 137 lb (62.1 kg)   08/12/22 140 lb 9.6 oz (63.8 kg)   08/03/22 141 lb (64 kg)     Assessment/Plan:   Diagnosis Orders   1. Paroxysmal atrial fibrillation (HCC)        2. On amiodarone therapy  Comprehensive Metabolic Panel    TSH    T4, Free    T3, Free    XR CHEST STANDARD (2 VW)    Full PFT Study Without Bronchdilator      3. Diastolic dysfunction        4. Chronic anticoagulation        5. Moderate aortic stenosis        6. Hypertension, unspecified type        7. History of CVA (cerebrovascular accident)        8. Mixed hyperlipidemia          [id number] Score: 6  Disclaimer: Risk Score calculation is dependent on accuracy of patient problem list and past encounter diagnosis. PAF - continues on amiodarone. Schedule amiodarone work up. No bleeding issue on Eliquis. Patient has been taking Eliquis once daily to save money. Advised this would not be adequate dose to prevent thrombotic event. Patient will resume twice daily dosing. DD - no symptoms of volume overload. Moderate AS - follow with 2D echo. HTN - BP uncontrolled at 162/88 today. Lisinopril dose increased to 20 mg (1) am and (12/) pm.  BP goal less than 130/80. Home BP log. HTN - BP elevated today at 162/88. Patient is compliant with medication regimen. Previous cardiac history and records reviewed. Continue current medical management for cardiac related condition. Continue other current medications as directed. Continue to follow up with primary care provider for non cardiac medical problems. If your primary care provider is outside of the [facility], please request that your labs be faxed to this office at [phone]. BP goal 130/80 or less. Call the office with any problems, questions or concerns at [phone].

## 2023-08-08 ENCOUNTER — SCHEDULED TELEPHONE ENCOUNTER (OUTPATIENT)
Dept: CARDIOLOGY CLINIC | Age: 78
End: 2023-08-08

## 2023-08-08 DIAGNOSIS — I10 HYPERTENSION, UNSPECIFIED TYPE: Primary | ICD-10-CM

## 2023-08-08 DIAGNOSIS — I48.0 PAROXYSMAL ATRIAL FIBRILLATION (HCC): ICD-10-CM

## 2023-08-08 DIAGNOSIS — Z79.01 CHRONIC ANTICOAGULATION: ICD-10-CM

## 2023-08-08 DIAGNOSIS — I51.89 DIASTOLIC DYSFUNCTION: ICD-10-CM

## 2023-08-08 DIAGNOSIS — Z79.899 ON AMIODARONE THERAPY: ICD-10-CM

## 2023-08-08 DIAGNOSIS — I35.0 MODERATE AORTIC STENOSIS: ICD-10-CM

## 2023-08-08 LAB
ALBUMIN SERPL-MCNC: 4.3 G/DL (ref 3.5–5.2)
ALP SERPL-CCNC: 81 U/L (ref 35–104)
ALT SERPL-CCNC: 17 U/L (ref 5–33)
ANION GAP SERPL CALCULATED.3IONS-SCNC: 14 MMOL/L (ref 7–19)
AST SERPL-CCNC: 20 U/L (ref 5–32)
BILIRUB SERPL-MCNC: 0.4 MG/DL (ref 0.2–1.2)
BUN SERPL-MCNC: 24 MG/DL (ref 8–23)
CALCIUM SERPL-MCNC: 9.7 MG/DL (ref 8.8–10.2)
CHLORIDE SERPL-SCNC: 101 MMOL/L (ref 98–111)
CO2 SERPL-SCNC: 24 MMOL/L (ref 22–29)
CREAT SERPL-MCNC: 1.4 MG/DL (ref 0.5–0.9)
GLUCOSE SERPL-MCNC: 92 MG/DL (ref 74–109)
POTASSIUM SERPL-SCNC: 4.3 MMOL/L (ref 3.5–5)
PROT SERPL-MCNC: 7.2 G/DL (ref 6.6–8.7)
SODIUM SERPL-SCNC: 139 MMOL/L (ref 136–145)
T4 FREE SERPL-MCNC: 1.8 NG/DL (ref 0.93–1.7)
TSH SERPL DL<=0.005 MIU/L-ACNC: 1.2 UIU/ML (ref 0.27–4.2)

## 2023-08-08 RX ORDER — AMLODIPINE BESYLATE 5 MG/1
5 TABLET ORAL DAILY
Qty: 90 TABLET | Refills: 1
Start: 2023-08-08

## 2023-08-08 NOTE — PROGRESS NOTES
tinnitus or significant hearing loss. Eyes - no sudden vision change or amaurosis. No corneal arcus, xantholasma, subconjunctival hemorrhage or discharge. Respiratory - no significant wheezing, stridor, apnea or cough. No dyspnea on exertion or shortness of air. Cardiovascular - no exertional chest pain to suggest myocardial ischemia. No orthopnea or PND. No sensation of sustained arrythmia. No occurrence of slow heart rate. No palpitations. No claudication. Gastrointestinal - no abdominal swelling or pain. No blood in stool. No severe constipation, diarrhea, nausea, or vomiting. Genitourinary - no dysuria, frequency, or urgency. No flank pain or hematuria. Musculoskeletal - no back pain or myalgia. No problems with gait. Extremities - no clubbing, cyanosis or extremity edema. Skin - no color change or rash. No pallor. No new surgical incision. Neurologic - no speech difficulty, facial asymmetry or lateralizing weakness. No seizures, presyncope or syncope. No significant dizziness. Hematologic - no easy bruising or excessive bleeding. Psychiatric - no severe anxiety or insomnia. No confusion. All other review of systems are negative. DATA REVIEWED:  4/4/22 echo   Mitral valve leaflets are mildly thickened with preserved leaflet mobility. Mitral annular calcification is present. Mild mitral regurgitation is present. Mild aortic stenosis. Bicuspid valve not excluded   Mean gradient 15 mm hg   Tricuspid valve is structurally normal.   Mild tricuspid regurgitation with estimated RVSP of 39 mm Hg. Moderately dilated left atrium. Normal left ventricular size with preserved LV function and an estimated ejection fraction of approximately 55-60%. Mild concentric left ventricular hypertrophy. Grade II diastolic dysfunction   Mildly enlarged right ventricle cavity. IVC dilated.    Electronically signed by Erlinda Owens MD(Interpreting physician) on 04/04/2022 08:25 AM    Lab

## 2023-08-08 NOTE — PATIENT INSTRUCTIONS
foods, canned soups, and other high-salt, high-fat, processed foods. Read food labels and try to avoid saturated and trans fats. They increase your risk of heart disease by raising cholesterol levels. Limit the amount of solid fat-butter, margarine, and shortening-you eat. Use olive, peanut, or canola oil when you cook. Bake, broil, and steam foods instead of frying them. Eat a variety of fruit and vegetables every day. Dark green, deep orange, red, or yellow fruits and vegetables are especially good for you. Examples include spinach, carrots, peaches, and berries. Foods high in fiber can reduce your cholesterol and provide important vitamins and minerals. High-fiber foods include whole-grain cereals and breads, oatmeal, beans, brown rice, citrus fruits, and apples. Eat lean proteins. Heart-healthy proteins include seafood, lean meats and poultry, eggs, beans, peas, nuts, seeds, and soy products. Limit drinks and foods with added sugar. These include candy, desserts, and soda pop. Lifestyle changes  If your doctor recommends it, get more exercise. Walking is a good choice. Bit by bit, increase the amount you walk every day. Try for at least 30 minutes on most days of the week. You also may want to swim, bike, or do other activities. Do not smoke. If you need help quitting, talk to your doctor about stop-smoking programs and medicines. These can increase your chances of quitting for good. Quitting smoking may be the most important step you can take to protect your heart. It is never too late to quit. Limit alcohol to 2 drinks a day for men and 1 drink a day for women. Too much alcohol can cause health problems. Manage other health problems such as diabetes, high blood pressure, and high cholesterol. If you think you may have a problem with alcohol or drug use, talk to your doctor. Medicines  Take your medicines exactly as prescribed.  Call your doctor if you think you are having a problem with your

## 2023-08-09 ENCOUNTER — TELEPHONE (OUTPATIENT)
Dept: CARDIOLOGY CLINIC | Age: 78
End: 2023-08-09

## 2023-08-09 NOTE — TELEPHONE ENCOUNTER
----- Message from KATI Barcenas sent at 8/8/2023  1:12 PM CDT -----  Kavin Espinoza,  These are labs for amiodarone workup. Kidney filtering is stable. Sodium and potassium normal.   Liver enzymes davon. Thyroid hormone minimally out of range at 1.8 (normal 1.7)  TSH thyroid test is normal.  Recheck labs in 6 months. Chanel, please fax to patient's PCP.   Thanks, [employer]

## 2023-08-18 ENCOUNTER — TELEPHONE (OUTPATIENT)
Dept: CARDIOLOGY CLINIC | Age: 78
End: 2023-08-18

## 2023-08-18 NOTE — TELEPHONE ENCOUNTER
Patient called to inform the office that she did not get the PFT or the Carotid bilateral scheduled for 8/14 due to her not feeling well. She states she will call and reschedule them. Thank you!

## 2023-08-22 ENCOUNTER — SCHEDULED TELEPHONE ENCOUNTER (OUTPATIENT)
Dept: CARDIOLOGY CLINIC | Age: 78
End: 2023-08-22
Payer: MEDICARE

## 2023-08-22 DIAGNOSIS — I48.0 PAROXYSMAL ATRIAL FIBRILLATION (HCC): ICD-10-CM

## 2023-08-22 DIAGNOSIS — E78.2 MIXED HYPERLIPIDEMIA: ICD-10-CM

## 2023-08-22 DIAGNOSIS — I10 HYPERTENSION, UNSPECIFIED TYPE: Primary | ICD-10-CM

## 2023-08-22 DIAGNOSIS — I51.89 DIASTOLIC DYSFUNCTION: ICD-10-CM

## 2023-08-22 PROCEDURE — 99441 PR PHYS/QHP TELEPHONE EVALUATION 5-10 MIN: CPT | Performed by: NURSE PRACTITIONER

## 2023-08-22 RX ORDER — AMLODIPINE BESYLATE 5 MG/1
5 TABLET ORAL DAILY
Qty: 90 TABLET | Refills: 3 | Status: SHIPPED | OUTPATIENT
Start: 2023-08-22

## 2023-08-22 RX ORDER — METOPROLOL SUCCINATE 25 MG/1
TABLET, EXTENDED RELEASE ORAL
Qty: 30 TABLET | Refills: 5 | Status: SHIPPED | OUTPATIENT
Start: 2023-08-22

## 2023-08-22 NOTE — PROGRESS NOTES
Carlo Henning is a 77 y.o. female evaluated via telephone on 8/22/2023. Consent:  She and/or health care decision maker is aware that that she may receive a bill for this telephone service, depending on her insurance coverage, and has provided verbal consent to proceed: Yes    Documentation:  I communicated with the patient and/or health care decision maker about HTN. Details of this discussion including any medical advice provided: heart health. I affirm this is a Patient Initiated Episode with an Established Patient who has not had a related appointment within my department in the past 7 days or scheduled within the next 24 hours. Total Time: minutes: 5-10 minutes    Note: not billable if this call serves to triage the patient into an appointment for the relevant concern    KATI Brown      8/22/2023    Audio Patient Encouter[facility])  The telephone encourter was conducted with patient in their residence from [address] [place] [address] with KATI Gramajo; assistance by Lakeisha Campos MA.    HPI:  Carlo Henning   Diagnosis Orders   1. Hypertension, unspecified type        2. Paroxysmal atrial fibrillation (HCC)        3. Diastolic dysfunction        4. Mixed hyperlipidemia          The patient presents today for audio evaluation regarding BP management. BP this AM is 180/86 pulse rate 55. She reports BP fluctuates up and down. BP this AM is 180/86 and last night 132/85. Amlodipine was added to regimen at last visit but patient reports was not called in per her pharmacist.  The patient denies symptoms to suggest myocardial ischemia, heart failure or arrhythmia. The patient's PCP monitors cholesterol. SUBJECTIVE:  Brii Andino denies exertional chest pain, shortness of breath, orthopnea, paroxysmal nocturnal dyspnea, syncope, presyncope, sensed arrhythmia, edema and fatigue.   The patient denies numbness or weakness to suggest cerebrovascular

## 2023-08-22 NOTE — PATIENT INSTRUCTIONS
New instructions for today:      Patient Instructions:  Continue current medications as prescribed. Always keep a current medication list. Bring your medications to every office visit. Continue to follow up with primary care provider for non cardiac medical problems. Call the office with any problems, questions or concerns at [phone]. If you have been asked to keep a blood pressure log, do so for 2 weeks. Call the office to report readings to the triage nurse at [phone]. Follow up with cardiologist as scheduled. The following educational material has been included in this after visit summary for your review: Life simple 7. Heart health. Life simple 7  1) Manage blood pressure - high blood pressure is a major risk factor for heart disease and stroke. Keeping blood pressure in health range reduces strain on your heart, arteries and kidneys. Blood pressure goal is less than 130/80. 2) Control cholesterol - contributes to plaque, which can clog arteries and lead to heart disease and stroke. When you control your cholesterol you are giving your arteries their best chance to remain clear. It is recommended that you get cholesterol lab work done once a year. 3) Reduce blood sugar - most of the food we eat is turning into glucose or blood sugar that our body uses for energy. Over time, high levels of blood sugar can damage your heart, kidneys, eyes and nerves. 4) Get active - living an active life is one of the most rewarding gifts you can give yourself and those you love. Simply put, daily physical activity increases your length and quality of life. Strive to exercise 15 minutes most days of the week. 5)  Eat better - A healthy diet is one of your best weapons for fighting cardiovascular disease. When you eat a heart healthy diet, you improve your chances for feeling good and staying healthy for life.   6)  Lose weight - when you shed extra fat an unnecessary pounds, you reduce the burden on

## 2023-08-31 ENCOUNTER — TELEPHONE (OUTPATIENT)
Dept: CARDIOLOGY CLINIC | Age: 78
End: 2023-08-31

## 2023-08-31 NOTE — TELEPHONE ENCOUNTER
Pt is going for blood work in about 30 minutes from now. and needs orders sent down please, thanks! I did reach out in chat so you would see sooner.

## 2023-09-07 ENCOUNTER — SCHEDULED TELEPHONE ENCOUNTER (OUTPATIENT)
Dept: CARDIOLOGY CLINIC | Age: 78
End: 2023-09-07
Payer: MEDICARE

## 2023-09-07 DIAGNOSIS — I35.0 MODERATE AORTIC STENOSIS: ICD-10-CM

## 2023-09-07 DIAGNOSIS — I48.0 PAROXYSMAL ATRIAL FIBRILLATION (HCC): ICD-10-CM

## 2023-09-07 DIAGNOSIS — E78.2 MIXED HYPERLIPIDEMIA: ICD-10-CM

## 2023-09-07 DIAGNOSIS — Z79.01 CHRONIC ANTICOAGULATION: ICD-10-CM

## 2023-09-07 DIAGNOSIS — I10 HYPERTENSION, UNSPECIFIED TYPE: Primary | ICD-10-CM

## 2023-09-07 PROCEDURE — 99441 PR PHYS/QHP TELEPHONE EVALUATION 5-10 MIN: CPT | Performed by: NURSE PRACTITIONER

## 2023-09-07 NOTE — PATIENT INSTRUCTIONS
New instructions for today:      Patient Instructions:  Continue current medications as prescribed. Always keep a current medication list. Bring your medications to every office visit. Continue to follow up with primary care provider for non cardiac medical problems. Call the office with any problems, questions or concerns at [phone]. If you have been asked to keep a blood pressure log, do so for 2 weeks. Call the office to report readings to the triage nurse at [phone]. Follow up with cardiologist as scheduled. The following educational material has been included in this after visit summary for your review: Life simple 7. Heart health. Life simple 7  1) Manage blood pressure - high blood pressure is a major risk factor for heart disease and stroke. Keeping blood pressure in health range reduces strain on your heart, arteries and kidneys. Blood pressure goal is less than 130/80. 2) Control cholesterol - contributes to plaque, which can clog arteries and lead to heart disease and stroke. When you control your cholesterol you are giving your arteries their best chance to remain clear. It is recommended that you get cholesterol lab work done once a year. 3) Reduce blood sugar - most of the food we eat is turning into glucose or blood sugar that our body uses for energy. Over time, high levels of blood sugar can damage your heart, kidneys, eyes and nerves. 4) Get active - living an active life is one of the most rewarding gifts you can give yourself and those you love. Simply put, daily physical activity increases your length and quality of life. Strive to exercise 15 minutes most days of the week. 5)  Eat better - A healthy diet is one of your best weapons for fighting cardiovascular disease. When you eat a heart healthy diet, you improve your chances for feeling good and staying healthy for life.   6)  Lose weight - when you shed extra fat an unnecessary pounds, you reduce the burden on

## 2023-09-07 NOTE — PROGRESS NOTES
Celio Solano is a 77 y.o. female evaluated via telephone on 9/7/2023. Consent:  She and/or health care decision maker is aware that that she may receive a bill for this telephone service, depending on her insurance coverage, and has provided verbal consent to proceed: Yes    Documentation:  I communicated with the patient and/or health care decision maker about BP management. Details of this discussion including any medical advice provided: heart health. I affirm this is a Patient Initiated Episode with an Established Patient who has not had a related appointment within my department in the past 7 days or scheduled within the next 24 hours. Total Time: minutes: 5-10 minutes    Note: not billable if this call serves to triage the patient into an appointment for the relevant concern    KATI Cortes      9/7/2023    Audio Patient Encouter(During [facility]-82 public health emergency)  The telephone encourter was conducted with patient in their residence from [address] [place] [address] with KATI Fonseca; assistance by Marlyn Shah MA.    HPI:  Celio Solano   Diagnosis Orders   1. Hypertension, unspecified type        2. Paroxysmal atrial fibrillation (HCC)        3. Chronic anticoagulation        4. Moderate aortic stenosis        5. Mixed hyperlipidemia          The patient presents today for audio evaluation regarding BP management. Amlodipine 5 mg daily was added at last visit. BP this /75 and 126/81. She reports \"one time, it got down to 116/71 and I felt a little light headed so I skipped the 1/2 tab Lisinopril that night. I am really worried because I bruise and bleed so bad on this blood thinner. \"  She reports no blood in urine or stool. Current age 77, weight 62.1 kg and creatinine 1.4.  5/1/23 Hg 14.5 and platelets 081. The patient denies symptoms to suggest myocardial ischemia, heart failure or arrhythmia. The patient's PCP monitors cholesterol.

## 2023-11-20 RX ORDER — AMIODARONE HYDROCHLORIDE 200 MG/1
TABLET ORAL
Qty: 90 TABLET | Refills: 1 | Status: SHIPPED | OUTPATIENT
Start: 2023-11-20

## 2023-12-08 LAB
25(OH)D3 SERPL-MCNC: 56 NG/ML
ALBUMIN SERPL-MCNC: 4.3 G/DL (ref 3.5–5.2)
ALP SERPL-CCNC: 124 U/L (ref 35–104)
ALT SERPL-CCNC: 20 U/L (ref 5–33)
ANION GAP SERPL CALCULATED.3IONS-SCNC: 12 MMOL/L (ref 7–19)
AST SERPL-CCNC: 27 U/L (ref 5–32)
BASOPHILS # BLD: 0.1 K/UL (ref 0–0.2)
BASOPHILS NFR BLD: 0.7 % (ref 0–1)
BILIRUB SERPL-MCNC: 0.3 MG/DL (ref 0.2–1.2)
BILIRUB UR QL STRIP: NEGATIVE
BUN SERPL-MCNC: 21 MG/DL (ref 8–23)
CALCIUM SERPL-MCNC: 10 MG/DL (ref 8.8–10.2)
CHLORIDE SERPL-SCNC: 103 MMOL/L (ref 98–111)
CLARITY UR: CLEAR
CO2 SERPL-SCNC: 26 MMOL/L (ref 22–29)
COLOR UR: YELLOW
CREAT SERPL-MCNC: 1.3 MG/DL (ref 0.5–0.9)
CREAT UR-MCNC: 35.1 MG/DL (ref 28–217)
EOSINOPHIL # BLD: 0.1 K/UL (ref 0–0.6)
EOSINOPHIL NFR BLD: 1.8 % (ref 0–5)
ERYTHROCYTE [DISTWIDTH] IN BLOOD BY AUTOMATED COUNT: 14.1 % (ref 11.5–14.5)
GLUCOSE SERPL-MCNC: 93 MG/DL (ref 74–109)
GLUCOSE UR STRIP.AUTO-MCNC: NEGATIVE MG/DL
HCT VFR BLD AUTO: 42.9 % (ref 37–47)
HGB BLD-MCNC: 13.9 G/DL (ref 12–16)
HGB UR STRIP.AUTO-MCNC: NEGATIVE MG/L
IMM GRANULOCYTES # BLD: 0 K/UL
KETONES UR STRIP.AUTO-MCNC: NEGATIVE MG/DL
LEUKOCYTE ESTERASE UR QL STRIP.AUTO: NEGATIVE
LYMPHOCYTES # BLD: 1.3 K/UL (ref 1.1–4.5)
LYMPHOCYTES NFR BLD: 17.6 % (ref 20–40)
MAGNESIUM SERPL-MCNC: 2.4 MG/DL (ref 1.6–2.4)
MCH RBC QN AUTO: 31.1 PG (ref 27–31)
MCHC RBC AUTO-ENTMCNC: 32.4 G/DL (ref 33–37)
MCV RBC AUTO: 96 FL (ref 81–99)
MONOCYTES # BLD: 0.6 K/UL (ref 0–0.9)
MONOCYTES NFR BLD: 8.6 % (ref 0–10)
NEUTROPHILS # BLD: 5.1 K/UL (ref 1.5–7.5)
NEUTS SEG NFR BLD: 71 % (ref 50–65)
NITRITE UR QL STRIP.AUTO: NEGATIVE
PH UR STRIP.AUTO: 7 [PH] (ref 5–8)
PHOSPHATE SERPL-MCNC: 3.4 MG/DL (ref 2.5–4.5)
PLATELET # BLD AUTO: 340 K/UL (ref 130–400)
PMV BLD AUTO: 10.1 FL (ref 9.4–12.3)
POTASSIUM SERPL-SCNC: 4.6 MMOL/L (ref 3.5–5)
PROT SERPL-MCNC: 7.3 G/DL (ref 6.6–8.7)
PROT UR STRIP.AUTO-MCNC: NEGATIVE MG/DL
PROT UR-MCNC: <4 MG/DL (ref 15–45)
PTH-INTACT SERPL-MCNC: 38.3 PG/ML (ref 15–65)
RBC # BLD AUTO: 4.47 M/UL (ref 4.2–5.4)
SODIUM SERPL-SCNC: 141 MMOL/L (ref 136–145)
SP GR UR STRIP.AUTO: 1.01 (ref 1–1.03)
URATE SERPL-MCNC: 5.7 MG/DL (ref 2.4–5.7)
UROBILINOGEN UR STRIP.AUTO-MCNC: 0.2 E.U./DL
WBC # BLD AUTO: 7.2 K/UL (ref 4.8–10.8)

## 2023-12-20 RX ORDER — APIXABAN 5 MG/1
TABLET, FILM COATED ORAL
Qty: 60 TABLET | Refills: 5 | OUTPATIENT
Start: 2023-12-20

## 2024-02-13 RX ORDER — APIXABAN 5 MG/1
TABLET, FILM COATED ORAL
Qty: 60 TABLET | Refills: 5 | OUTPATIENT
Start: 2024-02-13

## 2024-02-14 RX ORDER — APIXABAN 5 MG/1
TABLET, FILM COATED ORAL
Qty: 60 TABLET | Refills: 5 | Status: SHIPPED | OUTPATIENT
Start: 2024-02-14

## 2024-03-04 RX ORDER — METOPROLOL SUCCINATE 25 MG/1
TABLET, EXTENDED RELEASE ORAL
Qty: 30 TABLET | Refills: 5 | Status: SHIPPED | OUTPATIENT
Start: 2024-03-04

## 2024-04-01 RX ORDER — AMIODARONE HYDROCHLORIDE 200 MG/1
TABLET ORAL
Qty: 90 TABLET | Refills: 3 | Status: SHIPPED | OUTPATIENT
Start: 2024-04-01

## 2024-05-01 ENCOUNTER — OFFICE VISIT (OUTPATIENT)
Dept: CARDIOLOGY CLINIC | Age: 79
End: 2024-05-01
Payer: MEDICARE

## 2024-05-01 VITALS
HEIGHT: 63 IN | DIASTOLIC BLOOD PRESSURE: 88 MMHG | BODY MASS INDEX: 24.8 KG/M2 | HEART RATE: 69 BPM | WEIGHT: 140 LBS | SYSTOLIC BLOOD PRESSURE: 139 MMHG

## 2024-05-01 DIAGNOSIS — R94.31 ABNORMAL ELECTROCARDIOGRAM (ECG) (EKG): ICD-10-CM

## 2024-05-01 DIAGNOSIS — I48.0 PAROXYSMAL ATRIAL FIBRILLATION (HCC): Primary | ICD-10-CM

## 2024-05-01 PROCEDURE — 3079F DIAST BP 80-89 MM HG: CPT | Performed by: INTERNAL MEDICINE

## 2024-05-01 PROCEDURE — 3075F SYST BP GE 130 - 139MM HG: CPT | Performed by: INTERNAL MEDICINE

## 2024-05-01 PROCEDURE — 99214 OFFICE O/P EST MOD 30 MIN: CPT | Performed by: INTERNAL MEDICINE

## 2024-05-01 PROCEDURE — 93000 ELECTROCARDIOGRAM COMPLETE: CPT | Performed by: INTERNAL MEDICINE

## 2024-05-01 PROCEDURE — 1123F ACP DISCUSS/DSCN MKR DOCD: CPT | Performed by: INTERNAL MEDICINE

## 2024-05-01 ASSESSMENT — ENCOUNTER SYMPTOMS
BACK PAIN: 0
WHEEZING: 0
SHORTNESS OF BREATH: 0
VOMITING: 0
ABDOMINAL DISTENTION: 0
DIARRHEA: 0
COUGH: 0
BLOOD IN STOOL: 0
EYE DISCHARGE: 0
CONSTIPATION: 0

## 2024-05-01 NOTE — PROGRESS NOTES
[facility]  Cardiology Office Note  [address] [facility]  [address]  Phone: [phone]  Fax: [phone]                            Date:  5/1/2024  Patient: Jesse Lennon  Age:  78 y.o., 1945    Referral: No ref. provider found      PROBLEM LIST:    Patient Active Problem List    Diagnosis Date Noted    Chronic pain disorder 04/04/2022     Priority: Low    Gastroesophageal reflux disease 04/04/2022     Priority: Low    Palliative care patient 04/04/2022     Priority: Low    Diastolic dysfunction 04/04/2022     Priority: Low    Facial droop 04/03/2022     Priority: Low    Dysarthria 04/03/2022     Priority: Low    History of pelvic fracture 04/03/2022     Priority: Low    Hip pain 04/03/2022     Priority: Low    Hypokalemia 04/03/2022     Priority: Low    Vitamin D deficiency 04/03/2022     Priority: Low    Gait abnormality 04/03/2022     Priority: Low    Rhabdomyolysis 04/01/2022     Priority: Low    HTN (hypertension) 04/01/2022     Priority: Low    Generalized weakness 04/01/2022     Priority: Low    Anticoagulated 04/01/2022     Priority: Low    Intertrigo 04/01/2022     Priority: Low    Paroxysmal atrial fibrillation (HCC) 04/01/2022     Priority: Low    Transaminitis 04/01/2022     Priority: Low    Abnormal CXR 04/01/2022     Priority: Low    Osteoarthritis of right knee 09/02/2021     Priority: Low    Trigger middle finger of right hand 12/17/2020     Priority: Low    Mass of right wrist 12/17/2020     Priority: Low     1.  Paroxysmal atrial fibrillation on low-dose Eliquis and amiodarone (29% burden on Zio).  2.  Moderate aortic stenosis (echo 9/2021 with mean gradient 27 mmHg, mild by echo 4/2022 mean gradient 15 mmHg), normal LV ejection fraction.  3.  Active ongoing tobacco use.  4.  Left thalamic/basal ganglia infarct 4/1/2022, intermediate right ICA stenosis (off Eliquis).    PRESENTATION: Jesse Lennon is a 78 y.o. year old female presents 
done

## 2024-05-29 RX ORDER — LISINOPRIL 20 MG/1
20 TABLET ORAL 2 TIMES DAILY
Qty: 135 TABLET | Refills: 3 | Status: SHIPPED | OUTPATIENT
Start: 2024-05-29

## 2024-06-11 LAB
ALBUMIN SERPL-MCNC: 4.2 G/DL (ref 3.5–5.2)
ALP SERPL-CCNC: 87 U/L (ref 35–104)
ALT SERPL-CCNC: 19 U/L (ref 5–33)
ANION GAP SERPL CALCULATED.3IONS-SCNC: 19 MMOL/L (ref 7–19)
AST SERPL-CCNC: 25 U/L (ref 5–32)
BACTERIA URNS QL MICRO: NEGATIVE /HPF
BASOPHILS # BLD: 0 K/UL (ref 0–0.2)
BASOPHILS NFR BLD: 0.4 % (ref 0–1)
BILIRUB SERPL-MCNC: 0.3 MG/DL (ref 0.2–1.2)
BILIRUB UR QL STRIP: NEGATIVE
BUN SERPL-MCNC: 24 MG/DL (ref 8–23)
CALCIUM SERPL-MCNC: 9.9 MG/DL (ref 8.8–10.2)
CHLORIDE SERPL-SCNC: 106 MMOL/L (ref 98–111)
CLARITY UR: CLEAR
CO2 SERPL-SCNC: 21 MMOL/L (ref 22–29)
COLOR UR: YELLOW
CREAT SERPL-MCNC: 1.5 MG/DL (ref 0.5–0.9)
CREAT UR-MCNC: 147.8 MG/DL (ref 28–217)
CRYSTALS URNS MICRO: ABNORMAL /HPF
EOSINOPHIL # BLD: 0.1 K/UL (ref 0–0.6)
EOSINOPHIL NFR BLD: 1.2 % (ref 0–5)
EPI CELLS #/AREA URNS AUTO: 10 /HPF (ref 0–5)
ERYTHROCYTE [DISTWIDTH] IN BLOOD BY AUTOMATED COUNT: 13.8 % (ref 11.5–14.5)
GLUCOSE SERPL-MCNC: 94 MG/DL (ref 74–109)
GLUCOSE UR STRIP.AUTO-MCNC: NEGATIVE MG/DL
HCT VFR BLD AUTO: 42.6 % (ref 37–47)
HGB BLD-MCNC: 14 G/DL (ref 12–16)
HGB UR STRIP.AUTO-MCNC: NEGATIVE MG/L
HYALINE CASTS #/AREA URNS AUTO: 9 /HPF (ref 0–8)
IMM GRANULOCYTES # BLD: 0 K/UL
KETONES UR STRIP.AUTO-MCNC: NEGATIVE MG/DL
LEUKOCYTE ESTERASE UR QL STRIP.AUTO: ABNORMAL
LYMPHOCYTES # BLD: 1.5 K/UL (ref 1.1–4.5)
LYMPHOCYTES NFR BLD: 19.7 % (ref 20–40)
MAGNESIUM SERPL-MCNC: 2.2 MG/DL (ref 1.6–2.4)
MCH RBC QN AUTO: 31.5 PG (ref 27–31)
MCHC RBC AUTO-ENTMCNC: 32.9 G/DL (ref 33–37)
MCV RBC AUTO: 95.9 FL (ref 81–99)
MONOCYTES # BLD: 0.7 K/UL (ref 0–0.9)
MONOCYTES NFR BLD: 8.8 % (ref 0–10)
NEUTROPHILS # BLD: 5.2 K/UL (ref 1.5–7.5)
NEUTS SEG NFR BLD: 69.6 % (ref 50–65)
NITRITE UR QL STRIP.AUTO: NEGATIVE
PH UR STRIP.AUTO: 6.5 [PH] (ref 5–8)
PHOSPHATE SERPL-MCNC: 3.1 MG/DL (ref 2.5–4.5)
PLATELET # BLD AUTO: 304 K/UL (ref 130–400)
PMV BLD AUTO: 10.4 FL (ref 9.4–12.3)
POTASSIUM SERPL-SCNC: 4.6 MMOL/L (ref 3.5–5)
PROT SERPL-MCNC: 7.1 G/DL (ref 6.6–8.7)
PROT UR STRIP.AUTO-MCNC: NEGATIVE MG/DL
PROT UR-MCNC: 24 MG/DL (ref 15–45)
RBC # BLD AUTO: 4.44 M/UL (ref 4.2–5.4)
RBC #/AREA URNS AUTO: 0 /HPF (ref 0–4)
SODIUM SERPL-SCNC: 146 MMOL/L (ref 136–145)
SP GR UR STRIP.AUTO: 1.02 (ref 1–1.03)
URATE SERPL-MCNC: 5.8 MG/DL (ref 2.4–5.7)
UROBILINOGEN UR STRIP.AUTO-MCNC: 0.2 E.U./DL
WBC # BLD AUTO: 7.5 K/UL (ref 4.8–10.8)
WBC #/AREA URNS AUTO: 4 /HPF (ref 0–5)

## 2024-06-17 ENCOUNTER — HOSPITAL ENCOUNTER (EMERGENCY)
Age: 79
Discharge: HOME | End: 2024-06-17
Payer: MEDICARE

## 2024-06-17 ENCOUNTER — APPOINTMENT (OUTPATIENT)
Dept: CT IMAGING | Age: 79
End: 2024-06-17
Payer: MEDICARE

## 2024-06-17 VITALS
BODY MASS INDEX: 23.92 KG/M2 | SYSTOLIC BLOOD PRESSURE: 134 MMHG | WEIGHT: 135 LBS | OXYGEN SATURATION: 96 % | DIASTOLIC BLOOD PRESSURE: 64 MMHG | TEMPERATURE: 98 F | RESPIRATION RATE: 2 BRPM | HEART RATE: 66 BPM | HEIGHT: 63 IN

## 2024-06-17 DIAGNOSIS — G44.209 TENSION HEADACHE: Primary | ICD-10-CM

## 2024-06-17 DIAGNOSIS — J32.0 CHRONIC MAXILLARY SINUSITIS: ICD-10-CM

## 2024-06-17 LAB
ALBUMIN SERPL-MCNC: 4.1 G/DL (ref 3.5–5.2)
ALP SERPL-CCNC: 79 U/L (ref 35–104)
ALT SERPL-CCNC: 14 U/L (ref 5–33)
ANION GAP SERPL CALCULATED.3IONS-SCNC: 14 MMOL/L (ref 7–19)
APTT PPP: 28.4 SEC (ref 26–36.2)
AST SERPL-CCNC: 19 U/L (ref 5–32)
B PARAP IS1001 DNA NPH QL NAA+NON-PROBE: NOT DETECTED
B PERT.PT PRMT NPH QL NAA+NON-PROBE: NOT DETECTED
BACTERIA URNS QL MICRO: NEGATIVE /HPF
BASOPHILS # BLD: 0 K/UL (ref 0–0.2)
BASOPHILS NFR BLD: 0.5 % (ref 0–1)
BILIRUB SERPL-MCNC: 0.3 MG/DL (ref 0.2–1.2)
BILIRUB UR QL STRIP: NEGATIVE
BUN SERPL-MCNC: 24 MG/DL (ref 8–23)
C PNEUM DNA NPH QL NAA+NON-PROBE: NOT DETECTED
CALCIUM SERPL-MCNC: 9.3 MG/DL (ref 8.8–10.2)
CHLORIDE SERPL-SCNC: 103 MMOL/L (ref 98–111)
CLARITY UR: CLEAR
CO2 SERPL-SCNC: 23 MMOL/L (ref 22–29)
COLOR UR: YELLOW
CREAT SERPL-MCNC: 1.4 MG/DL (ref 0.5–0.9)
CRYSTALS URNS MICRO: NORMAL /HPF
EOSINOPHIL # BLD: 0 K/UL (ref 0–0.6)
EOSINOPHIL NFR BLD: 0.5 % (ref 0–5)
EPI CELLS #/AREA URNS AUTO: 9 /HPF (ref 0–5)
ERYTHROCYTE [DISTWIDTH] IN BLOOD BY AUTOMATED COUNT: 13.7 % (ref 11.5–14.5)
FLUAV RNA NPH QL NAA+NON-PROBE: NOT DETECTED
FLUBV RNA NPH QL NAA+NON-PROBE: NOT DETECTED
GLUCOSE SERPL-MCNC: 104 MG/DL (ref 74–109)
GLUCOSE UR STRIP.AUTO-MCNC: NEGATIVE MG/DL
HADV DNA NPH QL NAA+NON-PROBE: NOT DETECTED
HCOV 229E RNA NPH QL NAA+NON-PROBE: NOT DETECTED
HCOV HKU1 RNA NPH QL NAA+NON-PROBE: NOT DETECTED
HCOV NL63 RNA NPH QL NAA+NON-PROBE: NOT DETECTED
HCOV OC43 RNA NPH QL NAA+NON-PROBE: NOT DETECTED
HCT VFR BLD AUTO: 41 % (ref 37–47)
HGB BLD-MCNC: 13.3 G/DL (ref 12–16)
HGB UR STRIP.AUTO-MCNC: NEGATIVE MG/L
HMPV RNA NPH QL NAA+NON-PROBE: NOT DETECTED
HPIV1 RNA NPH QL NAA+NON-PROBE: NOT DETECTED
HPIV2 RNA NPH QL NAA+NON-PROBE: NOT DETECTED
HPIV3 RNA NPH QL NAA+NON-PROBE: NOT DETECTED
HPIV4 RNA NPH QL NAA+NON-PROBE: NOT DETECTED
HYALINE CASTS #/AREA URNS AUTO: 2 /HPF (ref 0–8)
IMM GRANULOCYTES # BLD: 0 K/UL
INR PPP: 1.2 (ref 0.88–1.18)
KETONES UR STRIP.AUTO-MCNC: NEGATIVE MG/DL
LEUKOCYTE ESTERASE UR QL STRIP.AUTO: ABNORMAL
LYMPHOCYTES # BLD: 0.8 K/UL (ref 1.1–4.5)
LYMPHOCYTES NFR BLD: 12.5 % (ref 20–40)
M PNEUMO DNA NPH QL NAA+NON-PROBE: NOT DETECTED
MCH RBC QN AUTO: 31.1 PG (ref 27–31)
MCHC RBC AUTO-ENTMCNC: 32.4 G/DL (ref 33–37)
MCV RBC AUTO: 95.8 FL (ref 81–99)
MONOCYTES # BLD: 0.5 K/UL (ref 0–0.9)
MONOCYTES NFR BLD: 7.3 % (ref 0–10)
NEUTROPHILS # BLD: 5.1 K/UL (ref 1.5–7.5)
NEUTS SEG NFR BLD: 78.9 % (ref 50–65)
NITRITE UR QL STRIP.AUTO: NEGATIVE
PH UR STRIP.AUTO: 6 [PH] (ref 5–8)
PLATELET # BLD AUTO: 267 K/UL (ref 130–400)
PMV BLD AUTO: 9.7 FL (ref 9.4–12.3)
POTASSIUM SERPL-SCNC: 4.4 MMOL/L (ref 3.5–5)
PROT SERPL-MCNC: 6.8 G/DL (ref 6.6–8.7)
PROT UR STRIP.AUTO-MCNC: NEGATIVE MG/DL
PROTHROMBIN TIME: 14.9 SEC (ref 12–14.6)
RBC # BLD AUTO: 4.28 M/UL (ref 4.2–5.4)
RBC #/AREA URNS AUTO: 2 /HPF (ref 0–4)
RSV RNA NPH QL NAA+NON-PROBE: NOT DETECTED
RV+EV RNA NPH QL NAA+NON-PROBE: NOT DETECTED
SARS-COV-2 RNA NPH QL NAA+NON-PROBE: NOT DETECTED
SODIUM SERPL-SCNC: 140 MMOL/L (ref 136–145)
SP GR UR STRIP.AUTO: 1.01 (ref 1–1.03)
UROBILINOGEN UR STRIP.AUTO-MCNC: 0.2 E.U./DL
WBC # BLD AUTO: 6.5 K/UL (ref 4.8–10.8)
WBC #/AREA URNS AUTO: 5 /HPF (ref 0–5)

## 2024-06-17 PROCEDURE — 70450 CT HEAD/BRAIN W/O DYE: CPT

## 2024-06-17 PROCEDURE — 36415 COLL VENOUS BLD VENIPUNCTURE: CPT

## 2024-06-17 PROCEDURE — 81001 URINALYSIS AUTO W/SCOPE: CPT

## 2024-06-17 PROCEDURE — 2580000003 HC RX 258: Performed by: PHYSICIAN ASSISTANT

## 2024-06-17 PROCEDURE — 85610 PROTHROMBIN TIME: CPT

## 2024-06-17 PROCEDURE — 80053 COMPREHEN METABOLIC PANEL: CPT

## 2024-06-17 PROCEDURE — 85730 THROMBOPLASTIN TIME PARTIAL: CPT

## 2024-06-17 PROCEDURE — 96374 THER/PROPH/DIAG INJ IV PUSH: CPT

## 2024-06-17 PROCEDURE — 99284 EMERGENCY DEPT VISIT MOD MDM: CPT

## 2024-06-17 PROCEDURE — 85025 COMPLETE CBC W/AUTO DIFF WBC: CPT

## 2024-06-17 PROCEDURE — 0202U NFCT DS 22 TRGT SARS-COV-2: CPT

## 2024-06-17 PROCEDURE — 6360000002 HC RX W HCPCS: Performed by: PHYSICIAN ASSISTANT

## 2024-06-17 RX ORDER — DEXAMETHASONE SODIUM PHOSPHATE 4 MG/ML
4 INJECTION, SOLUTION INTRA-ARTICULAR; INTRALESIONAL; INTRAMUSCULAR; INTRAVENOUS; SOFT TISSUE ONCE
Status: COMPLETED | OUTPATIENT
Start: 2024-06-17 | End: 2024-06-17

## 2024-06-17 RX ORDER — GUAIFENESIN 600 MG/1
600 TABLET, EXTENDED RELEASE ORAL 2 TIMES DAILY
Qty: 30 TABLET | Refills: 0 | Status: SHIPPED | OUTPATIENT
Start: 2024-06-17 | End: 2024-07-02

## 2024-06-17 RX ORDER — FLUTICASONE PROPIONATE 50 MCG
1 SPRAY, SUSPENSION (ML) NASAL DAILY
Qty: 32 G | Refills: 1 | Status: SHIPPED | OUTPATIENT
Start: 2024-06-17

## 2024-06-17 RX ORDER — PREDNISONE 10 MG/1
10 TABLET ORAL DAILY
Qty: 10 TABLET | Refills: 0 | Status: SHIPPED | OUTPATIENT
Start: 2024-06-17 | End: 2024-06-27

## 2024-06-17 RX ORDER — 0.9 % SODIUM CHLORIDE 0.9 %
500 INTRAVENOUS SOLUTION INTRAVENOUS ONCE
Status: COMPLETED | OUTPATIENT
Start: 2024-06-17 | End: 2024-06-17

## 2024-06-17 RX ADMIN — SODIUM CHLORIDE 500 ML: 9 INJECTION, SOLUTION INTRAVENOUS at 13:40

## 2024-06-17 RX ADMIN — DEXAMETHASONE SODIUM PHOSPHATE 4 MG: 4 INJECTION INTRA-ARTICULAR; INTRALESIONAL; INTRAMUSCULAR; INTRAVENOUS; SOFT TISSUE at 15:47

## 2024-06-17 ASSESSMENT — ENCOUNTER SYMPTOMS
ABDOMINAL DISTENTION: 0
APNEA: 0
RHINORRHEA: 0
BACK PAIN: 0
ABDOMINAL PAIN: 0
COUGH: 0
EYE PAIN: 0
COLOR CHANGE: 0
EYE DISCHARGE: 0
PHOTOPHOBIA: 0
SORE THROAT: 0
SHORTNESS OF BREATH: 0
NAUSEA: 0

## 2024-06-17 NOTE — ED PROVIDER NOTES
this note:    CT HEAD WO CONTRAST   Final Result   1. No acute intracranial process.   2. Microangiopathic ischemic changes and atrophy are noted.        All CT scans are performed using dose optimization techniques as appropriate to the performed exam and include    at least one of the following: Automated exposure control, adjustment of the mA and/or kV according to size, and the use of iterative reconstruction technique.        ______________________________________    Electronically signed by: NARA BARCENAS M.D.   Date:     06/17/2024   Time:    14:13           LABS:  Labs Reviewed   CBC WITH AUTO DIFFERENTIAL - Abnormal; Notable for the following components:       Result Value    MCH 31.1 (*)     MCHC 32.4 (*)     Neutrophils % 78.9 (*)     Lymphocytes % 12.5 (*)     Lymphocytes Absolute 0.8 (*)     All other components within normal limits   COMPREHENSIVE METABOLIC PANEL W/ REFLEX TO MG FOR LOW K - Abnormal; Notable for the following components:    BUN 24 (*)     Creatinine 1.4 (*)     Est, Glom Filt Rate 38 (*)     All other components within normal limits   URINALYSIS WITH REFLEX TO CULTURE - Abnormal; Notable for the following components:    Leukocyte Esterase, Urine TRACE (*)     All other components within normal limits   PROTIME-INR - Abnormal; Notable for the following components:    Protime 14.9 (*)     INR 1.20 (*)     All other components within normal limits   RESPIRATORY PANEL, MOLECULAR, WITH COVID-19   APTT   MICROSCOPIC URINALYSIS       All other labs were within normal range or notreturned as of this dictation.    RE-ASSESSMENT          EMERGENCY DEPARTMENT COURSE and DIFFERENTIAL DIAGNOSIS/MDM:   Vitals:    Vitals:    06/17/24 1309 06/17/24 1343 06/17/24 1600   BP: 134/64     Pulse: 62  66   Resp: 18  (!) 2   Temp: 98 °F (36.7 °C)  98 °F (36.7 °C)   TempSrc: Oral     SpO2: 96%  96%   Weight: 61.2 kg (135 lb) 61.2 kg (135 lb)    Height:  1.6 m (5' 3\")      EKG - sinus rhythm normal no st

## 2024-06-23 LAB
EKG P AXIS: 65 DEGREES
EKG P-R INTERVAL: 214 MS
EKG Q-T INTERVAL: 438 MS
EKG QRS DURATION: 112 MS
EKG QTC CALCULATION (BAZETT): 445 MS
EKG T AXIS: 56 DEGREES

## 2024-07-02 ENCOUNTER — OFFICE VISIT (OUTPATIENT)
Dept: ENT CLINIC | Age: 79
End: 2024-07-02
Payer: MEDICARE

## 2024-07-02 VITALS
SYSTOLIC BLOOD PRESSURE: 130 MMHG | DIASTOLIC BLOOD PRESSURE: 78 MMHG | HEIGHT: 63 IN | WEIGHT: 135 LBS | BODY MASS INDEX: 23.92 KG/M2

## 2024-07-02 DIAGNOSIS — H93.A3 PULSATILE TINNITUS OF BOTH EARS: ICD-10-CM

## 2024-07-02 DIAGNOSIS — R42 DIZZINESS OF UNKNOWN ETIOLOGY: Primary | ICD-10-CM

## 2024-07-02 PROCEDURE — 1123F ACP DISCUSS/DSCN MKR DOCD: CPT | Performed by: PHYSICIAN ASSISTANT

## 2024-07-02 PROCEDURE — 3078F DIAST BP <80 MM HG: CPT | Performed by: PHYSICIAN ASSISTANT

## 2024-07-02 PROCEDURE — 99203 OFFICE O/P NEW LOW 30 MIN: CPT | Performed by: PHYSICIAN ASSISTANT

## 2024-07-02 PROCEDURE — 3075F SYST BP GE 130 - 139MM HG: CPT | Performed by: PHYSICIAN ASSISTANT

## 2024-07-02 ASSESSMENT — ENCOUNTER SYMPTOMS
SINUS PRESSURE: 0
SORE THROAT: 0
VOICE CHANGE: 0
FACIAL SWELLING: 0
EYE PAIN: 0
TROUBLE SWALLOWING: 0
RHINORRHEA: 0
EYE DISCHARGE: 0
SINUS PAIN: 0

## 2024-07-02 NOTE — ASSESSMENT & PLAN NOTE
Progressive dizziness/unsteadiness-suspect neurological in nature due to her history of previous CVA  Plan: I recommended getting an MRI of the brain to include the IACs due to the current complaints.  I will call her the results once completed.  Also recommended referring her to neurology for further evaluation.  Overall I believe she might benefit from vestibular rehab if appropriate from a neurological standpoint of view.

## 2024-07-02 NOTE — PROGRESS NOTES
[facility]  Ms. Lennon is a pleasant 79-year-old  female that was referred by Alexandro Tyson from the emergency room department due to problems with dizziness and sporadic pulsatile tinnitus.  Patient reports that she had a previous CVA affecting her left side in 2017.  She was able to rehab and regain's her speech and some strength of the left side.  Now she is experiencing sporadic episodes of loud pulsatile tinnitus that occurs at night that makes her feel weak as if she is going to pass out.  She reports that she has no true vertigo symptoms and feels like she is on a ship resulting in imbalance.  She had a recent CT of the head through the emergency room department that was essentially unremarkable for any acute process.  Due to her symptoms persisting, she has requested evaluation.  Patient also complains of difficulty breathing through the right nostril and is requesting evaluation.        Allergies: Levofloxacin in d5w, Pyridium [phenazopyridine], Sulfamethoxazole-trimethoprim, Penicillins, Codeine, Hydrocodone-acetaminophen, Hydromorphone hcl, and Oxycodone      Current Outpatient Medications   Medication Sig Dispense Refill    guaiFENesin (MUCINEX) 600 MG extended release tablet Take 1 tablet by mouth 2 times daily for 15 days 30 tablet 0    fluticasone (FLONASE) 50 MCG/ACT nasal spray 1 spray by Each Nostril route daily 32 g 1    lisinopril (PRINIVIL;ZESTRIL) 20 MG tablet Take 1 tablet by mouth in the morning and at bedtime Take (1) tab AM and (1/2) tab  tablet 3    amiodarone (CORDARONE) 200 MG tablet TAKE 1 TABLET BY MOUTH DAILY FOR AFIB 90 tablet 3    metoprolol succinate (TOPROL XL) 25 MG extended release tablet TAKE 1 TABLET BY MOUTH DAILY *STOP CARDIZEM* 30 tablet 5    apixaban (ELIQUIS) 2.5 MG TABS tablet Take 1 tablet by mouth 2 times daily 1 tablet 0    amLODIPine (NORVASC) 5 MG tablet Take 1 tablet by mouth daily 90 tablet 3    ezetimibe (ZETIA) 10 MG tablet Take 1

## 2024-07-25 ENCOUNTER — HOSPITAL ENCOUNTER (OUTPATIENT)
Dept: MRI IMAGING | Age: 79
Discharge: HOME | End: 2024-07-25
Payer: MEDICARE

## 2024-07-25 DIAGNOSIS — H93.A3 PULSATILE TINNITUS OF BOTH EARS: ICD-10-CM

## 2024-07-25 DIAGNOSIS — R42 DIZZINESS OF UNKNOWN ETIOLOGY: ICD-10-CM

## 2024-07-25 PROCEDURE — 70551 MRI BRAIN STEM W/O DYE: CPT

## 2024-07-26 ENCOUNTER — TELEPHONE (OUTPATIENT)
Dept: ENT CLINIC | Age: 79
End: 2024-07-26

## 2024-07-26 RX ORDER — CLINDAMYCIN HYDROCHLORIDE 300 MG/1
300 CAPSULE ORAL 3 TIMES DAILY
Qty: 30 CAPSULE | Refills: 0 | Status: SHIPPED | OUTPATIENT
Start: 2024-07-26 | End: 2024-08-05

## 2024-07-26 NOTE — TELEPHONE ENCOUNTER
I called the patient and the MRI results.  Patient was noted with no acute findings.  She was noted to have multiple strokes in the past based on the MRI which may be contributing to her imbalance/dizziness  She currently has appointment see Dr. Castellon in October.  She also reports that she is having lots of sinus pressure and is requesting antibiotic.  This was prescribed to her [facility] pharmacy.      Electronically signed by ANIRUDH SHEPHERD PA-C on 7/26/24 at 3:08 PM [facility]

## 2024-09-04 RX ORDER — AMLODIPINE BESYLATE 5 MG/1
5 TABLET ORAL DAILY
Qty: 90 TABLET | Refills: 2 | Status: SHIPPED | OUTPATIENT
Start: 2024-09-04

## 2024-10-02 RX ORDER — METOPROLOL SUCCINATE 25 MG/1
TABLET, EXTENDED RELEASE ORAL
Qty: 30 TABLET | Refills: 5 | Status: SHIPPED | OUTPATIENT
Start: 2024-10-02

## 2024-12-10 ENCOUNTER — TELEPHONE (OUTPATIENT)
Dept: ENT CLINIC | Age: 79
End: 2024-12-10

## 2024-12-10 NOTE — TELEPHONE ENCOUNTER
when attempting to schedule pt due to medication refill- she said she is sick, can not move and has no one to bring her to the office. please call patient back

## 2024-12-31 RX ORDER — APIXABAN 5 MG/1
TABLET, FILM COATED ORAL
Qty: 60 TABLET | Refills: 5 | OUTPATIENT
Start: 2024-12-31

## 2025-03-06 RX ORDER — METOPROLOL SUCCINATE 25 MG/1
TABLET, EXTENDED RELEASE ORAL
Qty: 30 TABLET | Refills: 5 | Status: SHIPPED | OUTPATIENT
Start: 2025-03-06

## 2025-03-31 RX ORDER — AMIODARONE HYDROCHLORIDE 200 MG/1
TABLET ORAL
Qty: 90 TABLET | Refills: 3 | Status: SHIPPED | OUTPATIENT
Start: 2025-03-31

## 2025-04-08 ENCOUNTER — TELEPHONE (OUTPATIENT)
Dept: CARDIOLOGY CLINIC | Age: 80
End: 2025-04-08

## 2025-04-08 NOTE — TELEPHONE ENCOUNTER
She is on a low dose.  She can cut in half and take a half daily, that should get her through 6 days and hopefully have access to the pharmacy

## 2025-04-08 NOTE — TELEPHONE ENCOUNTER
Patient called to advise that she only has 3 more days of metoprolol, her last dose will be Friday. She has water over her road and the pharmacies are unable to get medication to her. She wanted to know what to do. She does have all of her other medications. She couldn't remember if HR has been in the 50 or 60 range. /75. [facility] and [facility] is not coming down yet.

## 2025-05-08 RX ORDER — AMLODIPINE BESYLATE 5 MG/1
5 TABLET ORAL DAILY
Qty: 90 TABLET | Refills: 0 | Status: SHIPPED | OUTPATIENT
Start: 2025-05-08

## 2025-06-04 ENCOUNTER — APPOINTMENT (OUTPATIENT)
Dept: GENERAL RADIOLOGY | Age: 80
End: 2025-06-04
Payer: MEDICARE

## 2025-06-04 ENCOUNTER — HOSPITAL ENCOUNTER (EMERGENCY)
Age: 80
Discharge: HOME | End: 2025-06-04
Attending: STUDENT IN AN ORGANIZED HEALTH CARE EDUCATION/TRAINING PROGRAM
Payer: MEDICARE

## 2025-06-04 VITALS
TEMPERATURE: 98.9 F | RESPIRATION RATE: 18 BRPM | WEIGHT: 115 LBS | SYSTOLIC BLOOD PRESSURE: 139 MMHG | DIASTOLIC BLOOD PRESSURE: 62 MMHG | HEART RATE: 66 BPM | HEIGHT: 62 IN | OXYGEN SATURATION: 91 % | BODY MASS INDEX: 21.16 KG/M2

## 2025-06-04 DIAGNOSIS — M79.89 LEG SWELLING: Primary | ICD-10-CM

## 2025-06-04 DIAGNOSIS — R60.0 LEG EDEMA: ICD-10-CM

## 2025-06-04 LAB
ALBUMIN SERPL-MCNC: 4.2 G/DL (ref 3.5–5.2)
ALP SERPL-CCNC: 72 U/L (ref 35–104)
ALT SERPL-CCNC: 16 U/L (ref 10–35)
ANION GAP SERPL CALCULATED.3IONS-SCNC: 13 MMOL/L (ref 8–16)
AST SERPL-CCNC: 23 U/L (ref 10–35)
BASOPHILS # BLD: 0.1 K/UL (ref 0–0.2)
BASOPHILS NFR BLD: 0.6 % (ref 0–1)
BILIRUB SERPL-MCNC: 0.3 MG/DL (ref 0.2–1.2)
BNP BLD-MCNC: 341 PG/ML (ref 0–449)
BUN SERPL-MCNC: 37 MG/DL (ref 8–23)
CALCIUM SERPL-MCNC: 9.7 MG/DL (ref 8.8–10.2)
CHLORIDE SERPL-SCNC: 105 MMOL/L (ref 98–107)
CO2 SERPL-SCNC: 23 MMOL/L (ref 22–29)
CREAT SERPL-MCNC: 1.4 MG/DL (ref 0.5–0.9)
EKG P AXIS: 52 DEGREES
EKG P AXIS: 54 DEGREES
EKG P-R INTERVAL: 226 MS
EKG P-R INTERVAL: 238 MS
EKG Q-T INTERVAL: 438 MS
EKG Q-T INTERVAL: 440 MS
EKG QRS DURATION: 114 MS
EKG QRS DURATION: 118 MS
EKG QTC CALCULATION (BAZETT): 439 MS
EKG QTC CALCULATION (BAZETT): 440 MS
EKG T AXIS: 61 DEGREES
EKG T AXIS: 63 DEGREES
EOSINOPHIL # BLD: 0.4 K/UL (ref 0–0.6)
EOSINOPHIL NFR BLD: 5.5 % (ref 0–5)
ERYTHROCYTE [DISTWIDTH] IN BLOOD BY AUTOMATED COUNT: 13.7 % (ref 11.5–14.5)
GLUCOSE SERPL-MCNC: 94 MG/DL (ref 70–99)
HCT VFR BLD AUTO: 40.3 % (ref 37–47)
HGB BLD-MCNC: 13.4 G/DL (ref 12–16)
IMM GRANULOCYTES # BLD: 0 K/UL
LYMPHOCYTES # BLD: 1.3 K/UL (ref 1.1–4.5)
LYMPHOCYTES NFR BLD: 16.9 % (ref 20–40)
MCH RBC QN AUTO: 31.7 PG (ref 27–31)
MCHC RBC AUTO-ENTMCNC: 33.3 G/DL (ref 33–37)
MCV RBC AUTO: 95.3 FL (ref 81–99)
MONOCYTES # BLD: 0.9 K/UL (ref 0–0.9)
MONOCYTES NFR BLD: 11.7 % (ref 0–10)
NEUTROPHILS # BLD: 5.1 K/UL (ref 1.5–7.5)
NEUTS SEG NFR BLD: 64.9 % (ref 50–65)
PLATELET # BLD AUTO: 256 K/UL (ref 130–400)
PMV BLD AUTO: 9.7 FL (ref 9.4–12.3)
POTASSIUM SERPL-SCNC: 4.1 MMOL/L (ref 3.5–5.1)
PROT SERPL-MCNC: 6.6 G/DL (ref 6.4–8.3)
RBC # BLD AUTO: 4.23 M/UL (ref 4.2–5.4)
SODIUM SERPL-SCNC: 141 MMOL/L (ref 136–145)
TROPONIN, HIGH SENSITIVITY: 34 NG/L (ref 0–14)
TROPONIN, HIGH SENSITIVITY: 37 NG/L (ref 0–14)
WBC # BLD AUTO: 7.9 K/UL (ref 4.8–10.8)

## 2025-06-04 PROCEDURE — 71045 X-RAY EXAM CHEST 1 VIEW: CPT

## 2025-06-04 PROCEDURE — 93005 ELECTROCARDIOGRAM TRACING: CPT

## 2025-06-04 PROCEDURE — 85025 COMPLETE CBC W/AUTO DIFF WBC: CPT

## 2025-06-04 PROCEDURE — 80053 COMPREHEN METABOLIC PANEL: CPT

## 2025-06-04 PROCEDURE — 84484 ASSAY OF TROPONIN QUANT: CPT

## 2025-06-04 PROCEDURE — 99285 EMERGENCY DEPT VISIT HI MDM: CPT

## 2025-06-04 PROCEDURE — 83880 ASSAY OF NATRIURETIC PEPTIDE: CPT

## 2025-06-04 PROCEDURE — 6370000000 HC RX 637 (ALT 250 FOR IP): Performed by: STUDENT IN AN ORGANIZED HEALTH CARE EDUCATION/TRAINING PROGRAM

## 2025-06-04 PROCEDURE — 36415 COLL VENOUS BLD VENIPUNCTURE: CPT

## 2025-06-04 RX ORDER — ACETAMINOPHEN 500 MG
1000 TABLET ORAL ONCE
Status: COMPLETED | OUTPATIENT
Start: 2025-06-04 | End: 2025-06-04

## 2025-06-04 RX ADMIN — ACETAMINOPHEN 1000 MG: 500 TABLET ORAL at 12:58

## 2025-06-04 ASSESSMENT — ENCOUNTER SYMPTOMS
NAUSEA: 0
VOMITING: 0
SHORTNESS OF BREATH: 0
ABDOMINAL PAIN: 0

## 2025-06-04 NOTE — ED PROVIDER NOTES
there is any dynamic change, however, she does not display primary ACS symptoms and instead this troponin was used to screen for cardiac ischemic etiology of new onset heart failure but with her normal BNP and overall presentation including chest x-ray without any interstitial edema this is unlikely [AS]   1240 Renal failure, nephrotic syndrome have essentially been ruled out.  Urinalysis is pending but her protein levels are normal creatinine is intact [AS]   1240 Most likely diagnosis at this time is venous insufficiency or lymphedema the patient has been counseled on using compressive stockings.  She has expressed throughout her stay in the ER that she would like to be discharged.  This would be permissible if emergencies are ruled out with the serial Tn and ECG [AS]      ED Course User Index  [AS] Carlos Enrique Ibarra MD   Serial Tn and ECG unchanged. Pt stable for DC. She was offered admission for debility and refused. She has people to care for her at home. Understands return precautions and offer for admission for debility.    Final Impression: See below.    Procedures    1. Leg swelling    2. Leg edema      DISPOSITION Decision To Discharge 06/04/2025 03:56:36 PM             Lesley Babcock, APRN - CNP  [address] [place] [address]  [phone]            DISCHARGE MEDICATIONS:  Discharge Medication List as of 6/4/2025  3:45 PM             (Please note that portions of this note were completed with a voice recognition program.  Efforts were made to edit thedictations but occasionally words are mis-transcribed.)    Carlos Enrique Ibarra MD (electronically signed)Emergency Physician          Carlos Enrique Ibarra MD  06/04/25 [id number]

## 2025-06-04 NOTE — DISCHARGE INSTRUCTIONS
Begin wearing compression hose during the day.  Return to the ER for developing shortness of breath, chest pain, passing out, or any emergencies.  Otherwise please follow-up with your doctor soon as possible for reassessment and you can continue the Lasix as needed as has been prescribed

## 2025-06-09 LAB
EKG P AXIS: 52 DEGREES
EKG P AXIS: 54 DEGREES
EKG P-R INTERVAL: 226 MS
EKG P-R INTERVAL: 238 MS
EKG Q-T INTERVAL: 438 MS
EKG Q-T INTERVAL: 440 MS
EKG QRS DURATION: 114 MS
EKG QRS DURATION: 118 MS
EKG QTC CALCULATION (BAZETT): 439 MS
EKG QTC CALCULATION (BAZETT): 440 MS
EKG T AXIS: 61 DEGREES
EKG T AXIS: 63 DEGREES

## 2025-07-15 ENCOUNTER — TELEPHONE (OUTPATIENT)
Dept: CARDIOLOGY CLINIC | Age: 80
End: 2025-07-15

## 2025-07-15 NOTE — TELEPHONE ENCOUNTER
Patient left a message asking if our office has a list of sitters to help her with rides to appointments. Looks like patient lives in [place]. We don't have any lists. Can you contact her and advise that she try reaching out to her PCP.

## 2025-07-15 NOTE — TELEPHONE ENCOUNTER
Spoke to the patient and advised her to call her PCP and ask if they have a list of caregivers available.  The patient voiced understanding

## 2025-08-25 ENCOUNTER — OFFICE VISIT (OUTPATIENT)
Dept: CARDIOLOGY CLINIC | Age: 80
End: 2025-08-25
Payer: MEDICARE

## 2025-08-25 VITALS
DIASTOLIC BLOOD PRESSURE: 70 MMHG | SYSTOLIC BLOOD PRESSURE: 120 MMHG | HEART RATE: 51 BPM | WEIGHT: 115 LBS | BODY MASS INDEX: 21.16 KG/M2 | HEIGHT: 62 IN | OXYGEN SATURATION: 97 %

## 2025-08-25 DIAGNOSIS — E78.2 MIXED HYPERLIPIDEMIA: ICD-10-CM

## 2025-08-25 DIAGNOSIS — I10 HYPERTENSION, UNSPECIFIED TYPE: ICD-10-CM

## 2025-08-25 DIAGNOSIS — I48.0 PAROXYSMAL ATRIAL FIBRILLATION (HCC): ICD-10-CM

## 2025-08-25 DIAGNOSIS — I48.0 PAROXYSMAL ATRIAL FIBRILLATION (HCC): Primary | ICD-10-CM

## 2025-08-25 DIAGNOSIS — R60.9 EDEMA, UNSPECIFIED TYPE: ICD-10-CM

## 2025-08-25 DIAGNOSIS — R06.02 SOB (SHORTNESS OF BREATH): ICD-10-CM

## 2025-08-25 DIAGNOSIS — I35.0 MODERATE AORTIC STENOSIS: ICD-10-CM

## 2025-08-25 LAB
ANION GAP SERPL CALCULATED.3IONS-SCNC: 11 MMOL/L (ref 8–16)
BASOPHILS # BLD: 0.1 K/UL (ref 0–0.2)
BASOPHILS NFR BLD: 0.8 % (ref 0–1)
BNP BLD-MCNC: 673 PG/ML (ref 0–449)
BUN SERPL-MCNC: 32 MG/DL (ref 8–23)
CALCIUM SERPL-MCNC: 9.5 MG/DL (ref 8.8–10.2)
CHLORIDE SERPL-SCNC: 105 MMOL/L (ref 98–107)
CO2 SERPL-SCNC: 26 MMOL/L (ref 22–29)
CREAT SERPL-MCNC: 1.4 MG/DL (ref 0.5–0.9)
EOSINOPHIL # BLD: 0.1 K/UL (ref 0–0.6)
EOSINOPHIL NFR BLD: 1.9 % (ref 0–5)
ERYTHROCYTE [DISTWIDTH] IN BLOOD BY AUTOMATED COUNT: 14.3 % (ref 11.5–14.5)
GLUCOSE SERPL-MCNC: 88 MG/DL (ref 70–99)
HCT VFR BLD AUTO: 38.2 % (ref 37–47)
HGB BLD-MCNC: 12.7 G/DL (ref 12–16)
IMM GRANULOCYTES # BLD: 0 K/UL
LYMPHOCYTES # BLD: 1.4 K/UL (ref 1.1–4.5)
LYMPHOCYTES NFR BLD: 21.4 % (ref 20–40)
MCH RBC QN AUTO: 32.1 PG (ref 27–31)
MCHC RBC AUTO-ENTMCNC: 33.2 G/DL (ref 33–37)
MCV RBC AUTO: 96.5 FL (ref 81–99)
MONOCYTES # BLD: 0.6 K/UL (ref 0–0.9)
MONOCYTES NFR BLD: 9 % (ref 0–10)
NEUTROPHILS # BLD: 4.2 K/UL (ref 1.5–7.5)
NEUTS SEG NFR BLD: 66.6 % (ref 50–65)
PLATELET # BLD AUTO: 311 K/UL (ref 130–400)
PMV BLD AUTO: 10.1 FL (ref 9.4–12.3)
POTASSIUM SERPL-SCNC: 4.1 MMOL/L (ref 3.5–5.1)
RBC # BLD AUTO: 3.96 M/UL (ref 4.2–5.4)
SODIUM SERPL-SCNC: 142 MMOL/L (ref 136–145)
WBC # BLD AUTO: 6.3 K/UL (ref 4.8–10.8)

## 2025-08-25 PROCEDURE — 1123F ACP DISCUSS/DSCN MKR DOCD: CPT | Performed by: NURSE PRACTITIONER

## 2025-08-25 PROCEDURE — 1159F MED LIST DOCD IN RCRD: CPT | Performed by: NURSE PRACTITIONER

## 2025-08-25 PROCEDURE — 3074F SYST BP LT 130 MM HG: CPT | Performed by: NURSE PRACTITIONER

## 2025-08-25 PROCEDURE — 99214 OFFICE O/P EST MOD 30 MIN: CPT | Performed by: NURSE PRACTITIONER

## 2025-08-25 PROCEDURE — 1160F RVW MEDS BY RX/DR IN RCRD: CPT | Performed by: NURSE PRACTITIONER

## 2025-08-25 PROCEDURE — 3078F DIAST BP <80 MM HG: CPT | Performed by: NURSE PRACTITIONER

## 2025-08-25 RX ORDER — LOSARTAN POTASSIUM 25 MG/1
25 TABLET ORAL DAILY
Qty: 90 TABLET | Refills: 1 | Status: SHIPPED | OUTPATIENT
Start: 2025-08-25

## 2025-08-25 RX ORDER — AMOXICILLIN 500 MG/1
CAPSULE ORAL
COMMUNITY

## 2025-08-26 ENCOUNTER — RESULTS FOLLOW-UP (OUTPATIENT)
Dept: CARDIOLOGY CLINIC | Age: 80
End: 2025-08-26

## 2025-08-28 ENCOUNTER — TELEPHONE (OUTPATIENT)
Dept: CARDIOLOGY CLINIC | Age: 80
End: 2025-08-28

## 2025-09-05 ENCOUNTER — HOSPITAL ENCOUNTER (OUTPATIENT)
Age: 80
Discharge: HOME | End: 2025-09-05
Payer: MEDICARE

## 2025-09-05 VITALS
WEIGHT: 115 LBS | SYSTOLIC BLOOD PRESSURE: 120 MMHG | HEIGHT: 62 IN | DIASTOLIC BLOOD PRESSURE: 70 MMHG | BODY MASS INDEX: 21.16 KG/M2

## 2025-09-05 DIAGNOSIS — R60.9 EDEMA, UNSPECIFIED TYPE: ICD-10-CM

## 2025-09-05 DIAGNOSIS — R06.09 DOE (DYSPNEA ON EXERTION): ICD-10-CM

## 2025-09-05 PROCEDURE — 93306 TTE W/DOPPLER COMPLETE: CPT

## 2025-09-06 LAB
ECHO AO ROOT DIAM: 3.1 CM
ECHO AO ROOT INDEX: 2.05 CM/M2
ECHO AV AREA PEAK VELOCITY: 1.5 CM2
ECHO AV AREA VTI: 1.9 CM2
ECHO AV AREA/BSA PEAK VELOCITY: 1 CM2/M2
ECHO AV AREA/BSA VTI: 1.3 CM2/M2
ECHO AV CUSP MM: 1 CM
ECHO AV MEAN GRADIENT: 10 MMHG
ECHO AV MEAN VELOCITY: 1.5 M/S
ECHO AV PEAK GRADIENT: 28 MMHG
ECHO AV PEAK VELOCITY: 2.6 M/S
ECHO AV VELOCITY RATIO: 0.5
ECHO AV VTI: 55.8 CM
ECHO BSA: 1.51 M2
ECHO LA AREA 2C: 23.2 CM2
ECHO LA AREA 4C: 19 CM2
ECHO LA DIAMETER INDEX: 1.79 CM/M2
ECHO LA DIAMETER: 2.7 CM
ECHO LA MAJOR AXIS: 5.9 CM
ECHO LA MINOR AXIS: 6.2 CM
ECHO LA TO AORTIC ROOT RATIO: 0.87
ECHO LA VOL BP: 57 ML (ref 22–52)
ECHO LA VOL MOD A2C: 69 ML (ref 22–52)
ECHO LA VOL MOD A4C: 45 ML (ref 22–52)
ECHO LA VOL/BSA BIPLANE: 38 ML/M2 (ref 16–34)
ECHO LA VOLUME INDEX MOD A2C: 46 ML/M2 (ref 16–34)
ECHO LA VOLUME INDEX MOD A4C: 30 ML/M2 (ref 16–34)
ECHO LV E' LATERAL VELOCITY: 6.74 CM/S
ECHO LV E' SEPTAL VELOCITY: 5 CM/S
ECHO LV EDV A2C: 98 ML
ECHO LV EDV A4C: 91 ML
ECHO LV EDV INDEX A4C: 60 ML/M2
ECHO LV EDV NDEX A2C: 65 ML/M2
ECHO LV EF PHYSICIAN: 60 %
ECHO LV EJECTION FRACTION A2C: 62 %
ECHO LV EJECTION FRACTION A4C: 61 %
ECHO LV EJECTION FRACTION BIPLANE: 62 % (ref 55–100)
ECHO LV ESV A2C: 37 ML
ECHO LV ESV A4C: 35 ML
ECHO LV ESV INDEX A2C: 25 ML/M2
ECHO LV ESV INDEX A4C: 23 ML/M2
ECHO LV FRACTIONAL SHORTENING: 39 % (ref 28–44)
ECHO LV INTERNAL DIMENSION DIASTOLE INDEX: 3.38 CM/M2
ECHO LV INTERNAL DIMENSION DIASTOLIC: 5.1 CM (ref 3.9–5.3)
ECHO LV INTERNAL DIMENSION SYSTOLIC INDEX: 2.05 CM/M2
ECHO LV INTERNAL DIMENSION SYSTOLIC: 3.1 CM
ECHO LV IVSD: 1.2 CM (ref 0.6–0.9)
ECHO LV MASS 2D: 227.4 G (ref 67–162)
ECHO LV MASS INDEX 2D: 150.6 G/M2 (ref 43–95)
ECHO LV POSTERIOR WALL DIASTOLIC: 1.1 CM (ref 0.6–0.9)
ECHO LV RELATIVE WALL THICKNESS RATIO: 0.43
ECHO LVOT AREA: 3.1 CM2
ECHO LVOT AV VTI INDEX: 0.6
ECHO LVOT DIAM: 2 CM
ECHO LVOT MEAN GRADIENT: 3 MMHG
ECHO LVOT PEAK GRADIENT: 7 MMHG
ECHO LVOT PEAK GRADIENT: 7 MMHG
ECHO LVOT PEAK VELOCITY: 1.3 M/S
ECHO LVOT STROKE VOLUME INDEX: 69.2 ML/M2
ECHO LVOT SV: 104.6 ML
ECHO LVOT VTI: 33.3 CM
ECHO MV A VELOCITY: 0.84 M/S
ECHO MV AREA VTI: 2.5 CM2
ECHO MV E VELOCITY: 0.97 M/S
ECHO MV E/A RATIO: 1.15
ECHO MV E/E' LATERAL: 14.39
ECHO MV E/E' RATIO (AVERAGED): 16.9
ECHO MV E/E' SEPTAL: 19.4
ECHO MV LVOT VTI INDEX: 1.27
ECHO MV MAX VELOCITY: 1.1 M/S
ECHO MV MEAN GRADIENT: 2 MMHG
ECHO MV MEAN VELOCITY: 0.7 M/S
ECHO MV PEAK GRADIENT: 5 MMHG
ECHO MV VTI: 42.2 CM
ECHO PV MAX VELOCITY: 0.7 M/S
ECHO PV PEAK GRADIENT: 2 MMHG
ECHO RA AREA 4C: 14.2 CM2
ECHO RA END SYSTOLIC VOLUME APICAL 4 CHAMBER INDEX BSA: 25 ML/M2
ECHO RA VOLUME: 37 ML
ECHO RV BASAL DIMENSION: 3.2 CM
ECHO RV TAPSE: 2.6 CM (ref 1.7–?)
ECHO TV REGURGITANT MAX VELOCITY: 2.25 M/S
ECHO TV REGURGITANT PEAK GRADIENT: 20 MMHG

## (undated) DEVICE — SUTURE VCRL SZ 2-0 L36IN ABSRB UD L36MM CT-1 1/2 CIR J945H

## (undated) DEVICE — LINE GAS SAMPLING INTEGR NSL SINGLE PC O2 ETCO2 FILTERLINE

## (undated) DEVICE — GLOVE ORTHO 8   MSG9480

## (undated) DEVICE — Z DUPLICATE USE 2738952 SYSTEM VENT M AD NSL PAP DEV HD STRP 2L HYPRINFL BG MRI

## (undated) DEVICE — TUBE ET 7MM NSL ORAL BASIC CUF INTMED MURPHY EYE RADPQ MRK

## (undated) DEVICE — ENDO KIT,LOURDES HOSPITAL: Brand: MEDLINE INDUSTRIES, INC.

## (undated) DEVICE — GAUZE,SPONGE,FLUFF,6"X6.75",STRL,10/TRAY: Brand: MEDLINE

## (undated) DEVICE — GLOVE SURG SZ 85 L12IN FNGR ORTHO 126MIL CRM LTX FREE

## (undated) DEVICE — DRESSING PETRO W2XL2IN 100% USP COT GZ 3% BISMUTH

## (undated) DEVICE — CHLORAPREP 26ML ORANGE

## (undated) DEVICE — GLOVE ORANGE PI 8   MSG9080

## (undated) DEVICE — 9165 UNIVERSAL PATIENT PLATE: Brand: 3M™

## (undated) DEVICE — SOLUTION IV IRRIG POUR BRL 0.9% SODIUM CHL 2F7124

## (undated) DEVICE — SURGICAL PROCEDURE PACK KNEE TOT DBD CDS LOURDES HOSP LF

## (undated) DEVICE — SOLUTION IRRIG 1000ML 09% SOD CHL USP PIC PLAS CONTAINER

## (undated) DEVICE — CEMENT MIXING SYSTEM WITH FEMORAL BREAKWAY NOZZLE: Brand: REVOLUTION

## (undated) DEVICE — 3M™ COBAN™ NL STERILE NON-LATEX SELF-ADHERENT WRAP, 2082S, 2 IN X 5 YD (5 CM X 4,5 M), 36 ROLLS/CASE: Brand: 3M™ COBAN™

## (undated) DEVICE — LARYNGOSCOPE BLDE MAC HNDL M SZ 35 ST CURAPLEX CURAVIEW LED

## (undated) DEVICE — AMBU AURAONCE U SIZE 3: Brand: AURAONCE

## (undated) DEVICE — DRAPE,U/ SHT,SPLIT,PLAS,STERIL: Brand: MEDLINE

## (undated) DEVICE — SUTURE VCRL SZ 3-0 L27IN ABSRB UD L19MM PS-2 3/8 CIR PRIM J427H

## (undated) DEVICE — SUTURE ETHLN SZ 4-0 L18IN NONABSORBABLE BLK L19MM PS-2 3/8 1667H

## (undated) DEVICE — ELECTROSURGICAL PENCIL BUTTON SWITCH NON COATED BLADE ELECTRODE 10 FT (3 M) CORD HOLSTER: Brand: MEGADYNE

## (undated) DEVICE — Z INACTIVE USE 2660664 SOLUTION IRRIG 3000ML 0.9% SOD CHL USP UROMATIC PLAS CONT

## (undated) DEVICE — GLOVE SURG SZ 85 L12IN FNGR THK79MIL GRN LTX FREE

## (undated) DEVICE — Z DISCONTINUED USE 2272117 DRAPE SURG 3 QTR N INVASIVE 2 LAYR DISP

## (undated) DEVICE — BIPOLAR CORD: Brand: DEROYAL

## (undated) DEVICE — DRAPE,EXTREMITY,89X128,STERILE: Brand: MEDLINE

## (undated) DEVICE — MAJOR BSIN SETUP PK

## (undated) DEVICE — GLOVE SURG SZ 85 CRM LTX FREE POLYISOPRENE POLYMER BEAD ANTI

## (undated) DEVICE — RECIPROCATING BLADE DOUBLE SIDE (74.0 X 0.77MM)

## (undated) DEVICE — BANDAGE COMPR W3INXL15FT BGE E SGL LAYERED CLP CLSR

## (undated) DEVICE — SYSTEM SKIN CLSR 22CM DERMBND PRINEO

## (undated) DEVICE — SUTURE ABSRB BRAID COAT UD CP NO 2 27IN VCRL J195H

## (undated) DEVICE — DRESSING FOAM W4XL12IN SIL RECT ADH WTRPRF FLM BK W/ BORD

## (undated) DEVICE — DUAL CUT SAGITTAL BLADE

## (undated) DEVICE — SHEET,DRAPE,53X77,STERILE: Brand: MEDLINE

## (undated) DEVICE — CIRCUIT AD PLN SWVL WYE